# Patient Record
Sex: MALE | Race: WHITE | NOT HISPANIC OR LATINO | Employment: UNEMPLOYED | URBAN - METROPOLITAN AREA
[De-identification: names, ages, dates, MRNs, and addresses within clinical notes are randomized per-mention and may not be internally consistent; named-entity substitution may affect disease eponyms.]

---

## 2017-01-28 ENCOUNTER — HOSPITAL ENCOUNTER (EMERGENCY)
Facility: MEDICAL CENTER | Age: 22
End: 2017-01-28
Attending: EMERGENCY MEDICINE
Payer: MEDICAID

## 2017-01-28 VITALS
OXYGEN SATURATION: 96 % | DIASTOLIC BLOOD PRESSURE: 85 MMHG | HEIGHT: 70 IN | WEIGHT: 250.66 LBS | BODY MASS INDEX: 35.89 KG/M2 | HEART RATE: 92 BPM | SYSTOLIC BLOOD PRESSURE: 125 MMHG | TEMPERATURE: 97.7 F | RESPIRATION RATE: 16 BRPM

## 2017-01-28 DIAGNOSIS — S10.11XA ABRASION OF THROAT, INITIAL ENCOUNTER: ICD-10-CM

## 2017-01-28 PROCEDURE — 99283 EMERGENCY DEPT VISIT LOW MDM: CPT

## 2017-01-28 PROCEDURE — 700101 HCHG RX REV CODE 250: Performed by: EMERGENCY MEDICINE

## 2017-01-28 RX ORDER — BENZONATATE 100 MG/1
100 CAPSULE ORAL 3 TIMES DAILY PRN
Qty: 30 CAP | Refills: 0 | Status: SHIPPED | OUTPATIENT
Start: 2017-01-28 | End: 2017-04-02

## 2017-01-28 RX ADMIN — LIDOCAINE HYDROCHLORIDE 15 ML: 20 SOLUTION ORAL; TOPICAL at 17:40

## 2017-01-28 ASSESSMENT — PAIN SCALES - GENERAL: PAINLEVEL_OUTOF10: 4

## 2017-01-28 NOTE — ED AVS SNAPSHOT
Inmagic Access Code: [email or web address]  Expires: 2/27/2017  5:11 PM    Your email address is not on file at [email or web address].  Email Addresses are required for you to sign up for Inmagic, please contact [phone] to verify your personal information and to provide your email address prior to attempting to register for Inmagic.    [address] [place] [address]    Inmagic  A secure, online tool to manage your health information     [facility]’s Inmagic® is a secure, online tool that connects you to your personalized health information from the privacy of your home -- day or night - making it very easy for you to manage your healthcare. Once the activation process is completed, you can even access your medical information using the Inmagic bryan, which is available for free in the Apple Bryan store or Google Play store.     To learn more about Inmagic, visit [email or web address]    There are two levels of access available (as shown below):   My Chart Features  [facility] Primary Care Doctor [facility]  Specialists [facility]  Urgent  Care Non-[facility] Primary Care Doctor   Email your healthcare team securely and privately 24/7 X X X    Manage appointments: schedule your next appointment; view details of past/upcoming appointments X      Request prescription refills. X      View recent personal medical records, including lab and immunizations X X X X   View health record, including health history, allergies, medications X X X X   Read reports about your outpatient visits, procedures, consult and ER notes X X X X   See your discharge summary, which is a recap of your hospital and/or ER visit that includes your diagnosis, lab results, and care plan X X  X     How to register for Vibrowt:  Once your e-mail address has been verified, follow the following steps to sign up for Inmagic.     1. Go to  [email or web address]  2. Click on the Sign Up Now box, which takes you to the New Member Sign Up page. You will need to  provide the following information:  a. Enter your Zomato Access Code exactly as it appears at the top of this page. (You will not need to use this code after you’ve completed the sign-up process. If you do not sign up before the expiration date, you must request a new code.)   b. Enter your date of birth.   c. Enter your home email address.   d. Click Submit, and follow the next screen’s instructions.  3. Create a Zomato ID. This will be your Zomato login ID and cannot be changed, so think of one that is secure and easy to remember.  4. Create a Zomato password. You can change your password at any time.  5. Enter your Password Reset Question and Answer. This can be used at a later time if you forget your password.   6. Enter your e-mail address. This allows you to receive e-mail notifications when new information is available in Zomato.  7. Click Sign Up. You can now view your health information.    For assistance activating your Zomato account, call [phone]

## 2017-01-28 NOTE — ED AVS SNAPSHOT
1/28/2017          Fareed Muhammad  [address] [place] [address]    Dear Fareed:    [facility] wants to ensure your discharge home is safe and you or your loved ones have had all your questions answered regarding your care after you leave the hospital.    You may receive a telephone call within two days of your discharge.  This call is to make certain you understand your discharge instructions as well as ensure we provided you with the best care possible during your stay with us.     The call will only last approximately 3-5 minutes and will be done by a nurse.    Once again, we want to ensure your discharge home is safe and that you have a clear understanding of any next steps in your care.  If you have any questions or concerns, please do not hesitate to contact us, we are here for you.  Thank you for choosing [facility] for your healthcare needs.    Sincerely,    Shivam Ramos    [facility]

## 2017-01-28 NOTE — ED AVS SNAPSHOT
After Visit Summary                                                                                                                Fareed Muhammad   MRN: [id number]    Department:  [facility], Emergency Dept   Date of Visit:  1/28/2017            [facility], Emergency Dept    [address]    [place] [address]    Phone:  [phone]      You were seen by     Shivam Meyers M.D.      Your Diagnosis Was     Abrasion of throat, initial encounter     S10.11XA       Follow-up Information     1. Follow up with CLAUDIO De La Torre.    Specialty:  Family Medicine    Why:  As needed    Contact information    [address] [address] [phone]        Medication Information     Review all of your home medications and newly ordered medications with your primary doctor and/or pharmacist as soon as possible. Follow medication instructions as directed by your doctor and/or pharmacist.     Please keep your complete medication list with you and share with your physician. Update the information when medications are discontinued, doses are changed, or new medications (including over-the-counter products) are added; and carry medication information at all times in the event of emergency situations.               Medication List      START taking these medications        Instructions    benzonatate 100 MG Caps   Commonly known as:  TESSALON    Take 1 Cap by mouth 3 times a day as needed for Cough.   Dose:  100 mg                 Discharge Instructions       Choking, Adult  Choking occurs when a food or object gets stuck in the throat or trachea, blocking the airway. If the airway is partly blocked, coughing will usually cause the food or object to come out. If the airway is completely blocked, immediate action is needed to help it come out. A complete airway blockage is life threatening because it causes breathing to stop. Choking is a true medical emergency that  requires fast, appropriate action by anyone available.  SIGNS OF AIRWAY BLOCKAGE  There is a partial airway blockage if you or the person who is choking is:   · Able to breathe and speak.  · Coughing loudly.  · Making loud noises.  There is a complete airway blockage if you or the person who is choking is:   · Unable to breathe.  · Making soft or high-pitched sounds while breathing.  · Unable to cough or coughing weakly, ineffectively, or silently.  · Unable to cry, speak, or make sounds.  · Turning blue.  · Holding the neck with both arms. This is the universal sign of choking.  WHAT TO DO IF CHOKING OCCURS  If there is a partial airway blockage, allow coughing to clear the airway. Do not try to drink until the food or object comes out. If someone else has a partial airway blockage, do not interfere. Stay with him or her and watch for signs of complete airway blockage until the food or object comes out.   If there is a complete airway blockage or if there is a partial airway blockage and the food or object does not come out, perform abdominal thrusts (also referred to as the Heimlich maneuver). Abdominal thrusts are used to create an artificial cough to try to clear the airway. Performing abdominal thrusts is part of a series of steps that should be done to help someone who is choking. Abdominal thrusts are usually done by someone else, but if you are alone, you can perform abdominal thrusts on yourself. Follow the procedure below that best fits your situation.   IF SOMEONE ELSE IS CHOKING:  For a conscious adult:   1. Ask the person whether he or she is choking. If the person nods, continue to step 2.  2. Stand or kneel behind the person and lean him or her forward slightly.  3. Make a fist with 1 hand, put your arms around the person, and grasp your fist with your other hand. Place the thumb side of your fist in the person's abdomen, just below the ribs.  4. Press inward and upward with both hands.  5. Repeat  this maneuver until the object comes out and the person is able to breathe or until the person loses consciousness.  For an unconscious adult:  1. Shout for help. If someone responds, have him or her call local emergency services (971 in [place] If no one responds, call local emergency services yourself if possible.  2. Begin CPR, starting with compressions. Every time you open the airway to give rescue breaths, open the person's mouth. If you can see the food or object and it can be easily pulled out, remove it with your fingers.  3. After 5 cycles or 2 minutes of CPR, call local emergency services (781 in [place] if you or someone else did not already call.  For a conscious adult who is obese or in the later stages of pregnancy:  Abdominal thrusts may not be effective when helping people who are in the later stages of pregnancy or who are obese. In these instances, chest thrusts can be used.   1. Ask the person whether he or she is choking. If the person nods and has signs of complete airway blockage, continue to step 2.  2. Stand behind the person and wrap your arms around his or her chest (with your arms under the person's armpits).  3. Make a fist with 1 hand. Place the thumb side of your fist on the middle of the person's breastbone.  4. Grab your fist with your other hand and thrust backward. Continue this until the object comes out or until the person becomes unconscious.  For an unconscious adult who is obese or in the later stages of pregnancy:   1. Shout for help. If someone responds, have him or her call local emergency services (571 in [place] If no one responds, call local emergency services yourself if possible.  2. Begin CPR, starting with compressions. Every time you open the airway to give rescue breaths, open the person's mouth. If you can see the food or object and it can be easily pulled out, remove it with your fingers.  3. After 5 cycles or 2 minutes of CPR, call local emergency services (341 in  [place] if you or someone else did not already call.  Note that abdominal thrusts (below the rib cage) should be used for a pregnant woman if possible. This should be possible until the later stages of pregnancy when there is no longer enough room between the enlarging uterus and the rib cage to perform the maneuver. At that point, chest thrusts must be used as described.  IF YOU ARE CHOKIN. Call local emergency services (911 in [place] if near a landline. Do not worry about communicating what is happening. Do not hang up the phone. Someone may be sent to help you anyway.  2. Make a fist with 1 hand. Put the thumb side of the fist against your stomach, just above the belly button and well below the breastbone. If you are pregnant or obese, put your fist on your chest instead, just below the breastbone and just above your lowest ribs.  3. Hold your fist with your other hand and bend over a hard surface, such as a table or chair.  4. Forcefully push your fist in and up.  5. Continue to do this until the food or object comes out.  PREVENTION   To be prepared if choking occurs, learn how to correctly perform abdominal thrusts and give CPR by taking a certified first-aid training course.   SEEK IMMEDIATE MEDICAL CARE IF:  · You have a fever after choking stops.  · You have problems breathing after choking stops.  · You received the Heimlich maneuver.  MAKE SURE YOU:  · Understand these instructions.  · Watch your condition.  · Get help right away if you are not doing well or get worse.     This information is not intended to replace advice given to you by your health care provider. Make sure you discuss any questions you have with your health care provider.     Document Released: 2006 Document Revised: 2016 Document Reviewed: 2013  Elsevier Interactive Patient Education ©6 Elsevier Inc.            Patient Information     Patient Information    Following emergency treatment: all patient requiring  follow-up care must return either to a private physician or a clinic if your condition worsens before you are able to obtain further medical attention, please return to the emergency room.     Billing Information    At [facility], we work to make the billing process streamlined for our patients.  Our Representatives are here to answer any questions you may have regarding your hospital bill.  If you have insurance coverage and have supplied your insurance information to us, we will submit a claim to your insurer on your behalf.  Should you have any questions regarding your bill, we can be reached online or by phone as follows:  Online: You are able pay your bills online or live chat with our representatives about any billing questions you may have. We are here to help Monday - Friday from 8:00am to 7:30pm and 9:00am - 12:00pm on Saturdays.  Please visit [email or web address]  for more information.   Phone:  [phone] or [phone]    Please note that your emergency physician, surgeon, pathologist, radiologist, anesthesiologist, and other specialists are not employed by [facility] and will therefore bill separately for their services.  Please contact them directly for any questions concerning their bills at the numbers below:     Emergency Physician Services:  [phone]  [facility]:  [phone]  [facility]:  [phone]  [facility]:  [phone]    1. Your final bill may vary from the amount quoted upon discharge if all procedures are not complete at that time, or if your doctor has additional procedures of which we are not aware. You will receive an additional bill if you return to the Emergency Department at [facility] for suture removal regardless of the facility of which the sutures were placed.     2. Please arrange for settlement of this account at the emergency registration.    3. All self-pay accounts are due in  full at the time of treatment.  If you are unable to meet this obligation then payment is expected within 4-5 days.     4. If you have had radiology studies (CT, X-ray, Ultrasound, MRI), you have received a preliminary result during your emergency department visit. Please contact the radiology department [phone] to receive a copy of your final result. Please discuss the Final result with your primary physician or with the follow up physician provided.     Crisis Hotline:  [place] Crisis Hotline:  4-337-WHSGURO or 1-193.703.9959  [place] Crisis Hotline:    1-982.877.8369 or 332-370-3380         ED Discharge Follow Up Questions    1. In order to provide you with very good care, we would like to follow up with a phone call in the next few days.  May we have your permission to contact you?     YES /  NO    2. What is the best phone number to call you? (       )_____-__________    3. What is the best time to call you?      Morning  /  Afternoon  /  Evening                   Patient Signature:  ____________________________________________________________    Date:  ____________________________________________________________

## 2017-01-29 NOTE — DISCHARGE INSTRUCTIONS
The patient was informed of their blood pressure exceeding 120/80 and I have asked them to follow up with their primary care physician to discuss further monitoring and possible treatment.     Choking, Adult  Choking occurs when a food or object gets stuck in the throat or trachea, blocking the airway. If the airway is partly blocked, coughing will usually cause the food or object to come out. If the airway is completely blocked, immediate action is needed to help it come out. A complete airway blockage is life threatening because it causes breathing to stop. Choking is a true medical emergency that requires fast, appropriate action by anyone available.  SIGNS OF AIRWAY BLOCKAGE  There is a partial airway blockage if you or the person who is choking is:   · Able to breathe and speak.  · Coughing loudly.  · Making loud noises.  There is a complete airway blockage if you or the person who is choking is:   · Unable to breathe.  · Making soft or high-pitched sounds while breathing.  · Unable to cough or coughing weakly, ineffectively, or silently.  · Unable to cry, speak, or make sounds.  · Turning blue.  · Holding the neck with both arms. This is the universal sign of choking.  WHAT TO DO IF CHOKING OCCURS  If there is a partial airway blockage, allow coughing to clear the airway. Do not try to drink until the food or object comes out. If someone else has a partial airway blockage, do not interfere. Stay with him or her and watch for signs of complete airway blockage until the food or object comes out.   If there is a complete airway blockage or if there is a partial airway blockage and the food or object does not come out, perform abdominal thrusts (also referred to as the Heimlich maneuver). Abdominal thrusts are used to create an artificial cough to try to clear the airway. Performing abdominal thrusts is part of a series of steps that should be done to help someone who is choking. Abdominal thrusts are usually done  by someone else, but if you are alone, you can perform abdominal thrusts on yourself. Follow the procedure below that best fits your situation.   IF SOMEONE ELSE IS CHOKING:  For a conscious adult:   1. Ask the person whether he or she is choking. If the person nods, continue to step 2.  2. Stand or kneel behind the person and lean him or her forward slightly.  3. Make a fist with 1 hand, put your arms around the person, and grasp your fist with your other hand. Place the thumb side of your fist in the person's abdomen, just below the ribs.  4. Press inward and upward with both hands.  5. Repeat this maneuver until the object comes out and the person is able to breathe or until the person loses consciousness.  For an unconscious adult:  1. Shout for help. If someone responds, have him or her call local emergency services (911 in [place] If no one responds, call local emergency services yourself if possible.  2. Begin CPR, starting with compressions. Every time you open the airway to give rescue breaths, open the person's mouth. If you can see the food or object and it can be easily pulled out, remove it with your fingers.  3. After 5 cycles or 2 minutes of CPR, call local emergency services (911 in [place] if you or someone else did not already call.  For a conscious adult who is obese or in the later stages of pregnancy:  Abdominal thrusts may not be effective when helping people who are in the later stages of pregnancy or who are obese. In these instances, chest thrusts can be used.   1. Ask the person whether he or she is choking. If the person nods and has signs of complete airway blockage, continue to step 2.  2. Stand behind the person and wrap your arms around his or her chest (with your arms under the person's armpits).  3. Make a fist with 1 hand. Place the thumb side of your fist on the middle of the person's breastbone.  4. Grab your fist with your other hand and thrust backward. Continue this until the  object comes out or until the person becomes unconscious.  For an unconscious adult who is obese or in the later stages of pregnancy:   1. Shout for help. If someone responds, have him or her call [facility] emergency services (694 in [place] If no one responds, call local emergency services yourself if possible.  2. Begin CPR, starting with compressions. Every time you open the airway to give rescue breaths, open the person's mouth. If you can see the food or object and it can be easily pulled out, remove it with your fingers.  3. After 5 cycles or 2 minutes of CPR, call [facility] emergency services (020 in [place] if you or someone else did not already call.  Note that abdominal thrusts (below the rib cage) should be used for a pregnant woman if possible. This should be possible until the later stages of pregnancy when there is no longer enough room between the enlarging uterus and the rib cage to perform the maneuver. At that point, chest thrusts must be used as described.  IF YOU ARE CHOKIN. Call local emergency services (391 in [place] if near a landline. Do not worry about communicating what is happening. Do not hang up the phone. Someone may be sent to help you anyway.  2. Make a fist with 1 hand. Put the thumb side of the fist against your stomach, just above the belly button and well below the breastbone. If you are pregnant or obese, put your fist on your chest instead, just below the breastbone and just above your lowest ribs.  3. Hold your fist with your other hand and bend over a hard surface, such as a table or chair.  4. Forcefully push your fist in and up.  5. Continue to do this until the food or object comes out.  PREVENTION   To be prepared if choking occurs, learn how to correctly perform abdominal thrusts and give CPR by taking a certified first-aid training course.   SEEK IMMEDIATE MEDICAL CARE IF:  · You have a fever after choking stops.  · You have problems breathing after choking stops.  · You  received the Heimlich maneuver.  MAKE SURE YOU:  · Understand these instructions.  · Watch your condition.  · Get help right away if you are not doing well or get worse.     This information is not intended to replace advice given to you by your health care provider. Make sure you discuss any questions you have with your health care provider.     Document Released: 01/25/2006 Document Revised: 01/08/2016 Document Reviewed: 07/30/2013  ElseUpTo Interactive Patient Education ©2016 Elsevier Inc.

## 2017-01-29 NOTE — ED NOTES
".  Chief Complaint   Patient presents with   • Sore Throat     x1 day     C/o swelling and pain to throat x 1 day, reports \"white patches\"  "

## 2017-01-29 NOTE — ED PROVIDER NOTES
"ED Provider Note    Scribed for Shivam Meyers M.D. by Syeda Weinstein. 1/28/2017, 5:03 PM.    Primary care provider: CLAUDIO De La Torre  Means of arrival: Walk-In  History obtained from: Patient  History limited by: None    CHIEF COMPLAINT  Chief Complaint   Patient presents with   • Sore Throat     x1 day     HPI  Fareed Muhammad is a 21 y.o. male with asthma who presents to the Emergency Department with persistent throat pain without associated cough or dysphagia onset one day ago secondary to a potential retained tortilla chip. Today the patient was eating tortilla chips when he believes a chip may have gotten \"stuck\" in the back of his throat. Since that time he has noted persistent throat pain. He has not developed fever, chills, breath shortness, wheezing, cough, or sputum production.  The patient reports suffering from strep throat multiple times in the past and has come to the ED for concern of infection.  The patient last saw an ENT three years ago.  He is otherwise healthy, without known allergies.  The patient smokes cigarettes daily. He denies additional symptoms or further pertinent medical history.     REVIEW OF SYSTEMS  See HPI,  Negative for fevers, chills, breath shortness, wheezing, cough, sputum production      PAST MEDICAL HISTORY   has a past medical history of ASTHMA and Drug abuse in remission.    SURGICAL HISTORY  patient denies any surgical history    SOCIAL HISTORY  Social History   Substance Use Topics   • Smoking status: Current Every Day Smoker -- 0.25 packs/day for 3 years   • Smokeless tobacco: None      Comment: currently using e-cigarettes   • Alcohol Use: No      History   Drug Use   • Yes   • Special: Marijuana, Methamphetamines     FAMILY HISTORY  Family History   Problem Relation Age of Onset   • Cancer Paternal Aunt      pradip marie   • Hypertension Maternal Grandmother    • Diabetes Maternal Grandmother    • Cancer Maternal Grandmother    • Hypertension Maternal " "Grandfather    • Diabetes Maternal Grandfather      CURRENT MEDICATIONS  Reviewed.  See Encounter Summary.     ALLERGIES  No Known Allergies    PHYSICAL EXAM  VITAL SIGNS: /81 mmHg  Pulse 90  Temp(Src) 36.3 °C (97.4 °F)  Resp 16  Ht 1.778 m (5' 10\")  Wt 113.7 kg (250 lb 10.6 oz)  BMI 35.97 kg/m2  SpO2 96%  Constitutional: Awake, alert in no apparent distress.  HENT: Normocephalic, Bilateral external ears normal. Nose normal. Oropharynx within normal limits, no foreign body seen, there are mild cryptic tonsils. The patient is controlling his secretions.  Eyes: Conjunctiva normal, non-icteric, EOMI.    Thorax & Lungs: Easy unlabored respirations, no wheezes, no stridor  Cardiovascular:    Abdomen: Nondistended   :    Skin: Visualized skin is  Dry, No erythema, No rash.   Extremities:   No cyanosis, clubbing or edema  Neurologic: Alert, Grossly non-focal.   Psychiatric: Affect and Mood normal    COURSE & MEDICAL DECISION MAKING  Nursing notes and vital signs were reviewed. Pertinent Labs & Imaging studies reviewed. (See chart for details)    5:03 PM - Patient seen and examined at bedside. Patient presents with a throat abrasion. He was informed that his foreign body, as it was food, likely dissolved and he simply has persistent pain from a small abrasion.   I discussed his plans for discharge with a prescription for Tessalon. He was instructed to return to the ED if his symptoms worsen. Patient understands and agrees.     Patient's blood pressure was elevated, I believe it is likely secondary to medical condition. However I have advised home monitoring and if it continues to be 120/80 or higher, advised to followup with primary care physician for blood pressure management.     Decision Making:  This is a 21 y.o. year old male who presents with complaints of possibly retained foreign body in the throat. He does not have any respiratory distress, he is controlling his secretions, he denies any cough. Given " this history the foreign body would not have lodged in the vestibule of the larynx. The foreign body is a easily dissolvable potato chip, it should not be retained in the oropharynx or esophagus. He is controlling his secretions without difficulty. I do not feel that radiological studies would be helpful and I do not feel that he requires a rhinoscopy. Most likely he is experiencing pain from the abrasion of a hard potato chip. He was able to tolerate viscous lidocaine and noted improvement with this. He'll be discharged Tessalon pearls.  He is to return for any difficulty breathing, difficulty swallowing or any other concern.    DISPOSITION:  Patient will be discharged home in good condition.    Discharge Medications:  New Prescriptions    BENZONATATE (TESSALON) 100 MG CAP    Take 1 Cap by mouth 3 times a day as needed for Cough.     The patient was discharged home (see d/c instructions) and told to return immediately for any signs or symptoms listed, or any worsening at all.  The patient verbally agreed to the discharge precautions and follow-up plan which is documented in EPIC.    FINAL IMPRESSION  1. Abrasion of throat, initial encounter        Syeda WELLER (Scribe), am scribing for, and in the presence of, Shivam Meyers M.D..    Electronically signed by: Syeda Weinstein (Latiaibe), 1/28/2017    Shivam WELLER M.D. personally performed the services described in this documentation, as scribed by Syeda Weinstein in my presence, and it is both accurate and complete.    The note accurately reflects work and decisions made by me.  Shivam Meyers  1/28/2017  5:16 PM

## 2017-01-29 NOTE — ED NOTES
"Pt c/o sore throat since yesterday. States \"I swallowed a chip and I feel like it got stuck or scraped my throat, I have had strep throat before and want to catch it before I get infected.\" white coat noted to back of tongue, no patches noted to tonsils. Pt a/o x4, speaking in full sentences.   "

## 2017-01-29 NOTE — ED NOTES
MD at bedside prior to d/c. Pt given d/c instruction. One rx given. Pt ambulatory to lobby, gait steady. Pt took all belongings from room.

## 2017-03-26 ENCOUNTER — HOSPITAL ENCOUNTER (EMERGENCY)
Facility: MEDICAL CENTER | Age: 22
End: 2017-03-26
Payer: MEDICAID

## 2017-03-26 VITALS
HEART RATE: 81 BPM | SYSTOLIC BLOOD PRESSURE: 145 MMHG | WEIGHT: 247.8 LBS | DIASTOLIC BLOOD PRESSURE: 79 MMHG | TEMPERATURE: 96.6 F | RESPIRATION RATE: 16 BRPM | HEIGHT: 70 IN | OXYGEN SATURATION: 97 % | BODY MASS INDEX: 35.48 KG/M2

## 2017-03-26 PROCEDURE — 302449 STATCHG TRIAGE ONLY (STATISTIC)

## 2017-03-27 NOTE — ED NOTES
Chief Complaint   Patient presents with   • Ear Pain     left ear. x2 hours. Patient felt ear swelling after sitting for plasma donation. ear is red.   Pt ambulatory to triage with above complaint. Pt returned to lobby, educated on triage process, and to inform staff of any changes or concerns.

## 2017-03-27 NOTE — ED NOTES
Pt came to desk requesting AMA. Pt advised of risk. Pt acknowledges and understands risk. Py. Informed he was assigned to a room but needed to get home and would come back if symptoms worsened. Pt signed out AMA.

## 2017-04-02 ENCOUNTER — HOSPITAL ENCOUNTER (EMERGENCY)
Facility: MEDICAL CENTER | Age: 22
End: 2017-04-02
Attending: EMERGENCY MEDICINE
Payer: MEDICAID

## 2017-04-02 VITALS
BODY MASS INDEX: 34.36 KG/M2 | DIASTOLIC BLOOD PRESSURE: 94 MMHG | HEART RATE: 89 BPM | HEIGHT: 70 IN | WEIGHT: 240 LBS | SYSTOLIC BLOOD PRESSURE: 138 MMHG | TEMPERATURE: 98 F | RESPIRATION RATE: 16 BRPM

## 2017-04-02 DIAGNOSIS — L08.9 SKIN INFECTION: ICD-10-CM

## 2017-04-02 PROCEDURE — 99284 EMERGENCY DEPT VISIT MOD MDM: CPT

## 2017-04-02 RX ORDER — CEPHALEXIN 500 MG/1
500 CAPSULE ORAL 4 TIMES DAILY
Qty: 28 CAP | Refills: 0 | Status: SHIPPED | OUTPATIENT
Start: 2017-04-02 | End: 2018-04-28

## 2017-04-02 ASSESSMENT — LIFESTYLE VARIABLES
TOTAL SCORE: 0
HAVE PEOPLE ANNOYED YOU BY CRITICIZING YOUR DRINKING: NO
TOTAL SCORE: 0
DO YOU DRINK ALCOHOL: YES
EVER FELT BAD OR GUILTY ABOUT YOUR DRINKING: NO
CONSUMPTION TOTAL: INCOMPLETE
EVER HAD A DRINK FIRST THING IN THE MORNING TO STEADY YOUR NERVES TO GET RID OF A HANGOVER: NO
TOTAL SCORE: 0
HAVE YOU EVER FELT YOU SHOULD CUT DOWN ON YOUR DRINKING: NO

## 2017-04-02 ASSESSMENT — PAIN SCALES - GENERAL: PAINLEVEL_OUTOF10: 2

## 2017-04-02 NOTE — ED PROVIDER NOTES
ED Provider Note    CHIEF COMPLAINT  Chief Complaint   Patient presents with   • Legal 2000     Pt was released this am from intermediate, picked up for sleeping on the street, placed on legal after staff felt pt was rambling and unsafe to release on his own   • Hand Pain     rt, small abrasion on rt knuckle       HPI  Fareed Muhammad is a 21 y.o. male who presents for evaluation of a hand injury. The patient was just released from intermediate. He is homeless. He was evidently picked up for sleeping on the street. He was placed on a legal hold at the time of his release from the intermediate because he was disoriented. Upon arrival here he is oriented to person, place, and date. He states he did injure his right hand when they were putting handcuffs on him. He has no other physical complaints.    REVIEW OF SYSTEMS  See HPI for further details. All other systems are negative.     PAST MEDICAL HISTORY  Past Medical History   Diagnosis Date   • ASTHMA      Dx at age 5, does not use and inhaler, and has never had an attack   • Drug abuse in remission      meth, alcohol and tobacco since 14       FAMILY HISTORY  Family History   Problem Relation Age of Onset   • Cancer Paternal Aunt      pradip marie   • Hypertension Maternal Grandmother    • Diabetes Maternal Grandmother    • Cancer Maternal Grandmother    • Hypertension Maternal Grandfather    • Diabetes Maternal Grandfather        SOCIAL HISTORY  Social History     Social History   • Marital Status: Single     Spouse Name: N/A   • Number of Children: N/A   • Years of Education: N/A     Social History Main Topics   • Smoking status: Current Every Day Smoker -- 0.25 packs/day for 3 years   • Smokeless tobacco: None      Comment: currently using e-cigarettes   • Alcohol Use: No   • Drug Use: Yes     Special: Marijuana, Methamphetamines   • Sexual Activity:     Partners: Female     Birth Control/ Protection: Condom     Other Topics Concern   • None     Social History Narrative       SURGICAL  "HISTORY  History reviewed. No pertinent past surgical history.    CURRENT MEDICATIONS  Home Medications     Reviewed by Sobia Fuchs R.N. (Registered Nurse) on 04/02/17 at 1021  Med List Status: Complete    Medication Last Dose Status          Patient Juliano Taking any Medications                        ALLERGIES  No Known Allergies    PHYSICAL EXAM  VITAL SIGNS: /94 mmHg  Pulse 89  Temp(Src) 36.7 °C (98 °F)  Resp 16  Ht 1.778 m (5' 10\")  Wt 108.863 kg (240 lb)  BMI 34.44 kg/m2    Constitutional: Well developed, Well nourished, No acute distress, Non-toxic appearance.   HENT: Normocephalic, Atraumatic.   Eyes:  EOMI, Conjunctiva normal, No discharge.   Cardiovascular: Normal heart rate.   Thorax & Lungs: No respiratory distress.   Skin: Warm, Dry. Sunburn to his chest.  Musculoskeletal: Right hand shows no obvious deformity. He has an abrasion overlying the index MCP joint region. There appears to be a secondary cellulitis. He has another minor abrasion to the dorsal aspect of the middle finger overlying the PIP joint region. He has full range of motion of all digits.  Neurologic: Awake alert and oriented x 3. Cranial nerves II through XII are intact. Normal motor function, No focal deficits noted.       COURSE & MEDICAL DECISION MAKING  Pertinent Labs & Imaging studies reviewed. (See chart for details)  This 21-year-old here for evaluation of a hand injury as well as being on a legal 2000. The patient denies any suicidal or homicidal ideations. He is oriented to person, place, and date. I see no indication for a legal 2000 at this point. He's been evaluated by Rebekah of life [facility]. He does appear to have a cellulitis secondary to a hand abrasion and I will start him on Keflex. He is given a discharge instruction sheet on skin infections.    FINAL IMPRESSION  1. Abrasion to the right hand with secondary cellulitis  2.   3.         Electronically signed by: Elias Adamson, 4/2/2017 10:54 AM      "

## 2017-04-02 NOTE — ED AVS SNAPSHOT
Home Care Instructions                                                                                                                Fareed Muhammad   MRN: [id number]    Department:  [facility], Emergency Dept   Date of Visit:  4/2/2017            [facility], Emergency Dept    [address]    [place] [address]    Phone:  [phone]      You were seen by     Elias Adamson M.D.      Your Diagnosis Was     Skin infection     L08.9       Follow-up Information     1. Follow up with CLAUDIO De La Torre.    Specialty:  Family Medicine    Contact information    [address]  [address]  [place] [address] [phone]        Medication Information     Review all of your home medications and newly ordered medications with your primary doctor and/or pharmacist as soon as possible. Follow medication instructions as directed by your doctor and/or pharmacist.     Please keep your complete medication list with you and share with your physician. Update the information when medications are discontinued, doses are changed, or new medications (including over-the-counter products) are added; and carry medication information at all times in the event of emergency situations.               Medication List      START taking these medications        Instructions    Morning Afternoon Evening Bedtime    cephALEXin 500 MG Caps   Commonly known as:  KEFLEX        Take 1 Cap by mouth 4 times a day.   Dose:  500 mg                             Where to Get Your Medications      You can get these medications from any pharmacy     Bring a paper prescription for each of these medications    - cephALEXin 500 MG Caps              Discharge Instructions       Skin Infections  A skin infection usually develops as a result of disruption of the skin barrier.   CAUSES   A skin infection might occur following:  · Trauma or an injury to the skin such as a cut or insect sting.   · Inflammation (as in  eczema).   · Breaks in the skin between the toes (as in athlete's foot).   · Swelling (edema).   SYMPTOMS   The legs are the most common site affected. Usually there is:  · Redness.   · Swelling.   · Pain.   · There may be red streaks in the area of the infection.   TREATMENT   · Minor skin infections may be treated with topical antibiotics, but if the skin infection is severe, hospital care and intravenous (IV) antibiotic treatment may be needed.   · Most often skin infections can be treated with oral antibiotic medicine as well as proper rest and elevation of the affected area until the infection improves.   · If you are prescribed oral antibiotics, it is important to take them as directed and to take all the pills even if you feel better before you have finished all of the medicine.   · You may apply warm compresses to the area for 20-30 minutes 4 times daily.   You might need a tetanus shot now if:  · You have no idea when you had the last one.   · You have never had a tetanus shot before.   · Your wound had dirt in it.   If you need a tetanus shot and you decide not to get one, there is a rare chance of getting tetanus. Sickness from tetanus can be serious. If you get a tetanus shot, your arm may swell and become red and warm at the shot site. This is common and not a problem.  SEEK MEDICAL CARE IF:   The pain and swelling from your infection do not improve within 2 days.   SEEK IMMEDIATE MEDICAL CARE IF:   You develop a fever, chills, or other serious problems.   Document Released: 01/25/2006 Document Revised: 03/11/2013 Document Reviewed: 12/07/2009  [employer]® Patient Information ©2013 [employer].          Patient Information     Patient Information    Following emergency treatment: all patient requiring follow-up care must return either to a private physician or a clinic if your condition worsens before you are able to obtain further medical attention, please return to the emergency room.     Billing  Information    At [facility], we work to make the billing process streamlined for our patients.  Our Representatives are here to answer any questions you may have regarding your hospital bill.  If you have insurance coverage and have supplied your insurance information to us, we will submit a claim to your insurer on your behalf.  Should you have any questions regarding your bill, we can be reached online or by phone as follows:  Online: You are able pay your bills online or live chat with our representatives about any billing questions you may have. We are here to help Monday - Friday from 8:00am to 7:30pm and 9:00am - 12:00pm on Saturdays.  Please visit [email or web address]  for more information.   Phone:  [phone] or [phone]    Please note that your emergency physician, surgeon, pathologist, radiologist, anesthesiologist, and other specialists are not employed by [facility] and will therefore bill separately for their services.  Please contact them directly for any questions concerning their bills at the numbers below:     Emergency Physician Services:  [phone]  [facility]:  [phone]  [facility]:  [phone]  [facility]:  [phone]    1. Your final bill may vary from the amount quoted upon discharge if all procedures are not complete at that time, or if your doctor has additional procedures of which we are not aware. You will receive an additional bill if you return to the Emergency Department at [facility] for suture removal regardless of the facility of which the sutures were placed.     2. Please arrange for settlement of this account at the emergency registration.    3. All self-pay accounts are due in full at the time of treatment.  If you are unable to meet this obligation then payment is expected within 4-5 days.     4. If you have had radiology studies (CT, X-ray, Ultrasound, MRI), you have  received a preliminary result during your emergency department visit. Please contact the radiology department [phone] to receive a copy of your final result. Please discuss the Final result with your primary physician or with the follow up physician provided.     Crisis Hotline:  [place] Crisis Hotline:  5-350-ZLRWNZS or 1-563.786.8039  [place] Crisis Hotline:    1-986.396.4053 or 510-486-8215         ED Discharge Follow Up Questions    1. In order to provide you with very good care, we would like to follow up with a phone call in the next few days.  May we have your permission to contact you?     YES /  NO    2. What is the best phone number to call you? (       )_____-__________    3. What is the best time to call you?      Morning  /  Afternoon  /  Evening                   Patient Signature:  ____________________________________________________________    Date:  ____________________________________________________________

## 2017-04-02 NOTE — ED NOTES
Alert Team eval complete.  Pt states understanding of dc instructions and f/u care.   Financial Assist Rep @ BS.

## 2017-04-02 NOTE — CONSULTS
"[facility]   TRIAGE ASSESSMENT    Name: Fareed Muhammad  MRN: [id number]  : 1995  Age: 21 y.o.  Date of assessment: 2017  PCP: CLAUDIO De La Torre  Persons in attendance: Patient    CHIEF COMPLAINT/PRESENTING ISSUE (as stated by No Im not going to kill myself why would I life is good.\"  Was released from longterm but they placed him on legal until assess. By Eben.  Dr. Adamson exam. Him and removed from legal , put on script for infection in hand.          ):   Chief Complaint   Patient presents with   • Legal      Pt was released this am from longterm, picked up for sleeping on the street, placed on legal after staff felt pt was rambling and unsafe to release on his own   • Hand Pain     rt, small abrasion on rt knuckle        CURRENT LIVING SITUATION/SOCIAL SUPPORT: homeless recently, but lived with parents before that, and is calling mother after DC to come get him.  Has no children, 5 siblings, both parents alive.    BEHAVIORAL HEALTH TREATMENT HISTORY  Does patient/parent report a history of prior behavioral health treatment for patient?   once at [facility] 18 yr old, cant remember what problem was, poss. drugs.    SAFETY ASSESSMENT - SELF  Does patient acknowledge current or past symptoms of dangerousness to self? no  Does parent/significant other report patient has current or past symptoms of dangerousness to self? no  Does presenting problem suggest symptoms of dangerousness to self? No  \"Hell no why would I want to hurt myself.?\"      SAFETY ASSESSMENT - OTHERS  Does patient acknowledge current or past symptoms of aggressive behavior or risk to others? no  Does parent/significant other report patient has current or past symptoms of aggressive behavior or risk to others?  no  Does presenting problem suggest symptoms of dangerousness to others? No    Crisis Safety Plan completed and copy given to patient? N\A    ABUSE/NEGLECT SCREENING  Does patient report feeling “unsafe” in his/her " "home, or afraid of anyone?  no  Does patient report any history of physical, sexual, or emotional abuse?  no  Does parent or significant other report any of the above? no  Is there evidence of neglect by self?  no  Is there evidence of neglect by a caregiver? no  Does the patient/parent report any history of CPS/APS/police involvement related to suspected abuse/neglect or domestic violence? no  Based on the information provided during the current assessment, is a mandated report of suspected abuse/neglect being made?  No     SUBSTANCE USE SCREENING  Yes:  Qasim all substances used in the past 30 days:      Last Use Amount   []   Alcohol     []   Marijuana     []   Heroin     []   Prescription Opioids  (used without prescription, for    recreation, or in excess of prescribed amount)     []   Other Prescription  (used without prescription, for    recreation, or in excess of prescribed amount)     []   Cocaine      []   Methamphetamine     []   \"\" drugs (ectasy, MDMA)     []   Other substances        UDS results: pending   Breathalyzer results: 0.00      What consequences does the patient associate with any of the above substance use and or addictive behaviors? None    Risk factors for detox (check all that apply):  []  Seizures   []  Diaphoretic (sweating)   []  Tremors   []  Hallucinations   []  Increased blood pressure   []  Decreased blood pressure   []  Other   []  None      [] Patient education on risk factors for detoxification and instructed to return to ER as needed.      UDS results: pending   Breathalyzer results: 0.00    Risk factors for detox (check all that apply):  [] Seizures   [] Diaphoretic (sweating)   [] Tremors   [] Hallucinations   [] Increased blood pressure   [] Decreased blood pressure   [] Other    [] Patient education on risk factors for detoxification and instructed to return to ER as needed.  MENTAL STATUS   Participation: Active verbal participation  Grooming: Inappropriate to " situation  Orientation: Alert and Fully Oriented  Behavior: Agitated  Eye contact: Poor  Mood: Anxious  Affect: Constricted  Thought process: Circumstantial  Thought content: Preoccupation  Speech: Pressured and Rapid  Perception: Within normal limits  Memory:  Poor memory for chronology of events  Insight: Poor  Judgment:  Poor  Other:    Collateral information: police    Source:  [] Significant other present in person:   [] Significant other by telephone  [] [facility]   [] [facility] Nursing Staff  [] [facility]  [] Other:     [] Unable to complete full assessment due to:  [] Acute intoxication  [] Patient declined to participate/engage  [] Patient verbally unresponsive  [] Significant cognitive deficits  [] Significant perceptual distortions or behavioral disorganization  [] Other:      CLINICAL IMPRESSIONS:  Primary:  Substance abuse  Secondary:  Dep         IDENTIFIED NEEDS/PLAN:  [Trigger DISPOSITION list for any items marked]  His addiction is keeping him from remaining employed , so he will return to parents home or has instructions about [facility] if he decides wants recovery    []  Imminent safety risk - self [] Imminent safety risk - others   []  Acute substance withdrawl []  Psychosis/Impaired reality testing   []  Mood/anxiety [x]  Substance use/Addictive behavior   []  Maladaptive behaviro []  Parent/child conflict   []  Family/Couples conflict []  Biomedical   [x]  Housing [x]  Financial   []   Legal  Occupational/Educational   []  Domestic violence []  Other:     Disposition: removed from legal 20000 by Dr. Adamson, able to commit to no self or other harm,denies suicidal or homicidal.  bus pass and resource sheet with [facility] marked if he decides he wants to get clean at later date.     Does patient express agreement with the above plan? yes    Referral appointment(s) scheduled? N\A    Alert team only: 21 yr old cauc. Male with long hx addiction, just released intermediate and they felt he  needed ER assess. Before being back on st.   I have discussed findings and recommendations with Dr. Adamson who is in agreement with these recommendations.     Referral documentation sent to the following facilities:  [facility]   Was signed back up for Medicaid while visit to this facility.       Rebekah Jara R.N.     4/2/2017

## 2017-04-02 NOTE — ED AVS SNAPSHOT
4/2/2017          Fareed Muhammad  [address] [place] [address]    Dear Fareed:    [facility] wants to ensure your discharge home is safe and you or your loved ones have had all your questions answered regarding your care after you leave the hospital.    You may receive a telephone call within two days of your discharge.  This call is to make certain you understand your discharge instructions as well as ensure we provided you with the best care possible during your stay with us.     The call will only last approximately 3-5 minutes and will be done by a nurse.    Once again, we want to ensure your discharge home is safe and that you have a clear understanding of any next steps in your care.  If you have any questions or concerns, please do not hesitate to contact us, we are here for you.  Thank you for choosing [facility] for your healthcare needs.    Sincerely,    Shivam Ramos    [facility]

## 2017-04-02 NOTE — ED NOTES
Pt amb to triage.  Chief Complaint   Patient presents with   • Legal 2000     Pt was released this am from [facility], picked up for sleeping on the street, placed on legal after staff felt pt was rambling and unsafe to release on his own   • Hand Pain     rt, small abrasion on rt knuckle     Pt in triage room 2 w/ Alert Team.

## 2017-04-02 NOTE — ED AVS SNAPSHOT
Quovo Access Code: [email or web address]  Expires: 5/2/2017 10:57 AM    Your email address is not on file at [facility].  Email Addresses are required for you to sign up for Quovo, please contact [phone] to verify your personal information and to provide your email address prior to attempting to register for Quovo.    [address] [place] [address]    Quovo  A secure, online tool to manage your health information     [facility]’s Quovo® is a secure, online tool that connects you to your personalized health information from the privacy of your home -- day or night - making it very easy for you to manage your healthcare. Once the activation process is completed, you can even access your medical information using the Quovo bryan, which is available for free in the Apple Bryan store or Google Play store.     To learn more about Quovo, visit [email or web address]    There are two levels of access available (as shown below):   My Chart Features  [facility] Primary Care Doctor [facility]  Specialists [facility]  Urgent  Care Non-[facility] Primary Care Doctor   Email your healthcare team securely and privately 24/7 X X X    Manage appointments: schedule your next appointment; view details of past/upcoming appointments X      Request prescription refills. X      View recent personal medical records, including lab and immunizations X X X X   View health record, including health history, allergies, medications X X X X   Read reports about your outpatient visits, procedures, consult and ER notes X X X X   See your discharge summary, which is a recap of your hospital and/or ER visit that includes your diagnosis, lab results, and care plan X X  X     How to register for Quovo:  Once your e-mail address has been verified, follow the following steps to sign up for Quovo.     1. Go to  [email or web address]  2. Click on the Sign Up Now box, which takes you to the New Member Sign Up page. You will need to  provide the following information:  a. Enter your Webalo Access Code exactly as it appears at the top of this page. (You will not need to use this code after you’ve completed the sign-up process. If you do not sign up before the expiration date, you must request a new code.)   b. Enter your date of birth.   c. Enter your home email address.   d. Click Submit, and follow the next screen’s instructions.  3. Create a Webalo ID. This will be your Webalo login ID and cannot be changed, so think of one that is secure and easy to remember.  4. Create a Webalo password. You can change your password at any time.  5. Enter your Password Reset Question and Answer. This can be used at a later time if you forget your password.   6. Enter your e-mail address. This allows you to receive e-mail notifications when new information is available in Webalo.  7. Click Sign Up. You can now view your health information.    For assistance activating your Webalo account, call [phone]

## 2017-04-02 NOTE — DISCHARGE INSTRUCTIONS
Skin Infections  A skin infection usually develops as a result of disruption of the skin barrier.   CAUSES   A skin infection might occur following:  · Trauma or an injury to the skin such as a cut or insect sting.   · Inflammation (as in eczema).   · Breaks in the skin between the toes (as in athlete's foot).   · Swelling (edema).   SYMPTOMS   The legs are the most common site affected. Usually there is:  · Redness.   · Swelling.   · Pain.   · There may be red streaks in the area of the infection.   TREATMENT   · Minor skin infections may be treated with topical antibiotics, but if the skin infection is severe, hospital care and intravenous (IV) antibiotic treatment may be needed.   · Most often skin infections can be treated with oral antibiotic medicine as well as proper rest and elevation of the affected area until the infection improves.   · If you are prescribed oral antibiotics, it is important to take them as directed and to take all the pills even if you feel better before you have finished all of the medicine.   · You may apply warm compresses to the area for 20-30 minutes 4 times daily.   You might need a tetanus shot now if:  · You have no idea when you had the last one.   · You have never had a tetanus shot before.   · Your wound had dirt in it.   If you need a tetanus shot and you decide not to get one, there is a rare chance of getting tetanus. Sickness from tetanus can be serious. If you get a tetanus shot, your arm may swell and become red and warm at the shot site. This is common and not a problem.  SEEK MEDICAL CARE IF:   The pain and swelling from your infection do not improve within 2 days.   SEEK IMMEDIATE MEDICAL CARE IF:   You develop a fever, chills, or other serious problems.   Document Released: 01/25/2006 Document Revised: 03/11/2013 Document Reviewed: 12/07/2009  [employer]® Patient Information ©2013 [employer].

## 2017-05-31 ENCOUNTER — APPOINTMENT (OUTPATIENT)
Dept: INTERNAL MEDICINE | Facility: MEDICAL CENTER | Age: 22
End: 2017-05-31
Payer: MEDICAID

## 2017-08-07 ENCOUNTER — HOSPITAL ENCOUNTER (EMERGENCY)
Dept: HOSPITAL 8 - ED | Age: 22
Discharge: HOME | End: 2017-08-07
Payer: MEDICAID

## 2017-08-07 VITALS — BODY MASS INDEX: 40.84 KG/M2 | HEIGHT: 70 IN | WEIGHT: 285.28 LBS

## 2017-08-07 VITALS — SYSTOLIC BLOOD PRESSURE: 147 MMHG | DIASTOLIC BLOOD PRESSURE: 93 MMHG
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
## 2017-08-07 DIAGNOSIS — T63.301A: Primary | ICD-10-CM

## 2017-08-07 PROCEDURE — 99281 EMR DPT VST MAYX REQ PHY/QHP: CPT

## 2018-04-28 ENCOUNTER — HOSPITAL ENCOUNTER (EMERGENCY)
Facility: MEDICAL CENTER | Age: 23
End: 2018-04-29
Attending: EMERGENCY MEDICINE
Payer: MEDICAID

## 2018-04-28 DIAGNOSIS — R45.850 HOMICIDAL IDEATION: ICD-10-CM

## 2018-04-28 LAB
AMPHET UR QL SCN: NEGATIVE
BARBITURATES UR QL SCN: NEGATIVE
BENZODIAZ UR QL SCN: NEGATIVE
BZE UR QL SCN: NEGATIVE
CANNABINOIDS UR QL SCN: POSITIVE
METHADONE UR QL SCN: NEGATIVE
OPIATES UR QL SCN: NEGATIVE
OXYCODONE UR QL SCN: NEGATIVE
PCP UR QL SCN: NEGATIVE
POC BREATHALIZER: 0 PERCENT (ref 0–0.01)
PROPOXYPH UR QL SCN: NEGATIVE

## 2018-04-28 PROCEDURE — 99285 EMERGENCY DEPT VISIT HI MDM: CPT

## 2018-04-28 PROCEDURE — 302970 POC BREATHALIZER: Performed by: EMERGENCY MEDICINE

## 2018-04-28 PROCEDURE — 302970 POC BREATHALIZER

## 2018-04-28 PROCEDURE — 80307 DRUG TEST PRSMV CHEM ANLYZR: CPT

## 2018-04-28 RX ORDER — TRAZODONE HYDROCHLORIDE 100 MG/1
100 TABLET ORAL
Status: SHIPPED | COMMUNITY
End: 2022-03-03

## 2018-04-28 RX ORDER — MELOXICAM 7.5 MG/1
7.5 TABLET ORAL DAILY
Status: SHIPPED | COMMUNITY
End: 2022-03-03

## 2018-04-28 RX ORDER — QUETIAPINE FUMARATE 100 MG/1
100 TABLET, FILM COATED ORAL 2 TIMES DAILY
Status: SHIPPED | COMMUNITY
End: 2022-03-03

## 2018-04-28 RX ORDER — LORAZEPAM 1 MG/1
1 TABLET ORAL EVERY 6 HOURS PRN
Status: DISCONTINUED | OUTPATIENT
Start: 2018-04-28 | End: 2018-04-29 | Stop reason: HOSPADM

## 2018-04-28 RX ORDER — CARBAMAZEPINE 200 MG/1
200 TABLET ORAL 2 TIMES DAILY
Status: SHIPPED | COMMUNITY
End: 2022-03-03

## 2018-04-28 ASSESSMENT — LIFESTYLE VARIABLES
HAVE PEOPLE ANNOYED YOU BY CRITICIZING YOUR DRINKING: YES
EVER HAD A DRINK FIRST THING IN THE MORNING TO STEADY YOUR NERVES TO GET RID OF A HANGOVER: YES
AVERAGE NUMBER OF DAYS PER WEEK YOU HAVE A DRINK CONTAINING ALCOHOL: 5
TOTAL SCORE: 2
TOTAL SCORE: 2
HAVE YOU EVER FELT YOU SHOULD CUT DOWN ON YOUR DRINKING: NO
DO YOU DRINK ALCOHOL: YES
EVER FELT BAD OR GUILTY ABOUT YOUR DRINKING: NO
DOES PATIENT WANT TO STOP DRINKING: NO
HOW MANY TIMES IN THE PAST YEAR HAVE YOU HAD 5 OR MORE DRINKS IN A DAY: 150
CONSUMPTION TOTAL: POSITIVE
ON A TYPICAL DAY WHEN YOU DRINK ALCOHOL HOW MANY DRINKS DO YOU HAVE: 8
TOTAL SCORE: 2

## 2018-04-28 ASSESSMENT — PAIN SCALES - GENERAL
PAINLEVEL_OUTOF10: 1
PAINLEVEL_OUTOF10: 0

## 2018-04-28 NOTE — ED NOTES
Patient resting on bed, respirations even and unlabored.     Sitter in direct observation at all times.

## 2018-04-28 NOTE — ED NOTES
Patient resting on bed, respirations even and unlabored, NAD noted.    Sitter in direct observation of patient at all times.

## 2018-04-28 NOTE — ED NOTES
"Pt yelling at staff \"Hey did Freddy Wilson show up yet?  We were supposed to do a duet of the Itsy Bitsy Spider!  Let me know when he shows up!  It's a date!\"  "

## 2018-04-28 NOTE — ED NOTES
Patient resting on bed, watching TV, respirations even and unlabored.      Sitter in direct observation of patient at all times.

## 2018-04-28 NOTE — ED NOTES
Provider aware of patient HI. Room previously stripped of all dangerous items. Pt in hospital gown and no personal items. Legal hold in chart. No self harm discussed with pt, states will not harm self here.     Sitter has unobstructed view of patient at all times.
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
## 2018-04-28 NOTE — DISCHARGE PLANNING
Alert team note:  Patient assessed and approved by Delmar Monge , manager for transfer to inpatient psychiatric hospital.
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
## 2018-04-29 VITALS
BODY MASS INDEX: 38.65 KG/M2 | RESPIRATION RATE: 16 BRPM | WEIGHT: 270 LBS | TEMPERATURE: 100 F | HEIGHT: 70 IN | HEART RATE: 89 BPM | DIASTOLIC BLOOD PRESSURE: 84 MMHG | OXYGEN SATURATION: 97 % | SYSTOLIC BLOOD PRESSURE: 128 MMHG

## 2018-04-29 PROCEDURE — 700102 HCHG RX REV CODE 250 W/ 637 OVERRIDE(OP): Performed by: EMERGENCY MEDICINE

## 2018-04-29 PROCEDURE — A9270 NON-COVERED ITEM OR SERVICE: HCPCS | Performed by: EMERGENCY MEDICINE

## 2018-04-29 RX ORDER — CARBAMAZEPINE 200 MG/1
200 TABLET ORAL 2 TIMES DAILY
Status: DISCONTINUED | OUTPATIENT
Start: 2018-04-29 | End: 2018-04-29 | Stop reason: HOSPADM

## 2018-04-29 RX ORDER — TRAZODONE HYDROCHLORIDE 50 MG/1
100 TABLET ORAL
Status: DISCONTINUED | OUTPATIENT
Start: 2018-04-29 | End: 2018-04-29 | Stop reason: HOSPADM

## 2018-04-29 RX ORDER — QUETIAPINE FUMARATE 100 MG/1
100 TABLET, FILM COATED ORAL 2 TIMES DAILY
Status: DISCONTINUED | OUTPATIENT
Start: 2018-04-29 | End: 2018-04-29 | Stop reason: HOSPADM

## 2018-04-29 RX ADMIN — CARBAMAZEPINE 200 MG: 200 TABLET ORAL at 09:26

## 2018-04-29 RX ADMIN — QUETIAPINE FUMARATE 100 MG: 100 TABLET ORAL at 09:25
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
## 2018-04-29 NOTE — ED NOTES
Patient sleeping on bed, respirations even and unlabored.    Patient in direct observation at all times.

## 2018-04-29 NOTE — ED NOTES
Report and update given to RENAE Guerrero at [facility]. RENAE Guerrero states she will call back if accepted.

## 2018-04-29 NOTE — DISCHARGE PLANNING
Medical Social Work    Referral: Legal Hold    Intervention: Legal Hold Paperwork given to SW by [employer] RN Hanna    Legal Hold Initiated: Date: 4/28/2018 Time: 1110    Patient’s Insurance Listed on Face Sheet: [employer]    [facility]    This referral contains the following information:  1) Face sheet _X___  2) Page 1 and Page 2 of Legal Hold _X___  3) Alert Team Assessment/Psych Assessment __X__  4) Head to toe physical exam __X__  5) Urine Drug Screen __X__  6) Blood Alcohol __X__  7) Vital signs _X___  8) Pregnancy test when applicable _N/A__  9) Medications list __X__    Plan: Patient will transfer to mental health facility once acceptance is obtained

## 2018-04-29 NOTE — ED NOTES
Assumed care of pt  Pt resting quietly in NAD  Bed low and wheels locked  Sitter at bedside  Will continue to monitor

## 2018-04-29 NOTE — ED NOTES
Received report from RENAE Bullock to assume pt care. Pt sitting upright in bed. Pt denies any needs. Sitter present.

## 2018-04-29 NOTE — ED NOTES
Patient Mother Stephania called, patient consents for mother to receive update on patient status, Stephania updated on patient status,no questions at this time.

## 2018-04-29 NOTE — ED NOTES
"Patient's home medications have been reviewed by the pharmacy team. Per med rec team, the pt has not taken any medications since 04/25/18, because he \"doesn't want to take medications anymore\".    Past Medical History:   Diagnosis Date   • ASTHMA     Dx at age 5, does not use and inhaler, and has never had an attack   • Bipolar disorder (HCC)    • Drug abuse in remission     meth, alcohol and tobacco since 14       Patient's Medications   New Prescriptions    No medications on file   Previous Medications    CARBAMAZEPINE (TEGRETOL) 200 MG TAB    Take 200 mg by mouth 2 Times a Day.    MELOXICAM (MOBIC) 7.5 MG TAB    Take 7.5 mg by mouth every day.    QUETIAPINE (SEROQUEL) 100 MG TAB    Take 100 mg by mouth 2 times a day.    TRAZODONE (DESYREL) 100 MG TAB    Take 100 mg by mouth every bedtime.   Modified Medications    No medications on file   Discontinued Medications    CEPHALEXIN (KEFLEX) 500 MG CAP    Take 1 Cap by mouth 4 times a day.     A:  Medication non-compliance may be contributing to current complaints.     P:    Home medications have been discussed with the ER physician and reordered as appropriate.    Adriana Samayoa, PharmD, BCPS            "

## 2018-04-29 NOTE — DISCHARGE PLANNING
Medical Social Work    Pt being transferred to [facility] via [facility] at 1630. MTM arranged who provided auth number to [facility]

## 2018-04-29 NOTE — ED NOTES
Pt ambulating in room. Per SS RN pt has been accepted to [place] and they are just pending an intake time for transport to facility.

## 2018-04-29 NOTE — ED NOTES
"Patient anxious about going home, sitting on the ground yelling \"I have things to take care of at home and I need to go now\"; RN explained legal hold process to patient and POC - awaiting transfer to appropriate facility, patient requires reinforcement.  Patient calm at this time.  "

## 2018-04-29 NOTE — ED NOTES
Per SS pt has been accepted in at [place]. [facility] will come for pt at 1630. Pt updated on plan and states understanding. Pts father Jonny was notified per pt approval.

## 2018-04-29 NOTE — ED NOTES
Met with pt at bedside and dicussed current medications  And last doses taken.  Pt has not taken any medications since 04/25/18  Because he doesn't want to take medications anymore.

## 2020-09-20 ENCOUNTER — HOSPITAL ENCOUNTER (EMERGENCY)
Dept: HOSPITAL 8 - ED | Age: 25
LOS: 1 days | Discharge: PSYCHIATRIC OR SUBSTANCE USE FACILITY | End: 2020-09-21
Payer: MEDICAID

## 2020-09-20 VITALS — BODY MASS INDEX: 41.75 KG/M2 | WEIGHT: 315 LBS | HEIGHT: 73 IN

## 2020-09-20 DIAGNOSIS — F23: Primary | ICD-10-CM

## 2020-09-20 DIAGNOSIS — F22: ICD-10-CM

## 2020-09-20 LAB
ALBUMIN SERPL-MCNC: 4.1 G/DL (ref 3.4–5)
ANION GAP SERPL CALC-SCNC: 6 MMOL/L (ref 5–15)
BASOPHILS # BLD AUTO: 0.05 X10^3/UL (ref 0–0.1)
BASOPHILS NFR BLD AUTO: 0 % (ref 0–1)
CALCIUM SERPL-MCNC: 9 MG/DL (ref 8.5–10.1)
CHLORIDE SERPL-SCNC: 108 MMOL/L (ref 98–107)
CREAT SERPL-MCNC: 0.85 MG/DL (ref 0.7–1.3)
EOSINOPHIL # BLD AUTO: 0.02 X10^3/UL (ref 0–0.4)
EOSINOPHIL NFR BLD AUTO: 0 % (ref 1–7)
ERYTHROCYTE [DISTWIDTH] IN BLOOD BY AUTOMATED COUNT: 13.7 % (ref 9.4–14.8)
LYMPHOCYTES # BLD AUTO: 1.69 X10^3/UL (ref 1–3.4)
LYMPHOCYTES NFR BLD AUTO: 14 % (ref 22–44)
MCH RBC QN AUTO: 30.4 PG (ref 27.5–34.5)
MCHC RBC AUTO-ENTMCNC: 33.1 G/DL (ref 33.2–36.2)
MCV RBC AUTO: 91.8 FL (ref 81–97)
MD: NO
MONOCYTES # BLD AUTO: 0.87 X10^3/UL (ref 0.2–0.8)
MONOCYTES NFR BLD AUTO: 7 % (ref 2–9)
NEUTROPHILS # BLD AUTO: 9.51 X10^3/UL (ref 1.8–6.8)
NEUTROPHILS NFR BLD AUTO: 78 % (ref 42–75)
PLATELET # BLD AUTO: 252 X10^3/UL (ref 130–400)
PMV BLD AUTO: 8.3 FL (ref 7.4–10.4)
RBC # BLD AUTO: 4.84 X10^6/UL (ref 4.38–5.82)
VANCOMYCIN TROUGH SERPL-MCNC: < 1.7 MG/DL (ref 2.8–20)

## 2020-09-20 PROCEDURE — 99285 EMERGENCY DEPT VISIT HI MDM: CPT

## 2020-09-20 PROCEDURE — 80048 BASIC METABOLIC PNL TOTAL CA: CPT

## 2020-09-20 PROCEDURE — 82040 ASSAY OF SERUM ALBUMIN: CPT

## 2020-09-20 PROCEDURE — 96372 THER/PROPH/DIAG INJ SC/IM: CPT

## 2020-09-20 PROCEDURE — 80307 DRUG TEST PRSMV CHEM ANLYZR: CPT

## 2020-09-20 PROCEDURE — 85025 COMPLETE CBC W/AUTO DIFF WBC: CPT

## 2020-09-20 PROCEDURE — 36415 COLL VENOUS BLD VENIPUNCTURE: CPT

## 2020-09-20 NOTE — NUR
PT NOTED BY SITTER TO BE REPEATEDLY MESSING WITH THE GARAGE DOORS. PT EDUCATED 
ON WHY THEY ARE IN PLACE, AND REMINDED THAT HE IS NOT TO TAMPER WITH THE SAFETY 
EQUIPMENT. PT AGREEABLE. MONITOR TECH NOTIFIED FOR EXTRA EYES ON PATIENT. 
SITTER CONTINUES IN DIRECT LINE OF SIGHT.

## 2020-09-20 NOTE — NUR
ALL BELONGINGS REMOVED, BAGGED TAGGED AND PLACED IN PSYCH LOCKER IN APPROPRIATE 
BIN. L2K PROCESS EXPLAINED TO PT, PT VERBALIZES UNDERSTANDING. SITTER CONTINUES 
IN DIRECT LINE OF SIGHT.

## 2020-09-20 NOTE — NUR
PT PROVIDED URINE SAMPLE. COLLECTED AND SENT TO LAB. DENIES ANY FURTHER NEEDS 
OR CONCERNS. SITTER REMAINS IN DIRECT LINE OF SIGHT.

## 2020-09-21 VITALS — DIASTOLIC BLOOD PRESSURE: 86 MMHG | SYSTOLIC BLOOD PRESSURE: 136 MMHG

## 2020-09-21 NOTE — NUR
PT SATANDING AT DOOR AND PARINOID OF SITTER, PT SAID HE NEEDED FRESH AIR AND 
TRIED TO LEAVE TO 2 DIFFERENT EXITS WITH SITTER TRYING TO DIRRECT PT BACK TO 
ROOM. SECRURITY HAD TO TALK PT BACK TO ROOM AND BED. PROVIDER NOTIFIED. PT 
MEDICATED PER EMAR

## 2020-09-21 NOTE — NUR
TP: faxed packet to [facility], [facility], [facility], [place].

## 2020-09-21 NOTE — NUR
Gume from [facility]: accepted to [facility]. Accepting doctor is Dr. Davila.

## 2020-09-21 NOTE — NUR
PT SITTING ON EDGE OF BED, PT ROOM IS SI SECURE WITH SITTER AT PT DOOR. RN WILL 
CONTINUE TO MONITOR PT. PT DENIED ANY WANTS OR NEEDS AT THIS TIME. PT MEDICATED 
PER EMAR.

## 2020-09-21 NOTE — NUR
PT RESTING IN BED, PT ROOM IS SI SECURE WITH SITTER AT PT DOOR. RN WILL 
CONTINUE TO MONITOR PT. PT DENIED ANY WANTS OR NEEDS AT THIS TIME. PT MEDICATED 
PER EMAR.

## 2021-03-16 ENCOUNTER — HOSPITAL ENCOUNTER (EMERGENCY)
Dept: HOSPITAL 8 - ED | Age: 26
Discharge: HOME | End: 2021-03-16
Payer: MEDICAID

## 2021-03-16 VITALS — WEIGHT: 220.46 LBS | HEIGHT: 70 IN | BODY MASS INDEX: 31.56 KG/M2

## 2021-03-16 VITALS — DIASTOLIC BLOOD PRESSURE: 87 MMHG | SYSTOLIC BLOOD PRESSURE: 136 MMHG

## 2021-03-16 DIAGNOSIS — F15.151: Primary | ICD-10-CM

## 2021-03-16 DIAGNOSIS — F17.200: ICD-10-CM

## 2021-03-16 DIAGNOSIS — F12.10: ICD-10-CM

## 2021-03-16 DIAGNOSIS — R44.0: ICD-10-CM

## 2021-03-16 LAB
ALBUMIN SERPL-MCNC: 4 G/DL (ref 3.4–5)
ANION GAP SERPL CALC-SCNC: 9 MMOL/L (ref 5–15)
BASOPHILS # BLD AUTO: 0.1 X10^3/UL (ref 0–0.1)
BASOPHILS NFR BLD AUTO: 1 % (ref 0–1)
CALCIUM SERPL-MCNC: 8.8 MG/DL (ref 8.5–10.1)
CHLORIDE SERPL-SCNC: 107 MMOL/L (ref 98–107)
CREAT SERPL-MCNC: 0.8 MG/DL (ref 0.7–1.3)
EOSINOPHIL # BLD AUTO: 0 X10^3/UL (ref 0–0.4)
EOSINOPHIL NFR BLD AUTO: 0 % (ref 1–7)
ERYTHROCYTE [DISTWIDTH] IN BLOOD BY AUTOMATED COUNT: 13.7 % (ref 9.4–14.8)
LYMPHOCYTES # BLD AUTO: 2.6 X10^3/UL (ref 1–3.4)
LYMPHOCYTES NFR BLD AUTO: 19 % (ref 22–44)
MCH RBC QN AUTO: 30.4 PG (ref 27.5–34.5)
MCHC RBC AUTO-ENTMCNC: 34 G/DL (ref 33.2–36.2)
MD: NO
MONOCYTES # BLD AUTO: 1.3 X10^3/UL (ref 0.2–0.8)
MONOCYTES NFR BLD AUTO: 9 % (ref 2–9)
NEUTROPHILS # BLD AUTO: 9.9 X10^3/UL (ref 1.8–6.8)
NEUTROPHILS NFR BLD AUTO: 71 % (ref 42–75)
PLATELET # BLD AUTO: 283 X10^3/UL (ref 130–400)
PMV BLD AUTO: 8.6 FL (ref 7.4–10.4)
RBC # BLD AUTO: 5.55 X10^6/UL (ref 4.38–5.82)
VANCOMYCIN TROUGH SERPL-MCNC: < 1.7 MG/DL (ref 2.8–20)

## 2021-03-16 PROCEDURE — 80329 ANALGESICS NON-OPIOID 1 OR 2: CPT

## 2021-03-16 PROCEDURE — 80307 DRUG TEST PRSMV CHEM ANLYZR: CPT

## 2021-03-16 PROCEDURE — G0480 DRUG TEST DEF 1-7 CLASSES: HCPCS

## 2021-03-16 PROCEDURE — 36415 COLL VENOUS BLD VENIPUNCTURE: CPT

## 2021-03-16 PROCEDURE — 80299 QUANTITATIVE ASSAY DRUG: CPT

## 2021-03-16 PROCEDURE — 99283 EMERGENCY DEPT VISIT LOW MDM: CPT

## 2021-03-16 PROCEDURE — 85025 COMPLETE CBC W/AUTO DIFF WBC: CPT

## 2021-03-16 PROCEDURE — 80048 BASIC METABOLIC PNL TOTAL CA: CPT

## 2021-03-16 PROCEDURE — 82040 ASSAY OF SERUM ALBUMIN: CPT
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
## 2021-03-16 NOTE — NUR
PSYCH KANWAL MARTINEZ AND TIFFANY MARC AT BEDSIDE.  PT TO BE ADMITTED TO ACUTE MENTAL 
HEALTH FACILITY FOR SAFETY CONCERNS.  PT STATES THAT HE HAS BEEN ADMITTED TO 
[facility] 4-5 TIMES AND THAT THEY HAD HIM ON SEROQUEL AND ANOTHER 
PSYCH MED, BUT THAT HE TAKES NO MEDS AT THIS TIME BECAUSE AN UNKNOWN PHYSICIAN 
DIDN'T WANT HIM TO BE ON ANY MEDS.

## 2021-03-16 NOTE — NUR
PT RESTING ON GURNEY WITH NO COMPLAINTS.  PT REMAINS UNDER CONSTANT SUPERVISION 
BY SITTER AND REMAINS SAFE.

## 2021-05-11 NOTE — ED PROVIDER NOTES
"ED Provider Note Addendum    Scribed for Adrien Benavides DO by Dina Spring on 4/29/2018 at 12:36 PM.     This is an addendum to the note on Fareed J Jb.  For further details and full chart information, see patient's initial note.       12:36 PM- Patient evaluated by myself.  Patient is resting comfortably watching television this time with no complaints.   /86   Pulse 84   Temp 37.2 °C (98.9 °F) (Temporal)   Resp 14   Ht 1.778 m (5' 10\")   Wt 122.5 kg (270 lb)   SpO2 95%   BMI 38.74 kg/m²     Disposition:  Patient will be transferred to an appropriate psychiatric facility in stable condition for further psychiatric care and evaluation.  Patient will be placed under the care of my partner awaiting transfer.    FINAL IMPRESSION  1. Homicidal ideation         Dina WELLER (Scribe), am scribing for, and in the presence of, Adrien Benavides DO.    Electronically signed by: Dina Spring (Scribe), 4/29/2018    Adrien WELLER D* personally performed the services described in this documentation, as scribed by Dina Spring in my presence, and it is both accurate and complete.    The note accurately reflects work and decisions made by me.  Adrien Benavides  4/29/2018  12:52 PM      " Called patient to confirm her appointment for her SP w/  and left a voicemail asking the patient to call us back for additional instructions.

## 2021-05-31 ENCOUNTER — HOSPITAL ENCOUNTER (EMERGENCY)
Facility: MEDICAL CENTER | Age: 26
End: 2021-05-31
Attending: EMERGENCY MEDICINE
Payer: MEDICAID

## 2021-05-31 VITALS
SYSTOLIC BLOOD PRESSURE: 155 MMHG | HEIGHT: 70 IN | OXYGEN SATURATION: 97 % | RESPIRATION RATE: 20 BRPM | BODY MASS INDEX: 41.66 KG/M2 | HEART RATE: 101 BPM | WEIGHT: 291.01 LBS | TEMPERATURE: 96 F | DIASTOLIC BLOOD PRESSURE: 99 MMHG

## 2021-05-31 DIAGNOSIS — S01.01XA LACERATION OF SCALP, INITIAL ENCOUNTER: ICD-10-CM

## 2021-05-31 DIAGNOSIS — S09.90XA CLOSED HEAD INJURY, INITIAL ENCOUNTER: ICD-10-CM

## 2021-05-31 LAB — POC BREATHALIZER: 0 PERCENT (ref 0–0.01)

## 2021-05-31 PROCEDURE — 99284 EMERGENCY DEPT VISIT MOD MDM: CPT

## 2021-05-31 PROCEDURE — 302970 POC BREATHALIZER: Performed by: EMERGENCY MEDICINE

## 2021-05-31 RX ORDER — LIDOCAINE HYDROCHLORIDE 10 MG/ML
20 INJECTION, SOLUTION INFILTRATION; PERINEURAL ONCE
Status: DISCONTINUED | OUTPATIENT
Start: 2021-05-31 | End: 2021-05-31 | Stop reason: HOSPADM

## 2021-05-31 ASSESSMENT — LIFESTYLE VARIABLES: DO YOU DRINK ALCOHOL: YES
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
## 2021-05-31 NOTE — ED NOTES
Patient refusing assessment or other care. Patient breathalizer 0. Educated patient on risks of leaving AMA including possible death, patient verbalized understanding, ERP aware, AMA paperwork signed. Patient ambulated to ER lobby
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
## 2022-03-02 ENCOUNTER — HOSPITAL ENCOUNTER (EMERGENCY)
Facility: MEDICAL CENTER | Age: 27
End: 2022-03-02
Attending: EMERGENCY MEDICINE
Payer: MEDICAID

## 2022-03-02 ENCOUNTER — HOSPITAL ENCOUNTER (EMERGENCY)
Facility: MEDICAL CENTER | Age: 27
End: 2022-03-03
Attending: EMERGENCY MEDICINE
Payer: MEDICAID

## 2022-03-02 VITALS
SYSTOLIC BLOOD PRESSURE: 127 MMHG | BODY MASS INDEX: 40.09 KG/M2 | WEIGHT: 280 LBS | DIASTOLIC BLOOD PRESSURE: 69 MMHG | RESPIRATION RATE: 16 BRPM | HEIGHT: 70 IN | OXYGEN SATURATION: 97 % | TEMPERATURE: 97 F | HEART RATE: 71 BPM

## 2022-03-02 DIAGNOSIS — F10.929 ACUTE ALCOHOLIC INTOXICATION WITH COMPLICATION (HCC): ICD-10-CM

## 2022-03-02 DIAGNOSIS — F29 PSYCHOSIS, UNSPECIFIED PSYCHOSIS TYPE (HCC): ICD-10-CM

## 2022-03-02 DIAGNOSIS — G92.9 TOXIC ENCEPHALOPATHY: ICD-10-CM

## 2022-03-02 PROCEDURE — 99285 EMERGENCY DEPT VISIT HI MDM: CPT | Mod: 27

## 2022-03-02 PROCEDURE — 700102 HCHG RX REV CODE 250 W/ 637 OVERRIDE(OP): Performed by: EMERGENCY MEDICINE

## 2022-03-02 PROCEDURE — 700111 HCHG RX REV CODE 636 W/ 250 OVERRIDE (IP): Performed by: EMERGENCY MEDICINE

## 2022-03-02 PROCEDURE — 90791 PSYCH DIAGNOSTIC EVALUATION: CPT

## 2022-03-02 PROCEDURE — A9270 NON-COVERED ITEM OR SERVICE: HCPCS | Performed by: EMERGENCY MEDICINE

## 2022-03-02 PROCEDURE — 80307 DRUG TEST PRSMV CHEM ANLYZR: CPT

## 2022-03-02 PROCEDURE — 302970 POC BREATHALIZER: Performed by: EMERGENCY MEDICINE

## 2022-03-02 PROCEDURE — 96372 THER/PROPH/DIAG INJ SC/IM: CPT

## 2022-03-02 PROCEDURE — 99284 EMERGENCY DEPT VISIT MOD MDM: CPT

## 2022-03-02 PROCEDURE — 36415 COLL VENOUS BLD VENIPUNCTURE: CPT

## 2022-03-02 RX ORDER — LORAZEPAM 2 MG/1
2 TABLET ORAL ONCE
Status: COMPLETED | OUTPATIENT
Start: 2022-03-02 | End: 2022-03-02

## 2022-03-02 RX ORDER — LORAZEPAM 2 MG/ML
2 INJECTION INTRAMUSCULAR ONCE
Status: COMPLETED | OUTPATIENT
Start: 2022-03-02 | End: 2022-03-02

## 2022-03-02 RX ORDER — HALOPERIDOL 5 MG/ML
5 INJECTION INTRAMUSCULAR ONCE
Status: COMPLETED | OUTPATIENT
Start: 2022-03-02 | End: 2022-03-02

## 2022-03-02 RX ADMIN — HALOPERIDOL LACTATE 5 MG: 5 INJECTION, SOLUTION INTRAMUSCULAR at 11:54

## 2022-03-02 RX ADMIN — LORAZEPAM 2 MG: 2 INJECTION INTRAMUSCULAR; INTRAVENOUS at 14:04

## 2022-03-02 RX ADMIN — LORAZEPAM 2 MG: 2 TABLET ORAL at 11:35

## 2022-03-02 NOTE — ED NOTES
Patient refused discharge teaching. Patient A/Ox4 and ambulatory to the [facility] with a steady gait. Patient discharged home to self care.

## 2022-03-02 NOTE — DISCHARGE PLANNING
[facility] ED Behavioral Health Fax Referral      [facility] ED Behavioral Health Alert Team:  [phone]    Referral: Legal Hold    Intervention: Patient referral to [facility] inpatient  facillity    Legal Hold Initiated: Date: 3/2/22 Time: 1115    Patient’s Insurance Listed on Face Sheet: [place] [employer]    Referrals sent to: Stephan [facility]ARRIS, [facility]    Referrals faxed by Timothy Deleon    This referral contains the following information:  1) Face sheet __x__  2) Page 1 and Page 2 of Legal Hold _x___  3) Alert Team Assessment/Psych Assessment _x___  4) Head to toe physical exam __x__  5) Urine Drug Screen ____  6) Blood Alcohol __x__  7) Vital signs __x__  8) Pregnancy test when applicable _n/a__  9) Medications list _x___  10) Covid screening ____    Plan: Patient will transfer to mental health facility once acceptance is obtained

## 2022-03-02 NOTE — ED NOTES
Pt trying to elope multiple times in the last hour requiring security at bedside to help stop pt. Dr. Villanueva updated and 2mg of IM ativan ordered.
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
## 2022-03-02 NOTE — DISCHARGE PLANNING
Alert Team:    Updated /discharge planner on status of patient and completion of behavioral health consult.

## 2022-03-02 NOTE — ED NOTES
Pt has tried to leave x 2 additional times.  1:1 sitter placed on patient per Alert Team new recommendation.

## 2022-03-02 NOTE — ED PROVIDER NOTES
ED Provider Note    CHIEF COMPLAINT  Chief Complaint   Patient presents with   • Psych Eval     Pt found running around the streets naked, appears to be having conversations with himself       HPI  Fareed Muhammad is a 26 y.o. male who presents for psych evaluation.  The patient was walking around the streets naked having a conversation with himself when EMS was contacted.  He was just discharged from the ER this morning after admitting to alcohol and methamphetamine abuse.  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  He states that he did drink once he was discharged.  He is currently homeless.  He does have some flight of ideas but does answer questions appropriately.  He denies suicidal homicidal ideation.    REVIEW OF SYSTEMS  See HPI for further details. All other systems are negative.     PAST MEDICAL HISTORY  Past Medical History:   Diagnosis Date   • ASTHMA     Dx at age 5, does not use and inhaler, and has never had an attack   • Bipolar disorder (HCC)    • Drug abuse in remission     meth, alcohol and tobacco since 14       FAMILY HISTORY  The patient will not answer questions appropriately to get an accurate family history    SOCIAL HISTORY  Social History     Socioeconomic History   • Marital status: Single   Tobacco Use   • Smoking status: Current Every Day Smoker     Packs/day: 1.00     Years: 3.00     Pack years: 3.00   • Smokeless tobacco: Never Used   • Tobacco comment: currently using e-cigarettes   Substance and Sexual Activity   • Alcohol use: Yes     Comment: 1 pint several times a week   • Drug use: Yes     Types: Marijuana, Methamphetamines     Comment: pot, meth   • Sexual activity: Not Currently     Partners: Female     Birth control/protection: Condom       SURGICAL HISTORY  No past surgical history on file.    CURRENT MEDICATIONS  Home Medications    **Home medications have not yet been reviewed for this encounter**         ALLERGIES  No Known Allergies    PHYSICAL EXAM  VITAL SIGNS:  "/65   Pulse (!) 121   Temp 37.7 °C (99.9 °F) (Temporal)   Resp 18   Ht 1.778 m (5' 10\")   Wt 122 kg (270 lb)   SpO2 96%   BMI 38.74 kg/m²       Constitutional: Unkempt.   HENT: Normocephalic, Atraumatic, Bilateral external ears normal, Oropharynx moist, No oral exudates, Nose normal.   Eyes: PERRLA, EOMI, Conjunctiva normal, No discharge.   Neck: Normal range of motion, No tenderness, Supple, No stridor.   Lymphatic: No lymphadenopathy noted.   Cardiovascular: Tachycardic heart rate, Normal rhythm, No murmurs, No rubs, No gallops.   Thorax & Lungs: Normal breath sounds, No respiratory distress, No wheezing, No chest tenderness.   Abdomen: Bowel sounds normal, Soft, No tenderness, No masses, No pulsatile masses.   Skin: Warm, Dry, No erythema, No rash.   Back: No tenderness, No CVA tenderness.   Extremities: Intact distal pulses, No edema, No tenderness, No cyanosis, No clubbing.   Neurologic: Alert & oriented x 3, Normal motor function, Normal sensory function, No focal deficits noted.   Psychiatric: Flat of ideas, no suicidal no homicidal ideation    COURSE & MEDICAL DECISION MAKING  Pertinent Labs & Imaging studies reviewed. (See chart for details)  26-year-old male who presents emerge department and acutely psychotic state.  I did review his work-up this morning does not seem like the patient is totally cleared from a possible methamphetamine induced psychosis.  The patient continued to decompensate while in the department and on repeat exam he is responding to internal stimuli and clearly having hallucinations as well as some paranoia.  At this time he cannot care for himself.  I did speak with life skills and will place the patient on a legal hold.  The patient will be admitted to the emerge department for observation for further care and treatment.    The patient will be admitted to the emergency department for observation for further care for psychosis and drug abuse on 3/2/2022 at 11:26 " AM    FINAL IMPRESSION  1.  Acute psychosis  2.  Amphetamine abuse           Electronically signed by: Cosmo Villanueva M.D., 3/2/2022 11:13 AM

## 2022-03-02 NOTE — ED NOTES
Pt tried leaving x 2 additional times.  Pt into a gown and hospital provided clothing placed in belongings bag.  Security bedside.  ERP placing patient on legal 2000 at this time.

## 2022-03-02 NOTE — ED TRIAGE NOTES
"Fareed Muhammad  26 y.o. male  Chief Complaint   Patient presents with   • ETOH Intoxication     Patient was found without pants and intoxicated. Pt stated \"I was at snf, then a casino, now I'm here.\"      Partial triage complete do to patient cooperation.     Vitals:    03/02/22 0134   BP: 136/75   Pulse: (!) 105   Resp: 16   Temp: 35.8 °C (96.5 °F)   SpO2: 96%       Triage process explained to patient, apologized for wait time, and returned to lobby.  Pt informed to notify staff of any change in condition.     "

## 2022-03-02 NOTE — CONSULTS
[facility]   TRIAGE ASSESSMENT    Name: Fareed Muhammad  MRN: [id number]  : 1995  Age: 26 y.o.  Date of assessment: 3/2/2022  PCP: Roosevelt Alston M.D.  Persons in attendance: Patient  Patient Location: [facility]    CHIEF COMPLAINT/PRESENTING ISSUE (as stated by patient): Pt is a 27 y/o male BIB EMS after being found running around the streets naked. Pt was seen in ED this morning. States he was released from longterm this morning after being in there for 3 months for a trespassing charge. Pt states he used methamphetamine and ETOH after being released from longterm then ended up in ED this morning and was discharged to [facility]. Pt now back in ED. At time of behavioral health consult, pt denies SI/HI. Reports auditory hallucinations. Responding to internal stimuli. Denies visual hallucinations. Won't keep hospital gown on and keeps trying to elope while naked. Pt states he is homeless. States he was in longterm for 3 months and released this morning; for trespassing. Denies ETOH/substance use at this time, but admitted to ETOH/substance use this morning to ED staff and prior ED visit. 1:1 sitter recommended for elopement risk. Legal hold initiated. Findings discussed with ERP who agrees pt needs to transfer to an inpatient psychiatric facility for further evaluation and stabilization. [place] medicaid insurance plan. Inpatient referrals to be sent to [facility] and [facility]. Outpatient referral to be faxed to [facility].   Chief Complaint   Patient presents with   • Psych Eval     Pt found running around the streets naked, appears to be having conversations with himself        CURRENT LIVING SITUATION/SOCIAL SUPPORT/FINANCIAL RESOURCES: Pt states he is homeless. States he was in longterm for 3 months and released this morning; for trespassing.    BEHAVIORAL HEALTH/SUBSTANCE USE TREATMENT HISTORY  Does patient/parent report a history of prior behavioral health/substance  use treatment for patient?   Pt denies current outpatient psychiatric services. Reports hx of inpatient psychiatric hospitalizations.     SAFETY ASSESSMENT - SELF  Does patient acknowledge current or past symptoms of dangerousness to self or is previous history noted? no  Does parent/significant other report patient has current or past symptoms of dangerousness to self? N\A  Does presenting problem suggest symptoms of dangerousness to self? yes; pt is at risk of harm to self due to acute psychosis    SAFETY ASSESSMENT - OTHERS  Does patient acknowledge current or past symptoms of aggressive behavior or risk to others or is previous history noted? no  Does parent/significant other report patient has current or past symptoms of aggressive behavior or risk to others?  N\A  Does presenting problem suggest symptoms of dangerousness to others? No; Denies HI.    LEGAL HISTORY  Does patient acknowledge history of arrest/snf/penitentiary or is previous history noted? Yes; pt states he was in snf for three months for trespassing and released this morning.     Crisis Safety Plan completed and copy given to patient? N\A    ABUSE/NEGLECT SCREENING  Does patient report feeling “unsafe” in his/her home, or afraid of anyone?  no  Does patient report any history of physical, sexual, or emotional abuse?  no  Does parent or significant other report any of the above? N\A  Is there evidence of neglect by self?  yes  Is there evidence of neglect by a caregiver? N/A  Does the patient/parent report any history of CPS/APS/police involvement related to suspected abuse/neglect or domestic violence? no  Based on the information provided during the current assessment, is a mandated report of suspected abuse/neglect being made?  No    SUBSTANCE USE SCREENING  Yes:  *Pt denies substance use to this writer, but during prior ED visit this morning pt admits to methamphetamine and ETOH use since being released from snf earlier this morning.        UDS  results: pending  Breathalyzer results: 0.00          MENTAL STATUS   Participation: Limited verbal participation  Grooming: Disheveled and Inappropriate to weather  Orientation: Disoriented to: time and situation and Evidence of hallucinations present  Behavior: Unusual behaviors noted  Eye contact: Poor  Mood: Irritable  Affect: Constricted  Thought process: Flight of ideas  Thought content: Preoccupation  Speech: Pressured and Latency of response  Perception: Evidence of auditory hallucination and Derealization  Memory:  Poor memory for chronology of events  Insight: Poor  Judgment:  Poor  Other:    Collateral information:   Source:  [] Significant other present in person:   [] Significant other by telephone  [] [facility]   [x] [facility]  [x] [facility]  [x] Other: ERP    [] Unable to complete full assessment due to:  [] Acute intoxication  [] Patient declined to participate/engage  [] Patient verbally unresponsive  [] Significant cognitive deficits  [x] Significant perceptual distortions or behavioral disorganization  [] Other:      CLINICAL IMPRESSIONS:  Primary:  Psychosis  Secondary:  Substance use d/o       IDENTIFIED NEEDS/PLAN:  [Trigger DISPOSITION list for any items marked]    [x]  Imminent safety risk - self [] Imminent safety risk - others   []  Acute substance withdrawal [x]  Psychosis/Impaired reality testing   [x]  Mood/anxiety [x]  Substance use/Addictive behavior   [x]  Maladaptive behaviro []  Parent/child conflict   []  Family/Couples conflict []  Biomedical   [x]  Housing [x]  Financial   [x]   Legal  Occupational/Educational   []  Domestic violence []  Other:     Recommended Plan of Care:  Actively being addressed by [facility] and [facility] Emergency Department, Refer to inpatient psychiatric facilities and 1:1 Observation. Pt denies SI/HI. Reports auditory hallucinations. Responding to internal stimuli. Denies visual hallucinations. Won't keep hospital gown on  and keeps trying to elope while naked. 1:1 sitter recommended for elopement risk. Legal hold initiated. Findings discussed with ERP who agrees pt needs to transfer to an inpatient psychiatric facility for further evaluation and stabilization. [place] medicaid insurance plan. Inpatient referrals to be sent to [facility] and [facility]. Outpatient referral to be faxed to [facility].   *Telesitter may not be utilized for moderate or high risk patients    Has the Recommended Plan of Care/Level of Observation been reviewed with the patient's assigned nurse? yes    Does patient/parent or guardian express agreement with the above plan? No; pt wants to leave ED; 1:1 sitter for elopement risk      Referral appointment(s) scheduled? N\A    Alert team only:   I have discussed findings and recommendations with Dr. Villanueva who is in agreement with these recommendations.     Referral information sent to the following outpatient community providers : [facility]    Referral information sent to the following inpatient community providers : [facility], [facility]    If applicable : Referred  to  Alert Team for legal hold follow up at (time): 1115      Nurys Rodarte R.N.  3/2/2022

## 2022-03-03 VITALS
RESPIRATION RATE: 16 BRPM | BODY MASS INDEX: 38.65 KG/M2 | OXYGEN SATURATION: 97 % | SYSTOLIC BLOOD PRESSURE: 120 MMHG | HEART RATE: 88 BPM | HEIGHT: 70 IN | WEIGHT: 270 LBS | DIASTOLIC BLOOD PRESSURE: 76 MMHG | TEMPERATURE: 98.1 F

## 2022-03-03 LAB
ALBUMIN SERPL BCP-MCNC: 4.4 G/DL (ref 3.2–4.9)
ALBUMIN/GLOB SERPL: 1.5 G/DL
ALP SERPL-CCNC: 87 U/L (ref 30–99)
ALT SERPL-CCNC: 26 U/L (ref 2–50)
ANION GAP SERPL CALC-SCNC: 8 MMOL/L (ref 7–16)
AST SERPL-CCNC: 29 U/L (ref 12–45)
BASOPHILS # BLD AUTO: 0.4 % (ref 0–1.8)
BASOPHILS # BLD: 0.04 K/UL (ref 0–0.12)
BILIRUB SERPL-MCNC: 0.6 MG/DL (ref 0.1–1.5)
BUN SERPL-MCNC: 12 MG/DL (ref 8–22)
CALCIUM SERPL-MCNC: 9.7 MG/DL (ref 8.5–10.5)
CHLORIDE SERPL-SCNC: 105 MMOL/L (ref 96–112)
CO2 SERPL-SCNC: 28 MMOL/L (ref 20–33)
CREAT SERPL-MCNC: 0.73 MG/DL (ref 0.5–1.4)
EKG IMPRESSION: NORMAL
EOSINOPHIL # BLD AUTO: 0.04 K/UL (ref 0–0.51)
EOSINOPHIL NFR BLD: 0.4 % (ref 0–6.9)
ERYTHROCYTE [DISTWIDTH] IN BLOOD BY AUTOMATED COUNT: 41.9 FL (ref 35.9–50)
GLOBULIN SER CALC-MCNC: 3 G/DL (ref 1.9–3.5)
GLUCOSE SERPL-MCNC: 76 MG/DL (ref 65–99)
HCT VFR BLD AUTO: 45.3 % (ref 42–52)
HGB BLD-MCNC: 15.4 G/DL (ref 14–18)
IMM GRANULOCYTES # BLD AUTO: 0.04 K/UL (ref 0–0.11)
IMM GRANULOCYTES NFR BLD AUTO: 0.4 % (ref 0–0.9)
LYMPHOCYTES # BLD AUTO: 2.14 K/UL (ref 1–4.8)
LYMPHOCYTES NFR BLD: 23.7 % (ref 22–41)
MCH RBC QN AUTO: 30.4 PG (ref 27–33)
MCHC RBC AUTO-ENTMCNC: 34 G/DL (ref 33.7–35.3)
MCV RBC AUTO: 89.3 FL (ref 81.4–97.8)
MONOCYTES # BLD AUTO: 0.54 K/UL (ref 0–0.85)
MONOCYTES NFR BLD AUTO: 6 % (ref 0–13.4)
NEUTROPHILS # BLD AUTO: 6.22 K/UL (ref 1.82–7.42)
NEUTROPHILS NFR BLD: 69.1 % (ref 44–72)
NRBC # BLD AUTO: 0 K/UL
NRBC BLD-RTO: 0 /100 WBC
PLATELET # BLD AUTO: 254 K/UL (ref 164–446)
PMV BLD AUTO: 10.9 FL (ref 9–12.9)
POTASSIUM SERPL-SCNC: 4.1 MMOL/L (ref 3.6–5.5)
PROT SERPL-MCNC: 7.4 G/DL (ref 6–8.2)
RBC # BLD AUTO: 5.07 M/UL (ref 4.7–6.1)
SODIUM SERPL-SCNC: 141 MMOL/L (ref 135–145)
WBC # BLD AUTO: 9 K/UL (ref 4.8–10.8)

## 2022-03-03 PROCEDURE — 700102 HCHG RX REV CODE 250 W/ 637 OVERRIDE(OP): Performed by: STUDENT IN AN ORGANIZED HEALTH CARE EDUCATION/TRAINING PROGRAM

## 2022-03-03 PROCEDURE — 99285 EMERGENCY DEPT VISIT HI MDM: CPT | Mod: GC | Performed by: PSYCHIATRY & NEUROLOGY

## 2022-03-03 PROCEDURE — 80053 COMPREHEN METABOLIC PANEL: CPT

## 2022-03-03 PROCEDURE — 93005 ELECTROCARDIOGRAM TRACING: CPT | Performed by: STUDENT IN AN ORGANIZED HEALTH CARE EDUCATION/TRAINING PROGRAM

## 2022-03-03 PROCEDURE — 85025 COMPLETE CBC W/AUTO DIFF WBC: CPT

## 2022-03-03 PROCEDURE — A9270 NON-COVERED ITEM OR SERVICE: HCPCS | Performed by: STUDENT IN AN ORGANIZED HEALTH CARE EDUCATION/TRAINING PROGRAM

## 2022-03-03 RX ORDER — QUETIAPINE FUMARATE 25 MG/1
50 TABLET, FILM COATED ORAL 2 TIMES DAILY
Status: DISCONTINUED | OUTPATIENT
Start: 2022-03-03 | End: 2022-03-03 | Stop reason: HOSPADM

## 2022-03-03 RX ADMIN — QUETIAPINE 50 MG: 25 TABLET, FILM COATED ORAL at 10:15

## 2022-03-03 ASSESSMENT — ENCOUNTER SYMPTOMS
CARDIOVASCULAR NEGATIVE: 1
EYES NEGATIVE: 1
CONSTITUTIONAL NEGATIVE: 1
HEADACHES: 1
RESPIRATORY NEGATIVE: 1
GASTROINTESTINAL NEGATIVE: 1
MUSCULOSKELETAL NEGATIVE: 1

## 2022-03-03 NOTE — ED NOTES
Rounded on pt. Pt resting in bed, respirations even and unlabored, VSS, NAD at this time, 1:1 sitter in view of pt.

## 2022-03-03 NOTE — ED NOTES
PT awake, seated in chair for phlebotomy and EKG. Speaking without signs of distress. Direct observation continues with sitter at doorway.

## 2022-03-03 NOTE — DISCHARGE PLANNING
Alert Team Note:    Informed by Katrina Mathews that pt has been accepted to [facility]. Accepting is Dr. Sparks. Facility expects transport at 1500.   Informed Viola Duran.

## 2022-03-03 NOTE — ED NOTES
Provided pt with hospital gown and underwear. Pt dressed himself independently. Vital signs re-assessed, pt laying in bed, PURNIMA, 1:1 sitter in view of pt.

## 2022-03-03 NOTE — DISCHARGE PLANNING
Legal Hold Transfer     Referral: Legal hold transfer to Mental Health Facility     Intervention: Notified by ESTRELLA Aguirre that patient has been accepted to [facility].     Pt's accepting physcian is Dr. Sparks    Spoke to Rivera at [facility]     Transport arranged through [facility]     The pt will be picked up at 1500      Notified Bedside RN Nitesh, Alert Team RENAE Herndon and DANIELLE Meyer of the departure time as well as accepting facility.      Transfer packet created with original legal hold and placed on chart.      Plan: Pt will be transferring to [facility] today at 1500 via [facility].

## 2022-03-03 NOTE — ED NOTES
Pt noted to be seated on floor, regular respirations and without signs of distress. Direct observation continues with sitter at doorway.

## 2022-03-03 NOTE — ED NOTES
Report to Yolanda MACDONALD at [facility] at this time. Expect transport approx 1500.

## 2022-03-03 NOTE — PROGRESS NOTES
"ED Provider Progress Note  ED Observation Progress Note    Date of Service: 03/03/22    Interval History  Patient is in the emergency department on a legal hold because of psychosis. He does not have any medical complaints although is not a good historian. His vital signs have been stable. Reviewed psychiatry notes. Lab data was obtained as noted. EKG was interpreted normal as below. Patient's legal hold is extended. He has been started on Seroquel 50 mg twice daily. Plan will be to transfer for psychiatric care    Physical Exam  /81   Pulse 93   Temp 36.7 °C (98.1 °F) (Temporal)   Resp 15   Ht 1.778 m (5' 10\")   Wt 122 kg (270 lb)   SpO2 97%   BMI 38.74 kg/m² .    Constitutional: Awake and alert. Nontoxic  HENT:  Grossly normal  Eyes: Grossly normal  Cardiovascular: Normal heart rate   Thorax & Lungs: No respiratory distress  Psychiatric: Bizarre affect    Labs  Results for orders placed or performed during the hospital encounter of 03/02/22   URINE DRUG SCREEN (TRIAGE)   Result Value Ref Range    Amphetamines Urine Negative Negative    Barbiturates Negative Negative    Benzodiazepines Negative Negative    Cocaine Metabolite Negative Negative    Methadone Negative Negative    Opiates Negative Negative    Oxycodone Negative Negative    Phencyclidine -Pcp Negative Negative    Propoxyphene Negative Negative    Cannabinoid Metab Positive (A) Negative   CBC WITH DIFFERENTIAL   Result Value Ref Range    WBC 9.0 4.8 - 10.8 K/uL    RBC 5.07 4.70 - 6.10 M/uL    Hemoglobin 15.4 14.0 - 18.0 g/dL    Hematocrit 45.3 42.0 - 52.0 %    MCV 89.3 81.4 - 97.8 fL    MCH 30.4 27.0 - 33.0 pg    MCHC 34.0 33.7 - 35.3 g/dL    RDW 41.9 35.9 - 50.0 fL    Platelet Count 254 164 - 446 K/uL    MPV 10.9 9.0 - 12.9 fL    Neutrophils-Polys 69.10 44.00 - 72.00 %    Lymphocytes 23.70 22.00 - 41.00 %    Monocytes 6.00 0.00 - 13.40 %    Eosinophils 0.40 0.00 - 6.90 %    Basophils 0.40 0.00 - 1.80 %    Immature Granulocytes 0.40 0.00 - " 0.90 %    Nucleated RBC 0.00 /100 WBC    Neutrophils (Absolute) 6.22 1.82 - 7.42 K/uL    Lymphs (Absolute) 2.14 1.00 - 4.80 K/uL    Monos (Absolute) 0.54 0.00 - 0.85 K/uL    Eos (Absolute) 0.04 0.00 - 0.51 K/uL    Baso (Absolute) 0.04 0.00 - 0.12 K/uL    Immature Granulocytes (abs) 0.04 0.00 - 0.11 K/uL    NRBC (Absolute) 0.00 K/uL   POC BREATHALIZER   Result Value Ref Range    POC Breathalizer 0.00 0.00 - 0.01 Percent   EKG   Result Value Ref Range    Report       [facility] Emergency Dept.    Test Date:  2022  Pt Name:    MAURI HENSLEY                    Department: ER  MRN:        [id number]                      Room:       [facility]  Gender:     Male                         Technician: [id number]  :        1995                   Requested By:OSCAR CARD  Order #:    [id number]                    Reading MD: BRYAN ORDOÑEZ MD    Measurements  Intervals                                Axis  Rate:       68                           P:          27  NC:         147                          QRS:        19  QRSD:       89                           T:          21  QT:         391  QTc:        416    Interpretive Statements  Sinus rhythm  No previous ECG available for comparison  Electronically Signed On 3-3-2022 11:44:28 PST by BRYAN ORDOÑEZ MD         Problem List  1. Psychosis-continue with plan for psychiatric care      Electronically signed by: Bryan Ordoñez M.D., 3/3/2022 11:44 AM

## 2022-03-03 NOTE — DISCHARGE PLANNING
Received Verified Involuntary Court Ordered Petition from the court. Scanned copy of Petition into pt's chart, sent copy to [facility].

## 2022-03-03 NOTE — ED NOTES
Pt noted to be ambulatory in room. No signs of distress noted. Direct observation continued with sitter at doorway

## 2022-03-03 NOTE — CONSULTS
PSYCHIATRIC INTAKE EVALUATION(new)  Reason for admission: Acute psychosis  Reason for consult: Acute psychosis/legal hold evaluation  Requesting Provider: Cosmo Villanueva M.D.       Legal Hold Status on Admission:   On legal hold         Chart reviewed.         *HPI: Patient was seen in the emergency department.  Patient is an unreliable historian, unable to provide details of medical or psychiatric history.  He was sitting on the floor in a corner completely naked.  He states that he was brought here from [facility] for public indecency.  He denies any drug use and states that he is only been drinking beer.  Reports that his last drink was yesterday.  He denies history of alcohol withdrawal seizures.  When asked why patient was wandering naked and why he is naked now, patient is unable to provide a coherent answer.  Patient is unable to provide an accurate timeline of events.  He states that he has not used meth in 2 years, he does report that he smoked some dope at the homeless shelter but that was 1 year ago.  He thinks that he has been homeless for a year.  He reports that he has been diagnosed with schizophrenia meth induced type and bipolar disorder.  Patient says that he has not taken his medications in over a year because they gave him headaches.  He says the only medication that he is taken is quetiapine.  He does have family in the area, his mother and his father, unclear if he is in contact with them.  Patient reports that he has [facility], unable to provide dates or number of hospitalizations.  Unable to provide previous medication trials.  Patient denies SI, reports occasional HI however he does not provide a specific person or plan to harm anyone.  He reports that he continues to take his clothes off while in the hospital because the voices are telling him to do so.  He denies VH/paranoia, throughout interview he does appear to internally  "preoccupied.    Psychiatric ROS:  Depression: Denies  Deepali: Denies  Anxiety: Denies  Psychosis: Denies    Medical ROS (as pertinent):     Review of Systems   Constitutional: Negative.    HENT: Negative.    Eyes: Negative.    Respiratory: Negative.    Cardiovascular: Negative.    Gastrointestinal: Negative.    Genitourinary: Negative.    Musculoskeletal: Negative.    Skin: Negative.    Neurological: Positive for headaches.   Endo/Heme/Allergies: Negative.        *Psychiatric Examination:  Vitals: /81   Pulse 93   Temp 36.7 °C (98.1 °F) (Temporal)   Resp 15   Ht 1.778 m (5' 10\")   Wt 122 kg (270 lb)   SpO2 97%   General Appearance: 26-year-old  male, somewhat overweight, completely naked, feet have visible dirt on them, hair is disheveled poor grooming and hygiene, no acute distress, no physical signs of trauma  Abnormal Movements: None noted  Gait and Posture: Patient ambulates with a normal steady gait.  Posture is within normal limits.  Speech: Spontaneous.  Increased latency of response.  Regular rate and tone.  Volume is decreased.  Frequent slurring of words and poor articulation.  Thought processes: Somewhat tangential, but linear when redirected.  Somewhat illogical.  Somewhat disorganized at times.  Associations: No loose associations.  Abnormal or Psychotic Thoughts: Patient reports command auditory hallucinations telling him to take his clothes off,, denies VH/paranoia.  He appears internally preoccupied throughout interview.   orientation: Patient is alert and oriented to date, initially states that we are in prison, but then corrects himself to report that we are in [facility], oriented to reason for coming to the hospital \"public indecency, they found to be naked\"  Recent and Remote Memory: Impaired recent and remote memory, patient is unable to provide timelines or details of a psychiatric or medical history   Attention Span and Concentration: Patient has decreased attention " "span and concentration to conversation, he is able to spell the word world backwards  Language: Fluent in English  Fund of Knowledge: Age-appropriate fund of knowledge  Mood and Affect: \" I'm chillin\", mildly blunted, but friendly, somewhat  SI/HI: Denies SI/reports occasional vague HI without specific person or plan.    Past Medical History:   Past Medical History:   Diagnosis Date   • ASTHMA     Dx at age 5, does not use and inhaler, and has never had an attack   • Bipolar disorder (HCC)    • Drug abuse in remission     meth, alcohol and tobacco since 14        Past Psychiatric History:  Previous Diagnosis: Schizophrenia, meth induced psychosis, bipolar disorder  Current meds: Patient reports that he has not been taking medications for over a year  Previous med trials: Quetiapine 100 mg twice daily, carbamazepine 200 mg twice daily, trazodone 100 mg nightly, also reports that he has tried Depakote, and Dilaudid  Hospitalizations: Reports at least 5 prior hospitalizations between [facility] and [facility]  Suicide attempts/SIB: Reports history of suicide attempts does not provide number or type of attempt  Outpatient services: Reports that he has been receiving outpatient services through [facility] and [facility]  Access to guns: Not assessed  Abuse/trauma hx: Not assessed  Legal hx: Reports that he was recently in custodial for 3 months per chart review it appears patient was released around December    Family Hx: Patient denies family history of medical illness or psychiatric illness.  Denies family history of suicides.    Social Hx: Patient is homeless, reports that he has been homeless for the past year.  He initially states that his mother and father live in [place], later he reports that his father has passed away.  He reports that he did not graduate from high school.  He reports multiple arrests including a recent 3-month incarceration.  Reports past methamphetamine use, states that he has not smoked in " a year.  Reports that he drinks up to 4 tall cans of malt liquor a day for the past year or 2.  States that he smoked dope at the homeless shelter a year ago.  Reports that he uses cigarettes when he has access to them.       Current Medications: Patient denies taking the medications listed below for the past year  No current facility-administered medications for this encounter.     Current Outpatient Medications   Medication Sig Dispense Refill   • carBAMazepine (TEGRETOL) 200 MG Tab Take 200 mg by mouth 2 Times a Day.     • QUEtiapine (SEROQUEL) 100 MG Tab Take 100 mg by mouth 2 times a day.     • traZODone (DESYREL) 100 MG Tab Take 100 mg by mouth every bedtime.     • meloxicam (MOBIC) 7.5 MG Tab Take 7.5 mg by mouth every day.          Allergies:  Patient has no known allergies.     Labs personally reviewed:   Recent Results (from the past 72 hour(s))   POC BREATHALIZER    Collection Time: 03/02/22 11:17 AM   Result Value Ref Range    POC Breathalizer 0.00 0.00 - 0.01 Percent   URINE DRUG SCREEN (TRIAGE)    Collection Time: 03/02/22  1:00 PM   Result Value Ref Range    Amphetamines Urine Negative Negative    Barbiturates Negative Negative    Benzodiazepines Negative Negative    Cocaine Metabolite Negative Negative    Methadone Negative Negative    Opiates Negative Negative    Oxycodone Negative Negative    Phencyclidine -Pcp Negative Negative    Propoxyphene Negative Negative    Cannabinoid Metab Positive (A) Negative       EKG: No results found for this or any previous visit.,  1 has been ordered.  Brain Imaging: None available  EEG: None available     Assessment:  Patient is a 26-year-old  male with a historical diagnosis of schizophrenia abd bipolar disorder, who was brought in by police after being discharged from the hospital earlier that day.  Patient was found wandering the streets naked and brought in for a psychiatric evaluation.  UDS was positive for cannabinoids only.  POC breathalyzer was  negative.  Upon evaluation patient remained completely naked, appeared internally preoccupied, he reports that he has not been taking his medications for over a year.  He does report auditory hallucinations telling him to take off his clothing.  He denies all SI HI.  At this time patient remains psychotic and unable to care for self, legal hold has been extended.  Patient requires inpatient psychiatric stabilization.  Quetiapine 50 mg twice daily has been ordered as well as EKG, CBC, CMP.    Dx:  Unspecified psychotic spectrum disorder  Historical diagnosis of bipolar disorder    Medical:  None    Plan:   Legal hold: Extended   Psychotropic medications: Start quetiapine 50 mg twice daily   Please transfer pt to inpatient psychiatric hospital when medically cleared and bed is available   Labs ordered/reviewed: CBC, CMP ordered   EKG ordered   Old records ordered/reviewed/summarized   Psychiatry will follow up    Thank you for the consult.     Sitter: Yes  Phone: No  Visitors: No  Personal belongings: No    This note was created using voice recognition software (Dragon). The accuracy of the dictation is limited by the abilities of the software. I have reviewed the note prior to signing. However, error related to voice recognition software and /or scribes may still exist and should be interpreted within the appropriate context.

## 2022-03-03 NOTE — ED NOTES
Received report from Trinity MACDONALD. Assuming care of pt at this time. Pt sleeping in room, respirations even and unlabored, 1:1 sitter in view of pt.

## 2022-03-03 NOTE — ED NOTES
PT noted to be ambulating in room. Regular respirations without signs of distress. Direct observation continues with sitter at doorway

## 2022-03-03 NOTE — ED NOTES
[facility] Psych[facility] in room to see pt. Pt seated on floor, calm and speaking without signs of distress.

## 2022-03-03 NOTE — ED NOTES
Pt noted to be ambulating of room down hallway towards Charge RN desk. Pt redirected to room by this RN and sitter. Direct observation continues with sitter at doorway.

## 2022-03-03 NOTE — ED NOTES
Pt's vials re-assessed, WNL. Provided pt sandwich, crackers, juice and apple sauce. 1:1 sitter in view of pt.

## 2022-03-03 NOTE — ED NOTES
Med rec complete per pt at bedside.  Allergies reviewed and updated.  Confirmed patient's home pharmacy preference.    Pt states has not taken any medications in the past 30 days.

## 2022-03-03 NOTE — ED NOTES
"Pt walked out of room toward charge desk and stated \"I wanna leave, I'm done here.\" RN re-educated pt that he is still on a legal hold and would not be able to leave at this time, redirected pt back to room. Pt cooperated and returned to room with minimal re-direction, laid back down on bed, 1:1 sitter in direct view of pt.   "

## 2022-03-03 NOTE — ED NOTES
PT noted to be ambulating in room. Regular respirations and no signs of distress noted. Direct observation continue with sitter at doorway.

## 2022-03-03 NOTE — ED NOTES
Pt sleeping in bed, respirations even and unlabored, makes independent positional changes, 1:1 sitter in view of pt.

## 2022-03-17 ENCOUNTER — HOSPITAL ENCOUNTER (EMERGENCY)
Facility: MEDICAL CENTER | Age: 27
End: 2022-03-17
Attending: EMERGENCY MEDICINE
Payer: MEDICAID

## 2022-03-17 VITALS
WEIGHT: 270 LBS | TEMPERATURE: 97.2 F | SYSTOLIC BLOOD PRESSURE: 109 MMHG | OXYGEN SATURATION: 91 % | BODY MASS INDEX: 38.65 KG/M2 | HEART RATE: 110 BPM | HEIGHT: 70 IN | RESPIRATION RATE: 16 BRPM | DIASTOLIC BLOOD PRESSURE: 59 MMHG

## 2022-03-17 DIAGNOSIS — R11.2 NAUSEA AND VOMITING, INTRACTABILITY OF VOMITING NOT SPECIFIED, UNSPECIFIED VOMITING TYPE: ICD-10-CM

## 2022-03-17 DIAGNOSIS — F10.10 ALCOHOL ABUSE: ICD-10-CM

## 2022-03-17 LAB
ALBUMIN SERPL BCP-MCNC: 4.2 G/DL (ref 3.2–4.9)
ALBUMIN/GLOB SERPL: 1.4 G/DL
ALP SERPL-CCNC: 96 U/L (ref 30–99)
ALT SERPL-CCNC: 29 U/L (ref 2–50)
ANION GAP SERPL CALC-SCNC: 15 MMOL/L (ref 7–16)
AST SERPL-CCNC: 21 U/L (ref 12–45)
BASOPHILS # BLD AUTO: 0.4 % (ref 0–1.8)
BASOPHILS # BLD: 0.05 K/UL (ref 0–0.12)
BILIRUB SERPL-MCNC: <0.2 MG/DL (ref 0.1–1.5)
BUN SERPL-MCNC: 7 MG/DL (ref 8–22)
CALCIUM SERPL-MCNC: 9.1 MG/DL (ref 8.5–10.5)
CHLORIDE SERPL-SCNC: 107 MMOL/L (ref 96–112)
CO2 SERPL-SCNC: 23 MMOL/L (ref 20–33)
CREAT SERPL-MCNC: 0.52 MG/DL (ref 0.5–1.4)
EKG IMPRESSION: NORMAL
EOSINOPHIL # BLD AUTO: 0.04 K/UL (ref 0–0.51)
EOSINOPHIL NFR BLD: 0.3 % (ref 0–6.9)
ERYTHROCYTE [DISTWIDTH] IN BLOOD BY AUTOMATED COUNT: 42.3 FL (ref 35.9–50)
GFR SERPLBLD CREATININE-BSD FMLA CKD-EPI: 143 ML/MIN/1.73 M 2
GLOBULIN SER CALC-MCNC: 2.9 G/DL (ref 1.9–3.5)
GLUCOSE SERPL-MCNC: 83 MG/DL (ref 65–99)
HCT VFR BLD AUTO: 46.2 % (ref 42–52)
HGB BLD-MCNC: 15.9 G/DL (ref 14–18)
IMM GRANULOCYTES # BLD AUTO: 0.23 K/UL (ref 0–0.11)
IMM GRANULOCYTES NFR BLD AUTO: 1.6 % (ref 0–0.9)
LIPASE SERPL-CCNC: 17 U/L (ref 11–82)
LYMPHOCYTES # BLD AUTO: 2.2 K/UL (ref 1–4.8)
LYMPHOCYTES NFR BLD: 15.7 % (ref 22–41)
MCH RBC QN AUTO: 30.8 PG (ref 27–33)
MCHC RBC AUTO-ENTMCNC: 34.4 G/DL (ref 33.7–35.3)
MCV RBC AUTO: 89.4 FL (ref 81.4–97.8)
MONOCYTES # BLD AUTO: 1.24 K/UL (ref 0–0.85)
MONOCYTES NFR BLD AUTO: 8.9 % (ref 0–13.4)
NEUTROPHILS # BLD AUTO: 10.21 K/UL (ref 1.82–7.42)
NEUTROPHILS NFR BLD: 73.1 % (ref 44–72)
NRBC # BLD AUTO: 0 K/UL
NRBC BLD-RTO: 0 /100 WBC
PLATELET # BLD AUTO: 295 K/UL (ref 164–446)
PMV BLD AUTO: 10 FL (ref 9–12.9)
POTASSIUM SERPL-SCNC: 4.1 MMOL/L (ref 3.6–5.5)
PROT SERPL-MCNC: 7.1 G/DL (ref 6–8.2)
RBC # BLD AUTO: 5.17 M/UL (ref 4.7–6.1)
SODIUM SERPL-SCNC: 145 MMOL/L (ref 135–145)
WBC # BLD AUTO: 14 K/UL (ref 4.8–10.8)

## 2022-03-17 PROCEDURE — 83690 ASSAY OF LIPASE: CPT

## 2022-03-17 PROCEDURE — 93005 ELECTROCARDIOGRAM TRACING: CPT

## 2022-03-17 PROCEDURE — 93005 ELECTROCARDIOGRAM TRACING: CPT | Performed by: EMERGENCY MEDICINE

## 2022-03-17 PROCEDURE — 99284 EMERGENCY DEPT VISIT MOD MDM: CPT

## 2022-03-17 PROCEDURE — 85025 COMPLETE CBC W/AUTO DIFF WBC: CPT

## 2022-03-17 PROCEDURE — 36415 COLL VENOUS BLD VENIPUNCTURE: CPT

## 2022-03-17 PROCEDURE — 80053 COMPREHEN METABOLIC PANEL: CPT

## 2022-03-17 PROCEDURE — 700105 HCHG RX REV CODE 258: Performed by: EMERGENCY MEDICINE

## 2022-03-17 RX ORDER — SODIUM CHLORIDE, SODIUM LACTATE, POTASSIUM CHLORIDE, CALCIUM CHLORIDE 600; 310; 30; 20 MG/100ML; MG/100ML; MG/100ML; MG/100ML
1000 INJECTION, SOLUTION INTRAVENOUS ONCE
Status: COMPLETED | OUTPATIENT
Start: 2022-03-17 | End: 2022-03-17

## 2022-03-17 RX ORDER — ONDANSETRON 4 MG/1
4 TABLET, ORALLY DISINTEGRATING ORAL EVERY 6 HOURS PRN
Qty: 20 TABLET | Refills: 0 | Status: SHIPPED | OUTPATIENT
Start: 2022-03-17

## 2022-03-17 RX ORDER — ONDANSETRON 2 MG/ML
4 INJECTION INTRAMUSCULAR; INTRAVENOUS ONCE
Status: DISCONTINUED | OUTPATIENT
Start: 2022-03-17 | End: 2022-03-17 | Stop reason: HOSPADM

## 2022-03-17 RX ADMIN — SODIUM CHLORIDE, POTASSIUM CHLORIDE, SODIUM LACTATE AND CALCIUM CHLORIDE 1000 ML: 600; 310; 30; 20 INJECTION, SOLUTION INTRAVENOUS at 06:41

## 2022-03-17 ASSESSMENT — FIBROSIS 4 INDEX: FIB4 SCORE: 0.56

## 2022-03-17 NOTE — ED NOTES
Pt and family member at bedside verbalize understanding of discharge instructions and follow up. Pt able to ambulate out to ED lobby with all belongings.

## 2022-03-17 NOTE — ED TRIAGE NOTES
"Fareed Osorio Denver  26 y.o. male  Chief Complaint   Patient presents with   • N/V     No emesis for x1 day     Pt arrives to triage w/ above complaints. Pt denies N/V for at least one day. Pt states he \"has been staying at the shelter and the  came there and told me to come to the ED\"     No respiratory distress noted, pt tachycardic in triage, . Denies pain.   Denies current drug use.    Pt Aox4    EKG ordered and complete    Triage process explained to patient, apologized for wait time, and returned to [facility].  Pt informed to notify staff of any change in condition.     "

## 2022-03-17 NOTE — ED PROVIDER NOTES
ED Provider Note    ER PROVIDER NOTE        CHIEF COMPLAINT  Chief Complaint   Patient presents with   • N/V     No emesis for x1 day       HPI  Fareed Muhammad is a 26 y.o. male who presents to the emergency department complaining of nausea and vomiting.  Patient reports over the last day he has had 1-2 episodes of nonbloody emesis and some nausea.  He reports no diarrhea, he reports no abdominal pain.  No fevers or chills.  No chest pain or shortness of breath.  No headaches.  No recent illness, cough, congestion or similar.  No urinary symptoms.  He does report drinking heavily recently.    REVIEW OF SYSTEMS  Pertinent positives include vomiting. Pertinent negatives include no abdominal pain  . See HPI for details. All other systems reviewed and are negative.    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    SURGICAL HISTORY  patient denies any surgical history    FAMILY HISTORY  Family History   Problem Relation Age of Onset   • Cancer Paternal Aunt         pradip marie   • Hypertension Maternal Grandmother    • Diabetes Maternal Grandmother    • Cancer Maternal Grandmother    • Hypertension Maternal Grandfather    • Diabetes Maternal Grandfather        SOCIAL HISTORY  Social History     Socioeconomic History   • Marital status: Single   Tobacco Use   • Smoking status: Current Every Day Smoker     Packs/day: 1.00     Years: 3.00     Pack years: 3.00   • Smokeless tobacco: Never Used   • Tobacco comment: currently using e-cigarettes   Substance and Sexual Activity   • Alcohol use: Yes     Comment: 1 pint several times a week   • Drug use: Yes     Types: Marijuana, Methamphetamines     Comment: pot, meth   • Sexual activity: Not Currently     Partners: Female     Birth control/protection: Condom      Social History     Substance and Sexual Activity   Drug Use Yes   • Types: Marijuana, Methamphetamines    Comment: pot, meth       CURRENT  "MEDICATIONS  Home Medications     Reviewed by Barb Espitia R.N. (Registered Nurse) on 03/17/22 at 0551  Med List Status: Partial   Medication Last Dose Status        Patient Juliano Taking any Medications                       ALLERGIES  No Known Allergies    PHYSICAL EXAM  VITAL SIGNS: /78   Pulse (!) 111   Temp 37.1 °C (98.7 °F) (Temporal)   Resp 18   Ht 1.778 m (5' 10\")   Wt 122 kg (270 lb)   SpO2 91%   BMI 38.74 kg/m²   Pulse ox interpretation: I interpret this pulse ox as normal.    Constitutional: Alert in no apparent distress.  HENT: No signs of trauma, Bilateral external ears normal, Nose normal.   Eyes: Pupils are equal and reactive, Conjunctiva normal, Non-icteric.   Neck: Normal range of motion, No tenderness, Supple, No stridor.   Lymphatic: No lymphadenopathy noted.   Cardiovascular: Tachycardic, no murmurs.   Thorax & Lungs: Normal breath sounds, No respiratory distress, No wheezing, No chest tenderness.   Abdomen: Bowel sounds normal, Soft, No tenderness, No masses, No pulsatile masses. No peritoneal signs.  Skin: Warm, Dry, No erythema, No rash.   Back: No bony tenderness, No CVA tenderness.   Extremities: Intact distal pulses, No edema, No tenderness, No cyanosis, Negative Jacqueline's sign.  Musculoskeletal: Good range of motion in all major joints. No tenderness to palpation or major deformities noted.   Neurologic: Alert , Normal motor function, Normal sensory function, No focal deficits noted.   Psychiatric: Affect normal, Judgment normal, Mood normal.     DIAGNOSTIC STUDIES / PROCEDURES    Results for orders placed or performed during the hospital encounter of 03/17/22   CBC WITH DIFFERENTIAL   Result Value Ref Range    WBC 14.0 (H) 4.8 - 10.8 K/uL    RBC 5.17 4.70 - 6.10 M/uL    Hemoglobin 15.9 14.0 - 18.0 g/dL    Hematocrit 46.2 42.0 - 52.0 %    MCV 89.4 81.4 - 97.8 fL    MCH 30.8 27.0 - 33.0 pg    MCHC 34.4 33.7 - 35.3 g/dL    RDW 42.3 35.9 - 50.0 fL    Platelet Count 295 164 - " 446 K/uL    MPV 10.0 9.0 - 12.9 fL    Neutrophils-Polys 73.10 (H) 44.00 - 72.00 %    Lymphocytes 15.70 (L) 22.00 - 41.00 %    Monocytes 8.90 0.00 - 13.40 %    Eosinophils 0.30 0.00 - 6.90 %    Basophils 0.40 0.00 - 1.80 %    Immature Granulocytes 1.60 (H) 0.00 - 0.90 %    Nucleated RBC 0.00 /100 WBC    Neutrophils (Absolute) 10.21 (H) 1.82 - 7.42 K/uL    Lymphs (Absolute) 2.20 1.00 - 4.80 K/uL    Monos (Absolute) 1.24 (H) 0.00 - 0.85 K/uL    Eos (Absolute) 0.04 0.00 - 0.51 K/uL    Baso (Absolute) 0.05 0.00 - 0.12 K/uL    Immature Granulocytes (abs) 0.23 (H) 0.00 - 0.11 K/uL    NRBC (Absolute) 0.00 K/uL   COMP METABOLIC PANEL   Result Value Ref Range    Sodium 145 135 - 145 mmol/L    Potassium 4.1 3.6 - 5.5 mmol/L    Chloride 107 96 - 112 mmol/L    Co2 23 20 - 33 mmol/L    Anion Gap 15.0 7.0 - 16.0    Glucose 83 65 - 99 mg/dL    Bun 7 (L) 8 - 22 mg/dL    Creatinine 0.52 0.50 - 1.40 mg/dL    Calcium 9.1 8.5 - 10.5 mg/dL    AST(SGOT) 21 12 - 45 U/L    ALT(SGPT) 29 2 - 50 U/L    Alkaline Phosphatase 96 30 - 99 U/L    Total Bilirubin <0.2 0.1 - 1.5 mg/dL    Albumin 4.2 3.2 - 4.9 g/dL    Total Protein 7.1 6.0 - 8.2 g/dL    Globulin 2.9 1.9 - 3.5 g/dL    A-G Ratio 1.4 g/dL   LIPASE   Result Value Ref Range    Lipase 17 11 - 82 U/L   ESTIMATED GFR   Result Value Ref Range    GFR (CKD-EPI) 143 >60 mL/min/1.73 m 2   EKG   Result Value Ref Range    Report       [facility] Emergency Dept.    Test Date:  2022  Pt Name:    MAURI [facility]: ER  MRN:        [id number]                      Room:  Gender:     Male                         Technician: [id number]  :        1995                   Requested By:ER TRIAGE PROTOCOL  Order #:    [id number]                    Reading MD: NARESH PEARCE MD    Measurements  Intervals                                Axis  Rate:       106                          P:          42  NC:         156                          QRS:        47  QRSD:        80                           T:          19  QT:         324  QTc:        431    Interpretive Statements  SINUS TACHYCARDIA  Compared to ECG 03/03/2022 11:10:36  Sinus rhythm no longer present  Electronically Signed On 3- 7:23:37 PDT by NARESH PEARCE MD           RADIOLOGY  No orders to display     The radiologist's interpretation of all radiological studies have been reviewed and images independently viewed by me.    COURSE & MEDICAL DECISION MAKING  Nursing notes, VS, PMSFHx reviewed in chart.    6:15 AM Patient seen and examined at bedside. Patient will be treated with IV fluid, Zofran. Ordered for CBC, CMP, lipase to evaluate his symptoms.     7:29 AM  Patient reevaluated, states he is feeling improved, tolerating water well, will plan for discharge    HYDRATION: Based on the patient's presentation of Acute Vomiting the patient was given IV fluids. IV Hydration was used because oral hydration was not as rapid as required. Upon recheck following hydration, the patient was improved.    Decision Making:  This is a 26 y.o. male presenting with some nausea and vomiting over the last day.  Likely secondary to his alcohol use and some gastritis, is overall quite well-appearing and tolerating orals well at time of discharge.  No findings of significant alcohol withdrawal on exam.  Lipase is normal and he has no abdominal pain or other findings suggestive of pancreatitis at this time.  There is no electrolyte or metabolic derangement.  He has no abdominal pain or tenderness suggestive of surgical intra-abdominal pathology such as appendicitis, perforation or obstruction.  He was given a prescription for Zofran, counseled on alcohol cessation    The patient will return for new or worsening symptoms and is stable at the time of discharge.    The patient is referred to a primary physician for blood pressure management, diabetic screening, and for all other preventative health concerns.      DISPOSITION:  Patient  will be discharged home in stable condition.    FOLLOW UP:  Critical access hospital ([facility]) - Primary Care  [address]  [facility] [address]  [phone]          OUTPATIENT MEDICATIONS:  New Prescriptions    ONDANSETRON (ZOFRAN ODT) 4 MG TABLET DISPERSIBLE    Take 1 Tablet by mouth every 6 hours as needed for Nausea.         FINAL IMPRESSION  1. Nausea and vomiting, intractability of vomiting not specified, unspecified vomiting type    2. Alcohol abuse          The note accurately reflects work and decisions made by me.  Nato Bentley M.D.  3/17/2022  7:30 AM

## 2022-03-17 NOTE — ED NOTES
"Pt admits to drinking \"a fifth of hard alcohol tonight with a few beers\".  Pt reports his emesis looking like alcohol.   "

## 2023-05-24 ENCOUNTER — APPOINTMENT (OUTPATIENT)
Dept: RADIOLOGY | Facility: MEDICAL CENTER | Age: 28
End: 2023-05-24
Attending: EMERGENCY MEDICINE
Payer: MEDICAID

## 2023-05-24 ENCOUNTER — HOSPITAL ENCOUNTER (EMERGENCY)
Facility: MEDICAL CENTER | Age: 28
End: 2023-05-24
Attending: EMERGENCY MEDICINE
Payer: MEDICAID

## 2023-05-24 VITALS
TEMPERATURE: 98.6 F | HEIGHT: 70 IN | HEART RATE: 85 BPM | WEIGHT: 210.54 LBS | DIASTOLIC BLOOD PRESSURE: 80 MMHG | OXYGEN SATURATION: 97 % | RESPIRATION RATE: 18 BRPM | SYSTOLIC BLOOD PRESSURE: 151 MMHG | BODY MASS INDEX: 30.14 KG/M2

## 2023-05-24 DIAGNOSIS — L01.00 IMPETIGO: ICD-10-CM

## 2023-05-24 DIAGNOSIS — R11.2 NAUSEA AND VOMITING, UNSPECIFIED VOMITING TYPE: ICD-10-CM

## 2023-05-24 DIAGNOSIS — L55.9 SUNBURN: ICD-10-CM

## 2023-05-24 DIAGNOSIS — D72.829 LEUKOCYTOSIS, UNSPECIFIED TYPE: ICD-10-CM

## 2023-05-24 LAB
ALBUMIN SERPL BCP-MCNC: 4.6 G/DL (ref 3.2–4.9)
ALBUMIN/GLOB SERPL: 1.2 G/DL
ALP SERPL-CCNC: 81 U/L (ref 30–99)
ALT SERPL-CCNC: 31 U/L (ref 2–50)
ANION GAP SERPL CALC-SCNC: 12 MMOL/L (ref 7–16)
APPEARANCE UR: CLEAR
AST SERPL-CCNC: 23 U/L (ref 12–45)
BACTERIA #/AREA URNS HPF: ABNORMAL /HPF
BASOPHILS # BLD AUTO: 0.3 % (ref 0–1.8)
BASOPHILS # BLD: 0.07 K/UL (ref 0–0.12)
BILIRUB SERPL-MCNC: 0.8 MG/DL (ref 0.1–1.5)
BILIRUB UR QL STRIP.AUTO: NEGATIVE
BUN SERPL-MCNC: 20 MG/DL (ref 8–22)
CALCIUM ALBUM COR SERPL-MCNC: 8.7 MG/DL (ref 8.5–10.5)
CALCIUM SERPL-MCNC: 9.2 MG/DL (ref 8.5–10.5)
CHLORIDE SERPL-SCNC: 101 MMOL/L (ref 96–112)
CO2 SERPL-SCNC: 26 MMOL/L (ref 20–33)
COLOR UR: YELLOW
CREAT SERPL-MCNC: 0.79 MG/DL (ref 0.5–1.4)
EOSINOPHIL # BLD AUTO: 0.01 K/UL (ref 0–0.51)
EOSINOPHIL NFR BLD: 0 % (ref 0–6.9)
EPI CELLS #/AREA URNS HPF: ABNORMAL /HPF
ERYTHROCYTE [DISTWIDTH] IN BLOOD BY AUTOMATED COUNT: 41.3 FL (ref 35.9–50)
ETHANOL BLD-MCNC: <10.1 MG/DL
GFR SERPLBLD CREATININE-BSD FMLA CKD-EPI: 125 ML/MIN/1.73 M 2
GLOBULIN SER CALC-MCNC: 4 G/DL (ref 1.9–3.5)
GLUCOSE SERPL-MCNC: 100 MG/DL (ref 65–99)
GLUCOSE UR STRIP.AUTO-MCNC: NEGATIVE MG/DL
HCT VFR BLD AUTO: 49.8 % (ref 42–52)
HGB BLD-MCNC: 16.9 G/DL (ref 14–18)
HYALINE CASTS #/AREA URNS LPF: ABNORMAL /LPF
IMM GRANULOCYTES # BLD AUTO: 0.27 K/UL (ref 0–0.11)
IMM GRANULOCYTES NFR BLD AUTO: 1.3 % (ref 0–0.9)
KETONES UR STRIP.AUTO-MCNC: ABNORMAL MG/DL
LEUKOCYTE ESTERASE UR QL STRIP.AUTO: ABNORMAL
LIPASE SERPL-CCNC: 20 U/L (ref 11–82)
LYMPHOCYTES # BLD AUTO: 2 K/UL (ref 1–4.8)
LYMPHOCYTES NFR BLD: 9.8 % (ref 22–41)
MCH RBC QN AUTO: 30.5 PG (ref 27–33)
MCHC RBC AUTO-ENTMCNC: 33.9 G/DL (ref 32.3–36.5)
MCV RBC AUTO: 89.9 FL (ref 81.4–97.8)
MICRO URNS: ABNORMAL
MONOCYTES # BLD AUTO: 1.35 K/UL (ref 0–0.85)
MONOCYTES NFR BLD AUTO: 6.6 % (ref 0–13.4)
NEUTROPHILS # BLD AUTO: 16.61 K/UL (ref 1.82–7.42)
NEUTROPHILS NFR BLD: 82 % (ref 44–72)
NITRITE UR QL STRIP.AUTO: NEGATIVE
NRBC # BLD AUTO: 0 K/UL
NRBC BLD-RTO: 0 /100 WBC (ref 0–0.2)
PH UR STRIP.AUTO: 6 [PH] (ref 5–8)
PLATELET # BLD AUTO: 424 K/UL (ref 164–446)
PMV BLD AUTO: 9.7 FL (ref 9–12.9)
POTASSIUM SERPL-SCNC: 4.1 MMOL/L (ref 3.6–5.5)
PROT SERPL-MCNC: 8.6 G/DL (ref 6–8.2)
PROT UR QL STRIP: NEGATIVE MG/DL
RBC # BLD AUTO: 5.54 M/UL (ref 4.7–6.1)
RBC # URNS HPF: ABNORMAL /HPF
RBC UR QL AUTO: NEGATIVE
SODIUM SERPL-SCNC: 139 MMOL/L (ref 135–145)
SP GR UR STRIP.AUTO: 1.01
UROBILINOGEN UR STRIP.AUTO-MCNC: 0.2 MG/DL
WBC # BLD AUTO: 20.3 K/UL (ref 4.8–10.8)
WBC #/AREA URNS HPF: ABNORMAL /HPF

## 2023-05-24 PROCEDURE — 700102 HCHG RX REV CODE 250 W/ 637 OVERRIDE(OP): Mod: UD | Performed by: EMERGENCY MEDICINE

## 2023-05-24 PROCEDURE — 85025 COMPLETE CBC W/AUTO DIFF WBC: CPT

## 2023-05-24 PROCEDURE — 82077 ASSAY SPEC XCP UR&BREATH IA: CPT

## 2023-05-24 PROCEDURE — 99285 EMERGENCY DEPT VISIT HI MDM: CPT

## 2023-05-24 PROCEDURE — 700111 HCHG RX REV CODE 636 W/ 250 OVERRIDE (IP): Mod: UD | Performed by: EMERGENCY MEDICINE

## 2023-05-24 PROCEDURE — 36415 COLL VENOUS BLD VENIPUNCTURE: CPT

## 2023-05-24 PROCEDURE — 81001 URINALYSIS AUTO W/SCOPE: CPT

## 2023-05-24 PROCEDURE — A9270 NON-COVERED ITEM OR SERVICE: HCPCS | Mod: UD | Performed by: EMERGENCY MEDICINE

## 2023-05-24 PROCEDURE — 80053 COMPREHEN METABOLIC PANEL: CPT

## 2023-05-24 PROCEDURE — 700105 HCHG RX REV CODE 258: Performed by: EMERGENCY MEDICINE

## 2023-05-24 PROCEDURE — 71045 X-RAY EXAM CHEST 1 VIEW: CPT

## 2023-05-24 PROCEDURE — 83690 ASSAY OF LIPASE: CPT

## 2023-05-24 RX ORDER — ONDANSETRON 4 MG/1
4 TABLET, ORALLY DISINTEGRATING ORAL EVERY 8 HOURS PRN
Qty: 10 TABLET | Refills: 0 | Status: SHIPPED | OUTPATIENT
Start: 2023-05-24

## 2023-05-24 RX ORDER — SODIUM CHLORIDE, SODIUM LACTATE, POTASSIUM CHLORIDE, AND CALCIUM CHLORIDE .6; .31; .03; .02 G/100ML; G/100ML; G/100ML; G/100ML
30 INJECTION, SOLUTION INTRAVENOUS ONCE
Status: COMPLETED | OUTPATIENT
Start: 2023-05-24 | End: 2023-05-24

## 2023-05-24 RX ORDER — DOXYCYCLINE 100 MG/1
100 CAPSULE ORAL 2 TIMES DAILY
Qty: 20 CAPSULE | Refills: 0 | Status: ACTIVE | OUTPATIENT
Start: 2023-05-24 | End: 2023-06-03

## 2023-05-24 RX ORDER — OMEPRAZOLE 20 MG/1
20 CAPSULE, DELAYED RELEASE ORAL DAILY
Qty: 30 CAPSULE | Refills: 0 | Status: SHIPPED | OUTPATIENT
Start: 2023-05-24

## 2023-05-24 RX ORDER — DOXYCYCLINE 100 MG/1
100 TABLET ORAL ONCE
Status: COMPLETED | OUTPATIENT
Start: 2023-05-24 | End: 2023-05-24

## 2023-05-24 RX ORDER — ONDANSETRON 4 MG/1
4 TABLET, ORALLY DISINTEGRATING ORAL ONCE
Status: COMPLETED | OUTPATIENT
Start: 2023-05-24 | End: 2023-05-24

## 2023-05-24 RX ADMIN — SODIUM CHLORIDE, POTASSIUM CHLORIDE, SODIUM LACTATE AND CALCIUM CHLORIDE 2190 ML: 600; 310; 30; 20 INJECTION, SOLUTION INTRAVENOUS at 18:57

## 2023-05-24 RX ADMIN — ONDANSETRON 4 MG: 4 TABLET, ORALLY DISINTEGRATING ORAL at 17:44

## 2023-05-24 RX ADMIN — DOXYCYCLINE 100 MG: 100 TABLET ORAL at 17:44

## 2023-05-24 ASSESSMENT — FIBROSIS 4 INDEX: FIB4 SCORE: 0.34

## 2023-05-24 NOTE — ED TRIAGE NOTES
"Chief Complaint   Patient presents with    N/V     \"I'm very ill. I tried everything I could. I've been barfing\"    Psych Eval     Pt rubbing hand  on his face. Pt not making any sense in triage. Pt is disheveled.     Denies use of alcohol and drugs.    BP (!) 125/90   Pulse (!) 117   Temp 36.2 °C (97.2 °F) (Temporal)   Resp 17   Ht 1.778 m (5' 10\")   Wt 95.5 kg (210 lb 8.6 oz)   SpO2 97%   BMI 30.21 kg/m²     "

## 2023-05-24 NOTE — ED NOTES
Pt continues to get out of bed and try to walk around. Redirected him twice, and informed him that he could not walk around. He is easily redirectable. Laying in bed currently.
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
## 2023-05-25 NOTE — ED NOTES
Bedside report from RENAE Longoria. Pt resting in [facility] comfortably, IVF continued

## 2023-05-25 NOTE — DISCHARGE PLANNING
"Alert Team:    Pt is a 26 y/o male presenting to ED reporting nausea and vomiting. He states, \"I'm very ill. I tried everything I could. I've been barfing.\" Pt was rubbing hand  on his face during triage and not making sense; disheveled. Upon attempting to meet with this writer he states, \"Get out. You're crazy. Leave me alone.\" Then he proceeded to demand food be brought to him. Notified ERP. Then pt apologized and this writer met with him a second time in which he denies SI/HI and is able to contract for safety. Pt does not meet criteria for a legal hold at this time. He states he wants to leave and asked for his IV to be taken out. Offered assistance getting to the [facility], but pt states he has been kicked out of their facility. Declined bus pass as well. Outpatient psychiatric resources provided with discharge paperwork. Updated ERP.     Pt requesting taxi voucher to shelter after all. Taxi voucher provided.   "

## 2023-05-25 NOTE — ED NOTES
Pt discharged to lobby with steady gait, new shirt provided and bus pass given to pt. GCS 15. IV discontinued and gauze placed, pt in possession of belongings. Pt provided discharge education and information pertaining to medications and follow up appointments. Pt received copy of discharge instructions and verbalized understanding.     Vitals:    05/24/23 2005   BP: (!) 151/80   Pulse: 85   Resp: 18   Temp: 37 °C (98.6 °F)   SpO2: 97%

## 2023-05-25 NOTE — DISCHARGE INSTRUCTIONS
Return the emergency department if you have severe vomiting, blood in vomit, blood in stool, abdominal pain, fever or increasing redness to the skin.

## 2023-05-31 ENCOUNTER — APPOINTMENT (OUTPATIENT)
Dept: RADIOLOGY | Facility: MEDICAL CENTER | Age: 28
DRG: 957 | End: 2023-05-31
Attending: EMERGENCY MEDICINE
Payer: MEDICAID

## 2023-05-31 ENCOUNTER — APPOINTMENT (OUTPATIENT)
Dept: RADIOLOGY | Facility: MEDICAL CENTER | Age: 28
DRG: 957 | End: 2023-05-31
Attending: SURGERY
Payer: MEDICAID

## 2023-05-31 ENCOUNTER — ANESTHESIA (OUTPATIENT)
Dept: SURGERY | Facility: MEDICAL CENTER | Age: 28
DRG: 957 | End: 2023-05-31
Payer: MEDICAID

## 2023-05-31 ENCOUNTER — ANESTHESIA EVENT (OUTPATIENT)
Dept: SURGERY | Facility: MEDICAL CENTER | Age: 28
DRG: 957 | End: 2023-05-31
Payer: MEDICAID

## 2023-05-31 ENCOUNTER — APPOINTMENT (OUTPATIENT)
Dept: RADIOLOGY | Facility: MEDICAL CENTER | Age: 28
DRG: 957 | End: 2023-05-31
Payer: MEDICAID

## 2023-05-31 ENCOUNTER — HOSPITAL ENCOUNTER (INPATIENT)
Facility: MEDICAL CENTER | Age: 28
LOS: 30 days | DRG: 957 | End: 2023-06-30
Attending: EMERGENCY MEDICINE | Admitting: SURGERY
Payer: MEDICAID

## 2023-05-31 DIAGNOSIS — R13.12 DYSPHAGIA, OROPHARYNGEAL: ICD-10-CM

## 2023-05-31 DIAGNOSIS — S27.321A CONTUSION OF LEFT LUNG, INITIAL ENCOUNTER: ICD-10-CM

## 2023-05-31 DIAGNOSIS — S22.42XA CLOSED FRACTURE OF MULTIPLE RIBS OF LEFT SIDE, INITIAL ENCOUNTER: ICD-10-CM

## 2023-05-31 DIAGNOSIS — S27.2XXA TRAUMATIC HEMOPNEUMOTHORAX, INITIAL ENCOUNTER: ICD-10-CM

## 2023-05-31 DIAGNOSIS — S42.402B OPEN FRACTURE DISLOCATION OF LEFT ELBOW JOINT, INITIAL ENCOUNTER: ICD-10-CM

## 2023-05-31 DIAGNOSIS — S37.062A: ICD-10-CM

## 2023-05-31 DIAGNOSIS — S36.113A LIVER LACERATION, INITIAL ENCOUNTER: ICD-10-CM

## 2023-05-31 DIAGNOSIS — F99 PSYCHIATRIC DIAGNOSIS: ICD-10-CM

## 2023-05-31 DIAGNOSIS — S36.039A SPLEEN LACERATION, INITIAL ENCOUNTER: ICD-10-CM

## 2023-05-31 DIAGNOSIS — S13.120A: ICD-10-CM

## 2023-05-31 PROBLEM — S02.91XB OPEN SKULL FRACTURE (HCC): Status: ACTIVE | Noted: 2023-05-31

## 2023-05-31 PROBLEM — S51.002A: Status: ACTIVE | Noted: 2023-05-31

## 2023-05-31 PROBLEM — S37.039A: Status: ACTIVE | Noted: 2023-05-31

## 2023-05-31 PROBLEM — Z53.09 CONTRAINDICATION TO DEEP VEIN THROMBOSIS (DVT) PROPHYLAXIS: Status: ACTIVE | Noted: 2023-05-31

## 2023-05-31 PROBLEM — S53.105A DISLOCATION OF LEFT ELBOW: Status: ACTIVE | Noted: 2023-05-31

## 2023-05-31 PROBLEM — T07.XXXA ABRASION, MULTIPLE SITES: Status: ACTIVE | Noted: 2023-05-31

## 2023-05-31 PROBLEM — S27.0XXA CLOSED TRAUMATIC FRACTURE OF RIBS WITH PNEUMOTHORAX, INITIAL ENCOUNTER: Status: ACTIVE | Noted: 2023-05-31

## 2023-05-31 PROBLEM — S02.119B: Status: ACTIVE | Noted: 2023-05-31

## 2023-05-31 PROBLEM — S81.811A LACERATION OF RIGHT LOWER EXTREMITY: Status: ACTIVE | Noted: 2023-05-31

## 2023-05-31 PROBLEM — T14.90XA TRAUMA: Status: ACTIVE | Noted: 2023-05-31

## 2023-05-31 PROBLEM — S22.49XA CLOSED TRAUMATIC FRACTURE OF RIBS WITH PNEUMOTHORAX, INITIAL ENCOUNTER: Status: ACTIVE | Noted: 2023-05-31

## 2023-05-31 PROBLEM — S01.01XA SCALP LACERATION, INITIAL ENCOUNTER: Status: ACTIVE | Noted: 2023-05-31

## 2023-05-31 LAB
ABO + RH BLD: NORMAL
ABO GROUP BLD: NORMAL
ALBUMIN SERPL BCP-MCNC: 3.4 G/DL (ref 3.2–4.9)
ALBUMIN/GLOB SERPL: 1.2 G/DL
ALP SERPL-CCNC: 72 U/L (ref 30–99)
ALT SERPL-CCNC: 151 U/L (ref 2–50)
ANION GAP SERPL CALC-SCNC: 15 MMOL/L (ref 7–16)
APTT PPP: 27.6 SEC (ref 24.7–36)
AST SERPL-CCNC: 212 U/L (ref 12–45)
BARCODED ABORH UBTYP: 5100
BARCODED ABORH UBTYP: 600
BARCODED ABORH UBTYP: 600
BARCODED ABORH UBTYP: 6200
BARCODED ABORH UBTYP: 6200
BARCODED PRD CODE UBPRD: NORMAL
BARCODED UNIT NUM UBUNT: NORMAL
BASE EXCESS BLDA CALC-SCNC: -3 MMOL/L (ref -4–3)
BASOPHILS # BLD AUTO: 0.1 % (ref 0–1.8)
BASOPHILS # BLD: 0.03 K/UL (ref 0–0.12)
BILIRUB SERPL-MCNC: 0.3 MG/DL (ref 0.1–1.5)
BLD GP AB SCN SERPL QL: NORMAL
BODY TEMPERATURE: ABNORMAL DEGREES
BUN SERPL-MCNC: 11 MG/DL (ref 8–22)
CALCIUM ALBUM COR SERPL-MCNC: 8.7 MG/DL (ref 8.5–10.5)
CALCIUM SERPL-MCNC: 8.2 MG/DL (ref 8.5–10.5)
CFT BLD TEG: 3.7 MIN (ref 4.6–9.1)
CFT P HPASE BLD TEG: 3.8 MIN (ref 4.3–8.3)
CHLORIDE SERPL-SCNC: 107 MMOL/L (ref 96–112)
CLOT ANGLE BLD TEG: 77 DEGREES (ref 63–78)
CLOT LYSIS 30M P MA LENFR BLD TEG: 0 % (ref 0–2.6)
CO2 BLDA-SCNC: 24 MMOL/L (ref 20–33)
CO2 SERPL-SCNC: 20 MMOL/L (ref 20–33)
COMPONENT F 8504F: NORMAL
COMPONENT P 8504P: NORMAL
COMPONENT R 8504R: NORMAL
CREAT SERPL-MCNC: 0.9 MG/DL (ref 0.5–1.4)
CT.EXTRINSIC BLD ROTEM: 0.8 MIN (ref 0.8–2.1)
DELSYS IDSYS: ABNORMAL
END TIDAL CARBON DIOXIDE IECO2: 39 MMHG
EOSINOPHIL # BLD AUTO: 0.08 K/UL (ref 0–0.51)
EOSINOPHIL NFR BLD: 0.4 % (ref 0–6.9)
ERYTHROCYTE [DISTWIDTH] IN BLOOD BY AUTOMATED COUNT: 45 FL (ref 35.9–50)
ERYTHROCYTE [DISTWIDTH] IN BLOOD BY AUTOMATED COUNT: 45.4 FL (ref 35.9–50)
ETHANOL BLD-MCNC: 59.9 MG/DL
GFR SERPLBLD CREATININE-BSD FMLA CKD-EPI: 120 ML/MIN/1.73 M 2
GLOBULIN SER CALC-MCNC: 2.8 G/DL (ref 1.9–3.5)
GLUCOSE SERPL-MCNC: 143 MG/DL (ref 65–99)
HCO3 BLDA-SCNC: 22.9 MMOL/L (ref 17–25)
HCT VFR BLD AUTO: 30.2 % (ref 42–52)
HCT VFR BLD AUTO: 37.2 % (ref 42–52)
HGB BLD-MCNC: 12.2 G/DL (ref 14–18)
HGB BLD-MCNC: 9.9 G/DL (ref 14–18)
HOROWITZ INDEX BLDA+IHG-RTO: 138 MM[HG]
IMM GRANULOCYTES # BLD AUTO: 0.23 K/UL (ref 0–0.11)
IMM GRANULOCYTES NFR BLD AUTO: 1.1 % (ref 0–0.9)
INR PPP: 1.26 (ref 0.87–1.13)
LYMPHOCYTES # BLD AUTO: 1.16 K/UL (ref 1–4.8)
LYMPHOCYTES NFR BLD: 5.7 % (ref 22–41)
MCF BLD TEG: 66 MM (ref 52–69)
MCF.PLATELET INHIB BLD ROTEM: 29.4 MM (ref 15–32)
MCH RBC QN AUTO: 30.8 PG (ref 27–33)
MCH RBC QN AUTO: 31 PG (ref 27–33)
MCHC RBC AUTO-ENTMCNC: 32.8 G/DL (ref 32.3–36.5)
MCHC RBC AUTO-ENTMCNC: 32.8 G/DL (ref 32.3–36.5)
MCV RBC AUTO: 94.1 FL (ref 81.4–97.8)
MCV RBC AUTO: 94.4 FL (ref 81.4–97.8)
MODE IMODE: ABNORMAL
MONOCYTES # BLD AUTO: 1.34 K/UL (ref 0–0.85)
MONOCYTES NFR BLD AUTO: 6.6 % (ref 0–13.4)
NEUTROPHILS # BLD AUTO: 17.55 K/UL (ref 1.82–7.42)
NEUTROPHILS NFR BLD: 86.1 % (ref 44–72)
NRBC # BLD AUTO: 0 K/UL
NRBC BLD-RTO: 0 /100 WBC (ref 0–0.2)
O2/TOTAL GAS SETTING VFR VENT: 60 %
PA AA BLD-ACNC: 8.8 % (ref 0–11)
PA ADP BLD-ACNC: 98.4 % (ref 0–17)
PCO2 BLDA: 43.2 MMHG (ref 26–37)
PCO2 TEMP ADJ BLDA: 44.7 MMHG (ref 26–37)
PERCENT MINUTE VOLUME IPMV: 130
PH BLDA: 7.33 [PH] (ref 7.4–7.5)
PH TEMP ADJ BLDA: 7.32 [PH] (ref 7.4–7.5)
PLATELET # BLD AUTO: 259 K/UL (ref 164–446)
PLATELET # BLD AUTO: 334 K/UL (ref 164–446)
PMV BLD AUTO: 10 FL (ref 9–12.9)
PMV BLD AUTO: 9.9 FL (ref 9–12.9)
PO2 BLDA: 83 MMHG (ref 64–87)
PO2 TEMP ADJ BLDA: 87 MMHG (ref 64–87)
POTASSIUM SERPL-SCNC: 3.8 MMOL/L (ref 3.6–5.5)
PRODUCT TYPE UPROD: NORMAL
PROT SERPL-MCNC: 6.2 G/DL (ref 6–8.2)
PROTHROMBIN TIME: 15.6 SEC (ref 12–14.6)
RBC # BLD AUTO: 3.21 M/UL (ref 4.7–6.1)
RBC # BLD AUTO: 3.94 M/UL (ref 4.7–6.1)
RH BLD: NORMAL
SAO2 % BLDA: 95 % (ref 93–99)
SODIUM SERPL-SCNC: 142 MMOL/L (ref 135–145)
SPECIMEN DRAWN FROM PATIENT: ABNORMAL
TEG ALGORITHM TGALG: ABNORMAL
UNIT STATUS USTAT: NORMAL
WBC # BLD AUTO: 19.6 K/UL (ref 4.8–10.8)
WBC # BLD AUTO: 20.4 K/UL (ref 4.8–10.8)

## 2023-05-31 PROCEDURE — 160029 HCHG SURGERY MINUTES - 1ST 30 MINS LEVEL 4: Performed by: SURGERY

## 2023-05-31 PROCEDURE — 0PSJ35Z REPOSITION LEFT RADIUS WITH EXTERNAL FIXATION DEVICE, PERCUTANEOUS APPROACH: ICD-10-PCS | Performed by: EMERGENCY MEDICINE

## 2023-05-31 PROCEDURE — 88305 TISSUE EXAM BY PATHOLOGIST: CPT

## 2023-05-31 PROCEDURE — 700105 HCHG RX REV CODE 258: Performed by: ANESTHESIOLOGY

## 2023-05-31 PROCEDURE — 80053 COMPREHEN METABOLIC PANEL: CPT

## 2023-05-31 PROCEDURE — 99222 1ST HOSP IP/OBS MODERATE 55: CPT | Mod: 57 | Performed by: ORTHOPAEDIC SURGERY

## 2023-05-31 PROCEDURE — 700111 HCHG RX REV CODE 636 W/ 250 OVERRIDE (IP)

## 2023-05-31 PROCEDURE — 86901 BLOOD TYPING SEROLOGIC RH(D): CPT

## 2023-05-31 PROCEDURE — 160041 HCHG SURGERY MINUTES - EA ADDL 1 MIN LEVEL 4: Performed by: SURGERY

## 2023-05-31 PROCEDURE — 0HQKXZZ REPAIR RIGHT LOWER LEG SKIN, EXTERNAL APPROACH: ICD-10-PCS | Performed by: EMERGENCY MEDICINE

## 2023-05-31 PROCEDURE — 0PSL35Z REPOSITION LEFT ULNA WITH EXTERNAL FIXATION DEVICE, PERCUTANEOUS APPROACH: ICD-10-PCS | Performed by: EMERGENCY MEDICINE

## 2023-05-31 PROCEDURE — 90471 IMMUNIZATION ADMIN: CPT

## 2023-05-31 PROCEDURE — 37799 UNLISTED PX VASCULAR SURGERY: CPT

## 2023-05-31 PROCEDURE — 85610 PROTHROMBIN TIME: CPT

## 2023-05-31 PROCEDURE — 00790 ANES IPER UPR ABD NOS: CPT | Performed by: ANESTHESIOLOGY

## 2023-05-31 PROCEDURE — 82077 ASSAY SPEC XCP UR&BREATH IA: CPT

## 2023-05-31 PROCEDURE — 76705 ECHO EXAM OF ABDOMEN: CPT

## 2023-05-31 PROCEDURE — 73070 X-RAY EXAM OF ELBOW: CPT | Mod: LT

## 2023-05-31 PROCEDURE — 86900 BLOOD TYPING SEROLOGIC ABO: CPT

## 2023-05-31 PROCEDURE — 700111 HCHG RX REV CODE 636 W/ 250 OVERRIDE (IP): Performed by: ANESTHESIOLOGY

## 2023-05-31 PROCEDURE — 85347 COAGULATION TIME ACTIVATED: CPT

## 2023-05-31 PROCEDURE — 86850 RBC ANTIBODY SCREEN: CPT

## 2023-05-31 PROCEDURE — 700111 HCHG RX REV CODE 636 W/ 250 OVERRIDE (IP): Performed by: EMERGENCY MEDICINE

## 2023-05-31 PROCEDURE — 0HQ0XZZ REPAIR SCALP SKIN, EXTERNAL APPROACH: ICD-10-PCS | Performed by: EMERGENCY MEDICINE

## 2023-05-31 PROCEDURE — 24600 TX CLSD ELBOW DISLC W/O ANES: CPT

## 2023-05-31 PROCEDURE — 73590 X-RAY EXAM OF LOWER LEG: CPT | Mod: LT

## 2023-05-31 PROCEDURE — 85576 BLOOD PLATELET AGGREGATION: CPT | Mod: 91

## 2023-05-31 PROCEDURE — 94002 VENT MGMT INPAT INIT DAY: CPT

## 2023-05-31 PROCEDURE — 94799 UNLISTED PULMONARY SVC/PX: CPT

## 2023-05-31 PROCEDURE — 770022 HCHG ROOM/CARE - ICU (200)

## 2023-05-31 PROCEDURE — 304217 HCHG IRRIGATION SYSTEM

## 2023-05-31 PROCEDURE — 72125 CT NECK SPINE W/O DYE: CPT

## 2023-05-31 PROCEDURE — 71045 X-RAY EXAM CHEST 1 VIEW: CPT

## 2023-05-31 PROCEDURE — 700105 HCHG RX REV CODE 258: Performed by: EMERGENCY MEDICINE

## 2023-05-31 PROCEDURE — 0W3P0ZZ CONTROL BLEEDING IN GASTROINTESTINAL TRACT, OPEN APPROACH: ICD-10-PCS | Performed by: SURGERY

## 2023-05-31 PROCEDURE — 72131 CT LUMBAR SPINE W/O DYE: CPT

## 2023-05-31 PROCEDURE — 72170 X-RAY EXAM OF PELVIS: CPT

## 2023-05-31 PROCEDURE — 96375 TX/PRO/DX INJ NEW DRUG ADDON: CPT

## 2023-05-31 PROCEDURE — 160009 HCHG ANES TIME/MIN: Performed by: SURGERY

## 2023-05-31 PROCEDURE — 36415 COLL VENOUS BLD VENIPUNCTURE: CPT

## 2023-05-31 PROCEDURE — 85384 FIBRINOGEN ACTIVITY: CPT

## 2023-05-31 PROCEDURE — 82803 BLOOD GASES ANY COMBINATION: CPT

## 2023-05-31 PROCEDURE — 99223 1ST HOSP IP/OBS HIGH 75: CPT | Mod: AI,57 | Performed by: SURGERY

## 2023-05-31 PROCEDURE — 160048 HCHG OR STATISTICAL LEVEL 1-5: Performed by: SURGERY

## 2023-05-31 PROCEDURE — 700101 HCHG RX REV CODE 250: Performed by: EMERGENCY MEDICINE

## 2023-05-31 PROCEDURE — 700117 HCHG RX CONTRAST REV CODE 255: Performed by: EMERGENCY MEDICINE

## 2023-05-31 PROCEDURE — 700105 HCHG RX REV CODE 258

## 2023-05-31 PROCEDURE — 99291 CRITICAL CARE FIRST HOUR: CPT

## 2023-05-31 PROCEDURE — G0390 TRAUMA RESPONS W/HOSP CRITI: HCPCS

## 2023-05-31 PROCEDURE — 70450 CT HEAD/BRAIN W/O DYE: CPT

## 2023-05-31 PROCEDURE — 72128 CT CHEST SPINE W/O DYE: CPT

## 2023-05-31 PROCEDURE — 85027 COMPLETE CBC AUTOMATED: CPT

## 2023-05-31 PROCEDURE — 38100 REMOVAL OF SPLEEN TOTAL: CPT | Performed by: SURGERY

## 2023-05-31 PROCEDURE — 73590 X-RAY EXAM OF LOWER LEG: CPT | Mod: RT

## 2023-05-31 PROCEDURE — 700102 HCHG RX REV CODE 250 W/ 637 OVERRIDE(OP)

## 2023-05-31 PROCEDURE — 85730 THROMBOPLASTIN TIME PARTIAL: CPT

## 2023-05-31 PROCEDURE — 90715 TDAP VACCINE 7 YRS/> IM: CPT

## 2023-05-31 PROCEDURE — 71260 CT THORAX DX C+: CPT

## 2023-05-31 PROCEDURE — 304999 HCHG REPAIR-SIMPLE/INTERMED LEVEL 1

## 2023-05-31 PROCEDURE — 07TP0ZZ RESECTION OF SPLEEN, OPEN APPROACH: ICD-10-PCS | Performed by: SURGERY

## 2023-05-31 PROCEDURE — A9270 NON-COVERED ITEM OR SERVICE: HCPCS

## 2023-05-31 PROCEDURE — 73610 X-RAY EXAM OF ANKLE: CPT | Mod: LT

## 2023-05-31 PROCEDURE — 85025 COMPLETE CBC W/AUTO DIFF WBC: CPT

## 2023-05-31 PROCEDURE — 3E0234Z INTRODUCTION OF SERUM, TOXOID AND VACCINE INTO MUSCLE, PERCUTANEOUS APPROACH: ICD-10-PCS | Performed by: EMERGENCY MEDICINE

## 2023-05-31 PROCEDURE — 36620 INSERTION CATHETER ARTERY: CPT | Performed by: ANESTHESIOLOGY

## 2023-05-31 PROCEDURE — 305308 HCHG STAPLER,SKIN,DISP.

## 2023-05-31 PROCEDURE — 96374 THER/PROPH/DIAG INJ IV PUSH: CPT

## 2023-05-31 PROCEDURE — 700111 HCHG RX REV CODE 636 W/ 250 OVERRIDE (IP): Performed by: SURGERY

## 2023-05-31 PROCEDURE — 73610 X-RAY EXAM OF ANKLE: CPT | Mod: RT

## 2023-05-31 PROCEDURE — 700101 HCHG RX REV CODE 250: Performed by: ANESTHESIOLOGY

## 2023-05-31 PROCEDURE — 5A1945Z RESPIRATORY VENTILATION, 24-96 CONSECUTIVE HOURS: ICD-10-PCS | Performed by: SURGERY

## 2023-05-31 RX ORDER — ONDANSETRON 2 MG/ML
4 INJECTION INTRAMUSCULAR; INTRAVENOUS EVERY 4 HOURS PRN
Status: DISCONTINUED | OUTPATIENT
Start: 2023-05-31 | End: 2023-06-06

## 2023-05-31 RX ORDER — POLYETHYLENE GLYCOL 3350 17 G/17G
1 POWDER, FOR SOLUTION ORAL 2 TIMES DAILY
Status: DISCONTINUED | OUTPATIENT
Start: 2023-05-31 | End: 2023-06-02

## 2023-05-31 RX ORDER — LIDOCAINE HYDROCHLORIDE AND EPINEPHRINE 10; 10 MG/ML; UG/ML
10 INJECTION, SOLUTION INFILTRATION; PERINEURAL ONCE
Status: DISCONTINUED | OUTPATIENT
Start: 2023-05-31 | End: 2023-05-31

## 2023-05-31 RX ORDER — ACETAMINOPHEN 500 MG
1000 TABLET ORAL EVERY 6 HOURS
Status: DISCONTINUED | OUTPATIENT
Start: 2023-06-01 | End: 2023-06-05

## 2023-05-31 RX ORDER — HYDROMORPHONE HYDROCHLORIDE 2 MG/ML
INJECTION, SOLUTION INTRAMUSCULAR; INTRAVENOUS; SUBCUTANEOUS PRN
Status: DISCONTINUED | OUTPATIENT
Start: 2023-05-31 | End: 2023-05-31 | Stop reason: SURG

## 2023-05-31 RX ORDER — BISACODYL 10 MG
10 SUPPOSITORY, RECTAL RECTAL
Status: DISCONTINUED | OUTPATIENT
Start: 2023-05-31 | End: 2023-06-30 | Stop reason: HOSPADM

## 2023-05-31 RX ORDER — BACITRACIN ZINC 500 [USP'U]/G
OINTMENT TOPICAL 2 TIMES DAILY
Status: COMPLETED | OUTPATIENT
Start: 2023-05-31 | End: 2023-06-05

## 2023-05-31 RX ORDER — ACETAMINOPHEN 500 MG
1000 TABLET ORAL EVERY 6 HOURS PRN
Status: DISCONTINUED | OUTPATIENT
Start: 2023-06-06 | End: 2023-06-05

## 2023-05-31 RX ORDER — MIDAZOLAM HYDROCHLORIDE 1 MG/ML
INJECTION INTRAMUSCULAR; INTRAVENOUS PRN
Status: DISCONTINUED | OUTPATIENT
Start: 2023-05-31 | End: 2023-05-31 | Stop reason: SURG

## 2023-05-31 RX ORDER — HYDROMORPHONE HYDROCHLORIDE 1 MG/ML
0.5 INJECTION, SOLUTION INTRAMUSCULAR; INTRAVENOUS; SUBCUTANEOUS
Status: DISCONTINUED | OUTPATIENT
Start: 2023-05-31 | End: 2023-06-05

## 2023-05-31 RX ORDER — CEFAZOLIN 2 G/1
INJECTION, POWDER, FOR SOLUTION INTRAMUSCULAR; INTRAVENOUS
Status: COMPLETED | OUTPATIENT
Start: 2023-05-31 | End: 2023-05-31

## 2023-05-31 RX ORDER — CEFAZOLIN SODIUM 1 G/3ML
INJECTION, POWDER, FOR SOLUTION INTRAMUSCULAR; INTRAVENOUS PRN
Status: DISCONTINUED | OUTPATIENT
Start: 2023-05-31 | End: 2023-05-31 | Stop reason: SURG

## 2023-05-31 RX ORDER — PHENYLEPHRINE HYDROCHLORIDE 10 MG/ML
INJECTION, SOLUTION INTRAMUSCULAR; INTRAVENOUS; SUBCUTANEOUS PRN
Status: DISCONTINUED | OUTPATIENT
Start: 2023-05-31 | End: 2023-05-31 | Stop reason: SURG

## 2023-05-31 RX ORDER — KETAMINE HYDROCHLORIDE 50 MG/ML
100 INJECTION, SOLUTION INTRAMUSCULAR; INTRAVENOUS ONCE
Status: COMPLETED | OUTPATIENT
Start: 2023-05-31 | End: 2023-05-31

## 2023-05-31 RX ORDER — SODIUM CHLORIDE 9 MG/ML
INJECTION, SOLUTION INTRAVENOUS CONTINUOUS
Status: DISCONTINUED | OUTPATIENT
Start: 2023-05-31 | End: 2023-06-05

## 2023-05-31 RX ORDER — HYDROMORPHONE HYDROCHLORIDE 1 MG/ML
1 INJECTION, SOLUTION INTRAMUSCULAR; INTRAVENOUS; SUBCUTANEOUS
Status: DISCONTINUED | OUTPATIENT
Start: 2023-05-31 | End: 2023-06-05

## 2023-05-31 RX ORDER — TRANEXAMIC ACID 100 MG/ML
INJECTION, SOLUTION INTRAVENOUS PRN
Status: DISCONTINUED | OUTPATIENT
Start: 2023-05-31 | End: 2023-05-31 | Stop reason: SURG

## 2023-05-31 RX ORDER — FAMOTIDINE 20 MG/1
20 TABLET, FILM COATED ORAL 2 TIMES DAILY
Status: DISCONTINUED | OUTPATIENT
Start: 2023-06-01 | End: 2023-06-05

## 2023-05-31 RX ORDER — SODIUM CHLORIDE 9 MG/ML
INJECTION, SOLUTION INTRAVENOUS
Status: COMPLETED | OUTPATIENT
Start: 2023-05-31 | End: 2023-05-31

## 2023-05-31 RX ORDER — DOCUSATE SODIUM 100 MG/1
100 CAPSULE, LIQUID FILLED ORAL 2 TIMES DAILY
Status: DISCONTINUED | OUTPATIENT
Start: 2023-05-31 | End: 2023-06-02

## 2023-05-31 RX ORDER — SODIUM CHLORIDE, SODIUM LACTATE, POTASSIUM CHLORIDE, CALCIUM CHLORIDE 600; 310; 30; 20 MG/100ML; MG/100ML; MG/100ML; MG/100ML
INJECTION, SOLUTION INTRAVENOUS
Status: DISCONTINUED | OUTPATIENT
Start: 2023-05-31 | End: 2023-05-31 | Stop reason: SURG

## 2023-05-31 RX ORDER — ENEMA 19; 7 G/133ML; G/133ML
1 ENEMA RECTAL
Status: DISCONTINUED | OUTPATIENT
Start: 2023-05-31 | End: 2023-06-30 | Stop reason: HOSPADM

## 2023-05-31 RX ORDER — ONDANSETRON 4 MG/1
4 TABLET, ORALLY DISINTEGRATING ORAL EVERY 4 HOURS PRN
Status: DISCONTINUED | OUTPATIENT
Start: 2023-05-31 | End: 2023-06-02

## 2023-05-31 RX ORDER — AMOXICILLIN 250 MG
1 CAPSULE ORAL NIGHTLY
Status: DISCONTINUED | OUTPATIENT
Start: 2023-05-31 | End: 2023-06-02

## 2023-05-31 RX ORDER — AMOXICILLIN 250 MG
1 CAPSULE ORAL
Status: DISCONTINUED | OUTPATIENT
Start: 2023-05-31 | End: 2023-06-02

## 2023-05-31 RX ADMIN — Medication 100 MG: at 21:05

## 2023-05-31 RX ADMIN — BACITRACIN ZINC: 500 OINTMENT TOPICAL at 23:10

## 2023-05-31 RX ADMIN — HYDROMORPHONE HYDROCHLORIDE 1 MG: 1 INJECTION, SOLUTION INTRAMUSCULAR; INTRAVENOUS; SUBCUTANEOUS at 22:33

## 2023-05-31 RX ADMIN — TRANEXAMIC ACID 1000 MG: 100 INJECTION, SOLUTION INTRAVENOUS at 21:05

## 2023-05-31 RX ADMIN — CLOSTRIDIUM TETANI TOXOID ANTIGEN (FORMALDEHYDE INACTIVATED), CORYNEBACTERIUM DIPHTHERIAE TOXOID ANTIGEN (FORMALDEHYDE INACTIVATED), BORDETELLA PERTUSSIS TOXOID ANTIGEN (GLUTARALDEHYDE INACTIVATED), BORDETELLA PERTUSSIS FILAMENTOUS HEMAGGLUTININ ANTIGEN (FORMALDEHYDE INACTIVATED), BORDETELLA PERTUSSIS PERTACTIN ANTIGEN, AND BORDETELLA PERTUSSIS FIMBRIAE 2/3 ANTIGEN 0.5 ML: 5; 2; 2.5; 5; 3; 5 INJECTION, SUSPENSION INTRAMUSCULAR at 20:12

## 2023-05-31 RX ADMIN — PROPOFOL 5 MCG/KG/MIN: 10 INJECTION, EMULSION INTRAVENOUS at 22:24

## 2023-05-31 RX ADMIN — SODIUM CHLORIDE 1 L: 9 INJECTION, SOLUTION INTRAVENOUS at 19:30

## 2023-05-31 RX ADMIN — MIDAZOLAM 4 MG: 1 INJECTION, SOLUTION INTRAMUSCULAR; INTRAVENOUS at 21:39

## 2023-05-31 RX ADMIN — KETAMINE HYDROCHLORIDE 100 MG: 100 INJECTION INTRAMUSCULAR; INTRAVENOUS at 20:16

## 2023-05-31 RX ADMIN — PROPOFOL 150 MG: 10 INJECTION, EMULSION INTRAVENOUS at 21:05

## 2023-05-31 RX ADMIN — CEFAZOLIN 2 G: 1 INJECTION, POWDER, FOR SOLUTION INTRAMUSCULAR; INTRAVENOUS at 21:06

## 2023-05-31 RX ADMIN — PHENYLEPHRINE HYDROCHLORIDE 100 MCG: 10 INJECTION INTRAVENOUS at 21:05

## 2023-05-31 RX ADMIN — SODIUM CHLORIDE, POTASSIUM CHLORIDE, SODIUM LACTATE AND CALCIUM CHLORIDE: 600; 310; 30; 20 INJECTION, SOLUTION INTRAVENOUS at 23:00

## 2023-05-31 RX ADMIN — SODIUM CHLORIDE: 9 INJECTION, SOLUTION INTRAVENOUS at 22:26

## 2023-05-31 RX ADMIN — HYDROMORPHONE HYDROCHLORIDE 2 MG: 2 INJECTION INTRAMUSCULAR; INTRAVENOUS; SUBCUTANEOUS at 21:39

## 2023-05-31 RX ADMIN — FENTANYL CITRATE 50 MCG: 50 INJECTION, SOLUTION INTRAMUSCULAR; INTRAVENOUS at 19:31

## 2023-05-31 RX ADMIN — CEFAZOLIN 2 G: 2 INJECTION, POWDER, FOR SOLUTION INTRAMUSCULAR; INTRAVENOUS at 19:29

## 2023-05-31 RX ADMIN — ROCURONIUM BROMIDE 50 MG: 10 INJECTION, SOLUTION INTRAVENOUS at 21:05

## 2023-05-31 RX ADMIN — ROCURONIUM BROMIDE 50 MG: 10 INJECTION, SOLUTION INTRAVENOUS at 21:39

## 2023-05-31 RX ADMIN — IOHEXOL 100 ML: 350 INJECTION, SOLUTION INTRAVENOUS at 19:54

## 2023-05-31 ASSESSMENT — PAIN DESCRIPTION - PAIN TYPE: TYPE: ACUTE PAIN

## 2023-06-01 ENCOUNTER — ANESTHESIA EVENT (OUTPATIENT)
Dept: SURGERY | Facility: MEDICAL CENTER | Age: 28
DRG: 957 | End: 2023-06-01
Payer: MEDICAID

## 2023-06-01 ENCOUNTER — APPOINTMENT (OUTPATIENT)
Dept: RADIOLOGY | Facility: MEDICAL CENTER | Age: 28
DRG: 957 | End: 2023-06-01
Attending: NURSE PRACTITIONER
Payer: MEDICAID

## 2023-06-01 ENCOUNTER — ANESTHESIA (OUTPATIENT)
Dept: SURGERY | Facility: MEDICAL CENTER | Age: 28
DRG: 957 | End: 2023-06-01
Payer: MEDICAID

## 2023-06-01 ENCOUNTER — APPOINTMENT (OUTPATIENT)
Dept: RADIOLOGY | Facility: MEDICAL CENTER | Age: 28
DRG: 957 | End: 2023-06-01
Attending: ORTHOPAEDIC SURGERY
Payer: MEDICAID

## 2023-06-01 ENCOUNTER — APPOINTMENT (OUTPATIENT)
Dept: RADIOLOGY | Facility: MEDICAL CENTER | Age: 28
DRG: 957 | End: 2023-06-01
Payer: MEDICAID

## 2023-06-01 ENCOUNTER — APPOINTMENT (OUTPATIENT)
Dept: RADIOLOGY | Facility: MEDICAL CENTER | Age: 28
DRG: 957 | End: 2023-06-01
Attending: SURGERY
Payer: MEDICAID

## 2023-06-01 PROBLEM — S27.321A LEFT PULMONARY CONTUSION: Status: ACTIVE | Noted: 2023-06-01

## 2023-06-01 PROBLEM — J96.90 RESPIRATORY FAILURE AFTER TRAUMA (HCC): Status: ACTIVE | Noted: 2023-06-01

## 2023-06-01 PROBLEM — S42.402B: Status: ACTIVE | Noted: 2023-05-31

## 2023-06-01 PROBLEM — F19.90 ILLICIT DRUG USE: Status: ACTIVE | Noted: 2023-06-01

## 2023-06-01 LAB
ALBUMIN SERPL BCP-MCNC: 2.6 G/DL (ref 3.2–4.9)
ALBUMIN/GLOB SERPL: 1.2 G/DL
ALP SERPL-CCNC: 51 U/L (ref 30–99)
ALT SERPL-CCNC: 113 U/L (ref 2–50)
AMPHET UR QL SCN: POSITIVE
ANION GAP SERPL CALC-SCNC: 8 MMOL/L (ref 7–16)
AST SERPL-CCNC: 107 U/L (ref 12–45)
BARBITURATES UR QL SCN: NEGATIVE
BASOPHILS # BLD AUTO: 0 % (ref 0–1.8)
BASOPHILS # BLD: 0 K/UL (ref 0–0.12)
BENZODIAZ UR QL SCN: POSITIVE
BILIRUB SERPL-MCNC: 0.3 MG/DL (ref 0.1–1.5)
BUN SERPL-MCNC: 12 MG/DL (ref 8–22)
BZE UR QL SCN: NEGATIVE
CALCIUM ALBUM COR SERPL-MCNC: 8.4 MG/DL (ref 8.5–10.5)
CALCIUM SERPL-MCNC: 7.3 MG/DL (ref 8.5–10.5)
CANNABINOIDS UR QL SCN: POSITIVE
CHLORIDE SERPL-SCNC: 110 MMOL/L (ref 96–112)
CO2 SERPL-SCNC: 23 MMOL/L (ref 20–33)
CREAT SERPL-MCNC: 1.1 MG/DL (ref 0.5–1.4)
EOSINOPHIL # BLD AUTO: 0 K/UL (ref 0–0.51)
EOSINOPHIL NFR BLD: 0 % (ref 0–6.9)
ERYTHROCYTE [DISTWIDTH] IN BLOOD BY AUTOMATED COUNT: 47.4 FL (ref 35.9–50)
FENTANYL UR QL: POSITIVE
GFR SERPLBLD CREATININE-BSD FMLA CKD-EPI: 95 ML/MIN/1.73 M 2
GLOBULIN SER CALC-MCNC: 2.1 G/DL (ref 1.9–3.5)
GLUCOSE SERPL-MCNC: 159 MG/DL (ref 65–99)
HCT VFR BLD AUTO: 29.9 % (ref 42–52)
HGB BLD-MCNC: 8.7 G/DL (ref 14–18)
HGB BLD-MCNC: 8.7 G/DL (ref 14–18)
HGB BLD-MCNC: 9.2 G/DL (ref 14–18)
HGB BLD-MCNC: 9.6 G/DL (ref 14–18)
LACTATE SERPL-SCNC: 1 MMOL/L (ref 0.5–2)
LYMPHOCYTES # BLD AUTO: 0.64 K/UL (ref 1–4.8)
LYMPHOCYTES NFR BLD: 3 % (ref 22–41)
MAGNESIUM SERPL-MCNC: 2 MG/DL (ref 1.5–2.5)
MANUAL DIFF BLD: NORMAL
MCH RBC QN AUTO: 30.7 PG (ref 27–33)
MCHC RBC AUTO-ENTMCNC: 32.1 G/DL (ref 32.3–36.5)
MCV RBC AUTO: 95.5 FL (ref 81.4–97.8)
METAMYELOCYTES NFR BLD MANUAL: 2 %
METHADONE UR QL SCN: NEGATIVE
MICROCYTES BLD QL SMEAR: ABNORMAL
MONOCYTES # BLD AUTO: 0.86 K/UL (ref 0–0.85)
MONOCYTES NFR BLD AUTO: 4 % (ref 0–13.4)
MORPHOLOGY BLD-IMP: NORMAL
NEUTROPHILS # BLD AUTO: 19.47 K/UL (ref 1.82–7.42)
NEUTROPHILS NFR BLD: 86 % (ref 44–72)
NEUTS BAND NFR BLD MANUAL: 5 % (ref 0–10)
NRBC # BLD AUTO: 0 K/UL
NRBC BLD-RTO: 0 /100 WBC (ref 0–0.2)
OPIATES UR QL SCN: NEGATIVE
OXYCODONE UR QL SCN: NEGATIVE
PATHOLOGY CONSULT NOTE: NORMAL
PCP UR QL SCN: NEGATIVE
PHOSPHATE SERPL-MCNC: 3.6 MG/DL (ref 2.5–4.5)
PLATELET # BLD AUTO: 266 K/UL (ref 164–446)
PLATELET BLD QL SMEAR: NORMAL
PMV BLD AUTO: 10.6 FL (ref 9–12.9)
POTASSIUM SERPL-SCNC: 4.2 MMOL/L (ref 3.6–5.5)
PROPOXYPH UR QL SCN: NEGATIVE
PROT SERPL-MCNC: 4.7 G/DL (ref 6–8.2)
RBC # BLD AUTO: 3.13 M/UL (ref 4.7–6.1)
RBC BLD AUTO: PRESENT
SODIUM SERPL-SCNC: 141 MMOL/L (ref 135–145)
WBC # BLD AUTO: 21.4 K/UL (ref 4.8–10.8)

## 2023-06-01 PROCEDURE — 502240 HCHG MISC OR SUPPLY RC 0272: Performed by: ORTHOPAEDIC SURGERY

## 2023-06-01 PROCEDURE — 160048 HCHG OR STATISTICAL LEVEL 1-5: Performed by: ORTHOPAEDIC SURGERY

## 2023-06-01 PROCEDURE — 83735 ASSAY OF MAGNESIUM: CPT

## 2023-06-01 PROCEDURE — 36620 INSERTION CATHETER ARTERY: CPT | Performed by: ANESTHESIOLOGY

## 2023-06-01 PROCEDURE — 72141 MRI NECK SPINE W/O DYE: CPT

## 2023-06-01 PROCEDURE — 84100 ASSAY OF PHOSPHORUS: CPT

## 2023-06-01 PROCEDURE — 11012 DEB SKIN BONE AT FX SITE: CPT | Performed by: ORTHOPAEDIC SURGERY

## 2023-06-01 PROCEDURE — 70547 MR ANGIOGRAPHY NECK W/O DYE: CPT

## 2023-06-01 PROCEDURE — 24615 OPTX AQT/CHRNC ELBOW DISLC: CPT | Performed by: ORTHOPAEDIC SURGERY

## 2023-06-01 PROCEDURE — 85007 BL SMEAR W/DIFF WBC COUNT: CPT

## 2023-06-01 PROCEDURE — 700105 HCHG RX REV CODE 258

## 2023-06-01 PROCEDURE — 94799 UNLISTED PULMONARY SVC/PX: CPT

## 2023-06-01 PROCEDURE — 770022 HCHG ROOM/CARE - ICU (200)

## 2023-06-01 PROCEDURE — 700111 HCHG RX REV CODE 636 W/ 250 OVERRIDE (IP): Performed by: SURGERY

## 2023-06-01 PROCEDURE — 73200 CT UPPER EXTREMITY W/O DYE: CPT | Mod: LT

## 2023-06-01 PROCEDURE — 80053 COMPREHEN METABOLIC PANEL: CPT

## 2023-06-01 PROCEDURE — C1713 ANCHOR/SCREW BN/BN,TIS/BN: HCPCS | Performed by: ORTHOPAEDIC SURGERY

## 2023-06-01 PROCEDURE — 71045 X-RAY EXAM CHEST 1 VIEW: CPT

## 2023-06-01 PROCEDURE — 700111 HCHG RX REV CODE 636 W/ 250 OVERRIDE (IP): Performed by: ANESTHESIOLOGY

## 2023-06-01 PROCEDURE — 24579 OPTX HUMRL CNDYLR FRACTURE: CPT | Mod: LT | Performed by: ORTHOPAEDIC SURGERY

## 2023-06-01 PROCEDURE — 85025 COMPLETE CBC W/AUTO DIFF WBC: CPT

## 2023-06-01 PROCEDURE — 99291 CRITICAL CARE FIRST HOUR: CPT | Performed by: SURGERY

## 2023-06-01 PROCEDURE — 80307 DRUG TEST PRSMV CHEM ANLYZR: CPT

## 2023-06-01 PROCEDURE — 73070 X-RAY EXAM OF ELBOW: CPT | Mod: LT

## 2023-06-01 PROCEDURE — 160029 HCHG SURGERY MINUTES - 1ST 30 MINS LEVEL 4: Performed by: ORTHOPAEDIC SURGERY

## 2023-06-01 PROCEDURE — 83605 ASSAY OF LACTIC ACID: CPT

## 2023-06-01 PROCEDURE — 700101 HCHG RX REV CODE 250: Performed by: ANESTHESIOLOGY

## 2023-06-01 PROCEDURE — 700111 HCHG RX REV CODE 636 W/ 250 OVERRIDE (IP)

## 2023-06-01 PROCEDURE — 160009 HCHG ANES TIME/MIN: Performed by: ORTHOPAEDIC SURGERY

## 2023-06-01 PROCEDURE — 502000 HCHG MISC OR IMPLANTS RC 0278: Performed by: ORTHOPAEDIC SURGERY

## 2023-06-01 PROCEDURE — 94003 VENT MGMT INPAT SUBQ DAY: CPT

## 2023-06-01 PROCEDURE — 85018 HEMOGLOBIN: CPT

## 2023-06-01 PROCEDURE — 01740 ANES OPN/ARTHRS PX ELBW NOS: CPT | Performed by: ANESTHESIOLOGY

## 2023-06-01 PROCEDURE — 160041 HCHG SURGERY MINUTES - EA ADDL 1 MIN LEVEL 4: Performed by: ORTHOPAEDIC SURGERY

## 2023-06-01 PROCEDURE — 0PSG04Z REPOSITION LEFT HUMERAL SHAFT WITH INTERNAL FIXATION DEVICE, OPEN APPROACH: ICD-10-PCS | Performed by: ORTHOPAEDIC SURGERY

## 2023-06-01 DEVICE — IMPLANTABLE DEVICE: Type: IMPLANTABLE DEVICE | Site: ELBOW | Status: FUNCTIONAL

## 2023-06-01 RX ORDER — OXYCODONE HCL 5 MG/5 ML
10 SOLUTION, ORAL ORAL
Status: CANCELLED | OUTPATIENT
Start: 2023-06-01

## 2023-06-01 RX ORDER — DIPHENHYDRAMINE HYDROCHLORIDE 50 MG/ML
12.5 INJECTION INTRAMUSCULAR; INTRAVENOUS
Status: CANCELLED | OUTPATIENT
Start: 2023-06-01

## 2023-06-01 RX ORDER — CEFAZOLIN SODIUM 1 G/3ML
INJECTION, POWDER, FOR SOLUTION INTRAMUSCULAR; INTRAVENOUS PRN
Status: DISCONTINUED | OUTPATIENT
Start: 2023-06-01 | End: 2023-06-01 | Stop reason: SURG

## 2023-06-01 RX ORDER — ROCURONIUM BROMIDE 10 MG/ML
INJECTION, SOLUTION INTRAVENOUS PRN
Status: DISCONTINUED | OUTPATIENT
Start: 2023-06-01 | End: 2023-06-01 | Stop reason: SURG

## 2023-06-01 RX ORDER — HYDROMORPHONE HYDROCHLORIDE 1 MG/ML
0.1 INJECTION, SOLUTION INTRAMUSCULAR; INTRAVENOUS; SUBCUTANEOUS
Status: CANCELLED | OUTPATIENT
Start: 2023-06-01

## 2023-06-01 RX ORDER — ONDANSETRON 2 MG/ML
4 INJECTION INTRAMUSCULAR; INTRAVENOUS
Status: CANCELLED | OUTPATIENT
Start: 2023-06-01

## 2023-06-01 RX ORDER — HYDROMORPHONE HYDROCHLORIDE 1 MG/ML
0.2 INJECTION, SOLUTION INTRAMUSCULAR; INTRAVENOUS; SUBCUTANEOUS
Status: CANCELLED | OUTPATIENT
Start: 2023-06-01

## 2023-06-01 RX ORDER — SODIUM CHLORIDE, SODIUM LACTATE, POTASSIUM CHLORIDE, CALCIUM CHLORIDE 600; 310; 30; 20 MG/100ML; MG/100ML; MG/100ML; MG/100ML
INJECTION, SOLUTION INTRAVENOUS CONTINUOUS
Status: CANCELLED | OUTPATIENT
Start: 2023-06-01

## 2023-06-01 RX ORDER — OXYCODONE HCL 5 MG/5 ML
5 SOLUTION, ORAL ORAL
Status: CANCELLED | OUTPATIENT
Start: 2023-06-01

## 2023-06-01 RX ORDER — HYDROMORPHONE HYDROCHLORIDE 1 MG/ML
0.4 INJECTION, SOLUTION INTRAMUSCULAR; INTRAVENOUS; SUBCUTANEOUS
Status: CANCELLED | OUTPATIENT
Start: 2023-06-01

## 2023-06-01 RX ORDER — HYDROMORPHONE HYDROCHLORIDE 2 MG/ML
INJECTION, SOLUTION INTRAMUSCULAR; INTRAVENOUS; SUBCUTANEOUS PRN
Status: DISCONTINUED | OUTPATIENT
Start: 2023-06-01 | End: 2023-06-01 | Stop reason: SURG

## 2023-06-01 RX ADMIN — HYDROMORPHONE HYDROCHLORIDE 1 MG: 1 INJECTION, SOLUTION INTRAMUSCULAR; INTRAVENOUS; SUBCUTANEOUS at 08:25

## 2023-06-01 RX ADMIN — HYDROMORPHONE HYDROCHLORIDE 1 MG: 1 INJECTION, SOLUTION INTRAMUSCULAR; INTRAVENOUS; SUBCUTANEOUS at 22:35

## 2023-06-01 RX ADMIN — PROPOFOL 70 MCG/KG/MIN: 10 INJECTION, EMULSION INTRAVENOUS at 08:24

## 2023-06-01 RX ADMIN — CEFAZOLIN 2 G: 2 INJECTION, POWDER, FOR SOLUTION INTRAMUSCULAR; INTRAVENOUS at 05:19

## 2023-06-01 RX ADMIN — PROPOFOL 70 MCG/KG/MIN: 10 INJECTION, EMULSION INTRAVENOUS at 11:15

## 2023-06-01 RX ADMIN — CEFAZOLIN 2 G: 1 INJECTION, POWDER, FOR SOLUTION INTRAMUSCULAR; INTRAVENOUS at 16:37

## 2023-06-01 RX ADMIN — ROCURONIUM BROMIDE 50 MG: 50 INJECTION, SOLUTION INTRAVENOUS at 16:33

## 2023-06-01 RX ADMIN — HYDROMORPHONE HYDROCHLORIDE 1 MG: 1 INJECTION, SOLUTION INTRAMUSCULAR; INTRAVENOUS; SUBCUTANEOUS at 11:30

## 2023-06-01 RX ADMIN — CEFAZOLIN 2 G: 2 INJECTION, POWDER, FOR SOLUTION INTRAMUSCULAR; INTRAVENOUS at 14:02

## 2023-06-01 RX ADMIN — PROPOFOL 50 MCG/KG/MIN: 10 INJECTION, EMULSION INTRAVENOUS at 17:58

## 2023-06-01 RX ADMIN — ROCURONIUM BROMIDE 40 MG: 50 INJECTION, SOLUTION INTRAVENOUS at 17:16

## 2023-06-01 RX ADMIN — FAMOTIDINE 20 MG: 10 INJECTION, SOLUTION INTRAVENOUS at 17:56

## 2023-06-01 RX ADMIN — BACITRACIN ZINC: 500 OINTMENT TOPICAL at 17:55

## 2023-06-01 RX ADMIN — CEFAZOLIN 2 G: 2 INJECTION, POWDER, FOR SOLUTION INTRAMUSCULAR; INTRAVENOUS at 21:28

## 2023-06-01 RX ADMIN — BACITRACIN ZINC: 500 OINTMENT TOPICAL at 05:23

## 2023-06-01 RX ADMIN — PROPOFOL 65 MCG/KG/MIN: 10 INJECTION, EMULSION INTRAVENOUS at 01:46

## 2023-06-01 RX ADMIN — Medication 1 APPLICATOR: at 17:56

## 2023-06-01 RX ADMIN — FAMOTIDINE 20 MG: 10 INJECTION, SOLUTION INTRAVENOUS at 05:16

## 2023-06-01 RX ADMIN — PROPOFOL 55 MCG/KG/MIN: 10 INJECTION, EMULSION INTRAVENOUS at 04:54

## 2023-06-01 RX ADMIN — PROPOFOL 70 MCG/KG/MIN: 10 INJECTION, EMULSION INTRAVENOUS at 13:59

## 2023-06-01 RX ADMIN — Medication 1 APPLICATOR: at 05:16

## 2023-06-01 RX ADMIN — ROCURONIUM BROMIDE 10 MG: 50 INJECTION, SOLUTION INTRAVENOUS at 16:53

## 2023-06-01 RX ADMIN — HYDROMORPHONE HYDROCHLORIDE 2 MG: 2 INJECTION INTRAMUSCULAR; INTRAVENOUS; SUBCUTANEOUS at 16:53

## 2023-06-01 RX ADMIN — PROPOFOL 60 MCG/KG/MIN: 10 INJECTION, EMULSION INTRAVENOUS at 22:42

## 2023-06-01 ASSESSMENT — PAIN DESCRIPTION - PAIN TYPE
TYPE: ACUTE PAIN

## 2023-06-01 ASSESSMENT — FIBROSIS 4 INDEX: FIB4 SCORE: 8.19

## 2023-06-01 ASSESSMENT — PAIN SCALES - GENERAL: PAIN_LEVEL: 0

## 2023-06-01 NOTE — ASSESSMENT & PLAN NOTE
Complete dislocation with comminuted olecranon fracture, comminuted fracture of the lateral humeral epicondyle.  Reduced and splinted in ED.  6/1 ORIF.  Weight bearing status - Nonweightbearing LUE.  6/22 Cast and staple removal with subsequent replacement of long arm cast  Follow up 3 weeks from cast placement (7/6)  Josef Bills MD. Orthopedic Surgeon. [facility].
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
## 2023-06-01 NOTE — ANESTHESIA POSTPROCEDURE EVALUATION
Patient: Faviola Seventy-[age]    Procedure Summary     Date: 05/31/23 Room / Location: [facility] [facility] 01 / SURGERY [facility]    Anesthesia Start: 2042 Anesthesia Stop: 2300    Procedure: SPLENECTOMY (Abdomen) Diagnosis: (grade 5 spleen)    Surgeons: Doris Bhakta M.D. Responsible Provider: Qasim Bella M.D.    Anesthesia Type: general ASA Status: 3 - Emergent          Final Anesthesia Type: general  Last vitals  BP   BP: 104/56    Temp   36.8 °C (98.3 °F)    Pulse   126   Resp   27    SpO2   100 %      Anesthesia Post Evaluation    Patient location during evaluation: PACU  Patient participation: complete - patient participated  Level of consciousness: awake and alert    Airway patency: patent  Anesthetic complications: no  Cardiovascular status: hemodynamically stable  Respiratory status: acceptable  Hydration status: euvolemic    PONV: none          No notable events documented.
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
## 2023-06-01 NOTE — ANESTHESIA PREPROCEDURE EVALUATION
Case: [id number] Date/Time: 06/01/23 1554    Procedure: ORIF, ELBOW    Location: [facility] OR 16 / SURGERY [facility]    Surgeons: Josef Bills M.D.          Relevant Problems      (positive) Liver laceration, initial encounter   (positive) Major laceration of kidney, left, initial encounter      Other   (positive) Spleen laceration, initial encounter       Physical Exam    Airway   Patient is intubated/trached     Cardiovascular - normal exam  Rhythm: regular  Rate: normal  (-) murmur     Dental - normal exam           Pulmonary - normal exam  (+) decreased breath sounds     Abdominal    Neurological - normal exam                 Anesthesia Plan    ASA 3   ASA physical status 3 criteria: alcohol and/or substance dependence or abuse    Plan - general       Airway plan will be ETT                    Informed Consent:
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
## 2023-06-01 NOTE — ASSESSMENT & PLAN NOTE
Trace left pneumothorax.  Chest tube placement not required at time of admission.  6/1 Chest x-ray without pneumothorax.  Supplemental oxygen to maintain SaO2 greater than 95%.  Aggressive pulmonary hygiene and serial chest radiography.
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
## 2023-06-01 NOTE — PROGRESS NOTES
No family available and patient intubated so 2 physician consent obtained as fracture dislocation requires urgent surgical management
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
## 2023-06-01 NOTE — THERAPY
Occupational Therapy Contact Note    OT consult received. Pt not ready for OT evaluation yet, pending OR. Will attempt tomorrow as appropriate.    Amaris Guillen, OTR/L

## 2023-06-01 NOTE — CARE PLAN
The patient is Watcher - Medium risk of patient condition declining or worsening    Shift Goals  Clinical Goals: Q6hr Hgb, hemodynamic stability.  Patient Goals: REKHA  Family Goals: REKHA    Progress made toward(s) clinical / shift goals:  MRI and CT today prior to surgery with orthopedics.  Patient remains on mechanical ventilation with good neurological responses, plan to go back to OR for fixation of elbow with ortho surgeon today.  Pain well controlled with analgesics.      Patient is not progressing towards the following goals:      Problem: Pain - Standard  Goal: Alleviation of pain or a reduction in pain to the patient’s comfort goal  Outcome: Progressing     Problem: Knowledge Deficit - Standard  Goal: Patient and family/care givers will demonstrate understanding of plan of care, disease process/condition, diagnostic tests and medications  Outcome: Progressing     Problem: Safety - Medical Restraint  Goal: Free from restraint(s) (Restraint for Interference with Medical Device)  Outcome: Not Progressing

## 2023-06-01 NOTE — PROGRESS NOTES
Left elbow fracture dislocation  Closed reduction after CT  Operative fixation in AM  NPO after MN

## 2023-06-01 NOTE — ASSESSMENT & PLAN NOTE
Grade 5 left renal injury with adjacent hematoma, concern for traumatic AV fistula.  6/5 Repeat CTA abdomen with no evidence for left renal arteriovenous fistula.  Serial hemograms and abdominal exams stable.

## 2023-06-01 NOTE — ANESTHESIA PROCEDURE NOTES
Airway    Date/Time: 5/31/2023 9:07 PM    Performed by: Qasim Bella M.D.  Authorized by: Qasim Bella M.D.    Location:  OR  Urgency:  Elective  Indications for Airway Management:  Anesthesia      Spontaneous Ventilation: absent    Sedation Level:  Deep  Preoxygenated: Yes    Patient Position:  Sniffing  Final Airway Type:  Endotracheal airway  Final Endotracheal Airway:  ETT  Cuffed: Yes    Technique Used for Successful ETT Placement:  Video laryngoscopy    Insertion Site:  Oral  Blade Type:  Khanh  Laryngoscope Blade/Videolaryngoscope Blade Size:  4  ETT Size (mm):  8.0  Measured from:  Teeth  ETT to Teeth (cm):  22  Placement Verified by: auscultation and capnometry    Cormack-Lehane Classification:  Grade I - full view of glottis  Number of Attempts at Approach:  1   RSI with cricoid and in line stablization with glidescope

## 2023-06-01 NOTE — ASSESSMENT & PLAN NOTE
Posterior left 4th through 10th rib fractures.   Aggressive pulmonary hygiene and multimodal pain management.

## 2023-06-01 NOTE — ANESTHESIA PROCEDURE NOTES
Arterial Line    Performed by: Qasim Bella M.D.  Authorized by: Qasim Bella M.D.    Start Time:  5/31/2023 8:55 PM  End Time:  5/31/2023 8:56 PM  Localization: surface landmarks    Patient Location:  OR  Indication: continuous blood pressure monitoring        Catheter Size:  20 G  Seldinger Technique?: Yes    Site:  Radial artery  Line Secured:  Antimicrobial disc, tape and transparent dressing  Events: patient tolerated procedure well with no complications

## 2023-06-01 NOTE — ED NOTES
Pt medicated per MAR  Initiation of set up for conscious sedation   RT contacted   MD at bedside  Tech at bedside

## 2023-06-01 NOTE — ED NOTES
Time out for conscious sedation-2015  100 mg of Ketamine given-2016  Pt's arm reduced-2018  Splint placed-2022  Pt's right ankle is stapled   Laceration to the back of pt's head stapled, pt rolled with c-spine precautions, collar remains on  Surgeon at bedside  Per surgeon, pt needs surgery  Xray obtained- 2045  Pt placed on Zoll-2047  Pt to OR-2050

## 2023-06-01 NOTE — THERAPY
Physical Therapy Contact Note    Patient Name: Faviola Seventy-Six  Age:  89 y.o., Sex:  adult  Medical Record #: [id number]  Today's Date: 6/1/2023    PT consult received and chart reviewed. Pt is pending OR today per ortho notes to address elbow fx. Will hold and follow post-op as appropriate.    Shital Carrillo, PT, DPT

## 2023-06-01 NOTE — ASSESSMENT & PLAN NOTE
VTE Prophylaxis Contraindicated: VTE prophylaxis initially contraindicated secondary to elevated bleeding risk.  6/2 Trauma screening bilateral lower extremity venous duplex negative for above knee DVT.   Multiple blood transfusions required. Holding enoxaparin.  6/6 Enoxaparin initiated.

## 2023-06-01 NOTE — CARE PLAN
Problem: Ventilation  Goal: Ability to achieve and maintain unassisted ventilation or tolerate decreased levels of ventilator support  Description: Target End Date:  4 days     Document on Vent flowsheet    1.  Support and monitor invasive and noninvasive mechanical ventilation  2.  Monitor ventilator weaning response  3.  Perform ventilator associated pneumonia prevention interventions  4.  Manage ventilation therapy by monitoring diagnostic test results  Outcome: Not Met   Vent day 2   +8, 40% FIO2

## 2023-06-01 NOTE — ANESTHESIA PREPROCEDURE EVALUATION
Case: [id number] Date/Time: 05/31/23 2100    Procedure: SPLENECTOMY    Location: [facility] OR 01 / SURGERY [facility]    Surgeons: Doris Bhakta M.D.          Relevant Problems      (positive) Major laceration of kidney, left, initial encounter      Other   (positive) Spleen laceration, initial encounter       Physical Exam    Airway   Mallampati: II  TM distance: >3 FB  Neck ROM: full       Cardiovascular - normal exam  Rhythm: regular  Rate: normal  (-) murmur     Dental - normal exam           Pulmonary - normal exam  Breath sounds clear to auscultation     Abdominal    Neurological - normal exam                 Anesthesia Plan    ASA 3- EMERGENT   ASA physical status 3 criteria: other (comment)ASA physical status emergent criteria: acute hemorrhage    Plan - general               Induction: intravenous    Postoperative Plan: Postoperative administration of opioids is intended.    Pertinent diagnostic labs and testing reviewed    Informed Consent:    Anesthetic plan and risks discussed with patient.    Use of blood products discussed with: patient whom consented to blood products.

## 2023-06-01 NOTE — ASSESSMENT & PLAN NOTE
History of polysubstance abuse with heroine, methamphetamines, and etoh.  Urine drug screen positive for amphetamine and cannabis.   6/5 SBIRT completed.

## 2023-06-01 NOTE — ASSESSMENT & PLAN NOTE
Continue full mechanical ventilatory support.  6/4 Liberated from ventilator  Respiratory protocol.

## 2023-06-01 NOTE — ASSESSMENT & PLAN NOTE
Wound care and closure with staples in ED.   Xray without acute traumatic bony injury.  6/9 Staples removed.

## 2023-06-01 NOTE — CARE PLAN
The patient is Watcher - Medium risk of patient condition declining or worsening    Shift Goals  Clinical Goals: Q6hr Hgb, hemodynamic stability.  Patient Goals: REKHA  Family Goals: REKHA    Progress made toward(s) clinical / shift goals:      Problem: Pain - Standard  Goal: Alleviation of pain or a reduction in pain to the patient’s comfort goal  Outcome: Progressing     Problem: Skin Integrity  Goal: Skin integrity is maintained or improved  Outcome: Progressing     Problem: Fall Risk  Goal: Patient will remain free from falls  Outcome: Progressing     Problem: Safety - Medical Restraint  Goal: Remains free of injury from restraints (Restraint for Interference with Medical Device)  Outcome: Progressing       Patient is not progressing towards the following goals: Unable to update the patient on the Plan of Care at this time as he is intubated and sedated. The patient is still requiring restraints at this time due to risk of removal of ETT.     Problem: Knowledge Deficit - Standard  Goal: Patient and family/care givers will demonstrate understanding of plan of care, disease process/condition, diagnostic tests and medications  Outcome: Not Progressing     Problem: Safety - Medical Restraint  Goal: Free from restraint(s) (Restraint for Interference with Medical Device)  Outcome: Not Progressing

## 2023-06-01 NOTE — ANESTHESIA TIME REPORT
Anesthesia Start and Stop Event Times     Date Time Event    5/31/2023 2042 Ready for Procedure     2042 Anesthesia Start     2219 Anesthesia Stop        Responsible Staff  05/31/23    Name Role Begin End    Qasim Bella M.D. Anesth 2042 2219        Overtime Reason:  overtime    Comments:

## 2023-06-01 NOTE — ASSESSMENT & PLAN NOTE
Grade 5 spleen injury.  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  6/5 Pneumococcal conjugate vaccine (PCV20), Meningococcal polysaccharide/diphtheria toxoid conjugate vaccine series (Menactra®, Menveo®, MedQuadfi), Meningococcal serogroup B vaccine series (Bexsero®, Trumenba®), Haemophilus influenzae type B (Hib) and Diphtheria and tetanus toxoids and acellular pertussis vaccine (DTap <8yo, Tdap>=8yo).  Post splenectomy sepsis education prior to discharge.

## 2023-06-01 NOTE — PROGRESS NOTES
"      Mental status adequate for full examination?: No, intubated and sedated    Spine cleared (radiologically and/or clinically): No    REVIEW OF SYSTEMS:  Review of Systems   Unable to perform ROS: Intubated       PHYSICAL EXAMINATION:  Blood Pressure 122/74   Pulse 99   Temperature 36.8 °C (98.3 °F) (Temporal)   Respiration 20   Height 1.803 m (5' 11\")   Weight 98 kg (216 lb 0.8 oz)   Oxygen Saturation 98%   Body Mass Index 30.13 kg/m²   Physical Exam  Vitals and nursing note reviewed.   Constitutional:       Interventions: Faviola Bhatia is sedated, intubated and restrained. Cervical collar in place.      Comments: Intubated and sedated   HENT:      Head:      Comments: Posterior scalp laceration approximated with staples     Mouth/Throat:      Mouth: Mucous membranes are dry.      Comments: Oral gastric tube in place to suction  Eyes:      Conjunctiva/sclera: Conjunctivae normal.   Neck:      Comments: Cervical collar in place  Cardiovascular:      Rate and Rhythm: Normal rate and regular rhythm.      Pulses: Normal pulses.   Pulmonary:      Effort: Pulmonary effort is normal. Faviola Bhatia is intubated.      Breath sounds: No rhonchi.      Comments: Mechanically ventilated  Abdominal:      General: There is no distension.      Tenderness: There is no guarding.      Comments: Midline incision with surgical dressing in place   Genitourinary:     Comments: Edwards in place with yellow urine  Musculoskeletal:      Comments: Moves all extremities spontaneously    Skin:     General: Skin is warm.      Comments: Stapled laceration to right lower leg  Scattered abrasions   Neurological:      Comments: Sedated, moves all extremities.  Intermittent command following.         LABORATORY VALUES:  Recent Labs     05/31/23  1924 05/31/23  2309 06/01/23  0520   WBC 19.6* 20.4* 21.4*   RBC 3.94* 3.21* 3.13*   HEMOGLOBIN 12.2* 9.9* 9.6*   HEMATOCRIT 37.2* 30.2* 29.9*   MCV 94.4 94.1 95.5   MCH 31.0 30.8 30.7 "   MCHC 32.8 32.8 32.1*   RDW 45.4 45.0 47.4   PLATELETCT 334 259 266   MPV 9.9 10.0 10.6     Recent Labs     05/31/23 1924 06/01/23 0520   SODIUM 142 141   POTASSIUM 3.8 4.2   CHLORIDE 107 110   CO2 20 23   GLUCOSE 143* 159*   BUN 11 12   CREATININE 0.90 1.10   CALCIUM 8.2* 7.3*     Recent Labs     05/31/23 1924 06/01/23 0520   ASTSGOT 212* 107*   ALTSGPT 151* 113*   TBILIRUBIN 0.3 0.3   ALKPHOSPHAT 72 51   GLOBULIN 2.8 2.1   INR 1.26*  --      Recent Labs     05/31/23 1924   APTT 27.6   INR 1.26*       IMAGING:  DX-CHEST-PORTABLE (1 VIEW)   Final Result         1.  Left pulmonary infiltrates      DX-TIBIA AND FIBULA RIGHT   Final Result         1.  No acute traumatic bony injury.      DX-ANKLE 3+ VIEWS RIGHT   Final Result         1.  No acute traumatic bony injury.         DX-TIBIA AND FIBULA LEFT   Final Result         1.  No acute traumatic bony injury.      DX-ANKLE 3+ VIEWS LEFT   Final Result         1.  No acute traumatic bony injury.         DX-ELBOW-LIMITED 2- LEFT   Final Result      1.  Interval reduction of the left elbow dislocation, in improved alignment.   2.  Comminuted fracture of the lateral humeral epicondyle.   3.  Additional fractures may be present, though suboptimally evaluated for given splint and overlapping fracture fragments.      CT-LSPINE W/O PLUS RECONS   Final Result         1.  No acute traumatic bony injury of the lumbar spine.   2.  Left renal injury, see dedicated CT of the abdomen for further characterization.   3.  Thoracic injuries, see dedicated CT thorax for further characterization.      CT-TSPINE W/O PLUS RECONS   Final Result         1.  No acute traumatic bony injury of the thoracic spine.   2.  Posterior left fourth through 10th rib fractures   3.  Trace left pneumothorax      CT-CSPINE WITHOUT PLUS RECONS   Final Result         1.  2.7 mm right lateral shift of the lateral masses in relation of the dens, appearance favoring rotatory subluxation.   2.  Otherwise no  acute traumatic bony injury of the cervical spine is appreciated      These findings were discussed with the patient's clinician, Domenico Ryder, on 5/31/2023 8:18 PM.      CT-CHEST,ABDOMEN,PELVIS WITH   Final Result      1.  Small left pneumothorax.   2.  Segmental rib fractures of the left fourth through 10th ribs.   3.  Subjacent left lower lobe contusion.   4.  Grade 5 splenic injury. Questionable foci of active extravasation at the inferior aspect may represent shattered pieces of the spleen.   5.  Grade 5 left renal injury with adjacent hematoma. Contrast in the left renal vein raises concern for traumatic AV fistula. Questionable focus of active extravasation.   6.  Partially visualized left elbow dislocation with soft tissue gas.      Dr. Chase discussed these findings with Dr. Bhakta at 8:22 PM by telephone on 5/31/2023      CT-HEAD W/O   Final Result      1.  No definite intracranial hemorrhage.   2.  Comminuted and depressed left parietal occipital bone fractures with subjacent pneumocephalus.   3.  Overlying scalp laceration.   4.  Questionable left lateral pterygoid fracture.   5.  Scattered paranasal sinus disease.      Based solely on CT findings, the brain injury guideline category is mBIG 3.      EDH   IVH   Displaced skull fx   SDH > 8mm   IPH > 8mm or multiple   SAH bi-hemispheric or > 3mm      The original BIG retrospective analysis found radiographic progression in 0% of BIG 1 patients and 2.6% BIG 2.      Findings conveyed to Dr. Bhakta at 8:05 PM via Voalte messaging on 5/31/2023.      US-ABDOMEN F.A.S.T. LTD (FOR ED USE ONLY)   Final Result      No free fluid seen in all 4 quadrants.      Negative FAST scan.            DX-ELBOW-LIMITED 2- LEFT   Final Result      1.  Limited single view left elbow demonstrating complete dislocation with comminuted olecranon fracture. Other fractures not excluded by this single view.      DX-PELVIS-1 OR 2 VIEWS   Final Result      No acute fracture or dislocation.       DX-CHEST-LIMITED (1 VIEW)   Final Result      1.  No acute cardiac or pulmonary abnormalities are identified.   2.  Possible left lateral rib fractures.      MR-CERVICAL SPINE-W/O    (Results Pending)   DX-PORTABLE FLUORO > 1 HOUR    (Results Pending)   DX-ELBOW-LIMITED 2- LEFT    (Results Pending)   CT-ELBOW W/O PLUS RECONS LEFT    (Results Pending)       All current laboratory studies/radiology exams reviewed: Yes    Medications reconciliation has been reviewed: Yes    Completed Consultations:  None    Pending Consultations:  Dr. Rodriguez, neurosurgery    Newly Identified Diagnoses and Injuries:  None noted at time of exam    RAP Score Total: 8      CAGE Results: not completed Blood Alcohol>0.08: no       Discussed patient condition with RN, Patient, and trauma surgery, Dr. Man.

## 2023-06-01 NOTE — ASSESSMENT & PLAN NOTE
Comminuted and depressed left parietal occipital bone fractures with subjacent pneumocephalus. Overlying scalp laceration.  Ancef and Tetanus in ED.  Wound care and closure with staples per ED.  Non-operative management.   6/9 Staples removed.  Nadir Rodriguez MD. Neurosurgeon. [facility].
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
## 2023-06-01 NOTE — ED NOTES
Bedside report from Tonja Boyd  Pt to the room from CT  Pt placed on wall oxygen and all monitoring systems in the room  Pt is A&Ox1, orientated to self   Pt is in a c-collar  Fall precautions in place
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
## 2023-06-01 NOTE — ASSESSMENT & PLAN NOTE
Supplemental oxygen to maintain SaO2 greater than 95%.  Aggressive pulmonary hygiene and serial chest radiography.

## 2023-06-01 NOTE — ASSESSMENT & PLAN NOTE
2.7 mm right lateral shift of the lateral masses concerning for rotatory subluxation. No gross neuro deficit.  MRI C-spine without cord abnormality, possible ligamentous sprain at the craniocervical junction.  MRA neck without vessel injury.  Non-operative management.  Cervical immobilization. x 12 weeks, may remove collar for showering.  Follow up in 6 weeks for upright AP/lateral/flexion and extension x-rays (7/11).  Nadir Rodriguez MD. Neurosurgeon. [facility].

## 2023-06-01 NOTE — OR NURSING
Patient rolled through pre-op without stopping in a room. Patient identification verified with circulating RN. No family to consent the patient. 2 MD consent obtained. See Dr. Bills's note. WHO checklist completed.
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
## 2023-06-01 NOTE — PROGRESS NOTES
Unable to address [facility] at this time. Patient unable to participate in interview (intubated). No pharmacy on file and no emergency contact information.

## 2023-06-02 ENCOUNTER — APPOINTMENT (OUTPATIENT)
Dept: RADIOLOGY | Facility: MEDICAL CENTER | Age: 28
DRG: 957 | End: 2023-06-02
Payer: MEDICAID

## 2023-06-02 ENCOUNTER — APPOINTMENT (OUTPATIENT)
Dept: RADIOLOGY | Facility: MEDICAL CENTER | Age: 28
DRG: 957 | End: 2023-06-02
Attending: SURGERY
Payer: MEDICAID

## 2023-06-02 LAB
ALBUMIN SERPL BCP-MCNC: 2.3 G/DL (ref 3.2–4.9)
ALBUMIN/GLOB SERPL: 0.9 G/DL
ALP SERPL-CCNC: 55 U/L (ref 30–99)
ALT SERPL-CCNC: 52 U/L (ref 2–50)
ANION GAP SERPL CALC-SCNC: 8 MMOL/L (ref 7–16)
AST SERPL-CCNC: 43 U/L (ref 12–45)
BASOPHILS # BLD AUTO: 0.3 % (ref 0–1.8)
BASOPHILS # BLD: 0.06 K/UL (ref 0–0.12)
BILIRUB SERPL-MCNC: 0.2 MG/DL (ref 0.1–1.5)
BUN SERPL-MCNC: 12 MG/DL (ref 8–22)
CALCIUM ALBUM COR SERPL-MCNC: 8.9 MG/DL (ref 8.5–10.5)
CALCIUM SERPL-MCNC: 7.5 MG/DL (ref 8.5–10.5)
CHLORIDE SERPL-SCNC: 113 MMOL/L (ref 96–112)
CO2 SERPL-SCNC: 23 MMOL/L (ref 20–33)
CREAT SERPL-MCNC: 1.05 MG/DL (ref 0.5–1.4)
EOSINOPHIL # BLD AUTO: 0 K/UL (ref 0–0.51)
EOSINOPHIL NFR BLD: 0 % (ref 0–6.9)
ERYTHROCYTE [DISTWIDTH] IN BLOOD BY AUTOMATED COUNT: 48.2 FL (ref 35.9–50)
GFR SERPLBLD CREATININE-BSD FMLA CKD-EPI: 100 ML/MIN/1.73 M 2
GLOBULIN SER CALC-MCNC: 2.6 G/DL (ref 1.9–3.5)
GLUCOSE SERPL-MCNC: 124 MG/DL (ref 65–99)
HCT VFR BLD AUTO: 25.2 % (ref 42–52)
HGB BLD-MCNC: 7.9 G/DL (ref 14–18)
IMM GRANULOCYTES # BLD AUTO: 0.19 K/UL (ref 0–0.11)
IMM GRANULOCYTES NFR BLD AUTO: 0.9 % (ref 0–0.9)
LYMPHOCYTES # BLD AUTO: 1.8 K/UL (ref 1–4.8)
LYMPHOCYTES NFR BLD: 8.6 % (ref 22–41)
MCH RBC QN AUTO: 30.4 PG (ref 27–33)
MCHC RBC AUTO-ENTMCNC: 31.3 G/DL (ref 32.3–36.5)
MCV RBC AUTO: 96.9 FL (ref 81.4–97.8)
MONOCYTES # BLD AUTO: 2.34 K/UL (ref 0–0.85)
MONOCYTES NFR BLD AUTO: 11.2 % (ref 0–13.4)
NEUTROPHILS # BLD AUTO: 16.54 K/UL (ref 1.82–7.42)
NEUTROPHILS NFR BLD: 79 % (ref 44–72)
NRBC # BLD AUTO: 0 K/UL
NRBC BLD-RTO: 0 /100 WBC (ref 0–0.2)
PLATELET # BLD AUTO: 247 K/UL (ref 164–446)
PMV BLD AUTO: 11.2 FL (ref 9–12.9)
POTASSIUM SERPL-SCNC: 4.1 MMOL/L (ref 3.6–5.5)
PROT SERPL-MCNC: 4.9 G/DL (ref 6–8.2)
RBC # BLD AUTO: 2.6 M/UL (ref 4.7–6.1)
SODIUM SERPL-SCNC: 144 MMOL/L (ref 135–145)
WBC # BLD AUTO: 20.9 K/UL (ref 4.8–10.8)

## 2023-06-02 PROCEDURE — A9270 NON-COVERED ITEM OR SERVICE: HCPCS | Performed by: SURGERY

## 2023-06-02 PROCEDURE — 99291 CRITICAL CARE FIRST HOUR: CPT | Performed by: SURGERY

## 2023-06-02 PROCEDURE — 94003 VENT MGMT INPAT SUBQ DAY: CPT

## 2023-06-02 PROCEDURE — 700105 HCHG RX REV CODE 258

## 2023-06-02 PROCEDURE — 700102 HCHG RX REV CODE 250 W/ 637 OVERRIDE(OP): Performed by: SURGERY

## 2023-06-02 PROCEDURE — 700111 HCHG RX REV CODE 636 W/ 250 OVERRIDE (IP): Performed by: SURGERY

## 2023-06-02 PROCEDURE — 700105 HCHG RX REV CODE 258: Performed by: SURGERY

## 2023-06-02 PROCEDURE — 71045 X-RAY EXAM CHEST 1 VIEW: CPT

## 2023-06-02 PROCEDURE — 770022 HCHG ROOM/CARE - ICU (200)

## 2023-06-02 PROCEDURE — 85025 COMPLETE CBC W/AUTO DIFF WBC: CPT

## 2023-06-02 PROCEDURE — 700111 HCHG RX REV CODE 636 W/ 250 OVERRIDE (IP)

## 2023-06-02 PROCEDURE — 80053 COMPREHEN METABOLIC PANEL: CPT

## 2023-06-02 PROCEDURE — A9270 NON-COVERED ITEM OR SERVICE: HCPCS

## 2023-06-02 PROCEDURE — 700102 HCHG RX REV CODE 250 W/ 637 OVERRIDE(OP)

## 2023-06-02 PROCEDURE — 93970 EXTREMITY STUDY: CPT

## 2023-06-02 PROCEDURE — 94799 UNLISTED PULMONARY SVC/PX: CPT

## 2023-06-02 RX ORDER — QUETIAPINE FUMARATE 100 MG/1
100 TABLET, FILM COATED ORAL EVERY 8 HOURS
Status: DISCONTINUED | OUTPATIENT
Start: 2023-06-02 | End: 2023-06-05

## 2023-06-02 RX ORDER — AMOXICILLIN 250 MG
1 CAPSULE ORAL
Status: DISCONTINUED | OUTPATIENT
Start: 2023-06-02 | End: 2023-06-05

## 2023-06-02 RX ORDER — OXYCODONE HYDROCHLORIDE 10 MG/1
10 TABLET ORAL
Status: DISCONTINUED | OUTPATIENT
Start: 2023-06-02 | End: 2023-06-05

## 2023-06-02 RX ORDER — DOCUSATE SODIUM 50 MG/5ML
100 LIQUID ORAL 2 TIMES DAILY
Status: DISCONTINUED | OUTPATIENT
Start: 2023-06-02 | End: 2023-06-05

## 2023-06-02 RX ORDER — POLYETHYLENE GLYCOL 3350 17 G/17G
1 POWDER, FOR SOLUTION ORAL 2 TIMES DAILY
Status: DISCONTINUED | OUTPATIENT
Start: 2023-06-02 | End: 2023-06-05

## 2023-06-02 RX ORDER — OXYCODONE HYDROCHLORIDE 5 MG/1
5 TABLET ORAL
Status: DISCONTINUED | OUTPATIENT
Start: 2023-06-02 | End: 2023-06-05

## 2023-06-02 RX ORDER — ONDANSETRON 4 MG/1
4 TABLET, ORALLY DISINTEGRATING ORAL EVERY 4 HOURS PRN
Status: DISCONTINUED | OUTPATIENT
Start: 2023-06-02 | End: 2023-06-05

## 2023-06-02 RX ORDER — AMOXICILLIN 250 MG
1 CAPSULE ORAL NIGHTLY
Status: DISCONTINUED | OUTPATIENT
Start: 2023-06-02 | End: 2023-06-05

## 2023-06-02 RX ADMIN — ACETAMINOPHEN 1000 MG: 500 TABLET, FILM COATED ORAL at 11:42

## 2023-06-02 RX ADMIN — ACETAMINOPHEN 1000 MG: 500 TABLET, FILM COATED ORAL at 01:36

## 2023-06-02 RX ADMIN — POLYETHYLENE GLYCOL 3350 1 PACKET: 17 POWDER, FOR SOLUTION ORAL at 17:11

## 2023-06-02 RX ADMIN — PROPOFOL 70 MCG/KG/MIN: 10 INJECTION, EMULSION INTRAVENOUS at 07:54

## 2023-06-02 RX ADMIN — ACETAMINOPHEN 1000 MG: 500 TABLET, FILM COATED ORAL at 05:24

## 2023-06-02 RX ADMIN — PROPOFOL 70 MCG/KG/MIN: 10 INJECTION, EMULSION INTRAVENOUS at 11:26

## 2023-06-02 RX ADMIN — OXYCODONE HYDROCHLORIDE 10 MG: 10 TABLET ORAL at 11:43

## 2023-06-02 RX ADMIN — QUETIAPINE FUMARATE 100 MG: 100 TABLET ORAL at 21:06

## 2023-06-02 RX ADMIN — PROPOFOL 70 MCG/KG/MIN: 10 INJECTION, EMULSION INTRAVENOUS at 16:41

## 2023-06-02 RX ADMIN — ACETAMINOPHEN 1000 MG: 500 TABLET, FILM COATED ORAL at 23:31

## 2023-06-02 RX ADMIN — BACITRACIN ZINC: 500 OINTMENT TOPICAL at 17:11

## 2023-06-02 RX ADMIN — PROPOFOL 70 MCG/KG/MIN: 10 INJECTION, EMULSION INTRAVENOUS at 04:45

## 2023-06-02 RX ADMIN — SODIUM CHLORIDE: 9 INJECTION, SOLUTION INTRAVENOUS at 16:14

## 2023-06-02 RX ADMIN — Medication 1 APPLICATOR: at 05:24

## 2023-06-02 RX ADMIN — PROPOFOL 66.67 MCG/KG/MIN: 10 INJECTION, EMULSION INTRAVENOUS at 14:02

## 2023-06-02 RX ADMIN — QUETIAPINE FUMARATE 100 MG: 100 TABLET ORAL at 13:42

## 2023-06-02 RX ADMIN — HYDROMORPHONE HYDROCHLORIDE 1 MG: 1 INJECTION, SOLUTION INTRAMUSCULAR; INTRAVENOUS; SUBCUTANEOUS at 23:30

## 2023-06-02 RX ADMIN — PROPOFOL 60 MCG/KG/MIN: 10 INJECTION, EMULSION INTRAVENOUS at 01:37

## 2023-06-02 RX ADMIN — CEFAZOLIN 2 G: 2 INJECTION, POWDER, FOR SOLUTION INTRAMUSCULAR; INTRAVENOUS at 13:50

## 2023-06-02 RX ADMIN — BACITRACIN ZINC: 500 OINTMENT TOPICAL at 05:25

## 2023-06-02 RX ADMIN — PROPOFOL 70 MCG/KG/MIN: 10 INJECTION, EMULSION INTRAVENOUS at 22:39

## 2023-06-02 RX ADMIN — DOCUSATE SODIUM 50 MG AND SENNOSIDES 8.6 MG 1 TABLET: 8.6; 5 TABLET, FILM COATED ORAL at 21:06

## 2023-06-02 RX ADMIN — POLYETHYLENE GLYCOL 3350 1 PACKET: 17 POWDER, FOR SOLUTION ORAL at 05:24

## 2023-06-02 RX ADMIN — FAMOTIDINE 20 MG: 20 TABLET, FILM COATED ORAL at 17:10

## 2023-06-02 RX ADMIN — PROPOFOL 70 MCG/KG/MIN: 10 INJECTION, EMULSION INTRAVENOUS at 19:47

## 2023-06-02 RX ADMIN — Medication 1 APPLICATOR: at 17:11

## 2023-06-02 RX ADMIN — HYDROMORPHONE HYDROCHLORIDE 1 MG: 1 INJECTION, SOLUTION INTRAMUSCULAR; INTRAVENOUS; SUBCUTANEOUS at 01:46

## 2023-06-02 RX ADMIN — CEFAZOLIN 2 G: 2 INJECTION, POWDER, FOR SOLUTION INTRAMUSCULAR; INTRAVENOUS at 05:27

## 2023-06-02 RX ADMIN — DOCUSATE SODIUM 100 MG: 50 LIQUID ORAL at 17:10

## 2023-06-02 RX ADMIN — SODIUM CHLORIDE: 9 INJECTION, SOLUTION INTRAVENOUS at 04:44

## 2023-06-02 RX ADMIN — FAMOTIDINE 20 MG: 20 TABLET, FILM COATED ORAL at 05:24

## 2023-06-02 RX ADMIN — ACETAMINOPHEN 1000 MG: 500 TABLET, FILM COATED ORAL at 17:10

## 2023-06-02 ASSESSMENT — PAIN DESCRIPTION - PAIN TYPE
TYPE: ACUTE PAIN

## 2023-06-02 ASSESSMENT — FIBROSIS 4 INDEX: FIB4 SCORE: 4.65
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
## 2023-06-02 NOTE — ANESTHESIA POSTPROCEDURE EVALUATION
Patient: Faviola [age]    Procedure Summary     Date: 06/01/23 Room / Location: [place] [id number] / SURGERY [facility]    Anesthesia Start: 1627 Anesthesia Stop: 1744    Procedure: ORIF, ELBOW (Left: Elbow) Diagnosis: (Left elbow fracture dislocation)    Surgeons: Josef Bills M.D. Responsible Provider: Roosevelt Avendano M.D.    Anesthesia Type: general ASA Status: 3          Final Anesthesia Type: general  Last vitals  BP   BP: 122/67, Arterial BP: 155/70    Temp   36.8 °C (98.3 °F)    Pulse   105   Resp   19    SpO2   96 %      Anesthesia Post Evaluation    Patient location during evaluation: ICU  Patient participation: complete - patient cannot participate  Level of consciousness: obtunded/minimal responses  Pain score: 0    Airway patency: patent  Anesthetic complications: no  Cardiovascular status: hemodynamically stable  Respiratory status: ETT, intubated, ventilator and acceptable  Hydration status: balanced    PONV: none          There were no known notable events for this encounter.

## 2023-06-02 NOTE — THERAPY
Occupational Therapy Contact Note    OT eval attempted. Per RN, they are going to continue him on sedation due to agitation. Will attempt as appropriate and able.    Amaris Guillen, OTR/L

## 2023-06-02 NOTE — ANESTHESIA TIME REPORT
Anesthesia Start and Stop Event Times     Date Time Event    6/1/2023 1601 Ready for Procedure     1627 Anesthesia Start     1744 Anesthesia Stop        Responsible Staff  06/01/23    Name Role Begin End    Song Robbins M.D. Anesth 1627 1711    Roosevelt Avendano M.D. Anesth 1711 1748        Overtime Reason:  no overtime (within assigned shift)    Comments:
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
## 2023-06-02 NOTE — CARE PLAN
Problem: Ventilation  Goal: Ability to achieve and maintain unassisted ventilation or tolerate decreased levels of ventilator support  Description: Target End Date:  4 days     Document on Vent flowsheet    1.  Support and monitor invasive and noninvasive mechanical ventilation  2.  Monitor ventilator weaning response  3.  Perform ventilator associated pneumonia prevention interventions  4.  Manage ventilation therapy by monitoring diagnostic test results  Outcome: Not Met   Vd 3 100/10/30

## 2023-06-02 NOTE — DIETARY
"Nutrition Support Assessment:  Day 2 of admit.  Fareed Muhammad is a 89 y.o. adult with admitting DX of Trauma.     Current problem list:  Trauma  Respiratory failure after trauma  Liver laceration  Rotatory subluxation of atlantoaxial joint  Major laceration of kidney, left  Spleen laceration  Left pulmonary contusion  Laceration of right lower extremity  Closed fracture of multiple ribs of left side  Traumatic hemopneumothorax  Open skull fracture  Open fracture dislocation of left elbow joint  Illicit drug use  Abrasion, multiple sites     Assessment:  Estimated Nutritional Needs based on:   Height: 180.3 cm (5' 11\")  Weight: 98.5 kg (217 lb 2.5 oz)  Weight to Use in Calculations: 98.5 kg (217 lb 2.5 oz)  Ideal Body Weight: 78 kg (172 lb)  Percent Ideal Body Weight: 126.3  Body mass index is 30.29 kg/m²., BMI classification: Class 1 Obesity    Calculation/Equation using estimated age 28: PSU (VE 8.2, Tmax 38.2 C) = 2324 kcal, REE x 1.2 = 2380 kcal  Total Calories / day: 2300 - 2400  (Calories / k.3 - 24.3)  Total Grams Protein / day: 118 - 156  (Grams Protein / k.2 - 1.6 actual weight, 1.5 - 2 IBW)     Evaluation:   Pt intubated. Pt discussed in IDT rounds. Start tube feed and advance per protocol per Dr Lion.  OG placed to stomach per abdominal x-ray.  Medications include Propofol at 70 mcg/kg/min providing 831 kcal in 24 hours, Ancef, Pepcid, Bowel protocol  IV NS at 83 ml/hour   Labs 6/2 include Na 144, K+ 4.1, Glu 124 (H), BUN 12, Creat 1.05, Alb 2.3 (L)  LBM PTA  High protein, specialized formula Peptamen Intense VHP appropriate to meet protein needs in pt sedated on high rate of propofol.     Malnutrition Risk: ASPEN criteria not noted, but unable to fully assess.     Recommendations/Plan:  Start Peptamen Intense VHP and advance to goal rate of 65 ml/hour while on propofol to provide 1560 kcal, 143 gm protein, and 1310 ml free water in 24 hours.  Once off propofol, change tube feed to Impact Peptide " 1.5 goal rate of 65 ml/hour to provide 2340 kcal, 146 gm protein, and 1201 ml free water in 24 hours.  Fluids per MD.  Monitor weight.    RD following
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
## 2023-06-02 NOTE — CARE PLAN
Problem: Ventilation  Goal: Ability to achieve and maintain unassisted ventilation or tolerate decreased levels of ventilator support  Description: Target End Date:  4 days     Document on Vent flowsheet    1.  Support and monitor invasive and noninvasive mechanical ventilation  2.  Monitor ventilator weaning response  3.  Perform ventilator associated pneumonia prevention interventions  4.  Manage ventilation therapy by monitoring diagnostic test results  Outcome: Not Met     Ventilator Daily Summary    Vent Day # 2    ETT: 8.0 @ 26    Ventilator settings: , +10, 30%    Plan: Continue current ventilator settings and wean mechanical ventilation as tolerated per physician orders.

## 2023-06-02 NOTE — THERAPY
Physical Therapy Contact Note    Patient Name: Fareed Muhammad  Age:  89 y.o., Sex:  adult  Medical Record #: [id number]  Today's Date: 6/2/2023    Attempted to see for PT evaluation. Per RN, pt very agitated with any weaning of sedation and is not appropriate for therapy today. Will follow up as able and appropriate.    Shital Carrillo, PT, DPT

## 2023-06-02 NOTE — CARE PLAN
The patient is Watcher - Medium risk of patient condition declining or worsening    Shift Goals  Clinical Goals: Hemodynamic stability, reduce agitation  Patient Goals: REKHA  Family Goals: REKHA    Progress made toward(s) clinical / shift goals:      Problem: Pain - Standard  Goal: Alleviation of pain or a reduction in pain to the patient’s comfort goal  Outcome: Progressing     Problem: Skin Integrity  Goal: Skin integrity is maintained or improved  Outcome: Progressing     Problem: Fall Risk  Goal: Patient will remain free from falls  Outcome: Progressing     Problem: Safety - Medical Restraint  Goal: Remains free of injury from restraints (Restraint for Interference with Medical Device)  Outcome: Progressing       Patient is not progressing towards the following goals: Unable to update the patient on the Plan of Care at this time as he is intubated and sedated. The patient is still requiring restraints due to risk of removal of ETT.     Problem: Knowledge Deficit - Standard  Goal: Patient and family/care givers will demonstrate understanding of plan of care, disease process/condition, diagnostic tests and medications  Outcome: Not Progressing     Problem: Safety - Medical Restraint  Goal: Free from restraint(s) (Restraint for Interference with Medical Device)  Outcome: Not Progressing

## 2023-06-02 NOTE — DISCHARGE PLANNING
DEVIN reviewed chart and spoke with bedside RN. No DEBK identified or contacted yet. DEVIN reviewed previous chart which is linked for merge. SW found contact for pt's mother, Stephania. SW spoke with Stephania via phone and provided brief update to Stephania and pt's step-father. They haven't seen Fareed in about 3 weeks, after his release from [facility]. SW provided emotional support, as Stephania was quite distraught over pt condition. SW updated bedside RN.     Stephania PINON, [phone], should be contacted for all medical decision making.

## 2023-06-03 ENCOUNTER — APPOINTMENT (OUTPATIENT)
Dept: RADIOLOGY | Facility: MEDICAL CENTER | Age: 28
DRG: 957 | End: 2023-06-03
Payer: MEDICAID

## 2023-06-03 PROBLEM — D62 ACUTE BLOOD LOSS ANEMIA: Status: ACTIVE | Noted: 2023-06-03

## 2023-06-03 LAB
ALBUMIN SERPL BCP-MCNC: 2 G/DL (ref 3.2–4.9)
ALBUMIN/GLOB SERPL: 0.7 G/DL
ALP SERPL-CCNC: 56 U/L (ref 30–99)
ALT SERPL-CCNC: 27 U/L (ref 2–50)
ANION GAP SERPL CALC-SCNC: 4 MMOL/L (ref 7–16)
AST SERPL-CCNC: 33 U/L (ref 12–45)
BARCODED ABORH UBTYP: 6200
BARCODED PRD CODE UBPRD: NORMAL
BARCODED UNIT NUM UBUNT: NORMAL
BASOPHILS # BLD AUTO: 0.2 % (ref 0–1.8)
BASOPHILS # BLD: 0.05 K/UL (ref 0–0.12)
BILIRUB SERPL-MCNC: 0.2 MG/DL (ref 0.1–1.5)
BUN SERPL-MCNC: 13 MG/DL (ref 8–22)
CALCIUM ALBUM COR SERPL-MCNC: 9.1 MG/DL (ref 8.5–10.5)
CALCIUM SERPL-MCNC: 7.5 MG/DL (ref 8.5–10.5)
CHLORIDE SERPL-SCNC: 114 MMOL/L (ref 96–112)
CO2 SERPL-SCNC: 25 MMOL/L (ref 20–33)
COMPONENT R 8504R: NORMAL
CREAT SERPL-MCNC: 0.85 MG/DL (ref 0.5–1.4)
CRP SERPL HS-MCNC: 27.35 MG/DL (ref 0–0.75)
EKG IMPRESSION: NORMAL
EOSINOPHIL # BLD AUTO: 0 K/UL (ref 0–0.51)
EOSINOPHIL NFR BLD: 0 % (ref 0–6.9)
ERYTHROCYTE [DISTWIDTH] IN BLOOD BY AUTOMATED COUNT: 47.4 FL (ref 35.9–50)
GFR SERPLBLD CREATININE-BSD FMLA CKD-EPI: 122 ML/MIN/1.73 M 2
GLOBULIN SER CALC-MCNC: 2.8 G/DL (ref 1.9–3.5)
GLUCOSE SERPL-MCNC: 115 MG/DL (ref 65–99)
HCT VFR BLD AUTO: 21 % (ref 42–52)
HGB BLD-MCNC: 6.7 G/DL (ref 14–18)
IMM GRANULOCYTES # BLD AUTO: 0.24 K/UL (ref 0–0.11)
IMM GRANULOCYTES NFR BLD AUTO: 1.1 % (ref 0–0.9)
LYMPHOCYTES # BLD AUTO: 1.78 K/UL (ref 1–4.8)
LYMPHOCYTES NFR BLD: 8.3 % (ref 22–41)
MCH RBC QN AUTO: 30.5 PG (ref 27–33)
MCHC RBC AUTO-ENTMCNC: 31.9 G/DL (ref 32.3–36.5)
MCV RBC AUTO: 95.5 FL (ref 81.4–97.8)
MONOCYTES # BLD AUTO: 1.9 K/UL (ref 0–0.85)
MONOCYTES NFR BLD AUTO: 8.8 % (ref 0–13.4)
NEUTROPHILS # BLD AUTO: 17.58 K/UL (ref 1.82–7.42)
NEUTROPHILS NFR BLD: 81.6 % (ref 44–72)
NRBC # BLD AUTO: 0.02 K/UL
NRBC BLD-RTO: 0.1 /100 WBC (ref 0–0.2)
PLATELET # BLD AUTO: 291 K/UL (ref 164–446)
PMV BLD AUTO: 10.7 FL (ref 9–12.9)
POTASSIUM SERPL-SCNC: 4.1 MMOL/L (ref 3.6–5.5)
PREALB SERPL-MCNC: 5.5 MG/DL (ref 18–38)
PRODUCT TYPE UPROD: NORMAL
PROT SERPL-MCNC: 4.8 G/DL (ref 6–8.2)
RBC # BLD AUTO: 2.2 M/UL (ref 4.7–6.1)
SODIUM SERPL-SCNC: 143 MMOL/L (ref 135–145)
TRIGL SERPL-MCNC: 123 MG/DL (ref 0–149)
UNIT STATUS USTAT: NORMAL
WBC # BLD AUTO: 21.6 K/UL (ref 4.8–10.8)

## 2023-06-03 PROCEDURE — 85025 COMPLETE CBC W/AUTO DIFF WBC: CPT

## 2023-06-03 PROCEDURE — A9270 NON-COVERED ITEM OR SERVICE: HCPCS

## 2023-06-03 PROCEDURE — 71045 X-RAY EXAM CHEST 1 VIEW: CPT

## 2023-06-03 PROCEDURE — 30233N1 TRANSFUSION OF NONAUTOLOGOUS RED BLOOD CELLS INTO PERIPHERAL VEIN, PERCUTANEOUS APPROACH: ICD-10-PCS | Performed by: SURGERY

## 2023-06-03 PROCEDURE — 80053 COMPREHEN METABOLIC PANEL: CPT

## 2023-06-03 PROCEDURE — 700111 HCHG RX REV CODE 636 W/ 250 OVERRIDE (IP)

## 2023-06-03 PROCEDURE — 86140 C-REACTIVE PROTEIN: CPT

## 2023-06-03 PROCEDURE — 700105 HCHG RX REV CODE 258

## 2023-06-03 PROCEDURE — 94003 VENT MGMT INPAT SUBQ DAY: CPT

## 2023-06-03 PROCEDURE — 84478 ASSAY OF TRIGLYCERIDES: CPT

## 2023-06-03 PROCEDURE — 700102 HCHG RX REV CODE 250 W/ 637 OVERRIDE(OP)

## 2023-06-03 PROCEDURE — 99291 CRITICAL CARE FIRST HOUR: CPT | Performed by: SURGERY

## 2023-06-03 PROCEDURE — 86923 COMPATIBILITY TEST ELECTRIC: CPT

## 2023-06-03 PROCEDURE — 770022 HCHG ROOM/CARE - ICU (200)

## 2023-06-03 PROCEDURE — 84134 ASSAY OF PREALBUMIN: CPT

## 2023-06-03 PROCEDURE — 700105 HCHG RX REV CODE 258: Performed by: SURGERY

## 2023-06-03 PROCEDURE — A9270 NON-COVERED ITEM OR SERVICE: HCPCS | Performed by: SURGERY

## 2023-06-03 PROCEDURE — 700111 HCHG RX REV CODE 636 W/ 250 OVERRIDE (IP): Performed by: SURGERY

## 2023-06-03 PROCEDURE — 93005 ELECTROCARDIOGRAM TRACING: CPT | Performed by: SURGERY

## 2023-06-03 PROCEDURE — P9016 RBC LEUKOCYTES REDUCED: HCPCS

## 2023-06-03 PROCEDURE — 94799 UNLISTED PULMONARY SVC/PX: CPT

## 2023-06-03 PROCEDURE — 93010 ELECTROCARDIOGRAM REPORT: CPT | Performed by: INTERNAL MEDICINE

## 2023-06-03 PROCEDURE — 700102 HCHG RX REV CODE 250 W/ 637 OVERRIDE(OP): Performed by: SURGERY

## 2023-06-03 PROCEDURE — 36430 TRANSFUSION BLD/BLD COMPNT: CPT

## 2023-06-03 RX ORDER — SODIUM CHLORIDE, SODIUM LACTATE, POTASSIUM CHLORIDE, AND CALCIUM CHLORIDE .6; .31; .03; .02 G/100ML; G/100ML; G/100ML; G/100ML
1000 INJECTION, SOLUTION INTRAVENOUS ONCE
Status: COMPLETED | OUTPATIENT
Start: 2023-06-03 | End: 2023-06-03

## 2023-06-03 RX ADMIN — PROPOFOL 70 MCG/KG/MIN: 10 INJECTION, EMULSION INTRAVENOUS at 04:27

## 2023-06-03 RX ADMIN — BACITRACIN ZINC: 500 OINTMENT TOPICAL at 06:00

## 2023-06-03 RX ADMIN — SODIUM CHLORIDE, POTASSIUM CHLORIDE, SODIUM LACTATE AND CALCIUM CHLORIDE 1000 ML: 600; 310; 30; 20 INJECTION, SOLUTION INTRAVENOUS at 12:56

## 2023-06-03 RX ADMIN — HYDROMORPHONE HYDROCHLORIDE 1 MG: 1 INJECTION, SOLUTION INTRAMUSCULAR; INTRAVENOUS; SUBCUTANEOUS at 23:05

## 2023-06-03 RX ADMIN — DOCUSATE SODIUM 100 MG: 50 LIQUID ORAL at 05:31

## 2023-06-03 RX ADMIN — QUETIAPINE FUMARATE 100 MG: 100 TABLET ORAL at 14:40

## 2023-06-03 RX ADMIN — FAMOTIDINE 20 MG: 20 TABLET, FILM COATED ORAL at 17:16

## 2023-06-03 RX ADMIN — PROPOFOL 70 MCG/KG/MIN: 10 INJECTION, EMULSION INTRAVENOUS at 18:34

## 2023-06-03 RX ADMIN — QUETIAPINE FUMARATE 100 MG: 100 TABLET ORAL at 21:27

## 2023-06-03 RX ADMIN — QUETIAPINE FUMARATE 100 MG: 100 TABLET ORAL at 05:32

## 2023-06-03 RX ADMIN — HYDROMORPHONE HYDROCHLORIDE 1 MG: 1 INJECTION, SOLUTION INTRAMUSCULAR; INTRAVENOUS; SUBCUTANEOUS at 19:05

## 2023-06-03 RX ADMIN — PROPOFOL 70 MCG/KG/MIN: 10 INJECTION, EMULSION INTRAVENOUS at 10:04

## 2023-06-03 RX ADMIN — ACETAMINOPHEN 1000 MG: 500 TABLET, FILM COATED ORAL at 17:16

## 2023-06-03 RX ADMIN — ACETAMINOPHEN 1000 MG: 500 TABLET, FILM COATED ORAL at 05:32

## 2023-06-03 RX ADMIN — FAMOTIDINE 20 MG: 20 TABLET, FILM COATED ORAL at 05:32

## 2023-06-03 RX ADMIN — PROPOFOL 70 MCG/KG/MIN: 10 INJECTION, EMULSION INTRAVENOUS at 07:07

## 2023-06-03 RX ADMIN — PROPOFOL 70 MCG/KG/MIN: 10 INJECTION, EMULSION INTRAVENOUS at 12:57

## 2023-06-03 RX ADMIN — PROPOFOL 70 MCG/KG/MIN: 10 INJECTION, EMULSION INTRAVENOUS at 01:35

## 2023-06-03 RX ADMIN — PROPOFOL 70 MCG/KG/MIN: 10 INJECTION, EMULSION INTRAVENOUS at 16:10

## 2023-06-03 RX ADMIN — ACETAMINOPHEN 1000 MG: 500 TABLET, FILM COATED ORAL at 23:42

## 2023-06-03 RX ADMIN — DOCUSATE SODIUM 50 MG AND SENNOSIDES 8.6 MG 1 TABLET: 8.6; 5 TABLET, FILM COATED ORAL at 21:27

## 2023-06-03 RX ADMIN — POLYETHYLENE GLYCOL 3350 1 PACKET: 17 POWDER, FOR SOLUTION ORAL at 17:15

## 2023-06-03 RX ADMIN — Medication 1 APPLICATOR: at 06:00

## 2023-06-03 RX ADMIN — POLYETHYLENE GLYCOL 3350 1 PACKET: 17 POWDER, FOR SOLUTION ORAL at 05:32

## 2023-06-03 RX ADMIN — SODIUM CHLORIDE: 9 INJECTION, SOLUTION INTRAVENOUS at 03:47

## 2023-06-03 RX ADMIN — Medication 1 APPLICATOR: at 17:17

## 2023-06-03 RX ADMIN — DOCUSATE SODIUM 100 MG: 50 LIQUID ORAL at 17:16

## 2023-06-03 RX ADMIN — PROPOFOL 70 MCG/KG/MIN: 10 INJECTION, EMULSION INTRAVENOUS at 21:33

## 2023-06-03 RX ADMIN — HYDROMORPHONE HYDROCHLORIDE 1 MG: 1 INJECTION, SOLUTION INTRAMUSCULAR; INTRAVENOUS; SUBCUTANEOUS at 03:52

## 2023-06-03 RX ADMIN — ACETAMINOPHEN 1000 MG: 500 TABLET, FILM COATED ORAL at 12:14

## 2023-06-03 RX ADMIN — BACITRACIN ZINC: 500 OINTMENT TOPICAL at 17:18

## 2023-06-03 RX ADMIN — SODIUM CHLORIDE: 9 INJECTION, SOLUTION INTRAVENOUS at 16:52

## 2023-06-03 RX ADMIN — HYDROMORPHONE HYDROCHLORIDE 1 MG: 1 INJECTION, SOLUTION INTRAMUSCULAR; INTRAVENOUS; SUBCUTANEOUS at 12:24

## 2023-06-03 ASSESSMENT — PAIN DESCRIPTION - PAIN TYPE
TYPE: ACUTE PAIN

## 2023-06-03 ASSESSMENT — FIBROSIS 4 INDEX: FIB4 SCORE: 0.57

## 2023-06-03 NOTE — CARE PLAN
Problem: Ventilation  Goal: Ability to achieve and maintain unassisted ventilation or tolerate decreased levels of ventilator support  Description: Target End Date:  4 days     Document on Vent flowsheet    1.  Support and monitor invasive and noninvasive mechanical ventilation  2.  Monitor ventilator weaning response  3.  Perform ventilator associated pneumonia prevention interventions  4.  Manage ventilation therapy by monitoring diagnostic test results  Outcome: Not Met     Ventilator Daily Summary    Vent Day # 3    ETT: 8.0 @ 26    Ventilator settings: , +10, 30%    Plan: Continue current ventilator settings and wean mechanical ventilation as tolerated per physician orders.

## 2023-06-03 NOTE — CARE PLAN
Problem: Pain - Standard  Goal: Alleviation of pain or a reduction in pain to the patient’s comfort goal  6/3/2023 1529 by Randall Stevens RSerinaN.  Outcome: Progressing  6/3/2023 0710 by NADIYA MoffettN.  Outcome: Progressing     Problem: Safety - Medical Restraint  Goal: Remains free of injury from restraints (Restraint for Interference with Medical Device)  6/3/2023 1529 by NADIYA MoffettN.  Outcome: Progressing  6/3/2023 0710 by NADIYA MoffettN.  Outcome: Progressing   The patient is Watcher - Medium risk of patient condition declining or worsening    Shift Goals  Clinical Goals: Hemodynamic stability, control agitation  Patient Goals: REKHA  Family Goals: REKHA (No family present)    Progress made toward(s) clinical / shift goals:  Met      Patient is not progressing towards the following goals:

## 2023-06-03 NOTE — CARE PLAN
The patient is Watcher - Medium risk of patient condition declining or worsening    Shift Goals  Clinical Goals: Hemodynamic stability, pain control, RASS at goal  Patient Goals: unable to assess  Family Goals: No family at bedside    Progress made toward(s) clinical / shift goals:   Problem: Pain - Standard  Goal: Alleviation of pain or a reduction in pain to the patient’s comfort goal  Outcome: Progressing     Problem: Safety - Medical Restraint  Goal: Remains free of injury from restraints (Restraint for Interference with Medical Device)  Outcome: Progressing       Patient is not progressing towards the following goals:      Problem: Knowledge Deficit - Standard  Goal: Patient and family/care givers will demonstrate understanding of plan of care, disease process/condition, diagnostic tests and medications  Outcome: Not Progressing     Problem: Safety - Medical Restraint  Goal: Free from restraint(s) (Restraint for Interference with Medical Device)  Outcome: Not Progressing

## 2023-06-03 NOTE — PROGRESS NOTES
"     Orthopedic PA Progress Note    Interval changes:  Patient remains intubated in ICU.  LUE cast is CDI  NWB LUE  No additional ortho procedures pending    ROS - Unable to obtain due to status.     /61   Pulse (!) 104   Temp 37.4 °C (99.3 °F) (Bladder)   Resp (!) 21   Ht 1.803 m (5' 10.98\")   Wt 98.5 kg (217 lb 2.5 oz)   SpO2 98%     Patient seen and examined  Intubated  RRR  LUE: Cast CDI. Cap refill <2s.     Recent Labs     06/01/23  0520 06/01/23  1245 06/01/23  2305 06/02/23  0430 06/03/23  0515   WBC 21.4*  --   --  20.9* 21.6*   RBC 3.13*  --   --  2.60* 2.20*   HEMOGLOBIN 9.6*   < > 8.7* 7.9* 6.7*   HEMATOCRIT 29.9*  --   --  25.2* 21.0*   MCV 95.5  --   --  96.9 95.5   MCH 30.7  --   --  30.4 30.5   MCHC 32.1*  --   --  31.3* 31.9*   RDW 47.4  --   --  48.2 47.4   PLATELETCT 266  --   --  247 291   MPV 10.6  --   --  11.2 10.7    < > = values in this interval not displayed.         Active Hospital Problems    Diagnosis     Respiratory failure after trauma (HCC) [J96.90]      Priority: High    Spleen laceration, initial encounter [S36.039A]      Priority: High    Major laceration of kidney, left, initial encounter [S37.062A]      Priority: High    Liver laceration, initial encounter [S36.113A]      Priority: High    Left pulmonary contusion [S27.321A]      Priority: Medium    Open fracture dislocation of left elbow joint [S42.402B]      Priority: Medium    Open skull fracture (HCC) [S02.91XB]      Priority: Medium    Traumatic hemopneumothorax, initial encounter [S27.2XXA]      Priority: Medium    Closed fracture of multiple ribs of left side [S22.42XA]      Priority: Medium    Rotatory subluxation of atlantoaxial joint [S13.120A]      Priority: Medium    Contraindication to deep vein thrombosis (DVT) prophylaxis [Z53.09]      Priority: Medium    Laceration of right lower extremity [S81.811A]      Priority: Medium    Illicit drug use [F19.90]      Priority: Low    Trauma [T14.90XA]      " Priority: Low    Abrasion, multiple sites [T07.XXXA]      Priority: Low       Assessment/Plan:  Patient remains intubated in ICU.  LUE cast is CDI  NWB LUE  No additional ortho procedures pending    POD#2 S/p  1.  Irrigation and debridement open fracture   2.  Open reduction internal fixation left distal humerus lateral condyle fracture   3.  Open treatment acute elbow dislocation  Wt bearing status - NWB LUE  Wound care/Drains - Dressings to be changed every other day by nursing. Or PRN for saturation starting POD#2  Future Procedures - None planned   Lovenox: defer to trauma team  Sutures/Staples out- 14-21 days post operatively. Removal will completed by ortho ADAM's unless transferred.  PT/OT-initiated  Antibiotics:  Perioperative completed  DVT Prophylaxis- TEDS/SCDs/Foot pumps  Edwards-not needed per ortho  Case Coordination for Discharge Planning - Disposition per therapy recs.

## 2023-06-03 NOTE — CARE PLAN
Problem: Ventilation  Goal: Ability to achieve and maintain unassisted ventilation or tolerate decreased levels of ventilator support  Description: Target End Date:  4 days     Document on Vent flowsheet    1.  Support and monitor invasive and noninvasive mechanical ventilation  2.  Monitor ventilator weaning response  3.  Perform ventilator associated pneumonia prevention interventions  4.  Manage ventilation therapy by monitoring diagnostic test results  Outcome: Progressing     Ventilator Daily Summary    Vent Day # 4     ETT: 8.0 @ 26    Ventilator settings: /10/30%    Weaning trials: SBT x2    Respiratory Procedures: none    Plan: Continue current ventilator settings and wean mechanical ventilation as tolerated per physician orders.

## 2023-06-03 NOTE — CARE PLAN
The patient is Stable - Low risk of patient condition declining or worsening    Shift Goals  Clinical Goals: Hemodynamic stability, pain control, RASS at goal  Patient Goals: unable to assess  Family Goals: No family at bedside    Progress made toward(s) clinical / shift goals:  Met    Patient is not progressing towards the following goals:
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
## 2023-06-04 ENCOUNTER — APPOINTMENT (OUTPATIENT)
Dept: RADIOLOGY | Facility: MEDICAL CENTER | Age: 28
DRG: 957 | End: 2023-06-04
Attending: SURGERY
Payer: MEDICAID

## 2023-06-04 ENCOUNTER — APPOINTMENT (OUTPATIENT)
Dept: RADIOLOGY | Facility: MEDICAL CENTER | Age: 28
DRG: 957 | End: 2023-06-04
Payer: MEDICAID

## 2023-06-04 LAB
ABO GROUP BLD: NORMAL
ALBUMIN SERPL BCP-MCNC: 2.1 G/DL (ref 3.2–4.9)
ALBUMIN/GLOB SERPL: 0.7 G/DL
ALP SERPL-CCNC: 59 U/L (ref 30–99)
ALT SERPL-CCNC: 17 U/L (ref 2–50)
ANION GAP SERPL CALC-SCNC: 10 MMOL/L (ref 7–16)
AST SERPL-CCNC: 27 U/L (ref 12–45)
BARCODED ABORH UBTYP: 9500
BARCODED PRD CODE UBPRD: NORMAL
BARCODED UNIT NUM UBUNT: NORMAL
BASOPHILS # BLD AUTO: 0.3 % (ref 0–1.8)
BASOPHILS # BLD: 0.06 K/UL (ref 0–0.12)
BILIRUB SERPL-MCNC: 0.2 MG/DL (ref 0.1–1.5)
BLD GP AB SCN SERPL QL: NORMAL
BUN SERPL-MCNC: 17 MG/DL (ref 8–22)
CALCIUM ALBUM COR SERPL-MCNC: 9.2 MG/DL (ref 8.5–10.5)
CALCIUM SERPL-MCNC: 7.7 MG/DL (ref 8.5–10.5)
CHLORIDE SERPL-SCNC: 112 MMOL/L (ref 96–112)
CO2 SERPL-SCNC: 22 MMOL/L (ref 20–33)
COMPONENT R 8504R: NORMAL
CREAT SERPL-MCNC: 0.77 MG/DL (ref 0.5–1.4)
EOSINOPHIL # BLD AUTO: 0 K/UL (ref 0–0.51)
EOSINOPHIL NFR BLD: 0 % (ref 0–6.9)
ERYTHROCYTE [DISTWIDTH] IN BLOOD BY AUTOMATED COUNT: 52.9 FL (ref 35.9–50)
GFR SERPLBLD CREATININE-BSD FMLA CKD-EPI: 126 ML/MIN/1.73 M 2
GLOBULIN SER CALC-MCNC: 3 G/DL (ref 1.9–3.5)
GLUCOSE SERPL-MCNC: 114 MG/DL (ref 65–99)
HCT VFR BLD AUTO: 21.4 % (ref 42–52)
HGB BLD-MCNC: 6.7 G/DL (ref 14–18)
IMM GRANULOCYTES # BLD AUTO: 0.44 K/UL (ref 0–0.11)
IMM GRANULOCYTES NFR BLD AUTO: 2 % (ref 0–0.9)
LYMPHOCYTES # BLD AUTO: 1.68 K/UL (ref 1–4.8)
LYMPHOCYTES NFR BLD: 7.5 % (ref 22–41)
MCH RBC QN AUTO: 30 PG (ref 27–33)
MCHC RBC AUTO-ENTMCNC: 31.3 G/DL (ref 32.3–36.5)
MCV RBC AUTO: 96 FL (ref 81.4–97.8)
MONOCYTES # BLD AUTO: 2.18 K/UL (ref 0–0.85)
MONOCYTES NFR BLD AUTO: 9.7 % (ref 0–13.4)
NEUTROPHILS # BLD AUTO: 18.17 K/UL (ref 1.82–7.42)
NEUTROPHILS NFR BLD: 80.5 % (ref 44–72)
NRBC # BLD AUTO: 0.16 K/UL
NRBC BLD-RTO: 0.7 /100 WBC (ref 0–0.2)
PLATELET # BLD AUTO: 367 K/UL (ref 164–446)
PMV BLD AUTO: 10.1 FL (ref 9–12.9)
POTASSIUM SERPL-SCNC: 4.3 MMOL/L (ref 3.6–5.5)
PRODUCT TYPE UPROD: NORMAL
PROT SERPL-MCNC: 5.1 G/DL (ref 6–8.2)
RBC # BLD AUTO: 2.23 M/UL (ref 4.7–6.1)
RH BLD: NORMAL
SODIUM SERPL-SCNC: 144 MMOL/L (ref 135–145)
UNIT STATUS USTAT: NORMAL
WBC # BLD AUTO: 22.5 K/UL (ref 4.8–10.8)

## 2023-06-04 PROCEDURE — 700102 HCHG RX REV CODE 250 W/ 637 OVERRIDE(OP): Performed by: SURGERY

## 2023-06-04 PROCEDURE — 700105 HCHG RX REV CODE 258

## 2023-06-04 PROCEDURE — A9270 NON-COVERED ITEM OR SERVICE: HCPCS

## 2023-06-04 PROCEDURE — 86923 COMPATIBILITY TEST ELECTRIC: CPT

## 2023-06-04 PROCEDURE — 700111 HCHG RX REV CODE 636 W/ 250 OVERRIDE (IP)

## 2023-06-04 PROCEDURE — 86901 BLOOD TYPING SEROLOGIC RH(D): CPT

## 2023-06-04 PROCEDURE — 700111 HCHG RX REV CODE 636 W/ 250 OVERRIDE (IP): Performed by: SURGERY

## 2023-06-04 PROCEDURE — 700101 HCHG RX REV CODE 250

## 2023-06-04 PROCEDURE — 94003 VENT MGMT INPAT SUBQ DAY: CPT

## 2023-06-04 PROCEDURE — 71045 X-RAY EXAM CHEST 1 VIEW: CPT

## 2023-06-04 PROCEDURE — 85025 COMPLETE CBC W/AUTO DIFF WBC: CPT

## 2023-06-04 PROCEDURE — A9270 NON-COVERED ITEM OR SERVICE: HCPCS | Performed by: SURGERY

## 2023-06-04 PROCEDURE — 86900 BLOOD TYPING SEROLOGIC ABO: CPT

## 2023-06-04 PROCEDURE — 99223 1ST HOSP IP/OBS HIGH 75: CPT | Performed by: SURGERY

## 2023-06-04 PROCEDURE — 36430 TRANSFUSION BLD/BLD COMPNT: CPT

## 2023-06-04 PROCEDURE — 770022 HCHG ROOM/CARE - ICU (200)

## 2023-06-04 PROCEDURE — 86850 RBC ANTIBODY SCREEN: CPT

## 2023-06-04 PROCEDURE — 94669 MECHANICAL CHEST WALL OSCILL: CPT

## 2023-06-04 PROCEDURE — 94799 UNLISTED PULMONARY SVC/PX: CPT

## 2023-06-04 PROCEDURE — P9016 RBC LEUKOCYTES REDUCED: HCPCS

## 2023-06-04 PROCEDURE — 80053 COMPREHEN METABOLIC PANEL: CPT

## 2023-06-04 PROCEDURE — 94640 AIRWAY INHALATION TREATMENT: CPT

## 2023-06-04 PROCEDURE — 700102 HCHG RX REV CODE 250 W/ 637 OVERRIDE(OP)

## 2023-06-04 RX ADMIN — QUETIAPINE FUMARATE 100 MG: 100 TABLET ORAL at 05:39

## 2023-06-04 RX ADMIN — HYDROMORPHONE HYDROCHLORIDE 1 MG: 1 INJECTION, SOLUTION INTRAMUSCULAR; INTRAVENOUS; SUBCUTANEOUS at 08:03

## 2023-06-04 RX ADMIN — PROPOFOL 70 MCG/KG/MIN: 10 INJECTION, EMULSION INTRAVENOUS at 00:28

## 2023-06-04 RX ADMIN — HYDROMORPHONE HYDROCHLORIDE 1 MG: 1 INJECTION, SOLUTION INTRAMUSCULAR; INTRAVENOUS; SUBCUTANEOUS at 03:17

## 2023-06-04 RX ADMIN — MAGNESIUM HYDROXIDE 30 ML: 400 SUSPENSION ORAL at 05:38

## 2023-06-04 RX ADMIN — ALBUTEROL SULFATE 2.5 MG: 2.5 SOLUTION RESPIRATORY (INHALATION) at 10:40

## 2023-06-04 RX ADMIN — HYDROMORPHONE HYDROCHLORIDE 0.5 MG: 1 INJECTION, SOLUTION INTRAMUSCULAR; INTRAVENOUS; SUBCUTANEOUS at 22:01

## 2023-06-04 RX ADMIN — BACITRACIN ZINC: 500 OINTMENT TOPICAL at 18:09

## 2023-06-04 RX ADMIN — FAMOTIDINE 20 MG: 20 TABLET, FILM COATED ORAL at 05:39

## 2023-06-04 RX ADMIN — POLYETHYLENE GLYCOL 3350 1 PACKET: 17 POWDER, FOR SOLUTION ORAL at 05:39

## 2023-06-04 RX ADMIN — Medication 1 APPLICATOR: at 05:39

## 2023-06-04 RX ADMIN — ACETAMINOPHEN 1000 MG: 500 TABLET, FILM COATED ORAL at 05:39

## 2023-06-04 RX ADMIN — HYDROMORPHONE HYDROCHLORIDE 0.5 MG: 1 INJECTION, SOLUTION INTRAMUSCULAR; INTRAVENOUS; SUBCUTANEOUS at 19:31

## 2023-06-04 RX ADMIN — SODIUM CHLORIDE: 9 INJECTION, SOLUTION INTRAVENOUS at 17:31

## 2023-06-04 RX ADMIN — SODIUM CHLORIDE: 9 INJECTION, SOLUTION INTRAVENOUS at 04:59

## 2023-06-04 RX ADMIN — HYDROMORPHONE HYDROCHLORIDE 1 MG: 1 INJECTION, SOLUTION INTRAMUSCULAR; INTRAVENOUS; SUBCUTANEOUS at 10:08

## 2023-06-04 RX ADMIN — HYDROMORPHONE HYDROCHLORIDE 1 MG: 1 INJECTION, SOLUTION INTRAMUSCULAR; INTRAVENOUS; SUBCUTANEOUS at 15:16

## 2023-06-04 RX ADMIN — Medication 2.5 MG: at 10:40

## 2023-06-04 RX ADMIN — BACITRACIN ZINC: 500 OINTMENT TOPICAL at 05:40

## 2023-06-04 RX ADMIN — FAMOTIDINE 20 MG: 10 INJECTION, SOLUTION INTRAVENOUS at 18:09

## 2023-06-04 RX ADMIN — PROPOFOL 70 MCG/KG/MIN: 10 INJECTION, EMULSION INTRAVENOUS at 06:10

## 2023-06-04 RX ADMIN — DOCUSATE SODIUM 100 MG: 50 LIQUID ORAL at 05:38

## 2023-06-04 RX ADMIN — PROPOFOL 70 MCG/KG/MIN: 10 INJECTION, EMULSION INTRAVENOUS at 03:10

## 2023-06-04 RX ADMIN — Medication 1 APPLICATOR: at 17:32

## 2023-06-04 ASSESSMENT — PAIN DESCRIPTION - PAIN TYPE
TYPE: ACUTE PAIN

## 2023-06-04 ASSESSMENT — FIBROSIS 4 INDEX: FIB4 SCORE: 0.57

## 2023-06-04 ASSESSMENT — COPD QUESTIONNAIRES
HAVE YOU SMOKED AT LEAST 100 CIGARETTES IN YOUR ENTIRE LIFE: YES
DURING THE PAST 4 WEEKS HOW MUCH DID YOU FEEL SHORT OF BREATH: NONE/LITTLE OF THE TIME
DO YOU EVER COUGH UP ANY MUCUS OR PHLEGM?: NO/ONLY WITH OCCASIONAL COLDS OR INFECTIONS
COPD SCREENING SCORE: 2
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
## 2023-06-04 NOTE — CARE PLAN
Problem: Ventilation  Goal: Ability to achieve and maintain unassisted ventilation or tolerate decreased levels of ventilator support  Description: Target End Date:  4 days     Document on Vent flowsheet    1.  Support and monitor invasive and noninvasive mechanical ventilation  2.  Monitor ventilator weaning response  3.  Perform ventilator associated pneumonia prevention interventions  4.  Manage ventilation therapy by monitoring diagnostic test results  Outcome: Not Met   8 @ 26 vd 5 100/10/30

## 2023-06-04 NOTE — RESPIRATORY CARE
Extubation    Cuff leak noted: Small  Stridor present: No       RCP Complete? Yes  Events/Summary/Plan: Pt extubated to 4L NC, tolerated well.

## 2023-06-04 NOTE — ASSESSMENT & PLAN NOTE
Multiple sources of blood loss.  6/3 Transfused 1 uPRBC.  6/4 Transfused 1 uPRBC.  6/5 Iron studies low and replacement initiated.  Lab studies PRN.  Transfuse 1 unit PRBC's for hemoglobin less than 7.

## 2023-06-04 NOTE — PROGRESS NOTES
"     Orthopedic PA Progress Note    Interval changes:  Patient remains in ICU but is extubated.  LUE cast is CDI  NWB LUE  No additional ortho procedures pending  Cleared per ortho for any discharge planning    ROS - Unable to obtain due to status.     /72   Pulse (!) 103   Temp 38 °C (100.4 °F) (Bladder)   Resp 19   Ht 1.803 m (5' 10.98\")   Wt 107 kg (235 lb 0.2 oz)   SpO2 93%     Patient seen and examined  Intubated  RRR  LUE: Cast CDI. Flexes and extends all fingers. Cap refill <2s.     Recent Labs     06/02/23  0430 06/03/23  0515 06/04/23  0445   WBC 20.9* 21.6* 22.5*   RBC 2.60* 2.20* 2.23*   HEMOGLOBIN 7.9* 6.7* 6.7*   HEMATOCRIT 25.2* 21.0* 21.4*   MCV 96.9 95.5 96.0   MCH 30.4 30.5 30.0   MCHC 31.3* 31.9* 31.3*   RDW 48.2 47.4 52.9*   PLATELETCT 247 291 367   MPV 11.2 10.7 10.1         Active Hospital Problems    Diagnosis     Acute blood loss anemia [D62]      Priority: High    Respiratory failure after trauma (HCC) [J96.90]      Priority: High    Spleen laceration, initial encounter [S36.039A]      Priority: High    Major laceration of kidney, left, initial encounter [S37.062A]      Priority: High    Liver laceration, initial encounter [S36.113A]      Priority: High    Left pulmonary contusion [S27.321A]      Priority: Medium    Open fracture dislocation of left elbow joint [S42.402B]      Priority: Medium    Open skull fracture (HCC) [S02.91XB]      Priority: Medium    Traumatic hemopneumothorax, initial encounter [S27.2XXA]      Priority: Medium    Closed fracture of multiple ribs of left side [S22.42XA]      Priority: Medium    Rotatory subluxation of atlantoaxial joint [S13.120A]      Priority: Medium    Contraindication to deep vein thrombosis (DVT) prophylaxis [Z53.09]      Priority: Medium    Laceration of right lower extremity [S81.811A]      Priority: Medium    Illicit drug use [F19.90]      Priority: Low    Trauma [T14.90XA]      Priority: Low    Abrasion, multiple sites [T07.XXXA]  "     Priority: Low       Assessment/Plan:  Patient remains in ICU but is extubated.  LUE cast is CDI  NWB LUE  No additional ortho procedures pending  Cleared per ortho for any discharge planning    POD#3 S/p  1.  Irrigation and debridement open fracture   2.  Open reduction internal fixation left distal humerus lateral condyle fracture   3.  Open treatment acute elbow dislocation  Wt bearing status - NWB LUE  Wound care/Drains - Dressings to be left in place  Future Procedures - None planned   Lovenox: defer to trauma team  Sutures/Staples out- 14-21 days post operatively. Removal will completed by ortho ADAM's unless transferred.  PT/OT-initiated  Antibiotics:  Perioperative completed  DVT Prophylaxis- TEDS/SCDs/Foot pumps  Edwards-not needed per ortho  Case Coordination for Discharge Planning - Disposition per therapy recs.

## 2023-06-04 NOTE — CARE PLAN
Problem: Pain - Standard  Goal: Alleviation of pain or a reduction in pain to the patient’s comfort goal  Outcome: Progressing     Problem: Safety - Medical Restraint  Goal: Remains free of injury from restraints (Restraint for Interference with Medical Device)  Outcome: Progressing  Goal: Free from restraint(s) (Restraint for Interference with Medical Device)  Outcome: Progressing   The patient is Watcher - Medium risk of patient condition declining or worsening    Shift Goals  Clinical Goals: Extubate  Patient Goals: REKHA  Family Goals: REKHA    Progress made toward(s) clinical / shift goals:  Met      Patient is not progressing towards the following goals:

## 2023-06-04 NOTE — CARE PLAN
The patient is Watcher - Medium risk of patient condition declining or worsening    Shift Goals  Clinical Goals: Stable hgb and neuro exams, control agitation  Patient Goals: unable to assess  Family Goals: no family at bedside    Progress made toward(s) clinical / shift goals:   Problem: Pain - Standard  Goal: Alleviation of pain or a reduction in pain to the patient’s comfort goal  Outcome: Progressing     Problem: Skin Integrity  Goal: Skin integrity is maintained or improved  Outcome: Progressing     Problem: Fall Risk  Goal: Patient will remain free from falls  Outcome: Progressing     Problem: Safety - Medical Restraint  Goal: Remains free of injury from restraints (Restraint for Interference with Medical Device)  Outcome: Progressing       Patient is not progressing towards the following goals:      Problem: Knowledge Deficit - Standard  Goal: Patient and family/care givers will demonstrate understanding of plan of care, disease process/condition, diagnostic tests and medications  Outcome: Not Progressing     Problem: Safety - Medical Restraint  Goal: Free from restraint(s) (Restraint for Interference with Medical Device)  Outcome: Not Progressing

## 2023-06-05 ENCOUNTER — APPOINTMENT (OUTPATIENT)
Dept: RADIOLOGY | Facility: MEDICAL CENTER | Age: 28
DRG: 957 | End: 2023-06-05
Payer: MEDICAID

## 2023-06-05 ENCOUNTER — APPOINTMENT (OUTPATIENT)
Dept: RADIOLOGY | Facility: MEDICAL CENTER | Age: 28
DRG: 957 | End: 2023-06-05
Attending: NURSE PRACTITIONER
Payer: MEDICAID

## 2023-06-05 PROBLEM — R13.12 DYSPHAGIA, OROPHARYNGEAL: Status: ACTIVE | Noted: 2023-06-05

## 2023-06-05 LAB
ALBUMIN SERPL BCP-MCNC: 2.1 G/DL (ref 3.2–4.9)
ALBUMIN SERPL BCP-MCNC: 2.1 G/DL (ref 3.2–4.9)
ALBUMIN/GLOB SERPL: 0.6 G/DL
ALBUMIN/GLOB SERPL: 0.7 G/DL
ALP SERPL-CCNC: 67 U/L (ref 30–99)
ALP SERPL-CCNC: 67 U/L (ref 30–99)
ALT SERPL-CCNC: 27 U/L (ref 2–50)
ALT SERPL-CCNC: 31 U/L (ref 2–50)
ANION GAP SERPL CALC-SCNC: 10 MMOL/L (ref 7–16)
ANION GAP SERPL CALC-SCNC: 7 MMOL/L (ref 7–16)
ANISOCYTOSIS BLD QL SMEAR: ABNORMAL
AST SERPL-CCNC: 36 U/L (ref 12–45)
AST SERPL-CCNC: 40 U/L (ref 12–45)
BASOPHILS # BLD AUTO: 0 % (ref 0–1.8)
BASOPHILS # BLD: 0 K/UL (ref 0–0.12)
BILIRUB SERPL-MCNC: 0.4 MG/DL (ref 0.1–1.5)
BILIRUB SERPL-MCNC: 0.4 MG/DL (ref 0.1–1.5)
BUN SERPL-MCNC: 16 MG/DL (ref 8–22)
BUN SERPL-MCNC: 17 MG/DL (ref 8–22)
CALCIUM ALBUM COR SERPL-MCNC: 9.6 MG/DL (ref 8.5–10.5)
CALCIUM ALBUM COR SERPL-MCNC: 9.6 MG/DL (ref 8.5–10.5)
CALCIUM SERPL-MCNC: 8.1 MG/DL (ref 8.5–10.5)
CALCIUM SERPL-MCNC: 8.1 MG/DL (ref 8.5–10.5)
CHLORIDE SERPL-SCNC: 112 MMOL/L (ref 96–112)
CHLORIDE SERPL-SCNC: 113 MMOL/L (ref 96–112)
CO2 SERPL-SCNC: 23 MMOL/L (ref 20–33)
CO2 SERPL-SCNC: 24 MMOL/L (ref 20–33)
CREAT SERPL-MCNC: 0.61 MG/DL (ref 0.5–1.4)
CREAT SERPL-MCNC: 0.65 MG/DL (ref 0.5–1.4)
EOSINOPHIL # BLD AUTO: 0 K/UL (ref 0–0.51)
EOSINOPHIL NFR BLD: 0 % (ref 0–6.9)
ERYTHROCYTE [DISTWIDTH] IN BLOOD BY AUTOMATED COUNT: 50.8 FL (ref 35.9–50)
GFR SERPLBLD CREATININE-BSD FMLA CKD-EPI: 133 ML/MIN/1.73 M 2
GFR SERPLBLD CREATININE-BSD FMLA CKD-EPI: 135 ML/MIN/1.73 M 2
GLOBULIN SER CALC-MCNC: 3.1 G/DL (ref 1.9–3.5)
GLOBULIN SER CALC-MCNC: 3.3 G/DL (ref 1.9–3.5)
GLUCOSE SERPL-MCNC: 94 MG/DL (ref 65–99)
GLUCOSE SERPL-MCNC: 97 MG/DL (ref 65–99)
HCT VFR BLD AUTO: 24.3 % (ref 42–52)
HGB BLD-MCNC: 7.6 G/DL (ref 14–18)
HGB RETIC QN AUTO: 23.1 PG/CELL (ref 29–35)
HYPOCHROMIA BLD QL SMEAR: ABNORMAL
IMM RETICS NFR: 19.5 % (ref 2.6–16.1)
IRON SATN MFR SERPL: 10 % (ref 15–55)
IRON SERPL-MCNC: 12 UG/DL (ref 50–180)
LYMPHOCYTES # BLD AUTO: 1.65 K/UL (ref 1–4.8)
LYMPHOCYTES NFR BLD: 8 % (ref 22–41)
MAGNESIUM SERPL-MCNC: 2.5 MG/DL (ref 1.5–2.5)
MANUAL DIFF BLD: NORMAL
MCH RBC QN AUTO: 29.5 PG (ref 27–33)
MCHC RBC AUTO-ENTMCNC: 31.3 G/DL (ref 32.3–36.5)
MCV RBC AUTO: 94.2 FL (ref 81.4–97.8)
MICROCYTES BLD QL SMEAR: ABNORMAL
MONOCYTES # BLD AUTO: 2.06 K/UL (ref 0–0.85)
MONOCYTES NFR BLD AUTO: 10 % (ref 0–13.4)
MORPHOLOGY BLD-IMP: NORMAL
NEUTROPHILS # BLD AUTO: 16.89 K/UL (ref 1.82–7.42)
NEUTROPHILS NFR BLD: 82 % (ref 44–72)
NRBC # BLD AUTO: 0.15 K/UL
NRBC BLD-RTO: 0.7 /100 WBC (ref 0–0.2)
PHOSPHATE SERPL-MCNC: 3.2 MG/DL (ref 2.5–4.5)
PLATELET # BLD AUTO: 463 K/UL (ref 164–446)
PLATELET BLD QL SMEAR: NORMAL
PMV BLD AUTO: 9.5 FL (ref 9–12.9)
POTASSIUM SERPL-SCNC: 4.6 MMOL/L (ref 3.6–5.5)
POTASSIUM SERPL-SCNC: 4.8 MMOL/L (ref 3.6–5.5)
PROT SERPL-MCNC: 5.2 G/DL (ref 6–8.2)
PROT SERPL-MCNC: 5.4 G/DL (ref 6–8.2)
RBC # BLD AUTO: 2.58 M/UL (ref 4.7–6.1)
RBC BLD AUTO: PRESENT
RETICS # AUTO: 0.08 M/UL (ref 0.04–0.12)
RETICS/RBC NFR: 3.1 % (ref 0.8–2.6)
SODIUM SERPL-SCNC: 144 MMOL/L (ref 135–145)
SODIUM SERPL-SCNC: 145 MMOL/L (ref 135–145)
TIBC SERPL-MCNC: 118 UG/DL (ref 250–450)
UIBC SERPL-MCNC: 106 UG/DL (ref 110–370)
WBC # BLD AUTO: 20.6 K/UL (ref 4.8–10.8)

## 2023-06-05 PROCEDURE — 700111 HCHG RX REV CODE 636 W/ 250 OVERRIDE (IP)

## 2023-06-05 PROCEDURE — 99233 SBSQ HOSP IP/OBS HIGH 50: CPT | Performed by: NURSE PRACTITIONER

## 2023-06-05 PROCEDURE — 90471 IMMUNIZATION ADMIN: CPT

## 2023-06-05 PROCEDURE — 85025 COMPLETE CBC W/AUTO DIFF WBC: CPT

## 2023-06-05 PROCEDURE — 97167 OT EVAL HIGH COMPLEX 60 MIN: CPT

## 2023-06-05 PROCEDURE — 97163 PT EVAL HIGH COMPLEX 45 MIN: CPT

## 2023-06-05 PROCEDURE — 36415 COLL VENOUS BLD VENIPUNCTURE: CPT

## 2023-06-05 PROCEDURE — 94669 MECHANICAL CHEST WALL OSCILL: CPT

## 2023-06-05 PROCEDURE — 90648 HIB PRP-T VACCINE 4 DOSE IM: CPT | Performed by: NURSE PRACTITIONER

## 2023-06-05 PROCEDURE — 700117 HCHG RX CONTRAST REV CODE 255: Performed by: NURSE PRACTITIONER

## 2023-06-05 PROCEDURE — 3E0234Z INTRODUCTION OF SERUM, TOXOID AND VACCINE INTO MUSCLE, PERCUTANEOUS APPROACH: ICD-10-PCS | Performed by: SURGERY

## 2023-06-05 PROCEDURE — 74175 CTA ABDOMEN W/CONTRAST: CPT

## 2023-06-05 PROCEDURE — A9270 NON-COVERED ITEM OR SERVICE: HCPCS | Performed by: NURSE PRACTITIONER

## 2023-06-05 PROCEDURE — 83540 ASSAY OF IRON: CPT

## 2023-06-05 PROCEDURE — 90621 MENB-FHBP VACC 2/3 DOSE IM: CPT | Performed by: NURSE PRACTITIONER

## 2023-06-05 PROCEDURE — 700111 HCHG RX REV CODE 636 W/ 250 OVERRIDE (IP): Performed by: NURSE PRACTITIONER

## 2023-06-05 PROCEDURE — 90619 MENACWY-TT VACCINE IM: CPT | Performed by: NURSE PRACTITIONER

## 2023-06-05 PROCEDURE — 84100 ASSAY OF PHOSPHORUS: CPT

## 2023-06-05 PROCEDURE — 80053 COMPREHEN METABOLIC PANEL: CPT | Mod: 91

## 2023-06-05 PROCEDURE — 85046 RETICYTE/HGB CONCENTRATE: CPT

## 2023-06-05 PROCEDURE — 83550 IRON BINDING TEST: CPT

## 2023-06-05 PROCEDURE — 92610 EVALUATE SWALLOWING FUNCTION: CPT

## 2023-06-05 PROCEDURE — 85007 BL SMEAR W/DIFF WBC COUNT: CPT

## 2023-06-05 PROCEDURE — 700102 HCHG RX REV CODE 250 W/ 637 OVERRIDE(OP): Performed by: NURSE PRACTITIONER

## 2023-06-05 PROCEDURE — 700105 HCHG RX REV CODE 258: Performed by: SURGERY

## 2023-06-05 PROCEDURE — 83735 ASSAY OF MAGNESIUM: CPT

## 2023-06-05 PROCEDURE — 700105 HCHG RX REV CODE 258

## 2023-06-05 PROCEDURE — 71045 X-RAY EXAM CHEST 1 VIEW: CPT

## 2023-06-05 PROCEDURE — 90677 PCV20 VACCINE IM: CPT | Performed by: NURSE PRACTITIONER

## 2023-06-05 PROCEDURE — 700111 HCHG RX REV CODE 636 W/ 250 OVERRIDE (IP): Performed by: SURGERY

## 2023-06-05 PROCEDURE — 770001 HCHG ROOM/CARE - MED/SURG/GYN PRIV*

## 2023-06-05 RX ORDER — HYDROMORPHONE HYDROCHLORIDE 1 MG/ML
0.5 INJECTION, SOLUTION INTRAMUSCULAR; INTRAVENOUS; SUBCUTANEOUS
Status: DISCONTINUED | OUTPATIENT
Start: 2023-06-05 | End: 2023-06-09

## 2023-06-05 RX ORDER — POLYETHYLENE GLYCOL 3350 17 G/17G
1 POWDER, FOR SOLUTION ORAL 2 TIMES DAILY
Status: DISCONTINUED | OUTPATIENT
Start: 2023-06-05 | End: 2023-06-12

## 2023-06-05 RX ORDER — DOCUSATE SODIUM 50 MG/5ML
100 LIQUID ORAL 2 TIMES DAILY
Status: DISCONTINUED | OUTPATIENT
Start: 2023-06-05 | End: 2023-06-12

## 2023-06-05 RX ORDER — AMOXICILLIN 250 MG
1 CAPSULE ORAL NIGHTLY
Status: DISCONTINUED | OUTPATIENT
Start: 2023-06-05 | End: 2023-06-30 | Stop reason: HOSPADM

## 2023-06-05 RX ORDER — ONDANSETRON 4 MG/1
4 TABLET, ORALLY DISINTEGRATING ORAL EVERY 4 HOURS PRN
Status: DISCONTINUED | OUTPATIENT
Start: 2023-06-05 | End: 2023-06-30 | Stop reason: HOSPADM

## 2023-06-05 RX ORDER — QUETIAPINE FUMARATE 25 MG/1
50 TABLET, FILM COATED ORAL EVERY 8 HOURS
Status: DISCONTINUED | OUTPATIENT
Start: 2023-06-05 | End: 2023-06-09

## 2023-06-05 RX ORDER — AMOXICILLIN AND CLAVULANATE POTASSIUM 875; 125 MG/1; MG/1
1 TABLET, FILM COATED ORAL 2 TIMES DAILY PRN
Qty: 4 TABLET | Refills: 0 | Status: ACTIVE | OUTPATIENT
Start: 2023-06-05 | End: 2023-07-07

## 2023-06-05 RX ORDER — AMOXICILLIN 250 MG
1 CAPSULE ORAL
Status: DISCONTINUED | OUTPATIENT
Start: 2023-06-05 | End: 2023-06-30 | Stop reason: HOSPADM

## 2023-06-05 RX ORDER — ACETAMINOPHEN 500 MG
1000 TABLET ORAL EVERY 6 HOURS
Status: COMPLETED | OUTPATIENT
Start: 2023-06-05 | End: 2023-06-05

## 2023-06-05 RX ORDER — OXYCODONE HYDROCHLORIDE 5 MG/1
5 TABLET ORAL
Status: DISCONTINUED | OUTPATIENT
Start: 2023-06-05 | End: 2023-06-11

## 2023-06-05 RX ORDER — OXYCODONE HYDROCHLORIDE 10 MG/1
10 TABLET ORAL
Status: DISCONTINUED | OUTPATIENT
Start: 2023-06-05 | End: 2023-06-11

## 2023-06-05 RX ORDER — ACETAMINOPHEN 500 MG
1000 TABLET ORAL EVERY 6 HOURS PRN
Status: DISCONTINUED | OUTPATIENT
Start: 2023-06-06 | End: 2023-06-30 | Stop reason: HOSPADM

## 2023-06-05 RX ADMIN — ACETAMINOPHEN 1000 MG: 500 TABLET, FILM COATED ORAL at 13:23

## 2023-06-05 RX ADMIN — IOHEXOL 100 ML: 350 INJECTION, SOLUTION INTRAVENOUS at 19:19

## 2023-06-05 RX ADMIN — SENNOSIDES AND DOCUSATE SODIUM 1 TABLET: 50; 8.6 TABLET ORAL at 21:09

## 2023-06-05 RX ADMIN — PNEUMOCOCCAL 20-VALENT CONJUGATE VACCINE 0.5 ML
2.2; 2.2; 2.2; 2.2; 2.2; 2.2; 2.2; 2.2; 2.2; 2.2; 2.2; 2.2; 2.2; 2.2; 2.2; 2.2; 4.4; 2.2; 2.2; 2.2 INJECTION, SUSPENSION INTRAMUSCULAR at 11:36

## 2023-06-05 RX ADMIN — OXYCODONE HYDROCHLORIDE 10 MG: 10 TABLET ORAL at 21:11

## 2023-06-05 RX ADMIN — SODIUM CHLORIDE 250 MG: 9 INJECTION, SOLUTION INTRAVENOUS at 17:27

## 2023-06-05 RX ADMIN — SODIUM CHLORIDE: 9 INJECTION, SOLUTION INTRAVENOUS at 05:53

## 2023-06-05 RX ADMIN — MENINGOCOCCAL GROUP B VACCINE 0.5 ML: 60; 60 INJECTION, SUSPENSION INTRAMUSCULAR at 11:31

## 2023-06-05 RX ADMIN — NEISSERIA MENINGITIDIS GROUP A CAPSULAR POLYSACCHARIDE TETANUS TOXOID CONJUGATE ANTIGEN, NEISSERIA MENINGITIDIS GROUP C CAPSULAR POLYSACCHARIDE TETANUS TOXOID CONJUGATE ANTIGEN, NEISSERIA MENINGITIDIS GROUP Y CAPSULAR POLYSACCHARIDE TETANUS TOXOID CONJUGATE ANTIGEN, AND NEISSERIA MENINGITIDIS GROUP W-135 CAPSULAR POLYSACCHARIDE TETANUS TOXOID CONJUGATE ANTIGEN 0.5 ML: 10; 10; 10; 10 INJECTION, SOLUTION INTRAMUSCULAR at 11:39

## 2023-06-05 RX ADMIN — QUETIAPINE FUMARATE 50 MG: 25 TABLET ORAL at 21:09

## 2023-06-05 RX ADMIN — Medication 1 APPLICATOR: at 18:00

## 2023-06-05 RX ADMIN — Medication 1 APPLICATOR: at 05:40

## 2023-06-05 RX ADMIN — QUETIAPINE FUMARATE 50 MG: 25 TABLET ORAL at 13:23

## 2023-06-05 RX ADMIN — DOCUSATE SODIUM 100 MG: 50 LIQUID ORAL at 17:22

## 2023-06-05 RX ADMIN — BACITRACIN ZINC: 500 OINTMENT TOPICAL at 05:37

## 2023-06-05 RX ADMIN — HYDROMORPHONE HYDROCHLORIDE 0.5 MG: 1 INJECTION, SOLUTION INTRAMUSCULAR; INTRAVENOUS; SUBCUTANEOUS at 02:38

## 2023-06-05 RX ADMIN — HAEMOPHILUS INFLUENZAE TYPE B STRAIN 1482 CAPSULAR POLYSACCHARIDE TETANUS TOXOID CONJUGATE ANTIGEN 0.5 ML: KIT at 11:43

## 2023-06-05 RX ADMIN — ACETAMINOPHEN 1000 MG: 500 TABLET, FILM COATED ORAL at 17:22

## 2023-06-05 RX ADMIN — FAMOTIDINE 20 MG: 10 INJECTION, SOLUTION INTRAVENOUS at 05:12

## 2023-06-05 ASSESSMENT — COGNITIVE AND FUNCTIONAL STATUS - GENERAL
MOVING FROM LYING ON BACK TO SITTING ON SIDE OF FLAT BED: A LITTLE
SUGGESTED CMS G CODE MODIFIER DAILY ACTIVITY: CL
SUGGESTED CMS G CODE MODIFIER MOBILITY: CM
TOILETING: A LITTLE
EATING MEALS: A LITTLE
TOILETING: A LITTLE
MOBILITY SCORE: 7
MOBILITY SCORE: 16
CLIMB 3 TO 5 STEPS WITH RAILING: A LOT
CLIMB 3 TO 5 STEPS WITH RAILING: A LOT
STANDING UP FROM CHAIR USING ARMS: A LITTLE
DAILY ACTIVITIY SCORE: 13
PERSONAL GROOMING: A LOT
CLIMB 3 TO 5 STEPS WITH RAILING: TOTAL
MOVING TO AND FROM BED TO CHAIR: UNABLE
WALKING IN HOSPITAL ROOM: A LOT
MOVING FROM LYING ON BACK TO SITTING ON SIDE OF FLAT BED: UNABLE
STANDING UP FROM CHAIR USING ARMS: TOTAL
HELP NEEDED FOR BATHING: A LITTLE
SUGGESTED CMS G CODE MODIFIER MOBILITY: CL
STANDING UP FROM CHAIR USING ARMS: A LOT
SUGGESTED CMS G CODE MODIFIER DAILY ACTIVITY: CJ
DRESSING REGULAR LOWER BODY CLOTHING: A LITTLE
HELP NEEDED FOR BATHING: A LITTLE
PERSONAL GROOMING: A LITTLE
WALKING IN HOSPITAL ROOM: TOTAL
DRESSING REGULAR UPPER BODY CLOTHING: A LITTLE
MOBILITY SCORE: 13
DRESSING REGULAR UPPER BODY CLOTHING: A LOT
DAILY ACTIVITIY SCORE: 20
SUGGESTED CMS G CODE MODIFIER MOBILITY: CK
DRESSING REGULAR LOWER BODY CLOTHING: A LOT
MOVING FROM LYING ON BACK TO SITTING ON SIDE OF FLAT BED: A LOT
SUGGESTED CMS G CODE MODIFIER DAILY ACTIVITY: CK
TURNING FROM BACK TO SIDE WHILE IN FLAT BAD: A LOT
TURNING FROM BACK TO SIDE WHILE IN FLAT BAD: A LITTLE
HELP NEEDED FOR BATHING: A LOT
TOILETING: A LOT
DAILY ACTIVITIY SCORE: 16
WALKING IN HOSPITAL ROOM: A LOT
TURNING FROM BACK TO SIDE WHILE IN FLAT BAD: A LITTLE
DRESSING REGULAR UPPER BODY CLOTHING: A LOT
MOVING TO AND FROM BED TO CHAIR: A LITTLE
DRESSING REGULAR LOWER BODY CLOTHING: A LOT
MOVING TO AND FROM BED TO CHAIR: A LOT
EATING MEALS: A LITTLE

## 2023-06-05 ASSESSMENT — ENCOUNTER SYMPTOMS
DOUBLE VISION: 0
TREMORS: 0
SENSORY CHANGE: 0
VOMITING: 0
ABDOMINAL PAIN: 1
MYALGIAS: 1
NAUSEA: 0
FEVER: 0
HEADACHES: 0
NECK PAIN: 1
BACK PAIN: 0
CHILLS: 0
TINGLING: 0
FOCAL WEAKNESS: 0

## 2023-06-05 ASSESSMENT — LIFESTYLE VARIABLES
TOTAL SCORE: 0
ALCOHOL_USE: NO
TOTAL SCORE: 0
CONSUMPTION TOTAL: NEGATIVE
TOTAL SCORE: 0
HOW MANY TIMES IN THE PAST YEAR HAVE YOU HAD 5 OR MORE DRINKS IN A DAY: 0
AVERAGE NUMBER OF DAYS PER WEEK YOU HAVE A DRINK CONTAINING ALCOHOL: 0
HAVE YOU EVER FELT YOU SHOULD CUT DOWN ON YOUR DRINKING: NO
EVER FELT BAD OR GUILTY ABOUT YOUR DRINKING: NO
HAVE PEOPLE ANNOYED YOU BY CRITICIZING YOUR DRINKING: NO
DOES PATIENT WANT TO STOP DRINKING: NO
ON A TYPICAL DAY WHEN YOU DRINK ALCOHOL HOW MANY DRINKS DO YOU HAVE: 0
EVER HAD A DRINK FIRST THING IN THE MORNING TO STEADY YOUR NERVES TO GET RID OF A HANGOVER: NO

## 2023-06-05 ASSESSMENT — PAIN DESCRIPTION - PAIN TYPE
TYPE: ACUTE PAIN
TYPE: ACUTE PAIN;SURGICAL PAIN
TYPE: ACUTE PAIN;SURGICAL PAIN
TYPE: ACUTE PAIN

## 2023-06-05 ASSESSMENT — GAIT ASSESSMENTS
DISTANCE (FEET): 3
GAIT LEVEL OF ASSIST: MODERATE ASSIST
ASSISTIVE DEVICE: HAND HELD ASSIST

## 2023-06-05 ASSESSMENT — PATIENT HEALTH QUESTIONNAIRE - PHQ9
1. LITTLE INTEREST OR PLEASURE IN DOING THINGS: NOT AT ALL
2. FEELING DOWN, DEPRESSED, IRRITABLE, OR HOPELESS: NOT AT ALL
SUM OF ALL RESPONSES TO PHQ9 QUESTIONS 1 AND 2: 0

## 2023-06-05 ASSESSMENT — ACTIVITIES OF DAILY LIVING (ADL): TOILETING: INDEPENDENT

## 2023-06-05 ASSESSMENT — FIBROSIS 4 INDEX: FIB4 SCORE: 0.49
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
## 2023-06-05 NOTE — CARE PLAN
The patient is Stable - Low risk of patient condition declining or worsening    Shift Goals  Clinical Goals: maintain adequate oxygenation, stable VS, pain management  Patient Goals: get out of bed  Family Goals: no family at bedside    Progress made toward(s) clinical / shift goals:    Problem: Knowledge Deficit - Standard  Goal: Patient and family/care givers will demonstrate understanding of plan of care, disease process/condition, diagnostic tests and medications  Outcome: Progressing     Problem: Pain - Standard  Goal: Alleviation of pain or a reduction in pain to the patient’s comfort goal  Outcome: Progressing     Problem: Skin Integrity  Goal: Skin integrity is maintained or improved  Outcome: Progressing     Problem: Fall Risk  Goal: Patient will remain free from falls  Outcome: Progressing

## 2023-06-05 NOTE — PROGRESS NOTES
Pt failed nursing bedside swallow eval with 15 ml sterile water. Pt swallows but then clears throat and begins coughing. Order is in place for speech eval tomorrow
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
## 2023-06-05 NOTE — DIETARY
Nutrition Services: Update   Pt extubated 6/4 and tube feed was discontinued. Pt passed swallow evaluation for Regular diet today. RD to monitor per department policy.

## 2023-06-05 NOTE — DISCHARGE PLANNING
Received Choice form @: [id number]  Agency/Facility Name: [facility]   Referral sent per Choice form @: 1989
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
## 2023-06-05 NOTE — PROGRESS NOTES
"     Orthopedic PA Progress Note    Interval changes:  Patient remains in ICU.  Able to stand with therapies today  LUE cast is CDI  NWB LUE  No additional ortho procedures pending  Cleared per ortho for any discharge planning    ROS - Unable to obtain due to status.     /72   Pulse 96   Temp 38 °C (100.4 °F) (Bladder)   Resp 17   Ht 1.803 m (5' 10.98\")   Wt 108 kg (238 lb 12.1 oz)   SpO2 99%     Patient seen and examined  Intubated  RRR  LUE: Cast CDI. Flexes and extends all fingers. Cap refill <2s.     Recent Labs     06/03/23  0515 06/04/23  0445 06/05/23  0650   WBC 21.6* 22.5* 20.6*   RBC 2.20* 2.23* 2.58*   HEMOGLOBIN 6.7* 6.7* 7.6*   HEMATOCRIT 21.0* 21.4* 24.3*   MCV 95.5 96.0 94.2   MCH 30.5 30.0 29.5   MCHC 31.9* 31.3* 31.3*   RDW 47.4 52.9* 50.8*   PLATELETCT 291 367 463*   MPV 10.7 10.1 9.5         Active Hospital Problems    Diagnosis     Acute blood loss anemia [D62]      Priority: High    Respiratory failure after trauma (HCC) [J96.90]      Priority: High    Spleen laceration, initial encounter [S36.039A]      Priority: High    Major laceration of kidney, left, initial encounter [S37.062A]      Priority: High    Liver laceration, initial encounter [S36.113A]      Priority: High    Left pulmonary contusion [S27.321A]      Priority: Medium    Open fracture dislocation of left elbow joint [S42.402B]      Priority: Medium    Open skull fracture (HCC) [S02.91XB]      Priority: Medium    Traumatic hemopneumothorax, initial encounter [S27.2XXA]      Priority: Medium    Closed fracture of multiple ribs of left side [S22.42XA]      Priority: Medium    Rotatory subluxation of atlantoaxial joint [S13.120A]      Priority: Medium    Contraindication to deep vein thrombosis (DVT) prophylaxis [Z53.09]      Priority: Medium    Laceration of right lower extremity [S81.811A]      Priority: Medium    Illicit drug use [F19.90]      Priority: Low    Trauma [T14.90XA]      Priority: Low    Abrasion, multiple " sites [T07.XXXA]      Priority: Low       Assessment/Plan:  Patient remains in ICU.  Able to stand with therapies today  LUE cast is CDI  NWB LUE  No additional ortho procedures pending  Cleared per ortho for any discharge planning    POD#4 S/p  1.  Irrigation and debridement open fracture   2.  Open reduction internal fixation left distal humerus lateral condyle fracture   3.  Open treatment acute elbow dislocation  Wt bearing status - NWB LUE  Wound care/Drains - Dressings to be left in place  Future Procedures - None planned   Lovenox: defer to trauma team  Sutures/Staples out- 14-21 days post operatively. Removal will completed by ortho ADAM's unless transferred.  PT/OT-initiated  Antibiotics:  Perioperative completed  DVT Prophylaxis- TEDS/SCDs/Foot pumps  Edwards-not needed per ortho  Case Coordination for Discharge Planning - Disposition per therapy recs.

## 2023-06-05 NOTE — DISCHARGE PLANNING
"Care Transition Team Assessment    LSW attempted to meet with pt at bedside to complete assessment. Pt unable to provide appropriate responses to assessment questions.    LSW made phone call to pt's mom who directed this LSW to call pt's sister, Mary. LSW spoke with Mary to obtain the information below. Pt had been in senior living for about a year and was just released in May. Pt has been homeless for the past 3-4 years and does not like to stay at shelters. Pt has an extensive psych and drug history. Mary reports the pt has been in and out of psychiatric hospitals since he was 16 or 17 years old. Most recently, pt spent about two months at [facility] while he was in custody at the senior living. Pt has been diagnosed with bipolar disorder and meth induced schizophrenia. Pt has a history of meth, heroin, and etoh abuse in addition to \"takes any pills he can get his hands on\".    Pt is independent with ADLs, IADLs, and does not use any DME at baseline. Pt is unemployed and his only source of income is from [employer]. Pt does not drive and will walk anywhere he needs to go. Mary reports that pt has a history of aggression and they (her and her mom) do not feel safe to have pt stay with them. Mary stated that she believes the pt's preference would be to return being homeless on the streets regardless. Mary reports they have other brothers and sisters, however does not know if they would be comfortable with pt staying with them if needed. Pt does not have an advance directive, however his mom is his NOK.    LSW spoke with Mary about currently PT/OT recommendations. Mary agreeable with sending blanket SNF referrals due to MH, SA, and insurance barriers. Choice form faxed to [facility].    Information Source  Orientation Level: Oriented to person, Oriented to place, Oriented to situation, Disoriented to time  Information Given By: Relative  Informant's Name: Mary Jb  Who is responsible for making " decisions for patient? : Patient    Readmission Evaluation  Is this a readmission?: No    Elopement Risk  Legal Hold: No  Ambulatory or Self Mobile in Wheelchair: No-Not an Elopement Risk  Elopement Risk: Not at Risk for Elopement    Interdisciplinary Discharge Planning  Lives with - Patient's Self Care Capacity: Alone and Unable to Care For Self  Patient or legal guardian wants to designate a caregiver: No  Support Systems: None  Housing / Facility: Homeless  Prior Services: Unable To Determine At This Time  Durable Medical Equipment: Not Applicable    Discharge Preparedness  What is your plan after discharge?: Skilled nursing facility  Prior Functional Level: Ambulatory, Independent with Activities of Daily Living, Independent with Medication Management  Difficulity with ADLs: None  Difficulity with IADLs: None    Functional Assesment  Prior Functional Level: Ambulatory, Independent with Activities of Daily Living, Independent with Medication Management    Finances  Financial Barriers to Discharge: Yes  Average Monthly Income: 0 $  Source of Income: None  Prescription Coverage: Yes    Vision / Hearing Impairment  Vision Impairment : No  Hearing Impairment : No    Advance Directive  Advance Directive?: None    Domestic Abuse  Have you ever been the victim of abuse or violence?: No  Physical Abuse or Sexual Abuse: No  Verbal Abuse or Emotional Abuse: No  Possible Abuse/Neglect Reported to:: Not Applicable    Psychological Assessment  History of Substance Abuse: Alcohol, Heroin, Methamphetamine  History of Psychiatric Problems: Yes  Non-compliant with Treatment: Yes  Newly Diagnosed Illness: No    Discharge Risks or Barriers  Discharge risks or barriers?: No PCP, Substance abuse, Mental health, Homeless / couch surfing  Patient risk factors: Homeless, Mental health, Substance abuse, Noncompliance    Anticipated Discharge Information  Discharge Disposition: D/T to SNF with Medicare cert in anticipation of skilled care  (03)
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
## 2023-06-05 NOTE — CARE PLAN
Problem: Hyperinflation  Goal: Prevent or improve atelectasis  Description: Target End Date:  3 to 4 days    1. Instruct incentive spirometry usage  2.  Perform hyperinflation therapy as indicated  Outcome: Not Met       Respiratory Update    Treatment modality: PEP  Frequency: QID    Pt on 6L oxymask, will continue to titrate as tolerated.    IS value 750    Pt tolerating current treatments well with no adverse reactions.

## 2023-06-05 NOTE — ASSESSMENT & PLAN NOTE
Initially NPO due to intubated.  6/5 Swallow evaluation completed, start regular / thin liquid diet.  Speech therapy following.

## 2023-06-05 NOTE — CARE PLAN
The patient is Watcher - Medium risk of patient condition declining or worsening    Shift Goals  Clinical Goals: maintain adequate oxygenation, stable VS, pain management  Patient Goals: get out of bed  Family Goals: no family at bedside    Progress made toward(s) clinical / shift goals:    Problem: Pain - Standard  Goal: Alleviation of pain or a reduction in pain to the patient’s comfort goal  Outcome: Progressing     Problem: Safety - Medical Restraint  Goal: Remains free of injury from restraints (Restraint for Interference with Medical Device)  Outcome: Progressing     Problem: Safety - Medical Restraint  Goal: Free from restraint(s) (Restraint for Interference with Medical Device)  Outcome: Progressing       Patient is not progressing towards the following goals: N/A

## 2023-06-06 ENCOUNTER — APPOINTMENT (OUTPATIENT)
Dept: RADIOLOGY | Facility: MEDICAL CENTER | Age: 28
DRG: 957 | End: 2023-06-06
Payer: MEDICAID

## 2023-06-06 PROBLEM — Z78.9 NO CONTRAINDICATION TO DEEP VEIN THROMBOSIS (DVT) PROPHYLAXIS: Status: ACTIVE | Noted: 2023-05-31

## 2023-06-06 PROBLEM — F99 PSYCHIATRIC DIAGNOSIS: Status: ACTIVE | Noted: 2023-06-06

## 2023-06-06 PROBLEM — J96.90 RESPIRATORY FAILURE AFTER TRAUMA (HCC): Status: RESOLVED | Noted: 2023-06-01 | Resolved: 2023-06-06

## 2023-06-06 LAB
ALBUMIN SERPL BCP-MCNC: 2.3 G/DL (ref 3.2–4.9)
ALBUMIN/GLOB SERPL: 0.7 G/DL
ALP SERPL-CCNC: 90 U/L (ref 30–99)
ALT SERPL-CCNC: 48 U/L (ref 2–50)
ANION GAP SERPL CALC-SCNC: 6 MMOL/L (ref 7–16)
AST SERPL-CCNC: 46 U/L (ref 12–45)
BASOPHILS # BLD AUTO: 0 % (ref 0–1.8)
BASOPHILS # BLD: 0 K/UL (ref 0–0.12)
BILIRUB SERPL-MCNC: 0.4 MG/DL (ref 0.1–1.5)
BUN SERPL-MCNC: 16 MG/DL (ref 8–22)
CALCIUM ALBUM COR SERPL-MCNC: 9.5 MG/DL (ref 8.5–10.5)
CALCIUM SERPL-MCNC: 8.1 MG/DL (ref 8.5–10.5)
CHLORIDE SERPL-SCNC: 110 MMOL/L (ref 96–112)
CO2 SERPL-SCNC: 29 MMOL/L (ref 20–33)
CREAT SERPL-MCNC: 0.81 MG/DL (ref 0.5–1.4)
EOSINOPHIL # BLD AUTO: 0 K/UL (ref 0–0.51)
EOSINOPHIL NFR BLD: 0 % (ref 0–6.9)
ERYTHROCYTE [DISTWIDTH] IN BLOOD BY AUTOMATED COUNT: 47.9 FL (ref 35.9–50)
GFR SERPLBLD CREATININE-BSD FMLA CKD-EPI: 124 ML/MIN/1.73 M 2
GLOBULIN SER CALC-MCNC: 3.2 G/DL (ref 1.9–3.5)
GLUCOSE SERPL-MCNC: 108 MG/DL (ref 65–99)
HCT VFR BLD AUTO: 26.1 % (ref 42–52)
HGB BLD-MCNC: 8.2 G/DL (ref 14–18)
HYPOCHROMIA BLD QL SMEAR: ABNORMAL
LYMPHOCYTES # BLD AUTO: 3.25 K/UL (ref 1–4.8)
LYMPHOCYTES NFR BLD: 17 % (ref 22–41)
MACROCYTES BLD QL SMEAR: ABNORMAL
MANUAL DIFF BLD: NORMAL
MCH RBC QN AUTO: 29.5 PG (ref 27–33)
MCHC RBC AUTO-ENTMCNC: 31.4 G/DL (ref 32.3–36.5)
MCV RBC AUTO: 93.9 FL (ref 81.4–97.8)
MICROCYTES BLD QL SMEAR: ABNORMAL
MONOCYTES # BLD AUTO: 1.91 K/UL (ref 0–0.85)
MONOCYTES NFR BLD AUTO: 10 % (ref 0–13.4)
MORPHOLOGY BLD-IMP: NORMAL
NEUTROPHILS # BLD AUTO: 13.94 K/UL (ref 1.82–7.42)
NEUTROPHILS NFR BLD: 73 % (ref 44–72)
NRBC # BLD AUTO: 0.21 K/UL
NRBC BLD-RTO: 1.1 /100 WBC (ref 0–0.2)
PLATELET # BLD AUTO: 590 K/UL (ref 164–446)
PLATELET BLD QL SMEAR: NORMAL
PMV BLD AUTO: 9.5 FL (ref 9–12.9)
POLYCHROMASIA BLD QL SMEAR: NORMAL
POTASSIUM SERPL-SCNC: 4.3 MMOL/L (ref 3.6–5.5)
PROT SERPL-MCNC: 5.5 G/DL (ref 6–8.2)
RBC # BLD AUTO: 2.78 M/UL (ref 4.7–6.1)
RBC BLD AUTO: PRESENT
ROULEAUX BLD QL SMEAR: NORMAL
SODIUM SERPL-SCNC: 145 MMOL/L (ref 135–145)
WBC # BLD AUTO: 19.1 K/UL (ref 4.8–10.8)

## 2023-06-06 PROCEDURE — 770001 HCHG ROOM/CARE - MED/SURG/GYN PRIV*

## 2023-06-06 PROCEDURE — 700111 HCHG RX REV CODE 636 W/ 250 OVERRIDE (IP): Performed by: NURSE PRACTITIONER

## 2023-06-06 PROCEDURE — 99254 IP/OBS CNSLTJ NEW/EST MOD 60: CPT | Performed by: PHYSICAL MEDICINE & REHABILITATION

## 2023-06-06 PROCEDURE — 94669 MECHANICAL CHEST WALL OSCILL: CPT

## 2023-06-06 PROCEDURE — 700102 HCHG RX REV CODE 250 W/ 637 OVERRIDE(OP): Performed by: NURSE PRACTITIONER

## 2023-06-06 PROCEDURE — 36415 COLL VENOUS BLD VENIPUNCTURE: CPT

## 2023-06-06 PROCEDURE — 71045 X-RAY EXAM CHEST 1 VIEW: CPT

## 2023-06-06 PROCEDURE — 700111 HCHG RX REV CODE 636 W/ 250 OVERRIDE (IP): Performed by: SURGERY

## 2023-06-06 PROCEDURE — 80053 COMPREHEN METABOLIC PANEL: CPT

## 2023-06-06 PROCEDURE — 85025 COMPLETE CBC W/AUTO DIFF WBC: CPT

## 2023-06-06 PROCEDURE — A9270 NON-COVERED ITEM OR SERVICE: HCPCS | Performed by: NURSE PRACTITIONER

## 2023-06-06 PROCEDURE — 85007 BL SMEAR W/DIFF WBC COUNT: CPT

## 2023-06-06 PROCEDURE — 99254 IP/OBS CNSLTJ NEW/EST MOD 60: CPT | Mod: GC | Performed by: PSYCHIATRY & NEUROLOGY

## 2023-06-06 PROCEDURE — 700105 HCHG RX REV CODE 258: Performed by: SURGERY

## 2023-06-06 PROCEDURE — 99233 SBSQ HOSP IP/OBS HIGH 50: CPT | Performed by: NURSE PRACTITIONER

## 2023-06-06 RX ORDER — ENOXAPARIN SODIUM 100 MG/ML
30 INJECTION SUBCUTANEOUS EVERY 12 HOURS
Status: DISCONTINUED | OUTPATIENT
Start: 2023-06-06 | End: 2023-06-09

## 2023-06-06 RX ADMIN — SODIUM CHLORIDE 250 MG: 9 INJECTION, SOLUTION INTRAVENOUS at 18:08

## 2023-06-06 RX ADMIN — DOCUSATE SODIUM 100 MG: 50 LIQUID ORAL at 18:00

## 2023-06-06 RX ADMIN — SODIUM CHLORIDE 250 MG: 9 INJECTION, SOLUTION INTRAVENOUS at 05:54

## 2023-06-06 RX ADMIN — POLYETHYLENE GLYCOL 3350 1 PACKET: 17 POWDER, FOR SOLUTION ORAL at 18:02

## 2023-06-06 RX ADMIN — QUETIAPINE FUMARATE 50 MG: 25 TABLET ORAL at 15:15

## 2023-06-06 RX ADMIN — OXYCODONE HYDROCHLORIDE 10 MG: 10 TABLET ORAL at 06:18

## 2023-06-06 RX ADMIN — OXYCODONE HYDROCHLORIDE 10 MG: 10 TABLET ORAL at 21:16

## 2023-06-06 RX ADMIN — ENOXAPARIN SODIUM 30 MG: 100 INJECTION SUBCUTANEOUS at 18:04

## 2023-06-06 RX ADMIN — OXYCODONE HYDROCHLORIDE 5 MG: 5 TABLET ORAL at 18:03

## 2023-06-06 RX ADMIN — SENNOSIDES AND DOCUSATE SODIUM 1 TABLET: 50; 8.6 TABLET ORAL at 21:16

## 2023-06-06 RX ADMIN — QUETIAPINE FUMARATE 50 MG: 25 TABLET ORAL at 21:16

## 2023-06-06 RX ADMIN — DOCUSATE SODIUM 100 MG: 50 LIQUID ORAL at 05:54

## 2023-06-06 RX ADMIN — Medication 1 APPLICATOR: at 18:10

## 2023-06-06 RX ADMIN — QUETIAPINE FUMARATE 50 MG: 25 TABLET ORAL at 05:54

## 2023-06-06 ASSESSMENT — PAIN DESCRIPTION - PAIN TYPE
TYPE: ACUTE PAIN;SURGICAL PAIN

## 2023-06-06 ASSESSMENT — ENCOUNTER SYMPTOMS
ABDOMINAL PAIN: 1
CONSTIPATION: 0
FOCAL WEAKNESS: 0
NECK PAIN: 1
DECREASED NEED FOR SLEEP: 0
SPEECH CHANGE: 0
HEADACHES: 1
BLURRED VISION: 0
DOUBLE VISION: 0
PARANOIA: 1
BACK PAIN: 0
DIZZINESS: 0
DIFFICULTY FALLING ASLEEP: 0
FEVER: 0
NAUSEA: 0
CHILLS: 0
SHORTNESS OF BREATH: 0
TINGLING: 0
MYALGIAS: 1
VOMITING: 0
SENSORY CHANGE: 0
HEADACHES: 0
DELUSIONS: 1
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
## 2023-06-06 NOTE — PROGRESS NOTES
Received report from previous shift RN, pt arrived to T434 @ 1930, pt belongings at bedside, family at bedside. C collar in place. Pt transported via hospital bed.  Assessment complete.  A&O x 4, intermittently requires reorienting and refocusing during conversation, per family member pt has history of psych related issues. Patient educated on how to use in room call light. Pt pleasant at this time. Pt understands how to use call light.  Patient ambulates with x2 assist w/ FWW, NWB to LUE, c-collar in place per order, HOB > 30 per order.. Bed alarm on.   Patient has 7/10 pain. Pain managed with prescribed medications.  Denies N&V. Currently on regular diet, requires 1:1 supervision when eating. Tolerating sips of water and some food at this time.  MLI to abdomen, stapled closed, JEROME.  LUE in cast, pt reporting no numbness and tingling to extremity and reports no decrease in sensation to finger tips.  + void, + flatus, + BM 6/5, small BM upon arriving to unit, pt cleaned up.  Patient denies SOB.  SCD's on.    Review plan of care with patient. Call light and personal belongings with in reach. Hourly rounding in place. All needs met at this time.

## 2023-06-06 NOTE — PROGRESS NOTES
Received report from previous shift RN     C collar in place.   Assessment complete.  A&O x 4, often requires reorienting and refocusing during conversation, Patient educated on how to use in room call light.  Pt understands how to use call light. This RN spoke to sister on the phone. Per sister patient has a psychiatric history and can be difficult to communicate with.   Per report patient ambulates with x2 assist w/ FWW, NWB to LUE, c-collar in place per order, HOB > 30 per order.. Bed alarm on.   Patient has 4/10 pain. Decline pain medication. Patient repositioned. Full linen change  Denies N&V. Currently on regular diet, requires 1:1 supervision when eating. Tolerating diet. No n/v.  MLI to abdomen, stapled closed, JEROME.  LUE in cast, pt reporting no numbness and tingling to extremity and reports no decrease in sensation to finger tips.  + void, + flatus, -BM  Patient denies SOB.  SCD's on.    Review plan of care with patient. Call light and personal belongings with in reach. Hourly rounding in place. All needs met at this time.
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
## 2023-06-06 NOTE — CARE PLAN
Problem: Hyperinflation  Goal: Prevent or improve atelectasis  Description: Target End Date:  3 to 4 days    1. Instruct incentive spirometry usage  2.  Perform hyperinflation therapy as indicated  Outcome: Progressing     PEP QID  IS 1250 ml

## 2023-06-06 NOTE — PROGRESS NOTES
4 Eyes Skin Assessment Completed by RENAE Buchanan and RENAE Nam.    Head WDL  Ears WDL  Nose WDL  Mouth WDL  Neck C-collar in place.  Breast/Chest Redness and Blanching  Shoulder Blades Redness and Blanching, rashing to bilateral shoulders  Spine WDL  (R) Arm/Elbow/Hand Redness and Blanching, scattered abrasions, rashing  (L) Arm/Elbow/Hand Redness and Blanching  Abdomen MLI, JEROME  Groin WDL  Scrotum/Coccyx/Buttocks swelling to scrotum, redness and blanching to coccyx  (R) Leg laceration closed with staples  (L) Leg WDL  (R) Heel/Foot/Toe Redness and Blanching, discoloration.  (L) Heel/Foot/Toe Redness and Blanching, discoloration          Devices In Places Blood Pressure Cuff, Pulse Ox, SCD's, Nasal Cannula, and Cervical Collar, hard cast      Interventions In Place NC W/Ear Foams, Heel Mepilex, Sacral Mepilex, TAP System, Pillows, Q2 Turns, Barrier Cream, and Pressure Redistribution Mattress    Possible Skin Injury No    Pictures Uploaded Into Epic N/A  Wound Consult Placed N/A  RN Wound Prevention Protocol Ordered No

## 2023-06-06 NOTE — ASSESSMENT & PLAN NOTE
History of bipolar disorder and meth induced schizophrenia.  Has been in and out of mental health facilities for the past 10 years.  Recent 2 months stay at [facility] while incarcerated with the skilled nursing.  6/6 Psychiatric consult. Seroquel and PRN haldol per recommendations.  6/11 Depakote initiated per psychiatry recommendations.  6/14 Risperdal initiated per psychiatry recommendations.  6/16 Legal hold extended.  6/19 Legal hold discontinued.

## 2023-06-06 NOTE — CARE PLAN
The patient is Stable - Low risk of patient condition declining or worsening    Shift Goals  Clinical Goals: safety, skin integrity, rest, pain control  Patient Goals: sleep  Family Goals: No family present    Progress made toward(s) clinical / shift goals:    Problem: Knowledge Deficit - Standard  Goal: Patient and family/care givers will demonstrate understanding of plan of care, disease process/condition, diagnostic tests and medications  Outcome: Progressing     Problem: Pain - Standard  Goal: Alleviation of pain or a reduction in pain to the patient’s comfort goal  Outcome: Progressing
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
## 2023-06-06 NOTE — CARE PLAN
The patient is Stable - Low risk of patient condition declining or worsening    Shift Goals  Clinical Goals: Safety, skin integrity, pain control  Patient Goals: Rest  Family Goals: No family present    Progress made toward(s) clinical / shift goals:    Problem: Knowledge Deficit - Standard  Goal: Patient and family/care givers will demonstrate understanding of plan of care, disease process/condition, diagnostic tests and medications  Description: Target End Date:  1-3 days or as soon as patient condition allows    Document in Patient Education    1.  Patient and family/caregiver oriented to unit, equipment, visitation policy and means for communicating concern  2.  Complete/review Learning Assessment  3.  Assess knowledge level of disease process/condition, treatment plan, diagnostic tests and medications  4.  Explain disease process/condition, treatment plan, diagnostic tests and medications  Outcome: Progressing     Problem: Pain - Standard  Goal: Alleviation of pain or a reduction in pain to the patient’s comfort goal  Description: Target End Date:  Prior to discharge or change in level of care    Document on Vitals flowsheet    1.  Document pain using the appropriate pain scale per order or unit policy  2.  Educate and implement non-pharmacologic comfort measures (i.e. relaxation, distraction, massage, cold/heat therapy, etc.)  3.  Pain management medications as ordered  4.  Reassess pain after pain med administration per policy  5.  If opiods administered assess patient's response to pain medication is appropriate per POSS sedation scale  6.  Follow pain management plan developed in collaboration with patient and interdisciplinary team (including palliative care or pain specialists if applicable)  Outcome: Progressing     Problem: Skin Integrity  Goal: Skin integrity is maintained or improved  Description: Target End Date:  Prior to discharge or change in level of care    Document interventions on Skin  Risk/Jay flowsheet groups and corresponding LDA    1.  Assess and monitor skin integrity, appearance and/or temperature  2.  Assess risk factors for impaired skin integrity and/or pressures ulcers  3.  Implement precautions to protect skin integrity in collaboration with interdisciplinary team  4.  Implement pressure ulcer prevention protocol if at risk for skin breakdown  5.  Confirm wound care consult if at risk for skin breakdown  6.  Ensure patient use of pressure relieving devices  (Low air loss bed, waffle overlay, heel protectors, ROHO cushion, etc)  Outcome: Progressing     Problem: Fall Risk  Goal: Patient will remain free from falls  Description: Target End Date:  Prior to discharge or change in level of care    Document interventions on the Mary Jane Adamson Fall Risk Assessment    1.  Assess for fall risk factors  2.  Implement fall precautions  Outcome: Progressing
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
## 2023-06-07 ENCOUNTER — APPOINTMENT (OUTPATIENT)
Dept: RADIOLOGY | Facility: MEDICAL CENTER | Age: 28
DRG: 957 | End: 2023-06-07
Payer: MEDICAID

## 2023-06-07 LAB
ALBUMIN SERPL BCP-MCNC: 2.5 G/DL (ref 3.2–4.9)
ALBUMIN/GLOB SERPL: 0.8 G/DL
ALP SERPL-CCNC: 106 U/L (ref 30–99)
ALT SERPL-CCNC: 58 U/L (ref 2–50)
ANION GAP SERPL CALC-SCNC: 7 MMOL/L (ref 7–16)
AST SERPL-CCNC: 43 U/L (ref 12–45)
BASOPHILS # BLD AUTO: 0 % (ref 0–1.8)
BASOPHILS # BLD: 0 K/UL (ref 0–0.12)
BILIRUB SERPL-MCNC: 0.4 MG/DL (ref 0.1–1.5)
BUN SERPL-MCNC: 12 MG/DL (ref 8–22)
CALCIUM ALBUM COR SERPL-MCNC: 9.5 MG/DL (ref 8.5–10.5)
CALCIUM SERPL-MCNC: 8.3 MG/DL (ref 8.5–10.5)
CHLORIDE SERPL-SCNC: 97 MMOL/L (ref 96–112)
CO2 SERPL-SCNC: 29 MMOL/L (ref 20–33)
CREAT SERPL-MCNC: 0.67 MG/DL (ref 0.5–1.4)
EOSINOPHIL # BLD AUTO: 0 K/UL (ref 0–0.51)
EOSINOPHIL NFR BLD: 0 % (ref 0–6.9)
ERYTHROCYTE [DISTWIDTH] IN BLOOD BY AUTOMATED COUNT: 42.7 FL (ref 35.9–50)
GFR SERPLBLD CREATININE-BSD FMLA CKD-EPI: 132 ML/MIN/1.73 M 2
GLOBULIN SER CALC-MCNC: 3.2 G/DL (ref 1.9–3.5)
GLUCOSE SERPL-MCNC: 103 MG/DL (ref 65–99)
HCT VFR BLD AUTO: 27.1 % (ref 42–52)
HGB BLD-MCNC: 8.9 G/DL (ref 14–18)
HYPOCHROMIA BLD QL SMEAR: ABNORMAL
LYMPHOCYTES # BLD AUTO: 2.65 K/UL (ref 1–4.8)
LYMPHOCYTES NFR BLD: 14 % (ref 22–41)
MANUAL DIFF BLD: NORMAL
MCH RBC QN AUTO: 30.1 PG (ref 27–33)
MCHC RBC AUTO-ENTMCNC: 32.8 G/DL (ref 32.3–36.5)
MCV RBC AUTO: 91.6 FL (ref 81.4–97.8)
METAMYELOCYTES NFR BLD MANUAL: 1 %
MICROCYTES BLD QL SMEAR: ABNORMAL
MONOCYTES # BLD AUTO: 2.83 K/UL (ref 0–0.85)
MONOCYTES NFR BLD AUTO: 15 % (ref 0–13.4)
MORPHOLOGY BLD-IMP: NORMAL
MYELOCYTES NFR BLD MANUAL: 3 %
NEUTROPHILS # BLD AUTO: 12.66 K/UL (ref 1.82–7.42)
NEUTROPHILS NFR BLD: 67 % (ref 44–72)
NRBC # BLD AUTO: 0.38 K/UL
NRBC BLD-RTO: 2 /100 WBC (ref 0–0.2)
PLATELET # BLD AUTO: 710 K/UL (ref 164–446)
PLATELET BLD QL SMEAR: NORMAL
PMV BLD AUTO: 9.4 FL (ref 9–12.9)
POLYCHROMASIA BLD QL SMEAR: NORMAL
POTASSIUM SERPL-SCNC: 4.4 MMOL/L (ref 3.6–5.5)
PROT SERPL-MCNC: 5.7 G/DL (ref 6–8.2)
RBC # BLD AUTO: 2.96 M/UL (ref 4.7–6.1)
RBC BLD AUTO: PRESENT
SODIUM SERPL-SCNC: 133 MMOL/L (ref 135–145)
WBC # BLD AUTO: 18.9 K/UL (ref 4.8–10.8)

## 2023-06-07 PROCEDURE — 700111 HCHG RX REV CODE 636 W/ 250 OVERRIDE (IP): Performed by: NURSE PRACTITIONER

## 2023-06-07 PROCEDURE — 700105 HCHG RX REV CODE 258: Performed by: SURGERY

## 2023-06-07 PROCEDURE — 36415 COLL VENOUS BLD VENIPUNCTURE: CPT

## 2023-06-07 PROCEDURE — 71045 X-RAY EXAM CHEST 1 VIEW: CPT

## 2023-06-07 PROCEDURE — 700102 HCHG RX REV CODE 250 W/ 637 OVERRIDE(OP): Performed by: NURSE PRACTITIONER

## 2023-06-07 PROCEDURE — A9270 NON-COVERED ITEM OR SERVICE: HCPCS | Performed by: NURSE PRACTITIONER

## 2023-06-07 PROCEDURE — 770001 HCHG ROOM/CARE - MED/SURG/GYN PRIV*

## 2023-06-07 PROCEDURE — 99231 SBSQ HOSP IP/OBS SF/LOW 25: CPT | Performed by: PSYCHIATRY & NEUROLOGY

## 2023-06-07 PROCEDURE — 99024 POSTOP FOLLOW-UP VISIT: CPT | Performed by: NURSE PRACTITIONER

## 2023-06-07 PROCEDURE — 85007 BL SMEAR W/DIFF WBC COUNT: CPT

## 2023-06-07 PROCEDURE — 94669 MECHANICAL CHEST WALL OSCILL: CPT

## 2023-06-07 PROCEDURE — 85025 COMPLETE CBC W/AUTO DIFF WBC: CPT

## 2023-06-07 PROCEDURE — 700111 HCHG RX REV CODE 636 W/ 250 OVERRIDE (IP): Performed by: SURGERY

## 2023-06-07 PROCEDURE — 80053 COMPREHEN METABOLIC PANEL: CPT

## 2023-06-07 RX ORDER — HALOPERIDOL 5 MG/ML
5 INJECTION INTRAMUSCULAR EVERY 6 HOURS PRN
Status: DISCONTINUED | OUTPATIENT
Start: 2023-06-07 | End: 2023-06-08

## 2023-06-07 RX ADMIN — HYDROMORPHONE HYDROCHLORIDE 0.5 MG: 1 INJECTION, SOLUTION INTRAMUSCULAR; INTRAVENOUS; SUBCUTANEOUS at 05:48

## 2023-06-07 RX ADMIN — QUETIAPINE FUMARATE 50 MG: 25 TABLET ORAL at 20:48

## 2023-06-07 RX ADMIN — OXYCODONE HYDROCHLORIDE 10 MG: 10 TABLET ORAL at 15:04

## 2023-06-07 RX ADMIN — OXYCODONE HYDROCHLORIDE 10 MG: 10 TABLET ORAL at 08:40

## 2023-06-07 RX ADMIN — QUETIAPINE FUMARATE 50 MG: 25 TABLET ORAL at 15:00

## 2023-06-07 RX ADMIN — POLYETHYLENE GLYCOL 3350 1 PACKET: 17 POWDER, FOR SOLUTION ORAL at 05:48

## 2023-06-07 RX ADMIN — SENNOSIDES AND DOCUSATE SODIUM 1 TABLET: 50; 8.6 TABLET ORAL at 20:48

## 2023-06-07 RX ADMIN — DOCUSATE SODIUM 100 MG: 50 LIQUID ORAL at 05:48

## 2023-06-07 RX ADMIN — SODIUM CHLORIDE 250 MG: 9 INJECTION, SOLUTION INTRAVENOUS at 05:51

## 2023-06-07 RX ADMIN — ENOXAPARIN SODIUM 30 MG: 100 INJECTION SUBCUTANEOUS at 20:48

## 2023-06-07 RX ADMIN — ENOXAPARIN SODIUM 30 MG: 100 INJECTION SUBCUTANEOUS at 05:48

## 2023-06-07 RX ADMIN — OXYCODONE HYDROCHLORIDE 10 MG: 10 TABLET ORAL at 03:27

## 2023-06-07 RX ADMIN — QUETIAPINE FUMARATE 50 MG: 25 TABLET ORAL at 05:48

## 2023-06-07 ASSESSMENT — ENCOUNTER SYMPTOMS
MYALGIAS: 1
ABDOMINAL PAIN: 1
HALLUCINATIONS: 0
HEADACHES: 1
SHORTNESS OF BREATH: 0

## 2023-06-07 ASSESSMENT — PAIN DESCRIPTION - PAIN TYPE
TYPE: ACUTE PAIN
TYPE: ACUTE PAIN;SURGICAL PAIN
TYPE: ACUTE PAIN

## 2023-06-07 NOTE — PROGRESS NOTES
Per psychiatry resident Christine Walker D.O. legal hold put into effect for HI. No family, visitors or belongings available to patient. Patient not allowed to make phone calls.   Per psychiatry security sitter not needed. Security to round every 30 min. 1-1 sitter to be in room

## 2023-06-07 NOTE — PROGRESS NOTES
Received report from previous shift RN  Assessment complete.  A&O x 4. Patient calls appropriately. Legal hold in place for HI.  Patient ambulates with max assist, Q2 turns in place, TAPS in place. Bed alarm on. 1:1 sitter in place, no security sitter needed per Psych. Belongings removed from inside room.   Patient has 7/10 pain. Pain managed with prescribed medications.  Denies N&V. Tolerating regular diet, requires 1:1 feed.  MLI, stapled closed JEROME.  L arm in cast, NWB.  + void, + flatus, + BM.  Patient denies SOB.  SCD's refused.    Review plan of care with patient. Call light and personal belongings with in reach. Hourly rounding in place. All needs met at this time.

## 2023-06-07 NOTE — PROGRESS NOTES
"      Orthopaedic Progress Note    Interval changes:  Patient doing well   LUE cast CDI  Cleared for DC to facility vs shelter by ortho pending trauma clearance    ROS - Patient denies any new issues.  Pain well controlled.    BP (!) 130/90   Pulse 100   Temp 37.2 °C (99 °F) (Temporal)   Resp 20   Ht 1.803 m (5' 10.98\")   Wt 108 kg (238 lb 12.1 oz)   SpO2 95%     Patient seen and examined  No acute distress  Breathing non labored  RRR  LUE in long arm cast CDI, DNVI, moves all fingers, cap refill <2 sec.     Recent Labs     06/05/23  0650 06/06/23  0128 06/07/23  0702   WBC 20.6* 19.1* 18.9*   RBC 2.58* 2.78* 2.96*   HEMOGLOBIN 7.6* 8.2* 8.9*   HEMATOCRIT 24.3* 26.1* 27.1*   MCV 94.2 93.9 91.6   MCH 29.5 29.5 30.1   MCHC 31.3* 31.4* 32.8   RDW 50.8* 47.9 42.7   PLATELETCT 463* 590* 710*   MPV 9.5 9.5 9.4       Active Hospital Problems    Diagnosis     Psychiatric diagnosis [F99]      Priority: High    Dysphagia, oropharyngeal [R13.12]      Priority: Medium    Acute blood loss anemia [D62]      Priority: Medium    Left pulmonary contusion [S27.321A]      Priority: Medium    Illicit drug use [F19.90]      Priority: Medium    Open fracture dislocation of left elbow joint [S42.402B]      Priority: Medium    Open skull fracture (HCC) [S02.91XB]      Priority: Medium    Spleen laceration, initial encounter [S36.039A]      Priority: Medium    Major laceration of kidney, left, initial encounter [S37.062A]      Priority: Medium    Closed fracture of multiple ribs of left side [S22.42XA]      Priority: Medium    Rotatory subluxation of atlantoaxial joint [S13.120A]      Priority: Medium    No contraindication to deep vein thrombosis (DVT) prophylaxis [Z78.9]      Priority: Medium    Liver laceration, initial encounter [S36.113A]      Priority: Medium    Trauma [T14.90XA]      Priority: Low    Abrasion, multiple sites [T07.XXXA]      Priority: Low    Traumatic hemopneumothorax, initial encounter [S27.2XXA]      Priority: " Low    Laceration of right lower extremity [S81.946H]      Priority: Low       Assessment/Plan:  Patient doing well   LUE cast CDI  Cleared for DC to facility vs shelter by ortho pending trauma clearance  POD#6 S/P:  1.  Irrigation and debridement open fracture   2.  Open reduction internal fixation left distal humerus lateral condyle fracture   3.  Open treatment acute elbow dislocation  Wt bearing status - NWB LUE  Wound care/Drains - Dressings to be changed every other day by nursing. Or PRN for saturation starting POD#2  Future Procedures - none planned   Lovenox: Start 6/6, Duration-until ambulatory > 150'  Sutures/Staples out- 14-21 days post operatively. Removal will completed by ortho mid levels only.  PT/OT-initiated  Antibiotics: Perioperative completed  DVT Prophylaxis- TEDS/SCDs/Foot pumps  Edwards-not needed per ortho  Case Coordination for Discharge Planning - Disposition per therapy recs.

## 2023-06-07 NOTE — PROGRESS NOTES
Pt belongings placed in U legal hold cabinet, curtain removed from room, call light removed, all movable furniture and excess bedding removed.

## 2023-06-07 NOTE — CARE PLAN
The patient is Stable - Low risk of patient condition declining or worsening    Shift Goals  Clinical Goals: Safety, skin integrity  Patient Goals: Rest  Family Goals: No family present    Progress made toward(s) clinical / shift goals:    Problem: Knowledge Deficit - Standard  Goal: Patient and family/care givers will demonstrate understanding of plan of care, disease process/condition, diagnostic tests and medications  Description: Target End Date:  1-3 days or as soon as patient condition allows    Document in Patient Education    1.  Patient and family/caregiver oriented to unit, equipment, visitation policy and means for communicating concern  2.  Complete/review Learning Assessment  3.  Assess knowledge level of disease process/condition, treatment plan, diagnostic tests and medications  4.  Explain disease process/condition, treatment plan, diagnostic tests and medications  Outcome: Progressing     Problem: Pain - Standard  Goal: Alleviation of pain or a reduction in pain to the patient’s comfort goal  Description: Target End Date:  Prior to discharge or change in level of care    Document on Vitals flowsheet    1.  Document pain using the appropriate pain scale per order or unit policy  2.  Educate and implement non-pharmacologic comfort measures (i.e. relaxation, distraction, massage, cold/heat therapy, etc.)  3.  Pain management medications as ordered  4.  Reassess pain after pain med administration per policy  5.  If opiods administered assess patient's response to pain medication is appropriate per POSS sedation scale  6.  Follow pain management plan developed in collaboration with patient and interdisciplinary team (including palliative care or pain specialists if applicable)  Outcome: Progressing     Problem: Skin Integrity  Goal: Skin integrity is maintained or improved  Description: Target End Date:  Prior to discharge or change in level of care    Document interventions on Skin Risk/Jay flowsheet  groups and corresponding LDA    1.  Assess and monitor skin integrity, appearance and/or temperature  2.  Assess risk factors for impaired skin integrity and/or pressures ulcers  3.  Implement precautions to protect skin integrity in collaboration with interdisciplinary team  4.  Implement pressure ulcer prevention protocol if at risk for skin breakdown  5.  Confirm wound care consult if at risk for skin breakdown  6.  Ensure patient use of pressure relieving devices  (Low air loss bed, waffle overlay, heel protectors, ROHO cushion, etc)  Outcome: Progressing     Problem: Fall Risk  Goal: Patient will remain free from falls  Description: Target End Date:  Prior to discharge or change in level of care    Document interventions on the [facility] Fall Risk Assessment    1.  Assess for fall risk factors  2.  Implement fall precautions  Outcome: Progressing     Problem: Safety - Medical Restraint  Goal: Free from restraint(s) (Restraint for Interference with Medical Device)  Description: INTERVENTIONS:  1.  ONCE/SHIFT or MINIMUM Q12H: Assess and document the continuing need for restraints  2.  Q24H: Continued use of restraint requires LIP to perform face to face examination and written order  3.  Identify and implement measures to help patient regain control  4.  Educate patient/family on discontinuation criteria   5.  Assess patient's understanding and retention of education provided  6.  Assess readiness for release & initiate progressive release per protocol  7.  Identify and document criteria for restraints  Outcome: Progressing
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
## 2023-06-07 NOTE — DISCHARGE PLANNING
Referral: Legal Hold    Intervention: Discussed this case during IDT Rounds. Per Bernadette Hagan RN, Legal Hold was initiated yesterday, psych team aware.    LSW notified Viola with the ALERT Team and faxed the LH to [phone].    Plan: ALERT Team to follow up on the LH.

## 2023-06-07 NOTE — CARE PLAN
Problem: Hyperinflation  Goal: Prevent or improve atelectasis  Description: Target End Date:  3 to 4 days    1. Instruct incentive spirometry usage  2.  Perform hyperinflation therapy as indicated  6/6/2023 1731 by Roma Cochran, RRT  Outcome: Progressing    BID PEP therapy x 24 hrs PT able to do good effort but was lethargic due to pain meds
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
## 2023-06-07 NOTE — CARE PLAN
Problem: Hyperinflation  Goal: Prevent or improve atelectasis  Description: Target End Date:  3 to 4 days    1. Instruct incentive spirometry usage  2.  Perform hyperinflation therapy as indicated  Outcome: Progressing   PEP Q4hr

## 2023-06-07 NOTE — FLOWSHEET NOTE
06/07/23 0936   Hyperinflation Protocol   Hyperinflation Protocol Indications Chest Trauma (Blunt, Penetrative, Crushing, or Surgical);Atelectasis Documented by Chest X-Ray   I.S. Greater than 40% of Predicted IS instruct, PEP QID x 48 hours   Hyperinflation Protocol Goals/Outcome Improvement in Repeat CXR     Based on the patients CXR and effort, Patient should continue with PEP QID.

## 2023-06-07 NOTE — PROGRESS NOTES
Bedside report received from NOC RN, assessment completed.    A&O x  3, pt requires frequent reminders, highly impulsive. Continues to make statements about harming or killing staff.   Mobility: Moderate assist, with no lower extremity restrictions. Non weight bearing on LUE.   Fall Risk Assessment: high fall risk, bed alarm on, door notifications on  Pain Assessment / Reassessment completed, medication provided per MAR.   Skin: Q2Ts in place, multiple abrasions and bruises throughout, staples to RLE and back of head, LUE cast. C-Collar in place.   Diet: regular with 1x1 supervision  LDA:   IV Access: 18g LFA, CDI/ flushed/ SL/ Infusing     GI/: + void, + flatus, 6/5 LBM  DVT Prophylaxis: contraindicated, SCD in place  Jay Score: 16 see two RN skin note.        Reviewed plan of care with patient, bed in lowest position and locked, pt resting comfortably now, call light within reach, all needs met at this time. Interventions will be executed per plan of care

## 2023-06-07 NOTE — PROGRESS NOTES
4 Eyes Skin Assessment Completed by RENAE Buchanan and RENAE Self.     Head laceration closed with deirdre to head  Ears WDL  Nose WDL  Mouth WDL  Neck C-collar in place.  Breast/Chest Redness and Blanching  Shoulder Blades Redness and Blanching, rashing to bilateral shoulders  Spine WDL  (R) Arm/Elbow/Hand Redness and Blanching, scattered abrasions, rashing  (L) Arm/Elbow/Hand Redness and Blanching  Abdomen MLI, JEROME  Groin WDL  Scrotum/Coccyx/Buttocks swelling to scrotum, redness and blanching to coccyx  (R) Leg laceration closed with staples  (L) Leg WDL  (R) Heel/Foot/Toe Redness and Blanching, discoloration.  (L) Heel/Foot/Toe Redness and Blanching, discoloration              Devices In Places Blood Pressure Cuff, Pulse Ox, SCD's, Nasal Cannula, and Cervical Collar, hard cast        Interventions In Place NC W/Ear Foams, Heel Mepilex, Sacral Mepilex, TAP System, Pillows, Q2 Turns, Barrier Cream, and ICU low air loss     Possible Skin Injury No     Pictures Uploaded Into Epic N/A  Wound Consult Placed N/A  RN Wound Prevention Protocol Ordered No

## 2023-06-08 ENCOUNTER — APPOINTMENT (OUTPATIENT)
Dept: RADIOLOGY | Facility: MEDICAL CENTER | Age: 28
DRG: 957 | End: 2023-06-08
Payer: MEDICAID

## 2023-06-08 LAB
ALBUMIN SERPL BCP-MCNC: 2.7 G/DL (ref 3.2–4.9)
ALBUMIN/GLOB SERPL: 0.8 G/DL
ALP SERPL-CCNC: 148 U/L (ref 30–99)
ALT SERPL-CCNC: 85 U/L (ref 2–50)
ANION GAP SERPL CALC-SCNC: 11 MMOL/L (ref 7–16)
ANISOCYTOSIS BLD QL SMEAR: ABNORMAL
AST SERPL-CCNC: 55 U/L (ref 12–45)
BASOPHILS # BLD AUTO: 0 % (ref 0–1.8)
BASOPHILS # BLD: 0 K/UL (ref 0–0.12)
BILIRUB SERPL-MCNC: 0.6 MG/DL (ref 0.1–1.5)
BUN SERPL-MCNC: 11 MG/DL (ref 8–22)
CALCIUM ALBUM COR SERPL-MCNC: 9.5 MG/DL (ref 8.5–10.5)
CALCIUM SERPL-MCNC: 8.5 MG/DL (ref 8.5–10.5)
CHLORIDE SERPL-SCNC: 96 MMOL/L (ref 96–112)
CO2 SERPL-SCNC: 26 MMOL/L (ref 20–33)
CREAT SERPL-MCNC: 0.72 MG/DL (ref 0.5–1.4)
EOSINOPHIL # BLD AUTO: 0 K/UL (ref 0–0.51)
EOSINOPHIL NFR BLD: 0 % (ref 0–6.9)
ERYTHROCYTE [DISTWIDTH] IN BLOOD BY AUTOMATED COUNT: 42.4 FL (ref 35.9–50)
GFR SERPLBLD CREATININE-BSD FMLA CKD-EPI: 129 ML/MIN/1.73 M 2
GLOBULIN SER CALC-MCNC: 3.5 G/DL (ref 1.9–3.5)
GLUCOSE SERPL-MCNC: 105 MG/DL (ref 65–99)
HCT VFR BLD AUTO: 30.1 % (ref 42–52)
HGB BLD-MCNC: 9.6 G/DL (ref 14–18)
LYMPHOCYTES # BLD AUTO: 3.99 K/UL (ref 1–4.8)
LYMPHOCYTES NFR BLD: 19 % (ref 22–41)
MANUAL DIFF BLD: NORMAL
MCH RBC QN AUTO: 29.4 PG (ref 27–33)
MCHC RBC AUTO-ENTMCNC: 31.9 G/DL (ref 32.3–36.5)
MCV RBC AUTO: 92.3 FL (ref 81.4–97.8)
METAMYELOCYTES NFR BLD MANUAL: 2 %
MICROCYTES BLD QL SMEAR: ABNORMAL
MONOCYTES # BLD AUTO: 2.73 K/UL (ref 0–0.85)
MONOCYTES NFR BLD AUTO: 13 % (ref 0–13.4)
MORPHOLOGY BLD-IMP: NORMAL
MYELOCYTES NFR BLD MANUAL: 5 %
NEUTROPHILS # BLD AUTO: 12.81 K/UL (ref 1.82–7.42)
NEUTROPHILS NFR BLD: 61 % (ref 44–72)
NRBC # BLD AUTO: 0.07 K/UL
NRBC BLD-RTO: 0.3 /100 WBC (ref 0–0.2)
PLATELET # BLD AUTO: 894 K/UL (ref 164–446)
PLATELET BLD QL SMEAR: NORMAL
PMV BLD AUTO: 9.7 FL (ref 9–12.9)
POTASSIUM SERPL-SCNC: 4.4 MMOL/L (ref 3.6–5.5)
PROT SERPL-MCNC: 6.2 G/DL (ref 6–8.2)
RBC # BLD AUTO: 3.26 M/UL (ref 4.7–6.1)
RBC BLD AUTO: PRESENT
SODIUM SERPL-SCNC: 133 MMOL/L (ref 135–145)
WBC # BLD AUTO: 21 K/UL (ref 4.8–10.8)

## 2023-06-08 PROCEDURE — 770001 HCHG ROOM/CARE - MED/SURG/GYN PRIV*

## 2023-06-08 PROCEDURE — 85025 COMPLETE CBC W/AUTO DIFF WBC: CPT

## 2023-06-08 PROCEDURE — 700111 HCHG RX REV CODE 636 W/ 250 OVERRIDE (IP)

## 2023-06-08 PROCEDURE — 700111 HCHG RX REV CODE 636 W/ 250 OVERRIDE (IP): Performed by: NURSE PRACTITIONER

## 2023-06-08 PROCEDURE — 92526 ORAL FUNCTION THERAPY: CPT

## 2023-06-08 PROCEDURE — 80053 COMPREHEN METABOLIC PANEL: CPT

## 2023-06-08 PROCEDURE — 85007 BL SMEAR W/DIFF WBC COUNT: CPT

## 2023-06-08 PROCEDURE — 99024 POSTOP FOLLOW-UP VISIT: CPT | Performed by: NURSE PRACTITIONER

## 2023-06-08 PROCEDURE — A9270 NON-COVERED ITEM OR SERVICE: HCPCS | Performed by: NURSE PRACTITIONER

## 2023-06-08 PROCEDURE — 99233 SBSQ HOSP IP/OBS HIGH 50: CPT | Mod: GC | Performed by: PSYCHIATRY & NEUROLOGY

## 2023-06-08 PROCEDURE — 94669 MECHANICAL CHEST WALL OSCILL: CPT

## 2023-06-08 PROCEDURE — 700102 HCHG RX REV CODE 250 W/ 637 OVERRIDE(OP): Performed by: NURSE PRACTITIONER

## 2023-06-08 PROCEDURE — 700111 HCHG RX REV CODE 636 W/ 250 OVERRIDE (IP): Performed by: SURGERY

## 2023-06-08 PROCEDURE — 97530 THERAPEUTIC ACTIVITIES: CPT

## 2023-06-08 PROCEDURE — 36415 COLL VENOUS BLD VENIPUNCTURE: CPT

## 2023-06-08 PROCEDURE — 71045 X-RAY EXAM CHEST 1 VIEW: CPT

## 2023-06-08 RX ORDER — FUROSEMIDE 10 MG/ML
20 INJECTION INTRAMUSCULAR; INTRAVENOUS ONCE
Status: COMPLETED | OUTPATIENT
Start: 2023-06-08 | End: 2023-06-08

## 2023-06-08 RX ORDER — HALOPERIDOL 5 MG/ML
5 INJECTION INTRAMUSCULAR EVERY 4 HOURS PRN
Status: DISCONTINUED | OUTPATIENT
Start: 2023-06-08 | End: 2023-06-09

## 2023-06-08 RX ORDER — HALOPERIDOL 5 MG/ML
1 INJECTION INTRAMUSCULAR EVERY 4 HOURS PRN
Status: DISCONTINUED | OUTPATIENT
Start: 2023-06-08 | End: 2023-06-09

## 2023-06-08 RX ADMIN — MAGNESIUM HYDROXIDE 30 ML: 400 SUSPENSION ORAL at 04:10

## 2023-06-08 RX ADMIN — DOCUSATE SODIUM 100 MG: 50 LIQUID ORAL at 04:08

## 2023-06-08 RX ADMIN — QUETIAPINE FUMARATE 50 MG: 25 TABLET ORAL at 04:08

## 2023-06-08 RX ADMIN — OXYCODONE HYDROCHLORIDE 10 MG: 10 TABLET ORAL at 04:08

## 2023-06-08 RX ADMIN — QUETIAPINE FUMARATE 50 MG: 25 TABLET ORAL at 20:07

## 2023-06-08 RX ADMIN — POLYETHYLENE GLYCOL 3350 1 PACKET: 17 POWDER, FOR SOLUTION ORAL at 04:11

## 2023-06-08 RX ADMIN — ENOXAPARIN SODIUM 30 MG: 100 INJECTION SUBCUTANEOUS at 17:53

## 2023-06-08 RX ADMIN — OXYCODONE HYDROCHLORIDE 10 MG: 10 TABLET ORAL at 14:50

## 2023-06-08 RX ADMIN — ENOXAPARIN SODIUM 30 MG: 100 INJECTION SUBCUTANEOUS at 04:24

## 2023-06-08 RX ADMIN — HALOPERIDOL LACTATE 5 MG: 5 INJECTION, SOLUTION INTRAMUSCULAR at 14:44

## 2023-06-08 RX ADMIN — HALOPERIDOL LACTATE 1 MG: 5 INJECTION, SOLUTION INTRAMUSCULAR at 21:54

## 2023-06-08 RX ADMIN — QUETIAPINE FUMARATE 50 MG: 25 TABLET ORAL at 14:49

## 2023-06-08 RX ADMIN — OXYCODONE HYDROCHLORIDE 10 MG: 10 TABLET ORAL at 01:10

## 2023-06-08 RX ADMIN — FUROSEMIDE 20 MG: 10 INJECTION, SOLUTION INTRAVENOUS at 09:50

## 2023-06-08 RX ADMIN — HALOPERIDOL LACTATE 5 MG: 5 INJECTION, SOLUTION INTRAMUSCULAR at 00:49

## 2023-06-08 RX ADMIN — OXYCODONE HYDROCHLORIDE 10 MG: 10 TABLET ORAL at 09:50

## 2023-06-08 RX ADMIN — OXYCODONE HYDROCHLORIDE 10 MG: 10 TABLET ORAL at 21:38

## 2023-06-08 ASSESSMENT — ENCOUNTER SYMPTOMS
ABDOMINAL PAIN: 1
HALLUCINATIONS: 0
HEADACHES: 0
NEUROLOGICAL NEGATIVE: 1
PSYCHIATRIC NEGATIVE: 1
BACK PAIN: 1
RESPIRATORY NEGATIVE: 1
CHILLS: 0
ROS GI COMMENTS: BM 6/5
DEPRESSION: 0
COUGH: 0
MYALGIAS: 1
FEVER: 0

## 2023-06-08 ASSESSMENT — COGNITIVE AND FUNCTIONAL STATUS - GENERAL
MOVING TO AND FROM BED TO CHAIR: A LITTLE
CLIMB 3 TO 5 STEPS WITH RAILING: A LOT
STANDING UP FROM CHAIR USING ARMS: A LITTLE
MOBILITY SCORE: 17
WALKING IN HOSPITAL ROOM: A LITTLE
MOVING FROM LYING ON BACK TO SITTING ON SIDE OF FLAT BED: A LITTLE
TURNING FROM BACK TO SIDE WHILE IN FLAT BAD: A LITTLE
SUGGESTED CMS G CODE MODIFIER MOBILITY: CK

## 2023-06-08 ASSESSMENT — PAIN DESCRIPTION - PAIN TYPE
TYPE: ACUTE PAIN
TYPE: ACUTE PAIN;SURGICAL PAIN
TYPE: ACUTE PAIN;SURGICAL PAIN

## 2023-06-08 ASSESSMENT — GAIT ASSESSMENTS
DISTANCE (FEET): 15
DEVIATION: BRADYKINETIC;INCREASED BASE OF SUPPORT
GAIT LEVEL OF ASSIST: MINIMAL ASSIST

## 2023-06-08 NOTE — PROGRESS NOTES
Bedside report received.  Assessment complete.    Patient on legal hold for homicidal ideation, sitter at bedside.    A&O x 3. Patient continues to make statements about harming others.    Patient mobilized with x1-x2 assist, NWB to LUE. Bed alarm on.    Patient has no pain at this time. No pharmacological interventions provided at this time.    Denies N&V. Tolerating regular diet; 1:1 supervision.    MLI to abdomen, staples, JEROME, no drainage noted. LUE cast. RLE lacerations with staples, JEROME. Skull lacerations to back of the head, staples, JEROME.    + void, + flatus, - BM, last BM 6/5.    Patient denies SOB.    SCD's on.    Review plan with of care with patient. Call light and personal belongings within reach. Hourly rounding in place. All needs met at this time.

## 2023-06-08 NOTE — PROGRESS NOTES
0040: Patient getting more agitated. Started spitting, removing C-collar, and being verbally aggressive. Patient non-compliant to redirection and de-escalation. Patient given Haldol at 0049 per MAR.   0110: patient given oxycodone 10 mg per MAR after patient removed C-collar again  0150: patient calming down

## 2023-06-08 NOTE — CARE PLAN
The patient is Stable - Low risk of patient condition declining or worsening    Shift Goals  Clinical Goals: safety, pain control, rest, skin integrity  Patient Goals: rest  Family Goals: REKHA    Progress made toward(s) clinical / shift goals:  patient remains free from falls and hospital acquired injuries. Pain controlled with pharmacologic means and distractions.    Patient is not progressing towards the following goals:      Problem: Knowledge Deficit - Standard  Goal: Patient and family/care givers will demonstrate understanding of plan of care, disease process/condition, diagnostic tests and medications  Outcome: Not Progressing     Goal not progressing due to patient continuing to experience psychosis and hallucinations.

## 2023-06-08 NOTE — PROGRESS NOTES
4 Eyes Skin Assessment Completed by RENAE Garcia and RENAE Jimenez.    Head Scab, Bruising, Swelling, Redness, and Incision with staples  Ears WDL  Nose WDL  Mouth WDL  Neck Bruising C-Collar  Breast/Chest Redness and Bruising  Shoulder Blades Redness, rash  Spine WDL  (R) Arm/Elbow/Hand Redness, Blanching, and Rash  (L) Arm/Elbow/Hand Redness, Blanching, and Rash  Abdomen Incision  Groin WDL  Scrotum/Coccyx/Buttocks Scrotal swelling, redness and blanching to coccyx.   (R) Leg Incision, with staples  (L) Leg Redness  (R) Heel/Foot/Toe Redness, Blanching, and Discoloration  (L) Heel/Foot/Toe Redness, Blanching, and Discoloration          Devices In Places Blood Pressure Cuff, Pulse Ox, and SCD's and C-Collar       Interventions In Place Gray Ear Foams, NC W/Ear Foams, Q2 Turns, and Low Air Loss Mattress    Possible Skin Injury No    Pictures Uploaded Into Epic N/A  Wound Consult Placed N/A  RN Wound Prevention Protocol Ordered Low tamia interventions in place

## 2023-06-08 NOTE — THERAPY
Speech Language Pathology   Daily Treatment     Patient Name: Fareed Muhammad  AGE:  27 y.o., SEX:  male  Medical Record #: [id number]  Date of Service: 6/8/2023      Precautions:  Precautions: Fall Risk, Non Weight Bearing Left Upper Extremity, Cervical Collar  , Spinal / Back Precautions , Swallow Precautions         Subjective  Patient received asleep, needing verbal cues to rouse. Patient agreeable to participate in session, though needing encouragement and frequent redirection.       Assessment  Patient seen for dysphagia management on this date. Patient was agreeable to PO snack of apple juice, soft cheese and percy crackers. Patient was impulsive with PO, needing frequent verbal cues to take small bites/sips and slow rate of intake. Appropriate oral bolus acceptance/containment/transit. Mastication was intact. Swallow initiation appeared timely. No coughing or throat clearing appreciated. No overt s/sx of aspiration noted.      Clinical Impressions  Patient presents with a functional swallow, though impulsive with oral intake. No s/sx of aspiration appreciated. Diet modification is not indicated though pt will need supervision during meals due to impulsivity.       Recommendations  Treatment Completed: Dysphagia Treatment       Dysphagia Treatment  Diet Consistency: Regular solids and thin liquids  Instrumentation: None indicated at this time  Medication: As tolerated  Supervision: Distant supervision during meals, Monitor due to impulsive behavior  Positioning: Fully upright and midline during oral intake  Risk Management : Small bites/sips, Slow rate of intake, Reduce environmental distractions  Oral Care: BID             SLP Treatment Plan  Treatment Plan: Dysphagia Treatment  SLP Frequency: 3x Per Week  Estimated Duration: Until Therapy Goals Met      Anticipated Discharge Needs  Discharge Recommendations: Anticipate that the patient will have no further speech therapy needs after discharge from the  "hospital  Therapy Recommendations Upon DC: Not Indicated      Patient / Family Goals  Patient / Family Goal #1: \"More water.\"  Goal #1 Outcome: Progressing as expected  Short Term Goals  Short Term Goal # 1: Patient will consume meals of regular solids and thin liquids with no s/sx of aspiration given supervision for meals.  Goal Outcome # 1: Progressing as expected      Rocío Mclaughlin MS,CCC-SLP    "

## 2023-06-08 NOTE — THERAPY
"Physical Therapy   Daily Treatment     Patient Name: Fareed Muhammad  Age:  27 y.o., Sex:  male  Medical Record #: [id number]  Today's Date: 6/8/2023     Precautions  Precautions: Fall Risk;Non Weight Bearing Left Upper Extremity;Cervical Collar  ;Spinal / Back Precautions   Comments: L elbow is cast    Assessment    Patient received in bed and appropriate for PT session. PT provided cues for log roll/bed mobility but pt did not follow cues. Pt performed bed mobility with supervision and STS with SBA. Pt required min A during ambulation to the bathroom. Pt primarily limited by cognition and safety awareness. Continue to recommend post acute PT. Will follow for acute care PT.     Plan    Treatment Plan Status: Modify Current Treatment Plan  Type of Treatment: Bed Mobility, Gait Training, Neuro Re-Education / Balance, Stair Training, Therapeutic Activities, Therapeutic Exercise  Treatment Frequency: 4 Times per Week  Treatment Duration: Until Therapy Goals Met    DC Equipment Recommendations: Unable to determine at this time  Discharge Recommendations: Recommend post-acute placement for additional physical therapy services prior to discharge home      Subjective    \"Look I am walking, wow\".      Objective       06/08/23 0903   Precautions   Precautions Fall Risk;Non Weight Bearing Left Upper Extremity;Cervical Collar  ;Spinal / Back Precautions    Comments L elbow is cast   Pain 0 - 10 Group   Therapist Pain Assessment Post Activity Pain Same as Prior to Activity;Nurse Notified  (pain not rated)   Cognition    Level of Consciousness Alert   Ability To Follow Commands 3 Step   Safety Awareness Impaired;Impulsive   Attention Impaired   Sequencing Impaired   Comments cooperative, potential for escalation of behaviors, does not follow cues well, has a sitter for homicidal ideations   Strength Lower Body   Lower Body Strength  WDL   Comments no knee buckling during ambulation   Lower Body Muscle Tone   Lower Body Muscle Tone "  WDL   Balance   Sitting Balance (Static) Fair   Sitting Balance (Dynamic) Fair   Standing Balance (Static) Fair -   Standing Balance (Dynamic) Fair -   Weight Shift Sitting Fair   Weight Shift Standing Fair   Skilled Intervention Verbal Cuing;Sequencing   Comments CGA to min A, no AD   Bed Mobility    Supine to Sit Supervised   Scooting Supervised   Rolling Refused   Skilled Intervention Verbal Cuing;Sequencing   Comments pt did not follow cues for log roll/bed mobility   Gait Analysis   Gait Level Of Assist Minimal Assist   Assistive Device None   Distance (Feet) 15   # of Times Distance was Traveled 1   Deviation Bradykinetic;Increased Base Of Support   Skilled Intervention Verbal Cuing   Comments pt moves at own pace   Functional Mobility   Sit to Stand Standby Assist   Tub / Shower Transfers Minimal Assist   Mobility bed > walk to bathroom   Skilled Intervention Verbal Cuing   How much difficulty does the patient currently have...   Turning over in bed (including adjusting bedclothes, sheets and blankets)? 3   Sitting down on and standing up from a chair with arms (e.g., wheelchair, bedside commode, etc.) 3   Moving from lying on back to sitting on the side of the bed? 3   How much help from another person does the patient currently need...   Moving to and from a bed to a chair (including a wheelchair)? 3   Need to walk in a hospital room? 3   Climbing 3-5 steps with a railing? 2   6 clicks Mobility Score 17   Short Term Goals    Short Term Goal # 1 bed mobility at SPV level by tx 6   Goal Outcome # 1 Goal met   Short Term Goal # 2 pt will transfer bed to chair with LRAD at CGA level by tx 6   Goal Outcome # 2 Goal not met   Short Term Goal # 3 Pt will ambulate for 50 ft with LRAD with CGA by tx 6   Goal Outcome # 3 Goal not met   Education Group   Education Provided Role of Physical Therapist   Role of Physical Therapist Patient Response Patient;Acceptance;Explanation;Verbal Demonstration   Physical Therapy  Treatment Plan   Physical Therapy Treatment Plan Modify Current Treatment Plan   Treatment Plan  Bed Mobility;Gait Training;Neuro Re-Education / Balance;Stair Training;Therapeutic Activities;Therapeutic Exercise   Treatment Frequency 4 Times per Week   Duration Until Therapy Goals Met   Anticipated Discharge Equipment and Recommendations   DC Equipment Recommendations Unable to determine at this time   Discharge Recommendations Recommend post-acute placement for additional physical therapy services prior to discharge home   Interdisciplinary Plan of Care Collaboration   IDT Collaboration with  Nursing   Patient Position at End of Therapy Seated  (seated on shower bench with nursing)   Collaboration Comments RN updated   Session Information   Date / Session Number  6/8- 2 (2/4, 6/11)

## 2023-06-08 NOTE — CARE PLAN
The patient is Stable - Low risk of patient condition declining or worsening    Problem: Knowledge Deficit - Standard  Goal: Patient and family/care givers will demonstrate understanding of plan of care, disease process/condition, diagnostic tests and medications  Outcome: Progressing     Problem: Pain - Standard  Goal: Alleviation of pain or a reduction in pain to the patient’s comfort goal  Outcome: Progressing     Shift Goals  Clinical Goals: safety, pain control, Legal hold, rest  Patient Goals: rest  Family Goals: REKHA    Progress made toward(s) clinical / shift goals:  Patient's pain managed per MAR. Patient able to rest during this shift. Sitter at bedside for active legal hold.    Patient is not progressing towards the following goals:

## 2023-06-08 NOTE — PROGRESS NOTES
"      Orthopaedic Progress Note    Interval changes:  Patient doing well   LUE cast CDI  Cleared for DC to facility vs shelter by ortho pending trauma clearance    ROS - Patient denies any new issues.  Pain well controlled.    /81   Pulse 97   Temp 36.5 °C (97.7 °F) (Temporal)   Resp 17   Ht 1.803 m (5' 10.98\")   Wt 108 kg (238 lb 12.1 oz)   SpO2 99%     Patient seen and examined  No acute distress  Breathing non labored  RRR  LUE in long arm cast CDI, DNVI, moves all fingers, cap refill <2 sec.     Recent Labs     06/06/23  0128 06/07/23  0702 06/08/23  0946   WBC 19.1* 18.9* 21.0*   RBC 2.78* 2.96* 3.26*   HEMOGLOBIN 8.2* 8.9* 9.6*   HEMATOCRIT 26.1* 27.1* 30.1*   MCV 93.9 91.6 92.3   MCH 29.5 30.1 29.4   MCHC 31.4* 32.8 31.9*   RDW 47.9 42.7 42.4   PLATELETCT 590* 710* 894*   MPV 9.5 9.4 9.7       Active Hospital Problems    Diagnosis     Psychiatric diagnosis [F99]      Priority: High    Dysphagia, oropharyngeal [R13.12]      Priority: Medium    Acute blood loss anemia [D62]      Priority: Medium    Left pulmonary contusion [S27.321A]      Priority: Medium    Illicit drug use [F19.90]      Priority: Medium    Open fracture dislocation of left elbow joint [S42.402B]      Priority: Medium    Open skull fracture (HCC) [S02.91XB]      Priority: Medium    Spleen laceration, initial encounter [S36.039A]      Priority: Medium    Major laceration of kidney, left, initial encounter [S37.062A]      Priority: Medium    Closed fracture of multiple ribs of left side [S22.42XA]      Priority: Medium    Rotatory subluxation of atlantoaxial joint [S13.120A]      Priority: Medium    No contraindication to deep vein thrombosis (DVT) prophylaxis [Z78.9]      Priority: Medium    Liver laceration, initial encounter [S36.113A]      Priority: Medium    Trauma [T14.90XA]      Priority: Low    Abrasion, multiple sites [T07.XXXA]      Priority: Low    Traumatic hemopneumothorax, initial encounter [S27.2XXA]      Priority: Low "    Laceration of right lower extremity [S86.199L]      Priority: Low       Assessment/Plan:  Patient doing well   LUE cast CDI  Cleared for DC to facility vs shelter by ortho pending trauma clearance  POD#7 S/P:  1.  Irrigation and debridement open fracture   2.  Open reduction internal fixation left distal humerus lateral condyle fracture   3.  Open treatment acute elbow dislocation  Wt bearing status - NWB LUE  Wound care/Drains - cast left in place  Future Procedures - none planned   Lovenox: Start 6/6, Duration-until ambulatory > 150'  Sutures/Staples out- 14-21 days post operatively with cast exchange  PT/OT-initiated  Antibiotics: Perioperative completed  DVT Prophylaxis- TEDS/SCDs/Foot pumps  Edwards-not needed per ortho  Case Coordination for Discharge Planning - Disposition per therapy recs.

## 2023-06-08 NOTE — PROGRESS NOTES
4 Eyes Skin Assessment Completed by RENAE Shields and RENAE Mcneal.    Head Scab, Bruising, Swelling, Redness, and Incision with staples  Ears WDL  Nose WDL  Mouth WDL  Neck Bruising, C-collar in place  Breast/Chest Redness and Bruising  Shoulder Blades Redness  Spine WDL  (R) Arm/Elbow/Hand Redness, Blanching, and Rash  (L) Arm/Elbow/Hand unable to assess, cast in place  Abdomen Incision with staples, JEROME  Groin WDL  Scrotum/Coccyx/Buttocks Redness and Blanching to coccyx; scrotal swelling  (R) Leg Incision with staples  (L) Leg Redness  (R) Heel/Foot/Toe Redness and Blanching  (L) Heel/Foot/Toe Redness and Blanching          Devices In Places Blood Pressure Cuff, Pulse Ox, and SCD's, C-collar      Interventions In Place Gray Ear Foams, NC W/Ear Foams, Q2 Turns, and Low Air Loss Mattress    Possible Skin Injury No    Pictures Uploaded Into Epic N/A  Wound Consult Placed N/A  RN Wound Prevention Protocol Ordered Yes; low Jay interventions in place
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
## 2023-06-09 ENCOUNTER — APPOINTMENT (OUTPATIENT)
Dept: RADIOLOGY | Facility: MEDICAL CENTER | Age: 28
DRG: 957 | End: 2023-06-09
Payer: MEDICAID

## 2023-06-09 LAB
ALBUMIN SERPL BCP-MCNC: 3.1 G/DL (ref 3.2–4.9)
ALBUMIN/GLOB SERPL: 0.9 G/DL
ALP SERPL-CCNC: 186 U/L (ref 30–99)
ALT SERPL-CCNC: 110 U/L (ref 2–50)
ANION GAP SERPL CALC-SCNC: 9 MMOL/L (ref 7–16)
ANISOCYTOSIS BLD QL SMEAR: ABNORMAL
AST SERPL-CCNC: 62 U/L (ref 12–45)
BASOPHILS # BLD AUTO: 0 % (ref 0–1.8)
BASOPHILS # BLD: 0 K/UL (ref 0–0.12)
BILIRUB SERPL-MCNC: 0.5 MG/DL (ref 0.1–1.5)
BUN SERPL-MCNC: 12 MG/DL (ref 8–22)
CALCIUM ALBUM COR SERPL-MCNC: 9.3 MG/DL (ref 8.5–10.5)
CALCIUM SERPL-MCNC: 8.6 MG/DL (ref 8.5–10.5)
CHLORIDE SERPL-SCNC: 94 MMOL/L (ref 96–112)
CO2 SERPL-SCNC: 27 MMOL/L (ref 20–33)
CREAT SERPL-MCNC: 0.84 MG/DL (ref 0.5–1.4)
EOSINOPHIL # BLD AUTO: 0 K/UL (ref 0–0.51)
EOSINOPHIL NFR BLD: 0 % (ref 0–6.9)
ERYTHROCYTE [DISTWIDTH] IN BLOOD BY AUTOMATED COUNT: 42.8 FL (ref 35.9–50)
GFR SERPLBLD CREATININE-BSD FMLA CKD-EPI: 123 ML/MIN/1.73 M 2
GLOBULIN SER CALC-MCNC: 3.3 G/DL (ref 1.9–3.5)
GLUCOSE SERPL-MCNC: 109 MG/DL (ref 65–99)
HCT VFR BLD AUTO: 29.3 % (ref 42–52)
HGB BLD-MCNC: 9.5 G/DL (ref 14–18)
LYMPHOCYTES # BLD AUTO: 3 K/UL (ref 1–4.8)
LYMPHOCYTES NFR BLD: 13 % (ref 22–41)
MANUAL DIFF BLD: NORMAL
MCH RBC QN AUTO: 29.4 PG (ref 27–33)
MCHC RBC AUTO-ENTMCNC: 32.4 G/DL (ref 32.3–36.5)
MCV RBC AUTO: 90.7 FL (ref 81.4–97.8)
METAMYELOCYTES NFR BLD MANUAL: 1 %
MONOCYTES # BLD AUTO: 0.92 K/UL (ref 0–0.85)
MONOCYTES NFR BLD AUTO: 4 % (ref 0–13.4)
MORPHOLOGY BLD-IMP: NORMAL
MYELOCYTES NFR BLD MANUAL: 1 %
NEUTROPHILS # BLD AUTO: 18.71 K/UL (ref 1.82–7.42)
NEUTROPHILS NFR BLD: 81 % (ref 44–72)
NRBC # BLD AUTO: 0.03 K/UL
NRBC BLD-RTO: 0.1 /100 WBC (ref 0–0.2)
PLATELET # BLD AUTO: 899 K/UL (ref 164–446)
PLATELET BLD QL SMEAR: NORMAL
PMV BLD AUTO: 8.7 FL (ref 9–12.9)
POTASSIUM SERPL-SCNC: 4.8 MMOL/L (ref 3.6–5.5)
PROT SERPL-MCNC: 6.4 G/DL (ref 6–8.2)
RBC # BLD AUTO: 3.23 M/UL (ref 4.7–6.1)
RBC BLD AUTO: PRESENT
SODIUM SERPL-SCNC: 130 MMOL/L (ref 135–145)
WBC # BLD AUTO: 23.1 K/UL (ref 4.8–10.8)

## 2023-06-09 PROCEDURE — 99024 POSTOP FOLLOW-UP VISIT: CPT | Performed by: NURSE PRACTITIONER

## 2023-06-09 PROCEDURE — 80053 COMPREHEN METABOLIC PANEL: CPT

## 2023-06-09 PROCEDURE — 770001 HCHG ROOM/CARE - MED/SURG/GYN PRIV*

## 2023-06-09 PROCEDURE — 700102 HCHG RX REV CODE 250 W/ 637 OVERRIDE(OP): Performed by: NURSE PRACTITIONER

## 2023-06-09 PROCEDURE — A9270 NON-COVERED ITEM OR SERVICE: HCPCS | Performed by: NURSE PRACTITIONER

## 2023-06-09 PROCEDURE — 700111 HCHG RX REV CODE 636 W/ 250 OVERRIDE (IP): Performed by: NURSE PRACTITIONER

## 2023-06-09 PROCEDURE — 97535 SELF CARE MNGMENT TRAINING: CPT

## 2023-06-09 PROCEDURE — 85007 BL SMEAR W/DIFF WBC COUNT: CPT

## 2023-06-09 PROCEDURE — 85025 COMPLETE CBC W/AUTO DIFF WBC: CPT

## 2023-06-09 PROCEDURE — 71045 X-RAY EXAM CHEST 1 VIEW: CPT

## 2023-06-09 PROCEDURE — 97530 THERAPEUTIC ACTIVITIES: CPT

## 2023-06-09 PROCEDURE — 36415 COLL VENOUS BLD VENIPUNCTURE: CPT

## 2023-06-09 RX ORDER — QUETIAPINE FUMARATE 100 MG/1
100 TABLET, FILM COATED ORAL 2 TIMES DAILY
Status: DISCONTINUED | OUTPATIENT
Start: 2023-06-09 | End: 2023-06-13

## 2023-06-09 RX ORDER — HALOPERIDOL 5 MG/ML
5 INJECTION INTRAMUSCULAR EVERY 4 HOURS PRN
Status: DISCONTINUED | OUTPATIENT
Start: 2023-06-09 | End: 2023-06-12

## 2023-06-09 RX ORDER — ENOXAPARIN SODIUM 100 MG/ML
40 INJECTION SUBCUTANEOUS EVERY 12 HOURS
Status: DISCONTINUED | OUTPATIENT
Start: 2023-06-09 | End: 2023-06-30 | Stop reason: HOSPADM

## 2023-06-09 RX ADMIN — HALOPERIDOL LACTATE 5 MG: 5 INJECTION, SOLUTION INTRAMUSCULAR at 20:36

## 2023-06-09 RX ADMIN — OXYCODONE HYDROCHLORIDE 10 MG: 10 TABLET ORAL at 17:15

## 2023-06-09 RX ADMIN — HALOPERIDOL LACTATE 5 MG: 5 INJECTION, SOLUTION INTRAMUSCULAR at 05:12

## 2023-06-09 RX ADMIN — OXYCODONE HYDROCHLORIDE 10 MG: 10 TABLET ORAL at 01:39

## 2023-06-09 RX ADMIN — HALOPERIDOL LACTATE 1 MG: 5 INJECTION, SOLUTION INTRAMUSCULAR at 01:55

## 2023-06-09 RX ADMIN — DOCUSATE SODIUM 100 MG: 50 LIQUID ORAL at 17:14

## 2023-06-09 RX ADMIN — HALOPERIDOL LACTATE 5 MG: 5 INJECTION, SOLUTION INTRAMUSCULAR at 11:17

## 2023-06-09 RX ADMIN — OXYCODONE HYDROCHLORIDE 10 MG: 10 TABLET ORAL at 20:59

## 2023-06-09 RX ADMIN — QUETIAPINE FUMARATE 100 MG: 100 TABLET ORAL at 17:15

## 2023-06-09 RX ADMIN — POLYETHYLENE GLYCOL 3350 1 PACKET: 17 POWDER, FOR SOLUTION ORAL at 17:14

## 2023-06-09 RX ADMIN — QUETIAPINE FUMARATE 50 MG: 25 TABLET ORAL at 05:11

## 2023-06-09 RX ADMIN — OXYCODONE HYDROCHLORIDE 10 MG: 10 TABLET ORAL at 07:55

## 2023-06-09 RX ADMIN — ACETAMINOPHEN 1000 MG: 500 TABLET, FILM COATED ORAL at 19:46

## 2023-06-09 RX ADMIN — ENOXAPARIN SODIUM 40 MG: 100 INJECTION SUBCUTANEOUS at 17:14

## 2023-06-09 RX ADMIN — ENOXAPARIN SODIUM 30 MG: 100 INJECTION SUBCUTANEOUS at 05:11

## 2023-06-09 RX ADMIN — HYDROMORPHONE HYDROCHLORIDE 0.5 MG: 1 INJECTION, SOLUTION INTRAMUSCULAR; INTRAVENOUS; SUBCUTANEOUS at 02:22

## 2023-06-09 ASSESSMENT — PAIN DESCRIPTION - PAIN TYPE
TYPE: ACUTE PAIN
TYPE: ACUTE PAIN;SURGICAL PAIN
TYPE: ACUTE PAIN
TYPE: ACUTE PAIN
TYPE: ACUTE PAIN;SURGICAL PAIN
TYPE: ACUTE PAIN;SURGICAL PAIN
TYPE: ACUTE PAIN
TYPE: ACUTE PAIN
TYPE: ACUTE PAIN;SURGICAL PAIN

## 2023-06-09 ASSESSMENT — COGNITIVE AND FUNCTIONAL STATUS - GENERAL
PERSONAL GROOMING: A LITTLE
SUGGESTED CMS G CODE MODIFIER MOBILITY: CJ
DRESSING REGULAR UPPER BODY CLOTHING: A LITTLE
SUGGESTED CMS G CODE MODIFIER DAILY ACTIVITY: CK
CLIMB 3 TO 5 STEPS WITH RAILING: A LITTLE
MOBILITY SCORE: 21
DRESSING REGULAR LOWER BODY CLOTHING: A LOT
STANDING UP FROM CHAIR USING ARMS: A LITTLE
WALKING IN HOSPITAL ROOM: A LITTLE
TOILETING: A LITTLE
HELP NEEDED FOR BATHING: A LOT
EATING MEALS: A LITTLE
DAILY ACTIVITIY SCORE: 16

## 2023-06-09 ASSESSMENT — GAIT ASSESSMENTS
GAIT LEVEL OF ASSIST: STANDBY ASSIST
DEVIATION: BRADYKINETIC;SHUFFLED GAIT
DISTANCE (FEET): 50

## 2023-06-09 ASSESSMENT — ENCOUNTER SYMPTOMS
ROS GI COMMENTS: BM 6/8
PSYCHIATRIC NEGATIVE: 1
RESPIRATORY NEGATIVE: 1
MYALGIAS: 1
NEUROLOGICAL NEGATIVE: 1
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
## 2023-06-09 NOTE — PROGRESS NOTES
Assumed care of patient at 0645. Bedside report received. Assessment complete.    AA&Ox4. Patient continues to experience auditory hallucinations,word salad and clang associations, and referring to himself in third person. Patient requires frequent reminders, remains highly impulsive. Patient increasingly irritated and aggressive with staff. Security monitoring patient due to HI. See prior note regarding restrain use and de-escalation.     Denies CP/SOB.  Reporting moderate to severe pain. Medicated per MAR. Educated patient regarding pharmacologic and non pharmacologic modalities for pain management.  Skin per flowsheet.  Tolerating regular diet. Denies N/V.  + void. + BM. Last BM 6/8/2023  Upper left extremity non weight bearing in a cast.  Pt ambulates with assistance from staff.     LDA:    IV access 18g LFA, CDI / flushed /SL  Jay:    All needs met at this time. Bed in lowest position and locked Pt calls frequently. Bed low and locked,  Hourly rounding in place.

## 2023-06-09 NOTE — CARE PLAN
The patient is Watcher - Medium risk of patient condition declining or worsening    Shift Goals  Clinical Goals: rest, pain control, safety  Patient Goals: rest  Family Goals: REKHA    Progress made toward(s) clinical / shift goals:  Patient was able to rest intermittently overnight. Patient reported pain to be managed with PRN and scheduled medications. Educated on pharmacological and non pharmacological modalities. 1:1 security sitter at bedside for safety.     Patient is not progressing towards the following goals: Patient did remove C collar despite education. Patient intermittently became verbally aggressive towards staff. Medicated per MAR.
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
## 2023-06-09 NOTE — PROGRESS NOTES
4 Eyes Skin Assessment Completed by RENAE Garcia and RENAE Jimenez.     Head Scab, Bruising, Swelling, Redness, Incision   Ears WDL  Nose WDL  Mouth WDL  Neck Bruising C-Collar in place  Breast/Chest Redness and Bruising  Shoulder Blades Redness, rash  Spine WDL  (R) Arm/Elbow/Hand Redness, Blanching, and Rash  (L) Arm/Elbow/Hand Redness, Blanching, and Rash  Abdomen Incision  Groin WDL  Scrotum/Coccyx/Buttocks Scrotal swelling, redness and blanching to coccyx.   (R) Leg Incision, with staples  (L) Leg Redness  (R) Heel/Foot/Toe Redness, Blanching, and Discoloration  (L) Heel/Foot/Toe Redness, Blanching, and Discoloration              Devices In Places Blood Pressure Cuff, Pulse Ox, and SCD's and C-Collar         Interventions In Place Gray Ear Foams, NC W/Ear Foams, Q2 Turns, and Low Air Loss Mattress     Possible Skin Injury No     Pictures Uploaded Into Epic N/A  Wound Consult Placed N/A  RN Wound Prevention Protocol Ordered Low tamia interventions in place   28-Nov-2017 07:40 28-Nov-2017 15:43

## 2023-06-09 NOTE — PROGRESS NOTES
Bedside report received from NOC RN, assessment completed.     A&O x  2, pt requires frequent reminders, highly impulsive. Continues to make statements about harming or killing staff. 1x1 sitter changed from PSA to security.  Mobility: Moderate assist, with no lower extremity restrictions. Non weight bearing on LUE.   Fall Risk Assessment: high fall risk, bed alarm on, door notifications on  Pain Assessment / Reassessment completed, medication provided per MAR.   Skin: Q2Ts in place, multiple abrasions and bruises throughout, staples to RLE. Staples removed from head LUE cast. C-Collar in place.   Diet: regular   LDA:   IV Access: 18g LFA, CDI/ flushed/ SL/ Infusing      GI/: + void, + flatus, 6/8 LBM  DVT Prophylaxis: Lovenox, SCD in place  Jay Score: 16 see two RN skin note.           Reviewed plan of care with patient, bed in lowest position and locked, pt resting comfortably now, call light within reach, all needs met at this time. Interventions will be executed per plan of care
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
## 2023-06-09 NOTE — PROGRESS NOTES
"2155: Patient removed C-collar and threw it on floor. C-collar reapplied, assessment completed. Denies new onset numbness and tingling in extremities or any additional neuro symptoms.  Medicated per MAR for increasing agitation.     Overnight, patient continued to remove C-collar despite education. This RN educated patient on potential consequences of removing c-collar. Unable to assess learning capability. Patient stated: \"I can't breathe with this thing on.\" SpO2 97% on 1L NC.     0430: This AM patient increasingly agitated. Patient threw yankauer suction across room and shook vitals machine while shouting mild profanities at staff. Proceeded to rip off C-collar for the fourth time tonight. Extensive education provided. Patient did not demonstrate understanding at this time.    "

## 2023-06-09 NOTE — PROGRESS NOTES
Soft bilateral wrist restraints removed.     Reinforced appropriate behavior with nursing staff.    Patient calm and cooperative with plan of care at this time.     Security continuing to sit at bedside.

## 2023-06-09 NOTE — PROGRESS NOTES
4 Eyes Skin Assessment Completed by RENAE Nam and RENAE Camacho.    Head posterior head injury, one staple, dried blood   Ears WDL  Nose WDL  Mouth WDL  Neck WDL  Generalized facial redness  Breast/Chest Redness, Bruising, and Discoloration  Shoulder Blades Redness and Blanching  Spine Redness and Blanching  (R) Arm/Elbow/Hand Redness, Blanching, Bruising, and Abrasion  (L) Arm/Elbow/Hand cast, redness noted along upper cast area with abrasions   Abdomen MLI  Groin scrotal edema   Scrotum/Coccyx/Buttocks abrasion to sacrum, redness and blanching   (R) Leg incision with staples   (L) Leg redness, bruising  (R) Heel/Foot/Toe Redness and Blanching  (L) Heel/Foot/Toe Redness and Blanching    Devices In Places Blood Pressure Cuff, Pulse Ox, Nasal Cannula, and Cervical Collar    Interventions In Place NC W/Ear Foams, TAP System, Pillows, Low Air Loss Mattress, Barrier Cream, Dri-Rolly Pads, and Heels Loaded W/Pillows    Possible Skin Injury No    Pictures Uploaded Into Epic N/A  Wound Consult Placed N/A  RN Wound Prevention Protocol Ordered No

## 2023-06-09 NOTE — DISCHARGE PLANNING
Filed petition to the court via [employer]. Waiting on verified petition.
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
## 2023-06-09 NOTE — THERAPY
"Occupational Therapy  Daily Treatment     Patient Name: Fareed Muhammad  Age:  27 y.o., Sex:  male  Medical Record #: [id number]  Today's Date: 6/9/2023     Precautions  Precautions: Fall Risk, Non Weight Bearing Left Upper Extremity, Cervical Collar  , Spinal / Back Precautions , Swallow Precautions  Comments: L elbow is cast    Assessment    Pt seen for OT tx session. Pt required mod A to don underpants, CGA for functional mobility within room, pt had 1 lala LOB requiring physical assist to correct. Despite max ed on importance of sitting to don/doff underpants pt refused to attempt.  Pt very self directed, unclear what cognitive baseline is at this time. Will continue to follow.     Plan    Treatment Plan Status: (P) Continue Current Treatment Plan  Type of Treatment: Self Care / Activities of Daily Living, Therapeutic Activity  Treatment Frequency: 3 Times per Week  Treatment Duration: Until Therapy Goals Met       Discharge Recommendations: (P) Recommend post-acute placement for additional occupational therapy services prior to discharge home    Subjective    \"Why would I sit to put shorts on!? I told you I need help\"     Objective       06/09/23 1102   Treatment Charges   Charges Yes   OT Self Care / ADL (Units) 1   Total Time Spent   OT Self Care / ADL (Minutes) 20   OT Total Time Spent (Calculated) 20   Precautions   Precautions Fall Risk;Non Weight Bearing Left Upper Extremity;Cervical Collar  ;Spinal / Back Precautions ;Swallow Precautions   Cognition    Cognition / Consciousness X   Level of Consciousness Alert   Ability To Follow Commands 1 Step   Safety Awareness Impaired;Impulsive   Attention Impaired   Sequencing Impaired   Comments very self directed, episodes of eradic behavior, does not appear to internalize new ed regarding safety, would not follow cues regarding safety   Balance   Sitting Balance (Static) Fair   Sitting Balance (Dynamic) Fair   Standing Balance (Static) Fair -   Standing Balance " (Dynamic) Poor +   Weight Shift Sitting Fair   Weight Shift Standing Fair   Skilled Intervention Tactile Cuing;Verbal Cuing;Facilitation   Comments 1 lala LOB, required physical assist to correct, no AD   Bed Mobility    Supine to Sit Supervised   Sit to Supine Supervised   Scooting Supervised   Skilled Intervention Verbal Cuing   Activities of Daily Living   Grooming   (refused)   Upper Body Dressing Moderate Assist  (doffed C-collar w/ SPV, max A to don (behavioral))   Lower Body Dressing Moderate Assist  (donned underpants, refused to attempt in sitting)   Skilled Intervention Verbal Cuing;Tactile Cuing;Facilitation   How much help from another person does the patient currently need...   Putting on and taking off regular lower body clothing? 2   Bathing (including washing, rinsing, and drying)? 2   Toileting, which includes using a toilet, bedpan, or urinal? 3   Putting on and taking off regular upper body clothing? 3   Taking care of personal grooming such as brushing teeth? 3   Eating meals? 3   6 Clicks Daily Activity Score 16   Functional Mobility   Sit to Stand Standby Assist   Bed, Chair, Wheelchair Transfer Contact Guard Assist   Mobility within room w/ SBA   Skilled Intervention Verbal Cuing   Activity Tolerance   Sitting in Chair NT   Sitting Edge of Bed 10 min   Standing 5 min   Patient / Family Goals   Patient / Family Goal #1 To get his c-collar off   Short Term Goals   Short Term Goal # 1 supervised with UB dressing   Goal Outcome # 1 Progressing slower than expected   Short Term Goal # 2 min A with LB dressing   Goal Outcome # 2 Progressing slower than expected   Short Term Goal # 3 min A with ADL txfs   Goal Outcome # 3 Goal met, new goal added   Short Term Goal # 3 B Pt will demo ADL txfs at SPV level   Education Group   Role of Occupational Therapist Patient Response Patient;Acceptance;Explanation;Reinforcement Needed   Occupational Therapy Treatment Plan    O.T. Treatment Plan Continue Current  Treatment Plan   Anticipated Discharge Equipment and Recommendations   Discharge Recommendations Recommend post-acute placement for additional occupational therapy services prior to discharge home   Interdisciplinary Plan of Care Collaboration   IDT Collaboration with  Nursing;Physical Therapist   Patient Position at End of Therapy In Bed  (security sitter in place)   Collaboration Comments Report given   Session Information   Date / Session Number  6/9, 2 (2/3, 6/11)

## 2023-06-09 NOTE — CARE PLAN
The patient is Stable - Low risk of patient condition declining or worsening    Shift Goals  Clinical Goals: safety, pain control, legal hold  Patient Goals: rest, pain control.  Family Goals: REKHA    Progress made toward(s) clinical / shift goals:  pain remains controlled utilizing pharmacologic and nonpharmacologic means, patient remains free from hospital acquired injuries.     Patient is not progressing towards the following goals:      Problem: Knowledge Deficit - Standard  Goal: Patient and family/care givers will demonstrate understanding of plan of care, disease process/condition, diagnostic tests and medications  Outcome: Not Progressing     Patient continues to experience visual and auditory hallucinations, requires frequent reminders and remains uncooperative with the plan of care.

## 2023-06-09 NOTE — PROGRESS NOTES
"      Orthopaedic Progress Note    Interval changes:  Patient doing well   LUE cast CDI  Cleared for DC to facility vs shelter by ortho pending trauma clearance    ROS - Patient denies any new issues.  Pain well controlled.    BP (!) 160/96   Pulse 95   Temp 35.8 °C (96.5 °F) (Temporal)   Resp 18   Ht 1.803 m (5' 10.98\")   Wt 108 kg (238 lb 12.1 oz)   SpO2 97%     Patient seen and examined  No acute distress  Breathing non labored  RRR  LUE in long arm cast CDI, DNVI, moves all fingers, cap refill <2 sec.     Recent Labs     06/07/23  0702 06/08/23  0946 06/09/23  0731   WBC 18.9* 21.0* 23.1*   RBC 2.96* 3.26* 3.23*   HEMOGLOBIN 8.9* 9.6* 9.5*   HEMATOCRIT 27.1* 30.1* 29.3*   MCV 91.6 92.3 90.7   MCH 30.1 29.4 29.4   MCHC 32.8 31.9* 32.4   RDW 42.7 42.4 42.8   PLATELETCT 710* 894* 899*   MPV 9.4 9.7 8.7*       Active Hospital Problems    Diagnosis     Psychiatric diagnosis [F99]      Priority: High    Dysphagia, oropharyngeal [R13.12]      Priority: Medium    Acute blood loss anemia [D62]      Priority: Medium    Left pulmonary contusion [S27.321A]      Priority: Medium    Illicit drug use [F19.90]      Priority: Medium    Open fracture dislocation of left elbow joint [S42.402B]      Priority: Medium    Open skull fracture (HCC) [S02.91XB]      Priority: Medium    Spleen laceration, initial encounter [S36.039A]      Priority: Medium    Major laceration of kidney, left, initial encounter [S37.062A]      Priority: Medium    Closed fracture of multiple ribs of left side [S22.42XA]      Priority: Medium    Rotatory subluxation of atlantoaxial joint [S13.120A]      Priority: Medium    No contraindication to deep vein thrombosis (DVT) prophylaxis [Z78.9]      Priority: Medium    Liver laceration, initial encounter [S36.113A]      Priority: Medium    Trauma [T14.90XA]      Priority: Low    Abrasion, multiple sites [T07.XXXA]      Priority: Low    Traumatic hemopneumothorax, initial encounter [S27.2XXA]      Priority: " Low    Laceration of right lower extremity [S81.080C]      Priority: Low       Assessment/Plan:  Patient doing well   LUE cast CDI  Cleared for DC to facility vs shelter by ortho pending trauma clearance  POD#8 S/P:  1.  Irrigation and debridement open fracture   2.  Open reduction internal fixation left distal humerus lateral condyle fracture   3.  Open treatment acute elbow dislocation  Wt bearing status - NWB LUE  Wound care/Drains - cast left in place  Future Procedures - none planned   Lovenox: Start 6/6, Duration-until ambulatory > 150'  Sutures/Staples out- 14-21 days post operatively with cast exchange  PT/OT-initiated  Antibiotics: Perioperative completed  DVT Prophylaxis- TEDS/SCDs/Foot pumps  Edwards-not needed per ortho  Case Coordination for Discharge Planning - Disposition per therapy recs.

## 2023-06-10 ENCOUNTER — APPOINTMENT (OUTPATIENT)
Dept: RADIOLOGY | Facility: MEDICAL CENTER | Age: 28
DRG: 957 | End: 2023-06-10
Payer: MEDICAID

## 2023-06-10 LAB
ALBUMIN SERPL BCP-MCNC: 3.2 G/DL (ref 3.2–4.9)
ALBUMIN/GLOB SERPL: 0.8 G/DL
ALP SERPL-CCNC: 243 U/L (ref 30–99)
ALT SERPL-CCNC: 148 U/L (ref 2–50)
ANION GAP SERPL CALC-SCNC: 13 MMOL/L (ref 7–16)
ANISOCYTOSIS BLD QL SMEAR: ABNORMAL
AST SERPL-CCNC: 76 U/L (ref 12–45)
BASOPHILS # BLD AUTO: 0 % (ref 0–1.8)
BASOPHILS # BLD: 0 K/UL (ref 0–0.12)
BILIRUB SERPL-MCNC: 0.6 MG/DL (ref 0.1–1.5)
BUN SERPL-MCNC: 14 MG/DL (ref 8–22)
CALCIUM ALBUM COR SERPL-MCNC: 9.6 MG/DL (ref 8.5–10.5)
CALCIUM SERPL-MCNC: 9 MG/DL (ref 8.5–10.5)
CHLORIDE SERPL-SCNC: 96 MMOL/L (ref 96–112)
CO2 SERPL-SCNC: 23 MMOL/L (ref 20–33)
CREAT SERPL-MCNC: 0.76 MG/DL (ref 0.5–1.4)
EOSINOPHIL # BLD AUTO: 0 K/UL (ref 0–0.51)
EOSINOPHIL NFR BLD: 0 % (ref 0–6.9)
ERYTHROCYTE [DISTWIDTH] IN BLOOD BY AUTOMATED COUNT: 46 FL (ref 35.9–50)
GFR SERPLBLD CREATININE-BSD FMLA CKD-EPI: 127 ML/MIN/1.73 M 2
GLOBULIN SER CALC-MCNC: 3.9 G/DL (ref 1.9–3.5)
GLUCOSE SERPL-MCNC: 99 MG/DL (ref 65–99)
HCT VFR BLD AUTO: 33.7 % (ref 42–52)
HGB BLD-MCNC: 10.4 G/DL (ref 14–18)
LYMPHOCYTES # BLD AUTO: 2.88 K/UL (ref 1–4.8)
LYMPHOCYTES NFR BLD: 10 % (ref 22–41)
MACROCYTES BLD QL SMEAR: ABNORMAL
MANUAL DIFF BLD: NORMAL
MCH RBC QN AUTO: 29.1 PG (ref 27–33)
MCHC RBC AUTO-ENTMCNC: 30.9 G/DL (ref 32.3–36.5)
MCV RBC AUTO: 94.4 FL (ref 81.4–97.8)
MONOCYTES # BLD AUTO: 2.02 K/UL (ref 0–0.85)
MONOCYTES NFR BLD AUTO: 7 % (ref 0–13.4)
MORPHOLOGY BLD-IMP: NORMAL
NEUTROPHILS # BLD AUTO: 23.9 K/UL (ref 1.82–7.42)
NEUTROPHILS NFR BLD: 83 % (ref 44–72)
NRBC # BLD AUTO: 0.02 K/UL
NRBC BLD-RTO: 0.1 /100 WBC (ref 0–0.2)
PLATELET # BLD AUTO: 1067 K/UL (ref 164–446)
PLATELET BLD QL SMEAR: NORMAL
PMV BLD AUTO: 9.4 FL (ref 9–12.9)
POLYCHROMASIA BLD QL SMEAR: NORMAL
POTASSIUM SERPL-SCNC: 4.5 MMOL/L (ref 3.6–5.5)
PROT SERPL-MCNC: 7.1 G/DL (ref 6–8.2)
RBC # BLD AUTO: 3.57 M/UL (ref 4.7–6.1)
RBC BLD AUTO: PRESENT
SODIUM SERPL-SCNC: 132 MMOL/L (ref 135–145)
WBC # BLD AUTO: 28.8 K/UL (ref 4.8–10.8)

## 2023-06-10 PROCEDURE — 94640 AIRWAY INHALATION TREATMENT: CPT

## 2023-06-10 PROCEDURE — 85025 COMPLETE CBC W/AUTO DIFF WBC: CPT

## 2023-06-10 PROCEDURE — 85007 BL SMEAR W/DIFF WBC COUNT: CPT

## 2023-06-10 PROCEDURE — 71045 X-RAY EXAM CHEST 1 VIEW: CPT

## 2023-06-10 PROCEDURE — 770001 HCHG ROOM/CARE - MED/SURG/GYN PRIV*

## 2023-06-10 PROCEDURE — 700111 HCHG RX REV CODE 636 W/ 250 OVERRIDE (IP): Performed by: NURSE PRACTITIONER

## 2023-06-10 PROCEDURE — A9270 NON-COVERED ITEM OR SERVICE: HCPCS | Performed by: NURSE PRACTITIONER

## 2023-06-10 PROCEDURE — 99232 SBSQ HOSP IP/OBS MODERATE 35: CPT

## 2023-06-10 PROCEDURE — 700102 HCHG RX REV CODE 250 W/ 637 OVERRIDE(OP): Performed by: NURSE PRACTITIONER

## 2023-06-10 PROCEDURE — 80053 COMPREHEN METABOLIC PANEL: CPT

## 2023-06-10 PROCEDURE — 36415 COLL VENOUS BLD VENIPUNCTURE: CPT

## 2023-06-10 RX ORDER — IPRATROPIUM BROMIDE AND ALBUTEROL SULFATE 2.5; .5 MG/3ML; MG/3ML
3 SOLUTION RESPIRATORY (INHALATION)
Status: DISCONTINUED | OUTPATIENT
Start: 2023-06-10 | End: 2023-06-30 | Stop reason: HOSPADM

## 2023-06-10 RX ADMIN — QUETIAPINE FUMARATE 100 MG: 100 TABLET ORAL at 04:37

## 2023-06-10 RX ADMIN — OXYCODONE HYDROCHLORIDE 10 MG: 10 TABLET ORAL at 16:17

## 2023-06-10 RX ADMIN — HALOPERIDOL LACTATE 5 MG: 5 INJECTION, SOLUTION INTRAMUSCULAR at 16:07

## 2023-06-10 RX ADMIN — OXYCODONE HYDROCHLORIDE 5 MG: 5 TABLET ORAL at 08:30

## 2023-06-10 RX ADMIN — QUETIAPINE FUMARATE 100 MG: 100 TABLET ORAL at 16:17

## 2023-06-10 RX ADMIN — ENOXAPARIN SODIUM 40 MG: 100 INJECTION SUBCUTANEOUS at 16:15

## 2023-06-10 RX ADMIN — OXYCODONE HYDROCHLORIDE 10 MG: 10 TABLET ORAL at 02:10

## 2023-06-10 RX ADMIN — ENOXAPARIN SODIUM 40 MG: 100 INJECTION SUBCUTANEOUS at 04:37

## 2023-06-10 RX ADMIN — ACETAMINOPHEN 1000 MG: 500 TABLET, FILM COATED ORAL at 20:19

## 2023-06-10 ASSESSMENT — PAIN DESCRIPTION - PAIN TYPE
TYPE: ACUTE PAIN

## 2023-06-10 NOTE — CARE PLAN
The patient is Watcher - Medium risk of patient condition declining or worsening    Shift Goals  Clinical Goals: rest, pain control, safety  Patient Goals: rest, pain control, take off c-collar  Family Goals: REKHA    Progress made toward(s) clinical / shift goals:  Pain was medicated for patient per MAR parameters. Bed frame alarm is active for patient safety. Safety sitter remains at bedside.     Problem: Pain - Standard  Goal: Alleviation of pain or a reduction in pain to the patient’s comfort goal  Outcome: Progressing     Problem: Fall Risk  Goal: Patient will remain free from falls  Outcome: Progressing       Patient is not progressing towards the following goals: Patient endorsed SI/HI this morning (see other progress note). Safety sitter remains at bedside and precautions remain in place.     Problem: Knowledge Deficit - Standard  Goal: Patient and family/care givers will demonstrate understanding of plan of care, disease process/condition, diagnostic tests and medications  Outcome: Not Progressing

## 2023-06-10 NOTE — PROGRESS NOTES
1:1 security sitter for legal hold r/t HI.   Patient currently denies any current auditory and visual hallucinations at this time. Seen talking to space in room.   Patient denies any thoughts of harming self or others at this time.   Assumed care of patient at 1845. Bedside report received. Assessment complete.  AA&Ox3-4. Able to state name, place, situation, and year. Believes its May 2023.   Patient reporting SOB. Patient believes c-collar is causing SOB. SpO2 97% on RA.   Reporting 4/10 pain. Medicated per MAR.  Educated patient regarding pharmacologic and non pharmacologic modalities for pain management.  Skin per flowsheet.  Tolerating regular diet. Denies N/V at this time.   + void. + BM. Last BM 6/9.   Pt ambulates with SBA.  HOB >30 per order.   LUE casted. NWB per order.   C-collar to be on at all times per order. Patient attempting to remove C-collar at this time. Extensive education provided. No evidence of learning at this time.   All needs met at this time. Call light within reach. Pt calls appropriately. Bed low and locked, non skid socks in place. Hourly rounding in place.

## 2023-06-10 NOTE — CARE PLAN
The patient is Watcher - Medium risk of patient condition declining or worsening    Shift Goals  Clinical Goals: rest, pain control, safety  Patient Goals: rest, pain control, take off c-collar  Family Goals: REKHA    Progress made toward(s) clinical / shift goals:  Patient was able to rest intermittently overnight. Patient reported pain to be managed with PRN and scheduled medications. Educated on pharmacological and non pharmacological modalities. Patient remained safe and free from harm overnight. 1:1 security sitter in place for patient and staff safety.     Patient is not progressing towards the following goals: Patient attempted to removed c-collar once overnight. Education provided. Patient unable to demonstrate adequate understanding.

## 2023-06-10 NOTE — PROGRESS NOTES
"      Orthopaedic Progress Note    Interval changes:  Patient doing well   LUE cast CDI  Cleared for DC to facility vs shelter by ortho pending trauma clearance    ROS - Patient denies any new issues.  Pain well controlled.    /81   Pulse 97   Temp 36.9 °C (98.4 °F) (Temporal)   Resp 17   Ht 1.803 m (5' 10.98\")   Wt 108 kg (238 lb 12.1 oz)   SpO2 93%     Patient seen and examined  No acute distress  Breathing non labored  RRR  LUE in long arm cast CDI, DNVI, moves all fingers, cap refill <2 sec.     Recent Labs     06/08/23  0946 06/09/23  0731 06/10/23  0509   WBC 21.0* 23.1* 28.8*   RBC 3.26* 3.23* 3.57*   HEMOGLOBIN 9.6* 9.5* 10.4*   HEMATOCRIT 30.1* 29.3* 33.7*   MCV 92.3 90.7 94.4   MCH 29.4 29.4 29.1   MCHC 31.9* 32.4 30.9*   RDW 42.4 42.8 46.0   PLATELETCT 894* 899* 1067*   MPV 9.7 8.7* 9.4         Active Hospital Problems    Diagnosis     Psychiatric diagnosis [F99]     Dysphagia, oropharyngeal [R13.12]     Acute blood loss anemia [D62]     Left pulmonary contusion [S27.321A]     Illicit drug use [F19.90]     Trauma [T14.90XA]     Open fracture dislocation of left elbow joint [S42.402B]     Open skull fracture (HCC) [S02.91XB]     Abrasion, multiple sites [T07.XXXA]     Traumatic hemopneumothorax, initial encounter [S27.2XXA]     Spleen laceration, initial encounter [S36.039A]     Major laceration of kidney, left, initial encounter [S37.062A]     Closed fracture of multiple ribs of left side [S22.42XA]     Rotatory subluxation of atlantoaxial joint [S13.120A]     No contraindication to deep vein thrombosis (DVT) prophylaxis [Z78.9]     Liver laceration, initial encounter [S36.113A]     Laceration of right lower extremity [S81.811A]        Assessment/Plan:    Cleared for DC to facility vs shelter by ortho pending trauma clearance  POD#9 S/P:  1.  Irrigation and debridement open fracture   2.  Open reduction internal fixation left distal humerus lateral condyle fracture   3.  Open treatment acute " elbow dislocation    Wt bearing status - NWB LUE  Wound care/Drains - cast left in place  Future Procedures - none planned   Lovenox: Start 6/6, Duration-until ambulatory > 150'  Sutures/Staples out- 14-21 days post operatively with cast exchange  PT/OT-initiated  Antibiotics: Perioperative completed  DVT Prophylaxis- TEDS/SCDs/Foot pumps  Edwards-not needed per ortho  Case Coordination for Discharge Planning - Disposition per therapy recs.
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
## 2023-06-10 NOTE — PROGRESS NOTES
Received phone call regarding critical lab value for a platelet count of 1067, increased from 899.   Notified on call trauma Frida CASTAÑEDA of the value above.
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
## 2023-06-10 NOTE — PROGRESS NOTES
Family calling this RN regarding updates on patient. No orders regarding updates. Advised to call back at later time.     X3 visitors attempted to see patient. Notified visitors regarding visitor policy for patient. Visitors verbalized understanding.

## 2023-06-10 NOTE — PROGRESS NOTES
Patient became very agitated. Patient removed his cervical collar several times despite redirection and education pertaining to safety. Patient became verbally aggressive with security personnel. Patient was given 5mg IV haldol. Patient permitted administration of scheduled enoxaparin, and quetiapine. Pain medicated per MAR. Security safety sitter remains present.
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
## 2023-06-10 NOTE — PROGRESS NOTES
4 Eyes Skin Assessment Completed by RENAE Garcia and RENAE Jimenez.    Head Scab, Bruising, and Scratch  Ears WDL  Nose WDL  Mouth WDL  Neck Bruising, C-Collar in place  Breast/Chest Redness and Bruising  Shoulder Blades Rash  Spine WDL  (R) Arm/Elbow/Hand Redness, Blanching, and Rash  (L) Arm/Elbow/Hand Redness and Blanching  Abdomen redness, bruising, midline incision with staples.  Groin WDL  Scrotum/Coccyx/Buttocks, scrotal swelling. Redness, Excoriation, and Discoloration  (R) Leg Redness, Scab, and Bruising,   (L) Leg Redness and Bruising  (R) Heel/Foot/Toe Redness and Discoloration  (L) Heel/Foot/Toe Redness and Discoloration          Devices In Places Blood Pressure Cuff, Pulse Ox, and Cervical Collar, Left arm cast.       Interventions In Place Pillows, Low Air Loss Mattress, and Dri-Rolly Pads    Possible Skin Injury No    Pictures Uploaded Into Epic N/A  Wound Consult Placed N/A  RN Wound Prevention Protocol Ordered Yes

## 2023-06-10 NOTE — PROGRESS NOTES
"Assisted with boosting patient up higher in the bed. When asking if patient wanted a pillow, patient stated \"I want a pistol\" and mimed placing a gun in his mouth and firing. Patient also stated \"I want to shoot my sister.\"  All SI/HI precautions remain in place and safety sitter remains continuously present. Notified CN Bec.   "

## 2023-06-10 NOTE — PROGRESS NOTES
Report received from Viv MACDONALD and care of patient assumed. Observed patient sleeping in bed. Cervical collar observed to be in place. Left arm cast CDI. Safety sitter is present due to legal hold for HI. Precautions remain in place.

## 2023-06-11 ENCOUNTER — APPOINTMENT (OUTPATIENT)
Dept: RADIOLOGY | Facility: MEDICAL CENTER | Age: 28
DRG: 957 | End: 2023-06-11
Payer: MEDICAID

## 2023-06-11 PROBLEM — R13.12 DYSPHAGIA, OROPHARYNGEAL: Status: RESOLVED | Noted: 2023-06-05 | Resolved: 2023-06-11

## 2023-06-11 PROBLEM — E87.1 HYPONATREMIA: Status: ACTIVE | Noted: 2023-06-11

## 2023-06-11 PROBLEM — T07.XXXA ABRASION, MULTIPLE SITES: Status: RESOLVED | Noted: 2023-05-31 | Resolved: 2023-06-11

## 2023-06-11 LAB
ALBUMIN SERPL BCP-MCNC: 3.3 G/DL (ref 3.2–4.9)
ALBUMIN/GLOB SERPL: 0.9 G/DL
ALP SERPL-CCNC: 234 U/L (ref 30–99)
ALT SERPL-CCNC: 101 U/L (ref 2–50)
ANION GAP SERPL CALC-SCNC: 13 MMOL/L (ref 7–16)
AST SERPL-CCNC: 29 U/L (ref 12–45)
BASOPHILS # BLD AUTO: 0.3 % (ref 0–1.8)
BASOPHILS # BLD: 0.1 K/UL (ref 0–0.12)
BILIRUB SERPL-MCNC: 0.5 MG/DL (ref 0.1–1.5)
BUN SERPL-MCNC: 12 MG/DL (ref 8–22)
CALCIUM ALBUM COR SERPL-MCNC: 9.1 MG/DL (ref 8.5–10.5)
CALCIUM SERPL-MCNC: 8.5 MG/DL (ref 8.5–10.5)
CHLORIDE SERPL-SCNC: 88 MMOL/L (ref 96–112)
CO2 SERPL-SCNC: 21 MMOL/L (ref 20–33)
CREAT SERPL-MCNC: 0.66 MG/DL (ref 0.5–1.4)
EOSINOPHIL # BLD AUTO: 0.02 K/UL (ref 0–0.51)
EOSINOPHIL NFR BLD: 0.1 % (ref 0–6.9)
ERYTHROCYTE [DISTWIDTH] IN BLOOD BY AUTOMATED COUNT: 42.8 FL (ref 35.9–50)
GFR SERPLBLD CREATININE-BSD FMLA CKD-EPI: 132 ML/MIN/1.73 M 2
GLOBULIN SER CALC-MCNC: 3.7 G/DL (ref 1.9–3.5)
GLUCOSE SERPL-MCNC: 115 MG/DL (ref 65–99)
HCT VFR BLD AUTO: 30 % (ref 42–52)
HGB BLD-MCNC: 10.1 G/DL (ref 14–18)
IMM GRANULOCYTES # BLD AUTO: 1.04 K/UL (ref 0–0.11)
IMM GRANULOCYTES NFR BLD AUTO: 3.6 % (ref 0–0.9)
LYMPHOCYTES # BLD AUTO: 2.27 K/UL (ref 1–4.8)
LYMPHOCYTES NFR BLD: 7.8 % (ref 22–41)
MCH RBC QN AUTO: 29.6 PG (ref 27–33)
MCHC RBC AUTO-ENTMCNC: 33.7 G/DL (ref 32.3–36.5)
MCV RBC AUTO: 88 FL (ref 81.4–97.8)
MONOCYTES # BLD AUTO: 2.39 K/UL (ref 0–0.85)
MONOCYTES NFR BLD AUTO: 8.2 % (ref 0–13.4)
NEUTROPHILS # BLD AUTO: 23.31 K/UL (ref 1.82–7.42)
NEUTROPHILS NFR BLD: 80 % (ref 44–72)
NRBC # BLD AUTO: 0 K/UL
NRBC BLD-RTO: 0 /100 WBC (ref 0–0.2)
PLATELET # BLD AUTO: 1117 K/UL (ref 164–446)
PMV BLD AUTO: 9.3 FL (ref 9–12.9)
POTASSIUM SERPL-SCNC: 3.8 MMOL/L (ref 3.6–5.5)
PROT SERPL-MCNC: 7 G/DL (ref 6–8.2)
RBC # BLD AUTO: 3.41 M/UL (ref 4.7–6.1)
SODIUM SERPL-SCNC: 122 MMOL/L (ref 135–145)
SODIUM SERPL-SCNC: 123 MMOL/L (ref 135–145)
WBC # BLD AUTO: 29.1 K/UL (ref 4.8–10.8)

## 2023-06-11 PROCEDURE — 700102 HCHG RX REV CODE 250 W/ 637 OVERRIDE(OP)

## 2023-06-11 PROCEDURE — 770001 HCHG ROOM/CARE - MED/SURG/GYN PRIV*

## 2023-06-11 PROCEDURE — 700102 HCHG RX REV CODE 250 W/ 637 OVERRIDE(OP): Performed by: NURSE PRACTITIONER

## 2023-06-11 PROCEDURE — A9270 NON-COVERED ITEM OR SERVICE: HCPCS | Performed by: NURSE PRACTITIONER

## 2023-06-11 PROCEDURE — 71045 X-RAY EXAM CHEST 1 VIEW: CPT

## 2023-06-11 PROCEDURE — 85025 COMPLETE CBC W/AUTO DIFF WBC: CPT

## 2023-06-11 PROCEDURE — 36415 COLL VENOUS BLD VENIPUNCTURE: CPT

## 2023-06-11 PROCEDURE — A9270 NON-COVERED ITEM OR SERVICE: HCPCS | Performed by: PSYCHIATRY & NEUROLOGY

## 2023-06-11 PROCEDURE — 99233 SBSQ HOSP IP/OBS HIGH 50: CPT

## 2023-06-11 PROCEDURE — 700102 HCHG RX REV CODE 250 W/ 637 OVERRIDE(OP): Performed by: PSYCHIATRY & NEUROLOGY

## 2023-06-11 PROCEDURE — 84295 ASSAY OF SERUM SODIUM: CPT

## 2023-06-11 PROCEDURE — A9270 NON-COVERED ITEM OR SERVICE: HCPCS

## 2023-06-11 PROCEDURE — 80053 COMPREHEN METABOLIC PANEL: CPT

## 2023-06-11 PROCEDURE — 99024 POSTOP FOLLOW-UP VISIT: CPT | Performed by: ORTHOPAEDIC SURGERY

## 2023-06-11 PROCEDURE — 700111 HCHG RX REV CODE 636 W/ 250 OVERRIDE (IP): Performed by: NURSE PRACTITIONER

## 2023-06-11 PROCEDURE — 700105 HCHG RX REV CODE 258

## 2023-06-11 RX ORDER — DIVALPROEX SODIUM 500 MG/1
500 TABLET, DELAYED RELEASE ORAL EVERY 12 HOURS
Status: DISCONTINUED | OUTPATIENT
Start: 2023-06-11 | End: 2023-06-30 | Stop reason: HOSPADM

## 2023-06-11 RX ORDER — DIVALPROEX SODIUM 500 MG/1
500 TABLET, DELAYED RELEASE ORAL EVERY 12 HOURS
Status: DISCONTINUED | OUTPATIENT
Start: 2023-06-11 | End: 2023-06-11

## 2023-06-11 RX ORDER — OXYCODONE HYDROCHLORIDE 10 MG/1
10 TABLET ORAL EVERY 4 HOURS PRN
Status: DISCONTINUED | OUTPATIENT
Start: 2023-06-11 | End: 2023-06-19

## 2023-06-11 RX ORDER — OXYCODONE HYDROCHLORIDE 5 MG/1
5 TABLET ORAL EVERY 4 HOURS PRN
Status: DISCONTINUED | OUTPATIENT
Start: 2023-06-11 | End: 2023-06-19

## 2023-06-11 RX ORDER — SODIUM CHLORIDE 9 MG/ML
INJECTION, SOLUTION INTRAVENOUS CONTINUOUS
Status: DISCONTINUED | OUTPATIENT
Start: 2023-06-11 | End: 2023-06-16

## 2023-06-11 RX ORDER — SODIUM CHLORIDE 1 G/1
1 TABLET ORAL
Status: DISCONTINUED | OUTPATIENT
Start: 2023-06-11 | End: 2023-06-11

## 2023-06-11 RX ADMIN — OXYCODONE HYDROCHLORIDE 10 MG: 10 TABLET ORAL at 17:00

## 2023-06-11 RX ADMIN — QUETIAPINE FUMARATE 100 MG: 100 TABLET ORAL at 17:01

## 2023-06-11 RX ADMIN — ENOXAPARIN SODIUM 40 MG: 100 INJECTION SUBCUTANEOUS at 05:07

## 2023-06-11 RX ADMIN — HALOPERIDOL LACTATE 5 MG: 5 INJECTION, SOLUTION INTRAMUSCULAR at 20:27

## 2023-06-11 RX ADMIN — OXYCODONE HYDROCHLORIDE 10 MG: 10 TABLET ORAL at 20:55

## 2023-06-11 RX ADMIN — QUETIAPINE FUMARATE 100 MG: 100 TABLET ORAL at 05:08

## 2023-06-11 RX ADMIN — ENOXAPARIN SODIUM 40 MG: 100 INJECTION SUBCUTANEOUS at 17:01

## 2023-06-11 RX ADMIN — DIVALPROEX SODIUM 500 MG: 500 TABLET, DELAYED RELEASE ORAL at 09:21

## 2023-06-11 RX ADMIN — OXYCODONE HYDROCHLORIDE 10 MG: 10 TABLET ORAL at 09:21

## 2023-06-11 RX ADMIN — DIVALPROEX SODIUM 500 MG: 500 TABLET, DELAYED RELEASE ORAL at 17:00

## 2023-06-11 RX ADMIN — SODIUM CHLORIDE: 9 INJECTION, SOLUTION INTRAVENOUS at 10:59

## 2023-06-11 ASSESSMENT — PAIN DESCRIPTION - PAIN TYPE
TYPE: ACUTE PAIN
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
## 2023-06-11 NOTE — PROGRESS NOTES
"      Orthopaedic Progress Note    Interval changes:  Patient doing well   LUE cast CDI  Cleared for DC to facility vs shelter by ortho pending trauma clearance    ROS - Patient denies any new issues.  Pain well controlled.    BP (!) 153/92   Pulse 95   Temp 37 °C (98.6 °F) (Temporal)   Resp 17   Ht 1.803 m (5' 10.98\")   Wt 108 kg (238 lb 12.1 oz)   SpO2 96%     Patient seen and examined  No acute distress  Breathing non labored  RRR  LUE in long arm cast CDI, DNVI, moves all fingers, cap refill <2 sec.     Recent Labs     06/09/23  0731 06/10/23  0509 06/11/23  0508   WBC 23.1* 28.8* 29.1*   RBC 3.23* 3.57* 3.41*   HEMOGLOBIN 9.5* 10.4* 10.1*   HEMATOCRIT 29.3* 33.7* 30.0*   MCV 90.7 94.4 88.0   MCH 29.4 29.1 29.6   MCHC 32.4 30.9* 33.7   RDW 42.8 46.0 42.8   PLATELETCT 899* 1067* 1117*   MPV 8.7* 9.4 9.3         Active Hospital Problems    Diagnosis     Psychiatric diagnosis [F99]     Acute blood loss anemia [D62]     Left pulmonary contusion [S27.321A]     Illicit drug use [F19.90]     Trauma [T14.90XA]     Open fracture dislocation of left elbow joint [S42.402B]     Open skull fracture (HCC) [S02.91XB]     Abrasion, multiple sites [T07.XXXA]     Traumatic hemopneumothorax, initial encounter [S27.2XXA]     Spleen laceration, initial encounter [S36.039A]     Major laceration of kidney, left, initial encounter [S37.062A]     Closed fracture of multiple ribs of left side [S22.42XA]     Rotatory subluxation of atlantoaxial joint [S13.120A]     No contraindication to deep vein thrombosis (DVT) prophylaxis [Z78.9]     Liver laceration, initial encounter [S36.113A]     Laceration of right lower extremity [S81.811A]        Assessment/Plan:    Cleared for DC to facility vs shelter by ortho pending trauma clearance  POD#10 S/P:  1.  Irrigation and debridement open fracture   2.  Open reduction internal fixation left distal humerus lateral condyle fracture   3.  Open treatment acute elbow dislocation    Wt bearing " status - NWB LUE  Wound care/Drains - cast left in place  Future Procedures - none planned   Lovenox: Start 6/6, Duration-until ambulatory > 150'  Sutures/Staples out- 14-21 days post operatively with cast exchange  PT/OT-initiated  Antibiotics: Perioperative completed  DVT Prophylaxis- TEDS/SCDs/Foot pumps  Edwards-not needed per ortho  Case Coordination for Discharge Planning - Disposition per therapy recs.
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
## 2023-06-11 NOTE — ASSESSMENT & PLAN NOTE
6/11 Serum sodium trend down to 122 & chloride 88. Reported diarrhea and lack of oral intake. Suspect hypovolemic hyponatremia.  - Normal saline infusion initiated.  6/15 Serum sodium 131, saline locked.  6/23 Serum sodium trend down, trial Lasix.  6/28 Stop salt tabs  6/29 Sodium trend up  Trend lab studies.

## 2023-06-11 NOTE — CARE PLAN
Problem: Pain - Standard  Goal: Alleviation of pain or a reduction in pain to the patient’s comfort goal  Outcome: Progressing     Problem: Skin Integrity  Goal: Skin integrity is maintained or improved  Outcome: Progressing     Problem: Fall Risk  Goal: Patient will remain free from falls  Outcome: Progressing     The patient is Watcher - Medium risk of patient condition declining or worsening    Shift Goals  Clinical Goals: rest, pain control, safety  Patient Goals: rest, pain control, remove c-collar  Family Goals: REKHA    Progress made toward(s) clinical / shift goals:  Patient remains under a legal hold, with a safety sitter. Morning labs were collected and a critical platelet count was reported to provider, along with a drop in sodium. Patient removed his collar multiple times, and education was reinforced as to the importance of keeping it on.    Patient is not progressing towards the following goals:      Problem: Knowledge Deficit - Standard  Goal: Patient and family/care givers will demonstrate understanding of plan of care, disease process/condition, diagnostic tests and medications  Outcome: Not Progressing

## 2023-06-11 NOTE — CARE PLAN
"The patient is Watcher - Medium risk of patient condition declining or worsening    Shift Goals  Clinical Goals: patient safety  Patient Goals: \"I want to leave\"  Family Goals: not present    Progress made toward(s) clinical / shift goals:  Pain medicated per MAR. Patient is tolerating IVF. Safety sitter remains at bedside.    Patient is not progressing towards the following goals: Patient remains on legal hold.      "

## 2023-06-11 NOTE — PROGRESS NOTES
"      Orthopaedic Progress Note    Interval changes:  Patient doing well   LUE cast CDI  Cleared for DC to facility vs shelter by ortho pending trauma clearance    ROS - Patient denies any new issues.  Pain well controlled.    /82   Pulse (!) 118   Temp 36.8 °C (98.3 °F) (Temporal)   Resp 20   Ht 1.803 m (5' 10.98\")   Wt 108 kg (238 lb 12.1 oz)   SpO2 98%     Patient seen and examined  No acute distress  Breathing non labored  RRR  LUE in long arm cast CDI, DNVI, moves all fingers, cap refill <2 sec.     Recent Labs     06/08/23  0946 06/09/23  0731 06/10/23  0509   WBC 21.0* 23.1* 28.8*   RBC 3.26* 3.23* 3.57*   HEMOGLOBIN 9.6* 9.5* 10.4*   HEMATOCRIT 30.1* 29.3* 33.7*   MCV 92.3 90.7 94.4   MCH 29.4 29.4 29.1   MCHC 31.9* 32.4 30.9*   RDW 42.4 42.8 46.0   PLATELETCT 894* 899* 1067*   MPV 9.7 8.7* 9.4       Active Hospital Problems    Diagnosis     Psychiatric diagnosis [F99]      Priority: High    Dysphagia, oropharyngeal [R13.12]      Priority: Medium    Acute blood loss anemia [D62]      Priority: Medium    Left pulmonary contusion [S27.321A]      Priority: Medium    Illicit drug use [F19.90]      Priority: Medium    Open fracture dislocation of left elbow joint [S42.402B]      Priority: Medium    Open skull fracture (HCC) [S02.91XB]      Priority: Medium    Spleen laceration, initial encounter [S36.039A]      Priority: Medium    Major laceration of kidney, left, initial encounter [S37.062A]      Priority: Medium    Closed fracture of multiple ribs of left side [S22.42XA]      Priority: Medium    Rotatory subluxation of atlantoaxial joint [S13.120A]      Priority: Medium    No contraindication to deep vein thrombosis (DVT) prophylaxis [Z78.9]      Priority: Medium    Liver laceration, initial encounter [S36.113A]      Priority: Medium    Trauma [T14.90XA]      Priority: Low    Abrasion, multiple sites [T07.XXXA]      Priority: Low    Traumatic hemopneumothorax, initial encounter [S27.2XXA]      " Priority: Low    Laceration of right lower extremity [S82.214L]      Priority: Low       Assessment/Plan:  Patient doing well   LUE cast CDI  Cleared for DC to facility vs shelter by ortho pending trauma clearance  POD#9 S/P:  1.  Irrigation and debridement open fracture   2.  Open reduction internal fixation left distal humerus lateral condyle fracture   3.  Open treatment acute elbow dislocation  Wt bearing status - NWB LUE  Wound care/Drains - cast left in place  Future Procedures - none planned   Lovenox: Start 6/6, Duration-until ambulatory > 150'  Sutures/Staples out- 14-21 days post operatively with cast exchange  PT/OT-initiated  Antibiotics: Perioperative completed  DVT Prophylaxis- TEDS/SCDs/Foot pumps  Edwards-not needed per ortho  Case Coordination for Discharge Planning - Disposition per therapy recs.

## 2023-06-12 LAB
ALBUMIN SERPL BCP-MCNC: 2.9 G/DL (ref 3.2–4.9)
ALBUMIN/GLOB SERPL: 0.8 G/DL
ALP SERPL-CCNC: 231 U/L (ref 30–99)
ALT SERPL-CCNC: 79 U/L (ref 2–50)
ANION GAP SERPL CALC-SCNC: 11 MMOL/L (ref 7–16)
ANISOCYTOSIS BLD QL SMEAR: ABNORMAL
AST SERPL-CCNC: 28 U/L (ref 12–45)
BASOPHILS # BLD AUTO: 0 % (ref 0–1.8)
BASOPHILS # BLD: 0 K/UL (ref 0–0.12)
BILIRUB SERPL-MCNC: 0.4 MG/DL (ref 0.1–1.5)
BUN SERPL-MCNC: 10 MG/DL (ref 8–22)
C DIFF DNA SPEC QL NAA+PROBE: NEGATIVE
C DIFF TOX GENS STL QL NAA+PROBE: NEGATIVE
CALCIUM ALBUM COR SERPL-MCNC: 9.3 MG/DL (ref 8.5–10.5)
CALCIUM SERPL-MCNC: 8.4 MG/DL (ref 8.5–10.5)
CHLORIDE SERPL-SCNC: 91 MMOL/L (ref 96–112)
CO2 SERPL-SCNC: 22 MMOL/L (ref 20–33)
CREAT SERPL-MCNC: 0.65 MG/DL (ref 0.5–1.4)
EOSINOPHIL # BLD AUTO: 0 K/UL (ref 0–0.51)
EOSINOPHIL NFR BLD: 0 % (ref 0–6.9)
ERYTHROCYTE [DISTWIDTH] IN BLOOD BY AUTOMATED COUNT: 42.8 FL (ref 35.9–50)
GFR SERPLBLD CREATININE-BSD FMLA CKD-EPI: 133 ML/MIN/1.73 M 2
GLOBULIN SER CALC-MCNC: 3.6 G/DL (ref 1.9–3.5)
GLUCOSE SERPL-MCNC: 103 MG/DL (ref 65–99)
HCT VFR BLD AUTO: 30.1 % (ref 42–52)
HGB BLD-MCNC: 9.9 G/DL (ref 14–18)
LYMPHOCYTES # BLD AUTO: 3.11 K/UL (ref 1–4.8)
LYMPHOCYTES NFR BLD: 12 % (ref 22–41)
MACROCYTES BLD QL SMEAR: ABNORMAL
MANUAL DIFF BLD: NORMAL
MCH RBC QN AUTO: 29.6 PG (ref 27–33)
MCHC RBC AUTO-ENTMCNC: 32.9 G/DL (ref 32.3–36.5)
MCV RBC AUTO: 89.9 FL (ref 81.4–97.8)
METAMYELOCYTES NFR BLD MANUAL: 2 %
MONOCYTES # BLD AUTO: 1.55 K/UL (ref 0–0.85)
MONOCYTES NFR BLD AUTO: 6 % (ref 0–13.4)
MORPHOLOGY BLD-IMP: NORMAL
NEUTROPHILS # BLD AUTO: 20.72 K/UL (ref 1.82–7.42)
NEUTROPHILS NFR BLD: 80 % (ref 44–72)
NRBC # BLD AUTO: 0 K/UL
NRBC BLD-RTO: 0 /100 WBC (ref 0–0.2)
PLATELET # BLD AUTO: 1130 K/UL (ref 164–446)
PLATELET BLD QL SMEAR: NORMAL
PMV BLD AUTO: 9.2 FL (ref 9–12.9)
POTASSIUM SERPL-SCNC: 3.8 MMOL/L (ref 3.6–5.5)
PROT SERPL-MCNC: 6.5 G/DL (ref 6–8.2)
RBC # BLD AUTO: 3.35 M/UL (ref 4.7–6.1)
RBC BLD AUTO: PRESENT
SODIUM SERPL-SCNC: 124 MMOL/L (ref 135–145)
WBC # BLD AUTO: 25.9 K/UL (ref 4.8–10.8)

## 2023-06-12 PROCEDURE — A9270 NON-COVERED ITEM OR SERVICE: HCPCS | Performed by: NURSE PRACTITIONER

## 2023-06-12 PROCEDURE — 700102 HCHG RX REV CODE 250 W/ 637 OVERRIDE(OP): Performed by: NURSE PRACTITIONER

## 2023-06-12 PROCEDURE — 770001 HCHG ROOM/CARE - MED/SURG/GYN PRIV*

## 2023-06-12 PROCEDURE — A9270 NON-COVERED ITEM OR SERVICE: HCPCS | Performed by: PSYCHIATRY & NEUROLOGY

## 2023-06-12 PROCEDURE — 85007 BL SMEAR W/DIFF WBC COUNT: CPT

## 2023-06-12 PROCEDURE — 700111 HCHG RX REV CODE 636 W/ 250 OVERRIDE (IP): Performed by: NURSE PRACTITIONER

## 2023-06-12 PROCEDURE — 36415 COLL VENOUS BLD VENIPUNCTURE: CPT

## 2023-06-12 PROCEDURE — 99232 SBSQ HOSP IP/OBS MODERATE 35: CPT | Mod: GC | Performed by: PSYCHIATRY & NEUROLOGY

## 2023-06-12 PROCEDURE — A9270 NON-COVERED ITEM OR SERVICE: HCPCS

## 2023-06-12 PROCEDURE — 80053 COMPREHEN METABOLIC PANEL: CPT

## 2023-06-12 PROCEDURE — 99024 POSTOP FOLLOW-UP VISIT: CPT | Performed by: ORTHOPAEDIC SURGERY

## 2023-06-12 PROCEDURE — 87493 C DIFF AMPLIFIED PROBE: CPT

## 2023-06-12 PROCEDURE — 700102 HCHG RX REV CODE 250 W/ 637 OVERRIDE(OP): Performed by: PSYCHIATRY & NEUROLOGY

## 2023-06-12 PROCEDURE — 92526 ORAL FUNCTION THERAPY: CPT

## 2023-06-12 PROCEDURE — 99024 POSTOP FOLLOW-UP VISIT: CPT | Performed by: NURSE PRACTITIONER

## 2023-06-12 PROCEDURE — 700105 HCHG RX REV CODE 258

## 2023-06-12 PROCEDURE — 85025 COMPLETE CBC W/AUTO DIFF WBC: CPT

## 2023-06-12 PROCEDURE — 700102 HCHG RX REV CODE 250 W/ 637 OVERRIDE(OP)

## 2023-06-12 PROCEDURE — 97530 THERAPEUTIC ACTIVITIES: CPT

## 2023-06-12 PROCEDURE — 700105 HCHG RX REV CODE 258: Performed by: SURGERY

## 2023-06-12 RX ORDER — POLYETHYLENE GLYCOL 3350 17 G/17G
1 POWDER, FOR SOLUTION ORAL
Status: DISCONTINUED | OUTPATIENT
Start: 2023-06-12 | End: 2023-06-30 | Stop reason: HOSPADM

## 2023-06-12 RX ORDER — HALOPERIDOL 5 MG/ML
5 INJECTION INTRAMUSCULAR EVERY 6 HOURS PRN
Status: DISCONTINUED | OUTPATIENT
Start: 2023-06-12 | End: 2023-06-13

## 2023-06-12 RX ORDER — HALOPERIDOL 5 MG/ML
5 INJECTION INTRAMUSCULAR EVERY 4 HOURS PRN
Status: DISCONTINUED | OUTPATIENT
Start: 2023-06-12 | End: 2023-06-13

## 2023-06-12 RX ORDER — LOPERAMIDE HYDROCHLORIDE 2 MG/1
2 CAPSULE ORAL 4 TIMES DAILY PRN
Status: DISCONTINUED | OUTPATIENT
Start: 2023-06-12 | End: 2023-06-30 | Stop reason: HOSPADM

## 2023-06-12 RX ORDER — SODIUM CHLORIDE, SODIUM LACTATE, POTASSIUM CHLORIDE, AND CALCIUM CHLORIDE .6; .31; .03; .02 G/100ML; G/100ML; G/100ML; G/100ML
1000 INJECTION, SOLUTION INTRAVENOUS ONCE
Status: COMPLETED | OUTPATIENT
Start: 2023-06-12 | End: 2023-06-12

## 2023-06-12 RX ADMIN — ENOXAPARIN SODIUM 40 MG: 100 INJECTION SUBCUTANEOUS at 05:01

## 2023-06-12 RX ADMIN — SODIUM CHLORIDE, POTASSIUM CHLORIDE, SODIUM LACTATE AND CALCIUM CHLORIDE 1000 ML: 600; 310; 30; 20 INJECTION, SOLUTION INTRAVENOUS at 14:30

## 2023-06-12 RX ADMIN — HALOPERIDOL LACTATE 5 MG: 5 INJECTION, SOLUTION INTRAMUSCULAR at 00:52

## 2023-06-12 RX ADMIN — QUETIAPINE FUMARATE 100 MG: 100 TABLET ORAL at 05:01

## 2023-06-12 RX ADMIN — HALOPERIDOL LACTATE 5 MG: 5 INJECTION, SOLUTION INTRAMUSCULAR at 13:57

## 2023-06-12 RX ADMIN — HALOPERIDOL LACTATE 5 MG: 5 INJECTION, SOLUTION INTRAMUSCULAR at 09:15

## 2023-06-12 RX ADMIN — OXYCODONE HYDROCHLORIDE 10 MG: 10 TABLET ORAL at 13:57

## 2023-06-12 RX ADMIN — DIVALPROEX SODIUM 500 MG: 500 TABLET, DELAYED RELEASE ORAL at 17:36

## 2023-06-12 RX ADMIN — DIVALPROEX SODIUM 500 MG: 500 TABLET, DELAYED RELEASE ORAL at 05:01

## 2023-06-12 RX ADMIN — QUETIAPINE FUMARATE 100 MG: 100 TABLET ORAL at 17:36

## 2023-06-12 RX ADMIN — OXYCODONE HYDROCHLORIDE 10 MG: 10 TABLET ORAL at 09:16

## 2023-06-12 RX ADMIN — ENOXAPARIN SODIUM 40 MG: 100 INJECTION SUBCUTANEOUS at 17:36

## 2023-06-12 RX ADMIN — OXYCODONE HYDROCHLORIDE 10 MG: 10 TABLET ORAL at 02:44

## 2023-06-12 RX ADMIN — OXYCODONE HYDROCHLORIDE 10 MG: 10 TABLET ORAL at 21:41

## 2023-06-12 RX ADMIN — SODIUM CHLORIDE: 9 INJECTION, SOLUTION INTRAVENOUS at 23:04

## 2023-06-12 ASSESSMENT — COGNITIVE AND FUNCTIONAL STATUS - GENERAL
SUGGESTED CMS G CODE MODIFIER MOBILITY: CJ
STANDING UP FROM CHAIR USING ARMS: A LITTLE
MOBILITY SCORE: 21
CLIMB 3 TO 5 STEPS WITH RAILING: A LITTLE
WALKING IN HOSPITAL ROOM: A LITTLE

## 2023-06-12 ASSESSMENT — PAIN DESCRIPTION - PAIN TYPE
TYPE: ACUTE PAIN

## 2023-06-12 ASSESSMENT — ENCOUNTER SYMPTOMS
CONSTIPATION: 0
SHORTNESS OF BREATH: 0
FEVER: 0
CHILLS: 0
VOMITING: 0
NAUSEA: 0
DIARRHEA: 0
HEADACHES: 0

## 2023-06-12 ASSESSMENT — GAIT ASSESSMENTS
DISTANCE (FEET): 100
GAIT LEVEL OF ASSIST: SUPERVISED
DEVIATION: BRADYKINETIC;SHUFFLED GAIT

## 2023-06-12 NOTE — CARE PLAN
The patient is Stable - Low risk of patient condition declining or worsening    Shift Goals  Clinical Goals: Safety  Patient Goals: REKHA  Family Goals: not present    Progress made toward(s) clinical / shift goals:  Pt resting. Security sitter at bedside.     Patient is not progressing towards the following goals:
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
## 2023-06-12 NOTE — THERAPY
Speech Language Pathology   Daily Treatment     Patient Name: Fareed Muhammad  AGE:  27 y.o., SEX:  male  Medical Record #: [id number]  Date of Service: 6/12/2023      Precautions:  Precautions: Fall Risk, Non Weight Bearing Left Upper Extremity, Cervical Collar  , Spinal / Back Precautions      Updated CXR 6/11/2023  No new infiltrates or consolidations  have developed in the lungs compared to prior examination.  Overall appearance of the lungs is unchanged compared to prior exam.  No new pneumothorax or pleural fluid collection is identified.       Subjective  Patient received asleep, needing verbal cues to rouse. Patient agreeable to participate in session.      Assessment  Patient seen for dysphagia management on this date. Patient was agreeable to PO snack of apple juice and percy crackers. Oral phase grossly intact. Appropriate oral bolus acceptance/containment/transit. Mastication was intact. No coughing or throat clearing appreciated. No overt s/sx of aspiration noted. RN and patient endorsed tolerance of current diet.       Clinical Impressions  Patient presents with a functional swallow. SLP will no longer actively follow patient, re-consult with any change in status.       Recommendations  Treatment Completed: Dysphagia Treatment       Dysphagia Treatment  Diet Consistency: Regular solids and thin liquids  Instrumentation: None indicated at this time  Medication: As tolerated  Supervision: Monitor due to impulsive behavior  Positioning: Fully upright and midline during oral intake  Risk Management : Small bites/sips, Slow rate of intake, Reduce environmental distractions  Oral Care: BID              SLP Treatment Plan  Treatment Plan: Dysphagia Treatment            Anticipated Discharge Needs  Discharge Recommendations: Anticipate that the patient will have no further speech therapy needs after discharge from the hospital  Therapy Recommendations Upon DC: Not Indicated      Patient / Family Goals  Patient  "/ Family Goal #1: \"More water.\"  Goal #1 Outcome: Goal met  Short Term Goals  Short Term Goal # 1: Patient will consume meals of regular solids and thin liquids with no s/sx of aspiration given supervision for meals.  Goal Outcome # 1: Goal met      Rocío Mclaughlin MS,CCC-SLP    "

## 2023-06-12 NOTE — PROGRESS NOTES
"      Orthopaedic Progress Note    Interval changes:  Patient doing well   LUE cast CDI  Cleared for DC to facility vs shelter by ortho pending trauma clearance    ROS - Patient denies any new issues.  Pain well controlled.    /80   Pulse 95   Temp 37.3 °C (99.2 °F) (Temporal)   Resp 18   Ht 1.803 m (5' 10.98\")   Wt 108 kg (238 lb 12.1 oz)   SpO2 93%     Patient seen and examined  No acute distress  Breathing non labored  RRR  LUE in long arm cast CDI, DNVI, moves all fingers, cap refill <2 sec.     Recent Labs     06/10/23  0509 06/11/23  0508 06/12/23  0458   WBC 28.8* 29.1* 25.9*   RBC 3.57* 3.41* 3.35*   HEMOGLOBIN 10.4* 10.1* 9.9*   HEMATOCRIT 33.7* 30.0* 30.1*   MCV 94.4 88.0 89.9   MCH 29.1 29.6 29.6   MCHC 30.9* 33.7 32.9   RDW 46.0 42.8 42.8   PLATELETCT 1067* 1117* 1130*   MPV 9.4 9.3 9.2       Active Hospital Problems    Diagnosis     Hyponatremia [E87.1]      Priority: High    Psychiatric diagnosis [F99]      Priority: High    Open fracture dislocation of left elbow joint [S42.402B]      Priority: High    Spleen laceration, initial encounter [S36.039A]      Priority: High    Acute blood loss anemia [D62]      Priority: Medium    Left pulmonary contusion [S27.321A]      Priority: Medium    Illicit drug use [F19.90]      Priority: Medium    Open skull fracture (HCC) [S02.91XB]      Priority: Medium    Major laceration of kidney, left, initial encounter [S37.062A]      Priority: Medium    Closed fracture of multiple ribs of left side [S22.42XA]      Priority: Medium    Rotatory subluxation of atlantoaxial joint [S13.120A]      Priority: Medium    No contraindication to deep vein thrombosis (DVT) prophylaxis [Z78.9]      Priority: Medium    Liver laceration, initial encounter [S36.113A]      Priority: Medium    Trauma [T14.90XA]      Priority: Low    Traumatic hemopneumothorax, initial encounter [S27.2XXA]      Priority: Low    Laceration of right lower extremity [S81.811A]      Priority: Low "       Assessment/Plan:  Patient doing well   LUE cast CDI  Cleared for DC to facility vs shelter by ortho pending trauma clearance  POD#11 S/P:  1.  Irrigation and debridement open fracture   2.  Open reduction internal fixation left distal humerus lateral condyle fracture   3.  Open treatment acute elbow dislocation  Wt bearing status - NWB LUE  Wound care/Drains - cast left in place  Future Procedures - none planned   Lovenox: Start 6/6, Duration-until ambulatory > 150'  Sutures/Staples out- 14-21 days post operatively with cast exchange  PT/OT-initiated  Antibiotics: Perioperative completed  DVT Prophylaxis- TEDS/SCDs/Foot pumps  Edwards-not needed per ortho  Case Coordination for Discharge Planning - Disposition per therapy recs.

## 2023-06-12 NOTE — THERAPY
"Physical Therapy   Discharge     Patient Name: Fareed Muhammad  Age:  27 y.o., Sex:  male  Medical Record #: [id number]  Today's Date: 6/12/2023     Precautions  Precautions: Fall Risk;Non Weight Bearing Left Upper Extremity;Cervical Collar  ;Spinal / Back Precautions ;Swallow Precautions  Comments: L elbow is cast    Assessment    Pt received standing at EOB and able to ambulate in the room with no AD. Pt continues to be limited by cognitive and psychological factors that limit his ability to internalize cues for safety. Pt mobilized at a supervised level with no assistive device and is essentially up self with a security sitter. Pt is not in need of further acute PT at this time. Recommend discharge per psych recommendations versus to a shelter.    Plan    Reason for Discharge From Therapy: Discharge Secondary to Goals Met, Discharge Secondary to Patient Non Compliance    DC Equipment Recommendations: None  Discharge Recommendations: Other - (Discharge per psych, no further PT needs at this time)      Subjective    \"I need to use the bathroom\" pt stated as he proceeded to defecate all over the floor.     Objective       06/12/23 0931   Precautions   Precautions Fall Risk;Non Weight Bearing Left Upper Extremity;Cervical Collar  ;Spinal / Back Precautions ;Swallow Precautions   Comments L elbow is cast   Pain 0 - 10 Group   Therapist Pain Assessment Post Activity Pain Same as Prior to Activity;0   Cognition    Comments Pt self-directed and not able to follow commands well for log roll precautions. Pt is eradic with behavior and is not able to receive new education to improve safety.   Balance   Sitting Balance (Static) Fair +   Sitting Balance (Dynamic) Fair   Standing Balance (Static) Fair   Standing Balance (Dynamic) Fair   Weight Shift Sitting Fair   Weight Shift Standing Fair   Skilled Intervention Verbal Cuing   Comments no LOB, able to manage IV pole   Bed Mobility    Supine to Sit Supervised   Sit to Supine " Supervised   Scooting Supervised   Rolling Supervised   Skilled Intervention Verbal Cuing   Gait Analysis   Gait Level Of Assist Supervised   Assistive Device None   Distance (Feet) 100   # of Times Distance was Traveled 1   Deviation Bradykinetic;Shuffled Gait   Skilled Intervention Verbal Cuing   Comments Able to walk around the room with no losses of balance.   Functional Mobility   Sit to Stand Supervised   Bed, Chair, Wheelchair Transfer Supervised   Toilet Transfers Supervised   Mobility up in room with no AD   How much difficulty does the patient currently have...   Turning over in bed (including adjusting bedclothes, sheets and blankets)? 4   Sitting down on and standing up from a chair with arms (e.g., wheelchair, bedside commode, etc.) 4   Moving from lying on back to sitting on the side of the bed? 4   How much help from another person does the patient currently need...   Moving to and from a bed to a chair (including a wheelchair)? 3   Need to walk in a hospital room? 3   Climbing 3-5 steps with a railing? 3   6 clicks Mobility Score 21   Short Term Goals    Short Term Goal # 1 bed mobility at SPV level by tx 6   Goal Outcome # 1 Goal met   Short Term Goal # 2 pt will transfer bed to chair with LRAD at CGA level by tx 6   Goal Outcome # 2 Goal met   Short Term Goal # 3 Pt will ambulate for 100 ft with no AD and supervision by tx 6   Goal Outcome # 3 Goal met   Physical Therapy Treatment Plan   Physical Therapy Treatment Plan Modify Current Treatment Plan   Reason For Discharge Discharge Secondary to Goals Met;Discharge Secondary to Patient Non Compliance   Anticipated Discharge Equipment and Recommendations   DC Equipment Recommendations None   Discharge Recommendations Other -  (Discharge per psych, no further PT needs at this time)   Interdisciplinary Plan of Care Collaboration   IDT Collaboration with  Nursing   Patient Position at End of Therapy   (in shower with RN assisting)   Collaboration  Comments RN updated

## 2023-06-12 NOTE — PROGRESS NOTES
Bedside report received from night shift nurse. Assumed care at 0645.   Pt A&Ox0. Refuses orientation questions, rants about other topics.   Tolerating regular diet, denies n/v. Normoactive bowel sounds, passing flatus, LBM 6/12/23. IV access through 20g RFA that is running NS @ 100mL/hr.  Hard cast to DOLORES, CDI. MLI w/ staples, JEROME, CDI. R shin wound, CDI.   Saturating >90% on RA.  Pt ambulates independently. NWB to LUE, HOB > 30. C collar on 24/7.  Pain is controlled through medication orders. Updated on plan of care. Safety education provided. Bed locked in low. Call light within reach. Rounding in place.

## 2023-06-12 NOTE — THERAPY
Occupational Therapy Contact Note:     06/12/23 3967   Interdisciplinary Plan of Care Collaboration   Collaboration Comments Pt up self in his room, pt becomes agitated when given ed/training on c-spine precautions and brace management w/ no carry over of information. Pt's deficits are primarily phsycological in nature. Will DC from acute OT services at this time. Pt would benefit from more supportive environment at time of DC, but is likely close to current functional baseline.         Barb Amador, OTR/L

## 2023-06-12 NOTE — PROGRESS NOTES
Report received from Ivonne MACDONALD, assumed care at 1900  REKHA orientation, pt non cooperative at this time  Pt declines any SOB on room air, chest pain, new onset of numbness/tingling  Pt rates pain at 7/10, on a scale of 1-10, pt medicated per MAR  + voiding   Pt has + flatus, + bowel sounds, + BM on 6/11  Pt ambulates with a standby assist  Pt is tolerating a regular diet, pt denies any nausea/vomiting  MLI with staples  C collar in place  Cast to E  Security sitter at bedside.   Plan of care discussed, all questions answered.Call light is within reach, treaded slipper socks on, bed in lowest/locked position, hourly rounding in place, all needs met at this time.
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
## 2023-06-12 NOTE — PROGRESS NOTES
"      Orthopaedic Progress Note    Interval changes:  Patient doing well   LUE cast CDI  Cleared for DC to facility vs shelter by ortho pending trauma clearance    ROS - Patient denies any new issues.  Pain well controlled.    BP (!) 150/95   Pulse 84   Temp 36.8 °C (98.3 °F) (Temporal)   Resp 16   Ht 1.803 m (5' 10.98\")   Wt 108 kg (238 lb 12.1 oz)   SpO2 95%     Patient seen and examined  No acute distress  Breathing non labored  RRR  LUE in long arm cast CDI, DNVI, moves all fingers, cap refill <2 sec.     Recent Labs     06/09/23  0731 06/10/23  0509 06/11/23  0508   WBC 23.1* 28.8* 29.1*   RBC 3.23* 3.57* 3.41*   HEMOGLOBIN 9.5* 10.4* 10.1*   HEMATOCRIT 29.3* 33.7* 30.0*   MCV 90.7 94.4 88.0   MCH 29.4 29.1 29.6   MCHC 32.4 30.9* 33.7   RDW 42.8 46.0 42.8   PLATELETCT 899* 1067* 1117*   MPV 8.7* 9.4 9.3       Active Hospital Problems    Diagnosis     Hyponatremia [E87.1]      Priority: High    Psychiatric diagnosis [F99]      Priority: High    Open fracture dislocation of left elbow joint [S42.402B]      Priority: High    Spleen laceration, initial encounter [S36.039A]      Priority: High    Acute blood loss anemia [D62]      Priority: Medium    Left pulmonary contusion [S27.321A]      Priority: Medium    Illicit drug use [F19.90]      Priority: Medium    Open skull fracture (HCC) [S02.91XB]      Priority: Medium    Major laceration of kidney, left, initial encounter [S37.062A]      Priority: Medium    Closed fracture of multiple ribs of left side [S22.42XA]      Priority: Medium    Rotatory subluxation of atlantoaxial joint [S13.120A]      Priority: Medium    No contraindication to deep vein thrombosis (DVT) prophylaxis [Z78.9]      Priority: Medium    Liver laceration, initial encounter [S36.113A]      Priority: Medium    Trauma [T14.90XA]      Priority: Low    Traumatic hemopneumothorax, initial encounter [S27.2XXA]      Priority: Low    Laceration of right lower extremity [J38.244P]      Priority: Low "       Assessment/Plan:  Patient doing well   LUE cast CDI  Cleared for DC to facility vs shelter by ortho pending trauma clearance  POD#10 S/P:  1.  Irrigation and debridement open fracture   2.  Open reduction internal fixation left distal humerus lateral condyle fracture   3.  Open treatment acute elbow dislocation  Wt bearing status - NWB LUE  Wound care/Drains - cast left in place  Future Procedures - none planned   Lovenox: Start 6/6, Duration-until ambulatory > 150'  Sutures/Staples out- 14-21 days post operatively with cast exchange  PT/OT-initiated  Antibiotics: Perioperative completed  DVT Prophylaxis- TEDS/SCDs/Foot pumps  Edwards-not needed per ortho  Case Coordination for Discharge Planning - Disposition per therapy recs.

## 2023-06-12 NOTE — CARE PLAN
Problem: Knowledge Deficit - Standard  Goal: Patient and family/care givers will demonstrate understanding of plan of care, disease process/condition, diagnostic tests and medications  6/11/2023 2144 by Doug Brown R.N.  Outcome: Not Progressing  6/11/2023 2143 by Doug Brown R.N.  Outcome: Progressing     Problem: Pain - Standard  Goal: Alleviation of pain or a reduction in pain to the patient’s comfort goal  6/11/2023 2144 by Doug Brown R.N.  Outcome: Progressing  6/11/2023 2143 by Doug Brown R.N.  Outcome: Progressing   The patient is Stable - Low risk of patient condition declining or worsening    Shift Goals  Clinical Goals: safety, keep C collar on  Patient Goals: REKHA  Family Goals: not present    Patient is not progressing towards the following goals:      Problem: Knowledge Deficit - Standard  Goal: Patient and family/care givers will demonstrate understanding of plan of care, disease process/condition, diagnostic tests and medications  6/11/2023 2144 by Doug Brown R.N.  Outcome: Not Progressing  6/11/2023 2143 by Doug Brown R.N.  Outcome: Progressing

## 2023-06-13 LAB
ALBUMIN SERPL BCP-MCNC: 2.7 G/DL (ref 3.2–4.9)
ALBUMIN/GLOB SERPL: 0.8 G/DL
ALP SERPL-CCNC: 211 U/L (ref 30–99)
ALT SERPL-CCNC: 61 U/L (ref 2–50)
ANION GAP SERPL CALC-SCNC: 9 MMOL/L (ref 7–16)
ANISOCYTOSIS BLD QL SMEAR: ABNORMAL
AST SERPL-CCNC: 22 U/L (ref 12–45)
BASOPHILS # BLD AUTO: 0 % (ref 0–1.8)
BASOPHILS # BLD: 0 K/UL (ref 0–0.12)
BILIRUB SERPL-MCNC: 0.3 MG/DL (ref 0.1–1.5)
BUN SERPL-MCNC: 8 MG/DL (ref 8–22)
CALCIUM ALBUM COR SERPL-MCNC: 9.2 MG/DL (ref 8.5–10.5)
CALCIUM SERPL-MCNC: 8.2 MG/DL (ref 8.5–10.5)
CHLORIDE SERPL-SCNC: 100 MMOL/L (ref 96–112)
CO2 SERPL-SCNC: 23 MMOL/L (ref 20–33)
COMMENT NL1176: NORMAL
CREAT SERPL-MCNC: 0.6 MG/DL (ref 0.5–1.4)
EOSINOPHIL # BLD AUTO: 0 K/UL (ref 0–0.51)
EOSINOPHIL NFR BLD: 0 % (ref 0–6.9)
ERYTHROCYTE [DISTWIDTH] IN BLOOD BY AUTOMATED COUNT: 44.8 FL (ref 35.9–50)
GFR SERPLBLD CREATININE-BSD FMLA CKD-EPI: 136 ML/MIN/1.73 M 2
GLOBULIN SER CALC-MCNC: 3.3 G/DL (ref 1.9–3.5)
GLUCOSE SERPL-MCNC: 97 MG/DL (ref 65–99)
HCT VFR BLD AUTO: 28.7 % (ref 42–52)
HGB BLD-MCNC: 9.2 G/DL (ref 14–18)
LYMPHOCYTES # BLD AUTO: 3.21 K/UL (ref 1–4.8)
LYMPHOCYTES NFR BLD: 15 % (ref 22–41)
MACROCYTES BLD QL SMEAR: ABNORMAL
MANUAL DIFF BLD: NORMAL
MCH RBC QN AUTO: 29.6 PG (ref 27–33)
MCHC RBC AUTO-ENTMCNC: 32.1 G/DL (ref 32.3–36.5)
MCV RBC AUTO: 92.3 FL (ref 81.4–97.8)
METAMYELOCYTES NFR BLD MANUAL: 1 %
MICROCYTES BLD QL SMEAR: ABNORMAL
MONOCYTES # BLD AUTO: 1.93 K/UL (ref 0–0.85)
MONOCYTES NFR BLD AUTO: 9 % (ref 0–13.4)
MORPHOLOGY BLD-IMP: NORMAL
MYELOCYTES NFR BLD MANUAL: 2 %
NEUTROPHILS # BLD AUTO: 15.62 K/UL (ref 1.82–7.42)
NEUTROPHILS NFR BLD: 73 % (ref 44–72)
NRBC # BLD AUTO: 0 K/UL
NRBC BLD-RTO: 0 /100 WBC (ref 0–0.2)
PLATELET # BLD AUTO: 1112 K/UL (ref 164–446)
PLATELET BLD QL SMEAR: NORMAL
PMV BLD AUTO: 9 FL (ref 9–12.9)
POTASSIUM SERPL-SCNC: 4 MMOL/L (ref 3.6–5.5)
PROT SERPL-MCNC: 6 G/DL (ref 6–8.2)
RBC # BLD AUTO: 3.11 M/UL (ref 4.7–6.1)
RBC BLD AUTO: PRESENT
SODIUM SERPL-SCNC: 132 MMOL/L (ref 135–145)
WBC # BLD AUTO: 21.4 K/UL (ref 4.8–10.8)

## 2023-06-13 PROCEDURE — 700102 HCHG RX REV CODE 250 W/ 637 OVERRIDE(OP)

## 2023-06-13 PROCEDURE — A9270 NON-COVERED ITEM OR SERVICE: HCPCS | Performed by: PSYCHIATRY & NEUROLOGY

## 2023-06-13 PROCEDURE — 770001 HCHG ROOM/CARE - MED/SURG/GYN PRIV*

## 2023-06-13 PROCEDURE — 36415 COLL VENOUS BLD VENIPUNCTURE: CPT

## 2023-06-13 PROCEDURE — A9270 NON-COVERED ITEM OR SERVICE: HCPCS | Performed by: STUDENT IN AN ORGANIZED HEALTH CARE EDUCATION/TRAINING PROGRAM

## 2023-06-13 PROCEDURE — 99233 SBSQ HOSP IP/OBS HIGH 50: CPT | Mod: GC | Performed by: PSYCHIATRY & NEUROLOGY

## 2023-06-13 PROCEDURE — A9270 NON-COVERED ITEM OR SERVICE: HCPCS

## 2023-06-13 PROCEDURE — 99024 POSTOP FOLLOW-UP VISIT: CPT | Performed by: NURSE PRACTITIONER

## 2023-06-13 PROCEDURE — 700102 HCHG RX REV CODE 250 W/ 637 OVERRIDE(OP): Performed by: STUDENT IN AN ORGANIZED HEALTH CARE EDUCATION/TRAINING PROGRAM

## 2023-06-13 PROCEDURE — 80053 COMPREHEN METABOLIC PANEL: CPT

## 2023-06-13 PROCEDURE — 700102 HCHG RX REV CODE 250 W/ 637 OVERRIDE(OP): Performed by: PSYCHIATRY & NEUROLOGY

## 2023-06-13 PROCEDURE — 85025 COMPLETE CBC W/AUTO DIFF WBC: CPT

## 2023-06-13 PROCEDURE — A9270 NON-COVERED ITEM OR SERVICE: HCPCS | Performed by: NURSE PRACTITIONER

## 2023-06-13 PROCEDURE — 700111 HCHG RX REV CODE 636 W/ 250 OVERRIDE (IP): Performed by: NURSE PRACTITIONER

## 2023-06-13 PROCEDURE — 700102 HCHG RX REV CODE 250 W/ 637 OVERRIDE(OP): Performed by: NURSE PRACTITIONER

## 2023-06-13 PROCEDURE — 85007 BL SMEAR W/DIFF WBC COUNT: CPT

## 2023-06-13 RX ORDER — RISPERIDONE 0.5 MG/1
0.5 TABLET ORAL EVERY MORNING
Status: DISCONTINUED | OUTPATIENT
Start: 2023-06-14 | End: 2023-06-30 | Stop reason: HOSPADM

## 2023-06-13 RX ORDER — HALOPERIDOL 5 MG/ML
5 INJECTION INTRAMUSCULAR EVERY 6 HOURS PRN
Status: DISCONTINUED | OUTPATIENT
Start: 2023-06-13 | End: 2023-06-30 | Stop reason: HOSPADM

## 2023-06-13 RX ORDER — HALOPERIDOL 5 MG/1
5 TABLET ORAL EVERY 6 HOURS PRN
Status: DISCONTINUED | OUTPATIENT
Start: 2023-06-13 | End: 2023-06-30 | Stop reason: HOSPADM

## 2023-06-13 RX ORDER — QUETIAPINE FUMARATE 100 MG/1
100 TABLET, FILM COATED ORAL NIGHTLY
Status: DISCONTINUED | OUTPATIENT
Start: 2023-06-13 | End: 2023-06-30 | Stop reason: HOSPADM

## 2023-06-13 RX ADMIN — OXYCODONE HYDROCHLORIDE 10 MG: 10 TABLET ORAL at 17:48

## 2023-06-13 RX ADMIN — DIVALPROEX SODIUM 500 MG: 500 TABLET, DELAYED RELEASE ORAL at 17:48

## 2023-06-13 RX ADMIN — QUETIAPINE FUMARATE 100 MG: 100 TABLET ORAL at 05:09

## 2023-06-13 RX ADMIN — QUETIAPINE FUMARATE 100 MG: 100 TABLET ORAL at 20:34

## 2023-06-13 RX ADMIN — ENOXAPARIN SODIUM 40 MG: 100 INJECTION SUBCUTANEOUS at 05:11

## 2023-06-13 RX ADMIN — OXYCODONE HYDROCHLORIDE 5 MG: 5 TABLET ORAL at 13:15

## 2023-06-13 RX ADMIN — LOPERAMIDE HYDROCHLORIDE 2 MG: 2 CAPSULE ORAL at 05:14

## 2023-06-13 RX ADMIN — OXYCODONE HYDROCHLORIDE 10 MG: 10 TABLET ORAL at 05:14

## 2023-06-13 RX ADMIN — ENOXAPARIN SODIUM 40 MG: 100 INJECTION SUBCUTANEOUS at 17:48

## 2023-06-13 RX ADMIN — DIVALPROEX SODIUM 500 MG: 500 TABLET, DELAYED RELEASE ORAL at 05:09

## 2023-06-13 ASSESSMENT — ENCOUNTER SYMPTOMS
NAUSEA: 0
CONSTIPATION: 0
DEPRESSION: 0
VOMITING: 0
HALLUCINATIONS: 1
CHILLS: 0
FEVER: 0
HEADACHES: 0
PALPITATIONS: 0
MYALGIAS: 0
DIARRHEA: 0
SHORTNESS OF BREATH: 0

## 2023-06-13 ASSESSMENT — PAIN DESCRIPTION - PAIN TYPE
TYPE: ACUTE PAIN

## 2023-06-13 NOTE — CARE PLAN
The patient is Stable - Low risk of patient condition declining or worsening    Shift Goals  Clinical Goals: Safety  Patient Goals: REKHA  Family Goals: not present    Progress made toward(s) clinical / shift goals:  Pt resting. 1:1 security sitter at bedside for safety. LH for HI/SI.     Patient is not progressing towards the following goals:

## 2023-06-13 NOTE — PROGRESS NOTES
Bedside report received from night shift nurse. Assumed care at 0645.   Pt A&Ox4. Denies hallucinations but has vivid dreams.   Tolerating regular diet, denies n/v. Normoactive bowel sounds, passing flatus, LBM 6/13/23. IV access through 20g RFA that is running NS @ 100mL/hr.  Hard cast to DOLORES, CDI. MLI w/ staples, JEROME, CDI. R shin wound, CDI.   Saturating >90% on RA.  Pt ambulates independently. NWB to LUE, HOB > 30. C collar on 24/7.  Pain is controlled through medication orders. Updated on plan of care. Safety education provided. Bed locked in low. Call light within reach. Rounding in place.
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
## 2023-06-13 NOTE — PROGRESS NOTES
Received report from previous shift RN at 1900.  Assessment complete.  A&O x3, disoriented to situation. Patient doesn't call appropriately. Security sitter in place for safety due to SI/HI concerns. Legal hold in place.  Patient ambulates independently.   Patient has 7/10 pain. Pain managed with prescribed medications per MAR and rest.  Denies N&V. Tolerating regular diet with double portions, no sharps.  Skin per flowsheets.  + void, + flatus, + BM on 6/12.  Patient denies SOB on room air.    Patient cooperative throughout assessment.  Reviewed plan of care with patient, pt verbalizes some understanding. Hourly rounding in place. All needs met at this time.
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
## 2023-06-13 NOTE — CARE PLAN
The patient is Watcher - Medium risk of patient condition declining or worsening    Shift Goals  Clinical Goals: Safety; Pain Control  Patient Goals: REKHA  Family Goals: not present    Progress made toward(s) clinical / shift goals:  Patient medicated per MAR. Non-pharmacologic comfort measures implemented. Safety discussed. Education provided. Ambulation and repositioning encouraged.     Problem: Knowledge Deficit - Standard  Goal: Patient and family/care givers will demonstrate understanding of plan of care, disease process/condition, diagnostic tests and medications  Outcome: Progressing     Problem: Pain - Standard  Goal: Alleviation of pain or a reduction in pain to the patient’s comfort goal  Outcome: Progressing     Problem: Skin Integrity  Goal: Skin integrity is maintained or improved  Outcome: Progressing     Problem: Fall Risk  Goal: Patient will remain free from falls  Outcome: Progressing

## 2023-06-13 NOTE — PROGRESS NOTES
"      Orthopaedic Progress Note    Interval changes:  Patient doing well   LUE cast edges taped to reduce risk of abraision  Cleared for DC to facility vs shelter by ortho pending trauma clearance    ROS - Patient denies any new issues.  Pain well controlled.    BP (!) 122/93   Pulse 96   Temp 36.7 °C (98.1 °F) (Temporal)   Resp 16   Ht 1.803 m (5' 10.98\")   Wt 108 kg (238 lb 12.1 oz)   SpO2 94%     Patient seen and examined  No acute distress  Breathing non labored  RRR  LUE in long arm cast CDI, DNVI, moves all fingers, cap refill <2 sec.     Recent Labs     06/11/23  0508 06/12/23  0458 06/13/23  0201   WBC 29.1* 25.9* 21.4*   RBC 3.41* 3.35* 3.11*   HEMOGLOBIN 10.1* 9.9* 9.2*   HEMATOCRIT 30.0* 30.1* 28.7*   MCV 88.0 89.9 92.3   MCH 29.6 29.6 29.6   MCHC 33.7 32.9 32.1*   RDW 42.8 42.8 44.8   PLATELETCT 1117* 1130* 1112*   MPV 9.3 9.2 9.0       Active Hospital Problems    Diagnosis     Hyponatremia [E87.1]      Priority: High    Psychiatric diagnosis [F99]      Priority: High    Open fracture dislocation of left elbow joint [S42.402B]      Priority: High    Spleen laceration, initial encounter [S36.039A]      Priority: High    Acute blood loss anemia [D62]      Priority: Medium    Left pulmonary contusion [S27.321A]      Priority: Medium    Illicit drug use [F19.90]      Priority: Medium    Open skull fracture (HCC) [S02.91XB]      Priority: Medium    Major laceration of kidney, left, initial encounter [S37.062A]      Priority: Medium    Closed fracture of multiple ribs of left side [S22.42XA]      Priority: Medium    Rotatory subluxation of atlantoaxial joint [S13.120A]      Priority: Medium    No contraindication to deep vein thrombosis (DVT) prophylaxis [Z78.9]      Priority: Medium    Liver laceration, initial encounter [S36.113A]      Priority: Medium    Trauma [T14.90XA]      Priority: Low    Traumatic hemopneumothorax, initial encounter [S27.2XXA]      Priority: Low    Laceration of right lower " extremity [X99.779V]      Priority: Low       Assessment/Plan:  Patient doing well   LUE cast edges taped to reduce risk of abraision  Cleared for DC to facility vs shelter by ortho pending trauma clearance  POD#12 S/P:  1.  Irrigation and debridement open fracture   2.  Open reduction internal fixation left distal humerus lateral condyle fracture   3.  Open treatment acute elbow dislocation  Wt bearing status - NWB LUE  Wound care/Drains - cast left in place  Future Procedures - none planned   Lovenox: Start 6/6, Duration-until ambulatory > 150'  Sutures/Staples out- 14-21 days post operatively with cast exchange  PT/OT-initiated  Antibiotics: Perioperative completed  DVT Prophylaxis- TEDS/SCDs/Foot pumps  Edwards-not needed per ortho  Case Coordination for Discharge Planning - Disposition per therapy recs.

## 2023-06-14 LAB
ALBUMIN SERPL BCP-MCNC: 3.2 G/DL (ref 3.2–4.9)
ALBUMIN/GLOB SERPL: 0.9 G/DL
ALP SERPL-CCNC: 218 U/L (ref 30–99)
ALT SERPL-CCNC: 54 U/L (ref 2–50)
ANION GAP SERPL CALC-SCNC: 10 MMOL/L (ref 7–16)
ANISOCYTOSIS BLD QL SMEAR: ABNORMAL
AST SERPL-CCNC: 18 U/L (ref 12–45)
BASOPHILS # BLD AUTO: 2 % (ref 0–1.8)
BASOPHILS # BLD: 0.51 K/UL (ref 0–0.12)
BILIRUB SERPL-MCNC: 0.3 MG/DL (ref 0.1–1.5)
BUN SERPL-MCNC: 9 MG/DL (ref 8–22)
CALCIUM ALBUM COR SERPL-MCNC: 9.4 MG/DL (ref 8.5–10.5)
CALCIUM SERPL-MCNC: 8.8 MG/DL (ref 8.5–10.5)
CHLORIDE SERPL-SCNC: 95 MMOL/L (ref 96–112)
CO2 SERPL-SCNC: 25 MMOL/L (ref 20–33)
CREAT SERPL-MCNC: 0.69 MG/DL (ref 0.5–1.4)
EOSINOPHIL # BLD AUTO: 0 K/UL (ref 0–0.51)
EOSINOPHIL NFR BLD: 0 % (ref 0–6.9)
ERYTHROCYTE [DISTWIDTH] IN BLOOD BY AUTOMATED COUNT: 46.2 FL (ref 35.9–50)
GFR SERPLBLD CREATININE-BSD FMLA CKD-EPI: 130 ML/MIN/1.73 M 2
GLOBULIN SER CALC-MCNC: 3.5 G/DL (ref 1.9–3.5)
GLUCOSE SERPL-MCNC: 105 MG/DL (ref 65–99)
HCT VFR BLD AUTO: 31.1 % (ref 42–52)
HGB BLD-MCNC: 10.1 G/DL (ref 14–18)
LYMPHOCYTES # BLD AUTO: 1.53 K/UL (ref 1–4.8)
LYMPHOCYTES NFR BLD: 6 % (ref 22–41)
MACROCYTES BLD QL SMEAR: ABNORMAL
MANUAL DIFF BLD: NORMAL
MCH RBC QN AUTO: 30 PG (ref 27–33)
MCHC RBC AUTO-ENTMCNC: 32.5 G/DL (ref 32.3–36.5)
MCV RBC AUTO: 92.3 FL (ref 81.4–97.8)
MONOCYTES # BLD AUTO: 2.81 K/UL (ref 0–0.85)
MONOCYTES NFR BLD AUTO: 11 % (ref 0–13.4)
MORPHOLOGY BLD-IMP: NORMAL
MYELOCYTES NFR BLD MANUAL: 2 %
NEUTROPHILS # BLD AUTO: 20.15 K/UL (ref 1.82–7.42)
NEUTROPHILS NFR BLD: 79 % (ref 44–72)
NRBC # BLD AUTO: 0 K/UL
NRBC BLD-RTO: 0 /100 WBC (ref 0–0.2)
PLATELET # BLD AUTO: 1276 K/UL (ref 164–446)
PLATELET BLD QL SMEAR: NORMAL
PMV BLD AUTO: 8.9 FL (ref 9–12.9)
POTASSIUM SERPL-SCNC: 4.2 MMOL/L (ref 3.6–5.5)
PROT SERPL-MCNC: 6.7 G/DL (ref 6–8.2)
RBC # BLD AUTO: 3.37 M/UL (ref 4.7–6.1)
RBC BLD AUTO: PRESENT
SODIUM SERPL-SCNC: 130 MMOL/L (ref 135–145)
WBC # BLD AUTO: 25.5 K/UL (ref 4.8–10.8)

## 2023-06-14 PROCEDURE — 36415 COLL VENOUS BLD VENIPUNCTURE: CPT

## 2023-06-14 PROCEDURE — 700102 HCHG RX REV CODE 250 W/ 637 OVERRIDE(OP): Performed by: STUDENT IN AN ORGANIZED HEALTH CARE EDUCATION/TRAINING PROGRAM

## 2023-06-14 PROCEDURE — A9270 NON-COVERED ITEM OR SERVICE: HCPCS | Performed by: PSYCHIATRY & NEUROLOGY

## 2023-06-14 PROCEDURE — 700111 HCHG RX REV CODE 636 W/ 250 OVERRIDE (IP): Performed by: NURSE PRACTITIONER

## 2023-06-14 PROCEDURE — 85025 COMPLETE CBC W/AUTO DIFF WBC: CPT

## 2023-06-14 PROCEDURE — 770001 HCHG ROOM/CARE - MED/SURG/GYN PRIV*

## 2023-06-14 PROCEDURE — 700102 HCHG RX REV CODE 250 W/ 637 OVERRIDE(OP)

## 2023-06-14 PROCEDURE — 80053 COMPREHEN METABOLIC PANEL: CPT

## 2023-06-14 PROCEDURE — 700105 HCHG RX REV CODE 258

## 2023-06-14 PROCEDURE — A9270 NON-COVERED ITEM OR SERVICE: HCPCS | Performed by: STUDENT IN AN ORGANIZED HEALTH CARE EDUCATION/TRAINING PROGRAM

## 2023-06-14 PROCEDURE — 99024 POSTOP FOLLOW-UP VISIT: CPT | Performed by: STUDENT IN AN ORGANIZED HEALTH CARE EDUCATION/TRAINING PROGRAM

## 2023-06-14 PROCEDURE — 99232 SBSQ HOSP IP/OBS MODERATE 35: CPT

## 2023-06-14 PROCEDURE — 700102 HCHG RX REV CODE 250 W/ 637 OVERRIDE(OP): Performed by: PSYCHIATRY & NEUROLOGY

## 2023-06-14 PROCEDURE — A9270 NON-COVERED ITEM OR SERVICE: HCPCS

## 2023-06-14 PROCEDURE — 85007 BL SMEAR W/DIFF WBC COUNT: CPT

## 2023-06-14 PROCEDURE — 99232 SBSQ HOSP IP/OBS MODERATE 35: CPT | Mod: GC | Performed by: PSYCHIATRY & NEUROLOGY

## 2023-06-14 RX ADMIN — OXYCODONE HYDROCHLORIDE 10 MG: 10 TABLET ORAL at 08:52

## 2023-06-14 RX ADMIN — SODIUM CHLORIDE: 9 INJECTION, SOLUTION INTRAVENOUS at 10:07

## 2023-06-14 RX ADMIN — DIVALPROEX SODIUM 500 MG: 500 TABLET, DELAYED RELEASE ORAL at 05:33

## 2023-06-14 RX ADMIN — RISPERIDONE 0.5 MG: 0.5 TABLET ORAL at 05:33

## 2023-06-14 RX ADMIN — OXYCODONE HYDROCHLORIDE 10 MG: 10 TABLET ORAL at 20:38

## 2023-06-14 RX ADMIN — ENOXAPARIN SODIUM 40 MG: 100 INJECTION SUBCUTANEOUS at 05:33

## 2023-06-14 RX ADMIN — OXYCODONE HYDROCHLORIDE 10 MG: 10 TABLET ORAL at 04:32

## 2023-06-14 RX ADMIN — ENOXAPARIN SODIUM 40 MG: 100 INJECTION SUBCUTANEOUS at 17:57

## 2023-06-14 RX ADMIN — QUETIAPINE FUMARATE 100 MG: 100 TABLET ORAL at 20:38

## 2023-06-14 RX ADMIN — DIVALPROEX SODIUM 500 MG: 500 TABLET, DELAYED RELEASE ORAL at 17:56

## 2023-06-14 ASSESSMENT — ENCOUNTER SYMPTOMS
DEPRESSION: 0
HEADACHES: 0
BACK PAIN: 1
VOMITING: 0
CONSTIPATION: 0
SHORTNESS OF BREATH: 0
DIARRHEA: 1
HALLUCINATIONS: 0
NAUSEA: 0
CHILLS: 0
FEVER: 0

## 2023-06-14 ASSESSMENT — PAIN DESCRIPTION - PAIN TYPE
TYPE: ACUTE PAIN

## 2023-06-14 NOTE — CARE PLAN
The patient is Stable - Low risk of patient condition declining or worsening    Shift Goals  Clinical Goals: safety and pain control  Patient Goals: call his sister  Family Goals: not present    Progress made toward(s) clinical / shift goals:  Provided safety education and expectations. Pain controlled per MAR, cold packs, and ambulation. Pt sleeping intermittently. Pt had 2 supervised phone calls to mom and sister.    Problem: Pain - Standard  Goal: Alleviation of pain or a reduction in pain to the patient’s comfort goal  Outcome: Progressing     Problem: Safety - Violent/Self-destructive Restraint  Goal: Remains free of injury from restraints (Restraint for Violent/Self-Destructive Behavior)  Outcome: Progressing

## 2023-06-14 NOTE — PROGRESS NOTES
Report received from Angeles MACDONALD, assumed care at 0700.  Pt is A0X4, denies visual/auditory hallucinations.  Pt declines any SOB, chest pain, new onset of numbness/ tingling  Pt sleeping in bed, clam and unlabored breathing.  Pt is voiding adequatly and without hesitancy  Pt has + flatus, + bowel sounds, + BM on 6/13/23.  Pt ambulates with a standby assist   Pt is tolerating a regular diet, pt denies any nausea/vomiting.  Hard cast to LUE. MLI w/ staples CDI. C collar present w/ HOB >30 degrees.   Security sitter at bedside.   All safety risks removed from the room.   Plan of care discussed, all questions answered. Explained importance of calling before getting OOB and pt verbalizes understanding. Explained importance of oral care. Call light is within reach, treaded slipper socks on, bed in lowest/ locked position, hourly rounding in place, all needs met at this time.

## 2023-06-14 NOTE — PROGRESS NOTES
"     Orthopedic PA Progress Note    Interval changes:  Sitter at bedside.  LUE cast is CDI  Cast to remain on for 4 weeks total   NWB LUE  No additional ortho procedures pending  Cleared per ortho for any discharge planning    ROS - Unable to obtain due to status.     /67   Pulse 87   Temp 36.3 °C (97.4 °F) (Temporal)   Resp 16   Ht 1.803 m (5' 10.98\")   Wt 108 kg (238 lb 12.1 oz)   SpO2 97%     Patient seen and examined  Intubated  RRR  LUE: Cast CDI. Flexes and extends all fingers. Cap refill <2s.     Recent Labs     06/12/23  0458 06/13/23  0201   WBC 25.9* 21.4*   RBC 3.35* 3.11*   HEMOGLOBIN 9.9* 9.2*   HEMATOCRIT 30.1* 28.7*   MCV 89.9 92.3   MCH 29.6 29.6   MCHC 32.9 32.1*   RDW 42.8 44.8   PLATELETCT 1130* 1112*   MPV 9.2 9.0         Active Hospital Problems    Diagnosis     Hyponatremia [E87.1]      Priority: High    Psychiatric diagnosis [F99]      Priority: High    Open fracture dislocation of left elbow joint [S42.402B]      Priority: High    Spleen laceration, initial encounter [S36.039A]      Priority: High    Acute blood loss anemia [D62]      Priority: Medium    Left pulmonary contusion [S27.321A]      Priority: Medium    Illicit drug use [F19.90]      Priority: Medium    Open skull fracture (HCC) [S02.91XB]      Priority: Medium    Major laceration of kidney, left, initial encounter [S37.062A]      Priority: Medium    Closed fracture of multiple ribs of left side [S22.42XA]      Priority: Medium    Rotatory subluxation of atlantoaxial joint [S13.120A]      Priority: Medium    No contraindication to deep vein thrombosis (DVT) prophylaxis [Z78.9]      Priority: Medium    Liver laceration, initial encounter [S36.113A]      Priority: Medium    Trauma [T14.90XA]      Priority: Low    Traumatic hemopneumothorax, initial encounter [S27.2XXA]      Priority: Low    Laceration of right lower extremity [S81.811A]      Priority: Low     A/P:  Sitter at bedside.  LUE cast is CDI  Cast to remain on " for 4 weeks total   NWB LUE  No additional ortho procedures pending  Cleared per ortho for any discharge planning    POD#12 S/p  1.  Irrigation and debridement open fracture   2.  Open reduction internal fixation left distal humerus lateral condyle fracture   3.  Open treatment acute elbow dislocation  Wt bearing status - NWB LUE  Wound care/Drains - Dressings to be left in place  Future Procedures - None planned   Lovenox: defer to trauma team  Sutures/Staples out- 14-21 days post operatively. Removal will completed by ortho ADAM's unless transferred.  PT/OT-initiated  Antibiotics:  Perioperative completed  DVT Prophylaxis- TEDS/SCDs/Foot pumps  Edwards-not needed per ortho  Case Coordination for Discharge Planning - Disposition per therapy recs.

## 2023-06-14 NOTE — CARE PLAN
Problem: Knowledge Deficit - Standard  Goal: Patient and family/care givers will demonstrate understanding of plan of care, disease process/condition, diagnostic tests and medications  Outcome: Progressing     Problem: Pain - Standard  Goal: Alleviation of pain or a reduction in pain to the patient’s comfort goal  Outcome: Progressing     Problem: Safety - Violent/Self-destructive Restraint  Goal: Remains free of injury from restraints (Restraint for Violent/Self-Destructive Behavior)  Outcome: Progressing   The patient is Stable - Low risk of patient condition declining or worsening    Shift Goals  Clinical Goals: safety  Patient Goals: call family  Family Goals: not present    Progress made toward(s) clinical / shift goals:  Pt is ambulating in room with supervision. Pt tolerating regular diet. All personal belongings removed from room. All medical equipment posing safety risk removed from room. Pts pain managed with PRN medications. Legal hold in place at this time. Phone calls okay, no visitors at this time.
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
## 2023-06-14 NOTE — PROGRESS NOTES
Bedside report received, assessment completed    Legal hold in place for SI/HI, 1:1 security sitter in place  A&O x  4, pt doesn't call appropriately  Mobility: Up self  Fall Risk Assessment: High, door notifications in use  Pain Assessment / Reassessment completed, medication provided per MAR  Diet: Regular  LDA:   IV Access: 20 R FA, CDI/ flushed  Cervical collar  Hard cast: LUE    GI/: + void, + flatus, 6/13 BM  DVT Prophylaxis: Lovenox, SCD's refused   Skin: per flowsheets     Reviewed plan of care with patient, bed in lowest position and locked, pt resting comfortably now, call light within reach, all needs met at this time. Interventions will be executed per plan of care
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
## 2023-06-15 LAB
ALBUMIN SERPL BCP-MCNC: 3.2 G/DL (ref 3.2–4.9)
ALBUMIN/GLOB SERPL: 0.9 G/DL
ALP SERPL-CCNC: 214 U/L (ref 30–99)
ALT SERPL-CCNC: 40 U/L (ref 2–50)
ANION GAP SERPL CALC-SCNC: 11 MMOL/L (ref 7–16)
ANISOCYTOSIS BLD QL SMEAR: ABNORMAL
AST SERPL-CCNC: 11 U/L (ref 12–45)
BASOPHILS # BLD AUTO: 0 % (ref 0–1.8)
BASOPHILS # BLD: 0 K/UL (ref 0–0.12)
BILIRUB SERPL-MCNC: 0.3 MG/DL (ref 0.1–1.5)
BUN SERPL-MCNC: 6 MG/DL (ref 8–22)
CALCIUM ALBUM COR SERPL-MCNC: 9.4 MG/DL (ref 8.5–10.5)
CALCIUM SERPL-MCNC: 8.8 MG/DL (ref 8.5–10.5)
CHLORIDE SERPL-SCNC: 93 MMOL/L (ref 96–112)
CO2 SERPL-SCNC: 27 MMOL/L (ref 20–33)
CREAT SERPL-MCNC: 0.66 MG/DL (ref 0.5–1.4)
EOSINOPHIL # BLD AUTO: 0 K/UL (ref 0–0.51)
EOSINOPHIL NFR BLD: 0 % (ref 0–6.9)
ERYTHROCYTE [DISTWIDTH] IN BLOOD BY AUTOMATED COUNT: 45.5 FL (ref 35.9–50)
GFR SERPLBLD CREATININE-BSD FMLA CKD-EPI: 132 ML/MIN/1.73 M 2
GLOBULIN SER CALC-MCNC: 3.4 G/DL (ref 1.9–3.5)
GLUCOSE SERPL-MCNC: 111 MG/DL (ref 65–99)
HCT VFR BLD AUTO: 31.7 % (ref 42–52)
HGB BLD-MCNC: 10.4 G/DL (ref 14–18)
HYPOCHROMIA BLD QL SMEAR: ABNORMAL
LYMPHOCYTES # BLD AUTO: 2.55 K/UL (ref 1–4.8)
LYMPHOCYTES NFR BLD: 13.1 % (ref 22–41)
MANUAL DIFF BLD: NORMAL
MCH RBC QN AUTO: 29.9 PG (ref 27–33)
MCHC RBC AUTO-ENTMCNC: 32.8 G/DL (ref 32.3–36.5)
MCV RBC AUTO: 91.1 FL (ref 81.4–97.8)
MICROCYTES BLD QL SMEAR: ABNORMAL
MONOCYTES # BLD AUTO: 1.7 K/UL (ref 0–0.85)
MONOCYTES NFR BLD AUTO: 8.7 % (ref 0–13.4)
MORPHOLOGY BLD-IMP: NORMAL
MYELOCYTES NFR BLD MANUAL: 1.7 %
NEUTROPHILS # BLD AUTO: 14.92 K/UL (ref 1.82–7.42)
NEUTROPHILS NFR BLD: 76.5 % (ref 44–72)
NRBC # BLD AUTO: 0 K/UL
NRBC BLD-RTO: 0 /100 WBC (ref 0–0.2)
PLATELET # BLD AUTO: 1374 K/UL (ref 164–446)
PLATELET BLD QL SMEAR: NORMAL
PMV BLD AUTO: 8.9 FL (ref 9–12.9)
POTASSIUM SERPL-SCNC: 4.4 MMOL/L (ref 3.6–5.5)
PROT SERPL-MCNC: 6.6 G/DL (ref 6–8.2)
RBC # BLD AUTO: 3.48 M/UL (ref 4.7–6.1)
RBC BLD AUTO: PRESENT
SODIUM SERPL-SCNC: 131 MMOL/L (ref 135–145)
STOMATOCYTES BLD QL SMEAR: NORMAL
WBC # BLD AUTO: 19.5 K/UL (ref 4.8–10.8)

## 2023-06-15 PROCEDURE — 700102 HCHG RX REV CODE 250 W/ 637 OVERRIDE(OP)

## 2023-06-15 PROCEDURE — 99232 SBSQ HOSP IP/OBS MODERATE 35: CPT

## 2023-06-15 PROCEDURE — A9270 NON-COVERED ITEM OR SERVICE: HCPCS | Performed by: STUDENT IN AN ORGANIZED HEALTH CARE EDUCATION/TRAINING PROGRAM

## 2023-06-15 PROCEDURE — 85007 BL SMEAR W/DIFF WBC COUNT: CPT

## 2023-06-15 PROCEDURE — A9270 NON-COVERED ITEM OR SERVICE: HCPCS | Performed by: PSYCHIATRY & NEUROLOGY

## 2023-06-15 PROCEDURE — 36415 COLL VENOUS BLD VENIPUNCTURE: CPT

## 2023-06-15 PROCEDURE — 700111 HCHG RX REV CODE 636 W/ 250 OVERRIDE (IP): Performed by: NURSE PRACTITIONER

## 2023-06-15 PROCEDURE — 700105 HCHG RX REV CODE 258

## 2023-06-15 PROCEDURE — 80053 COMPREHEN METABOLIC PANEL: CPT

## 2023-06-15 PROCEDURE — 700102 HCHG RX REV CODE 250 W/ 637 OVERRIDE(OP): Performed by: PSYCHIATRY & NEUROLOGY

## 2023-06-15 PROCEDURE — 700102 HCHG RX REV CODE 250 W/ 637 OVERRIDE(OP): Performed by: STUDENT IN AN ORGANIZED HEALTH CARE EDUCATION/TRAINING PROGRAM

## 2023-06-15 PROCEDURE — 770001 HCHG ROOM/CARE - MED/SURG/GYN PRIV*

## 2023-06-15 PROCEDURE — A9270 NON-COVERED ITEM OR SERVICE: HCPCS

## 2023-06-15 PROCEDURE — 85025 COMPLETE CBC W/AUTO DIFF WBC: CPT

## 2023-06-15 PROCEDURE — 99232 SBSQ HOSP IP/OBS MODERATE 35: CPT | Performed by: PSYCHIATRY & NEUROLOGY

## 2023-06-15 RX ORDER — SODIUM CHLORIDE 1 G/1
1 TABLET ORAL
Status: DISPENSED | OUTPATIENT
Start: 2023-06-15 | End: 2023-06-16

## 2023-06-15 RX ADMIN — DIVALPROEX SODIUM 500 MG: 500 TABLET, DELAYED RELEASE ORAL at 06:01

## 2023-06-15 RX ADMIN — SODIUM CHLORIDE 1 G: 1 TABLET ORAL at 17:36

## 2023-06-15 RX ADMIN — DIVALPROEX SODIUM 500 MG: 500 TABLET, DELAYED RELEASE ORAL at 17:38

## 2023-06-15 RX ADMIN — OXYCODONE HYDROCHLORIDE 10 MG: 10 TABLET ORAL at 17:38

## 2023-06-15 RX ADMIN — RISPERIDONE 0.5 MG: 0.5 TABLET ORAL at 06:01

## 2023-06-15 RX ADMIN — QUETIAPINE FUMARATE 100 MG: 100 TABLET ORAL at 20:29

## 2023-06-15 RX ADMIN — ENOXAPARIN SODIUM 40 MG: 100 INJECTION SUBCUTANEOUS at 17:38

## 2023-06-15 RX ADMIN — ENOXAPARIN SODIUM 40 MG: 100 INJECTION SUBCUTANEOUS at 06:00

## 2023-06-15 RX ADMIN — SODIUM CHLORIDE: 9 INJECTION, SOLUTION INTRAVENOUS at 19:32

## 2023-06-15 ASSESSMENT — ENCOUNTER SYMPTOMS
SHORTNESS OF BREATH: 0
INSOMNIA: 0
NECK PAIN: 1
HEADACHES: 0
NEUROLOGICAL NEGATIVE: 1
ABDOMINAL PAIN: 1
VOMITING: 0
DEPRESSION: 0
RESPIRATORY NEGATIVE: 1
NAUSEA: 0
HALLUCINATIONS: 0
EYES NEGATIVE: 1
ABDOMINAL PAIN: 0
CONSTITUTIONAL NEGATIVE: 1
CARDIOVASCULAR NEGATIVE: 1
DIARRHEA: 0
CONSTIPATION: 0

## 2023-06-15 ASSESSMENT — PAIN DESCRIPTION - PAIN TYPE
TYPE: ACUTE PAIN

## 2023-06-15 NOTE — PROGRESS NOTES
Assumed care of patient at 0645. Bedside report received. Assessment complete.  AA&Ox4. Denies CP/SOB. Patient Delusional, Pacing, Yelling into Hallway  Reporting 0/10 pain.Declined intervention at this time.  Educated patient regarding pharmacologic and non pharmacologic modalities for pain management.  Skin per flowsheets  Tolerating Regular diet. Denies N/V.  + void. + BM. Last BM 6/15  Pt ambulates LUE NWB, Cast in place.  All needs met at this time. Call light within reach. Pt calls appropriately. Bed low and locked, non skid socks in place. Hourly rounding in place.

## 2023-06-15 NOTE — THERAPY
"Physical Therapy Contact Note    Patient Name: Fareed Muhammad  Age:  27 y.o., Sex:  male  Medical Record #: [id number]  Today's Date: 6/15/2023    Re-order for PT evaluation received for \"disposition planning\". Per APRN note today, pt remains on legal hold with plan for inpatient psych once medically cleared. Psychiatry saw pt today as well and extended legal hold with continued recommendation for inpatient psych disposition. Pt is able to mobilize at a supervised level with no AD and this appears to be at or close to his baseline level of function. Re-evaluation is not indicated at this time.    Shital Carrillo, PT, DPT  "

## 2023-06-15 NOTE — DISCHARGE PLANNING
Referral: Legal Hold Follow Up.    Intervention: LSW spoke with Timothy with [facility] regarding the status of patient's LH. Per Josee, the Court Order Legal Hold is extended through 6/22/23. Per Dr. Emily Aguirre with the Psych Team evaluated patient today and extended the LH.    Plan: As Above.
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
## 2023-06-15 NOTE — PROGRESS NOTES
Lab notified this RN of Elevated Platelet Level  Per MD Order, Patient is Post Splenectomy, MD is Aware, RN not to notify treatment team
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
## 2023-06-15 NOTE — CARE PLAN
Problem: Knowledge Deficit - Standard  Goal: Patient and family/care givers will demonstrate understanding of plan of care, disease process/condition, diagnostic tests and medications  Outcome: Progressing     Problem: Pain - Standard  Goal: Alleviation of pain or a reduction in pain to the patient’s comfort goal  Outcome: Progressing     Problem: Skin Integrity  Goal: Skin integrity is maintained or improved  Outcome: Progressing   The patient is Stable - Low risk of patient condition declining or worsening    Shift Goals  Clinical Goals: Safety  Patient Goals: PO Intake  Family Goals: not present    Progress made toward(s) clinical / shift goals:  Security Sitter in place, Patient turns self in bed, pain medicated per MAR     Patient is not progressing towards the following goals:

## 2023-06-15 NOTE — PROGRESS NOTES
Received report from previous shift RN.  Assessment complete. Legal hold for HI, security sitter in place.  A&O x 4. Patient calls appropriately.  Patient ambulates independently.  Patient denies SOB.  Patient has 8/10 pain. Pain managed per MAR.  Denies N&V. Tolerating regular diet.  Hard cast to L arm, MLI with steri strips, [place] J collar in place, padding replaced during day shift.   + void, + flatus, last BM 6/14.  Call light and personal belongings with in reach. Hourly rounding in place. All needs met at this time.

## 2023-06-15 NOTE — CARE PLAN
The patient is Stable - Low risk of patient condition declining or worsening    Shift Goals  Clinical Goals: Safety  Patient Goals: Sleep    Progress made toward(s) clinical / shift goals:  Patient able to ambulate safely to bathroom, calls appropriately for assistance. Patient able to sleep comfortably this shift.    Problem: Pain - Standard  Goal: Alleviation of pain or a reduction in pain to the patient’s comfort goal  Outcome: Progressing     Problem: Fall Risk  Goal: Patient will remain free from falls  Outcome: Progressing       Patient is not progressing towards the following goals:

## 2023-06-15 NOTE — THERAPY
"Occupational Therapy Contact Note    Patient Name: Fareed Muhammad  Age:  27 y.o., Sex:  male  Medical Record #: [id number]  Today's Date: 6/15/2023       06/15/23 0736   Interdisciplinary Plan of Care Collaboration   Collaboration Comments Re-order received for OT evaluation for disposition planning. Per APRN note dated 6/14/23 at 8:12am \"Remains on legal hold, plan for inpatient psych once medically clear.\" Psychiatry also recommending \"transfer to inpatient psychiatric hospital when medically cleared and bed is available.\" Considering barriers to safe DC are primarily psychiatric in nature and Psychiatry recommends inpatient psych facility once medically clear, re-evaluation by OT for disposition planning is not necessary at this time.     Please re-order OT if legal hold is discontinued and Psychiatry no longer recommends inpatient psych facility.        "

## 2023-06-15 NOTE — PROGRESS NOTES
"     Orthopedic PA Progress Note    Interval changes:  Security at door  LUE cast is CDI  Cast to remain on for 4 weeks total   NWB LUE  No additional ortho procedures pending  Cleared per ortho for any discharge planning    ROS - Unable to obtain due to status.     /76   Pulse 93   Temp 36.7 °C (98.1 °F) (Temporal)   Resp 18   Ht 1.803 m (5' 10.98\")   Wt 108 kg (238 lb 12.1 oz)   SpO2 92%     Patient seen and examined  Intubated  RRR  LUE: Cast CDI. Flexes and extends all fingers. Cap refill <2s.     Recent Labs     06/13/23  0201 06/14/23  0820 06/15/23  1107   WBC 21.4* 25.5* 19.5*   RBC 3.11* 3.37* 3.48*   HEMOGLOBIN 9.2* 10.1* 10.4*   HEMATOCRIT 28.7* 31.1* 31.7*   MCV 92.3 92.3 91.1   MCH 29.6 30.0 29.9   MCHC 32.1* 32.5 32.8   RDW 44.8 46.2 45.5   PLATELETCT 1112* 1276* 1374*   MPV 9.0 8.9* 8.9*         Active Hospital Problems    Diagnosis     Hyponatremia [E87.1]      Priority: High    Psychiatric diagnosis [F99]      Priority: High    Open fracture dislocation of left elbow joint [S42.402B]      Priority: High    Spleen laceration, initial encounter [S36.039A]      Priority: High    Acute blood loss anemia [D62]      Priority: Medium    Left pulmonary contusion [S27.321A]      Priority: Medium    Illicit drug use [F19.90]      Priority: Medium    Open skull fracture (HCC) [S02.91XB]      Priority: Medium    Major laceration of kidney, left, initial encounter [S37.062A]      Priority: Medium    Closed fracture of multiple ribs of left side [S22.42XA]      Priority: Medium    Rotatory subluxation of atlantoaxial joint [S13.120A]      Priority: Medium    No contraindication to deep vein thrombosis (DVT) prophylaxis [Z78.9]      Priority: Medium    Liver laceration, initial encounter [S36.113A]      Priority: Medium    Trauma [T14.90XA]      Priority: Low    Traumatic hemopneumothorax, initial encounter [S27.2XXA]      Priority: Low    Laceration of right lower extremity [S81.701A]      Priority: " Low     A/P:  Security at door  LUE cast is CDI  Cast to remain on for 4 weeks total   NWB LUE  No additional ortho procedures pending  Cleared per ortho for any discharge planning    POD#13 S/p  1.  Irrigation and debridement open fracture   2.  Open reduction internal fixation left distal humerus lateral condyle fracture   3.  Open treatment acute elbow dislocation  Wt bearing status - NWB LUE  Wound care/Drains - Dressings to be left in place  Future Procedures - None planned   Lovenox: defer to trauma team  Sutures/Staples out- 14-21 days post operatively. Removal will completed by ortho ADAM's unless transferred.  PT/OT-initiated  Antibiotics:  Perioperative completed  DVT Prophylaxis- TEDS/SCDs/Foot pumps  Edwards-not needed per ortho  Case Coordination for Discharge Planning - Disposition per therapy recs.

## 2023-06-16 LAB
ALBUMIN SERPL BCP-MCNC: 3 G/DL (ref 3.2–4.9)
ALBUMIN/GLOB SERPL: 0.8 G/DL
ALP SERPL-CCNC: 204 U/L (ref 30–99)
ALT SERPL-CCNC: 39 U/L (ref 2–50)
ANION GAP SERPL CALC-SCNC: 11 MMOL/L (ref 7–16)
ANISOCYTOSIS BLD QL SMEAR: ABNORMAL
AST SERPL-CCNC: 14 U/L (ref 12–45)
BASOPHILS # BLD AUTO: 1.7 % (ref 0–1.8)
BASOPHILS # BLD: 0.33 K/UL (ref 0–0.12)
BILIRUB SERPL-MCNC: 0.3 MG/DL (ref 0.1–1.5)
BUN SERPL-MCNC: 7 MG/DL (ref 8–22)
CALCIUM ALBUM COR SERPL-MCNC: 9.6 MG/DL (ref 8.5–10.5)
CALCIUM SERPL-MCNC: 8.8 MG/DL (ref 8.5–10.5)
CHLORIDE SERPL-SCNC: 96 MMOL/L (ref 96–112)
CO2 SERPL-SCNC: 26 MMOL/L (ref 20–33)
CREAT SERPL-MCNC: 0.7 MG/DL (ref 0.5–1.4)
EOSINOPHIL # BLD AUTO: 0 K/UL (ref 0–0.51)
EOSINOPHIL NFR BLD: 0 % (ref 0–6.9)
ERYTHROCYTE [DISTWIDTH] IN BLOOD BY AUTOMATED COUNT: 45.8 FL (ref 35.9–50)
GFR SERPLBLD CREATININE-BSD FMLA CKD-EPI: 130 ML/MIN/1.73 M 2
GLOBULIN SER CALC-MCNC: 3.6 G/DL (ref 1.9–3.5)
GLUCOSE SERPL-MCNC: 99 MG/DL (ref 65–99)
HCT VFR BLD AUTO: 31.4 % (ref 42–52)
HGB BLD-MCNC: 10.2 G/DL (ref 14–18)
HYPOCHROMIA BLD QL SMEAR: ABNORMAL
LYMPHOCYTES # BLD AUTO: 3.94 K/UL (ref 1–4.8)
LYMPHOCYTES NFR BLD: 20.2 % (ref 22–41)
MANUAL DIFF BLD: NORMAL
MCH RBC QN AUTO: 29.7 PG (ref 27–33)
MCHC RBC AUTO-ENTMCNC: 32.5 G/DL (ref 32.3–36.5)
MCV RBC AUTO: 91.3 FL (ref 81.4–97.8)
MICROCYTES BLD QL SMEAR: ABNORMAL
MONOCYTES # BLD AUTO: 2.28 K/UL (ref 0–0.85)
MONOCYTES NFR BLD AUTO: 11.7 % (ref 0–13.4)
MORPHOLOGY BLD-IMP: NORMAL
MYELOCYTES NFR BLD MANUAL: 1.7 %
NEUTROPHILS # BLD AUTO: 12.62 K/UL (ref 1.82–7.42)
NEUTROPHILS NFR BLD: 64.7 % (ref 44–72)
NRBC # BLD AUTO: 0 K/UL
NRBC BLD-RTO: 0 /100 WBC (ref 0–0.2)
PLATELET # BLD AUTO: 1271 K/UL (ref 164–446)
PLATELET BLD QL SMEAR: NORMAL
PMV BLD AUTO: 9 FL (ref 9–12.9)
POTASSIUM SERPL-SCNC: 4.4 MMOL/L (ref 3.6–5.5)
PROT SERPL-MCNC: 6.6 G/DL (ref 6–8.2)
RBC # BLD AUTO: 3.44 M/UL (ref 4.7–6.1)
RBC BLD AUTO: PRESENT
SODIUM SERPL-SCNC: 133 MMOL/L (ref 135–145)
VALPROATE SERPL-MCNC: 51.1 UG/ML (ref 50–100)
WBC # BLD AUTO: 19.5 K/UL (ref 4.8–10.8)

## 2023-06-16 PROCEDURE — 85007 BL SMEAR W/DIFF WBC COUNT: CPT

## 2023-06-16 PROCEDURE — 700102 HCHG RX REV CODE 250 W/ 637 OVERRIDE(OP): Performed by: STUDENT IN AN ORGANIZED HEALTH CARE EDUCATION/TRAINING PROGRAM

## 2023-06-16 PROCEDURE — 700102 HCHG RX REV CODE 250 W/ 637 OVERRIDE(OP)

## 2023-06-16 PROCEDURE — 85025 COMPLETE CBC W/AUTO DIFF WBC: CPT

## 2023-06-16 PROCEDURE — 36415 COLL VENOUS BLD VENIPUNCTURE: CPT

## 2023-06-16 PROCEDURE — 700102 HCHG RX REV CODE 250 W/ 637 OVERRIDE(OP): Performed by: PSYCHIATRY & NEUROLOGY

## 2023-06-16 PROCEDURE — A9270 NON-COVERED ITEM OR SERVICE: HCPCS | Performed by: PSYCHIATRY & NEUROLOGY

## 2023-06-16 PROCEDURE — 99232 SBSQ HOSP IP/OBS MODERATE 35: CPT

## 2023-06-16 PROCEDURE — 770001 HCHG ROOM/CARE - MED/SURG/GYN PRIV*

## 2023-06-16 PROCEDURE — A9270 NON-COVERED ITEM OR SERVICE: HCPCS | Performed by: STUDENT IN AN ORGANIZED HEALTH CARE EDUCATION/TRAINING PROGRAM

## 2023-06-16 PROCEDURE — A9270 NON-COVERED ITEM OR SERVICE: HCPCS

## 2023-06-16 PROCEDURE — 700111 HCHG RX REV CODE 636 W/ 250 OVERRIDE (IP): Performed by: NURSE PRACTITIONER

## 2023-06-16 PROCEDURE — 80164 ASSAY DIPROPYLACETIC ACD TOT: CPT

## 2023-06-16 PROCEDURE — 80053 COMPREHEN METABOLIC PANEL: CPT

## 2023-06-16 RX ADMIN — OXYCODONE HYDROCHLORIDE 10 MG: 10 TABLET ORAL at 17:22

## 2023-06-16 RX ADMIN — DIVALPROEX SODIUM 500 MG: 500 TABLET, DELAYED RELEASE ORAL at 04:05

## 2023-06-16 RX ADMIN — QUETIAPINE FUMARATE 100 MG: 100 TABLET ORAL at 20:41

## 2023-06-16 RX ADMIN — OXYCODONE HYDROCHLORIDE 10 MG: 10 TABLET ORAL at 06:51

## 2023-06-16 RX ADMIN — ENOXAPARIN SODIUM 40 MG: 100 INJECTION SUBCUTANEOUS at 17:22

## 2023-06-16 RX ADMIN — SODIUM CHLORIDE 1 G: 1 TABLET ORAL at 10:54

## 2023-06-16 RX ADMIN — SODIUM CHLORIDE 1 G: 1 TABLET ORAL at 17:22

## 2023-06-16 RX ADMIN — ENOXAPARIN SODIUM 40 MG: 100 INJECTION SUBCUTANEOUS at 04:05

## 2023-06-16 RX ADMIN — RISPERIDONE 0.5 MG: 0.5 TABLET ORAL at 04:05

## 2023-06-16 RX ADMIN — DIVALPROEX SODIUM 500 MG: 500 TABLET, DELAYED RELEASE ORAL at 17:23

## 2023-06-16 RX ADMIN — OXYCODONE HYDROCHLORIDE 10 MG: 10 TABLET ORAL at 22:40

## 2023-06-16 ASSESSMENT — ENCOUNTER SYMPTOMS
RESPIRATORY NEGATIVE: 1
EYES NEGATIVE: 1
CONSTITUTIONAL NEGATIVE: 1
DIARRHEA: 0
NEUROLOGICAL NEGATIVE: 1
NECK PAIN: 1
CONSTIPATION: 0
NAUSEA: 0
CARDIOVASCULAR NEGATIVE: 1
VOMITING: 0
HALLUCINATIONS: 0
ABDOMINAL PAIN: 1

## 2023-06-16 ASSESSMENT — PAIN DESCRIPTION - PAIN TYPE
TYPE: ACUTE PAIN
TYPE: ACUTE PAIN
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
## 2023-06-16 NOTE — PROGRESS NOTES
Received report from day shift RN. Assumed patient care. Assessment completed at beginning of shift.     Patient is A+O x4.  Patient is on 0L - RA.   Tolerating regular diet. Denies N/V.  Denies chest pain or SOB.  Pain 6 on a 0-10 pain scale.   + void, - BM. Last BM 6/14.  Skin per flowsheets.   Patient ambulates with stand by assistance.   1:1 security sitter at the bedside.   Denies SI/HI.     Plan of care discussed, all questions answered. Educated on the importance of calling before getting OOB and pt verbalizes understanding. Educated regarding importance of oral care. Oral care kit at bedside. Call light is within reach, treaded slipper socks on, bed in lowest/ locked position, hourly rounding in place, all needs met at this time

## 2023-06-16 NOTE — PROGRESS NOTES
"Bedside report received, assessment completed    A&O x  4, pt calls appropriately  Mobility: Up self, no assistive devices needed   Fall Risk Assessment: low, bed alarm n/a, door notifications n/a   - Security sitter in place HI  Pain Assessment / Reassessment completed, medication provided per MAR  Diet: Regular, no sharps   LDA:   IV Access: 20G, CDI/ flushed/ Infusing NS @ 100  GI/: bathroom/ urinal void, + flatus, 6/16 BM  DVT Prophylaxis: lovenox, SCD refusing   Jay Score: 20, Interventions per flow sheet  Procedures:    - 5/31 Splenectomy   - 6/1 L elbow ORIF  D/C Plan:    - Legal hold -  \"Received Stipulation and Order from the court continuing pt's legal hold until 6/22, scanned copy into pt's chart.\"       Reviewed plan of care with patient, bed in lowest position and locked, pt resting comfortably now, call light within reach, all needs met at this time. Interventions will be executed per plan of care   "
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
## 2023-06-16 NOTE — CARE PLAN
The patient is Stable - Low risk of patient condition declining or worsening    Shift Goals  Clinical Goals: Safety, behavioral compliance, rest  Patient Goals: PO Intake  Family Goals: not present    Progress made toward(s) clinical / shift goals:  Patient verbalized understanding of plan of care. Patient's pain is managed with current pharmacologic and nonpharmacologic interventions. Patient verbalizes understanding to call for assistance before getting out of bed. Skin interventions per flowsheets. Security sitter at the bedside for safety.     Patient is not progressing towards the following goals:

## 2023-06-16 NOTE — PROGRESS NOTES
"     Orthopedic PA Progress Note    Interval changes:  Sitter outside room.  Psychiatry following  LUE cast is CDI  Cast to remain on for 4 weeks total   NWB LUE  No additional ortho procedures pending  Cleared per ortho for any discharge planning    ROS - Unable to obtain due to status.     /76   Pulse (!) 102   Temp 36.6 °C (97.9 °F) (Temporal)   Resp 17   Ht 1.803 m (5' 10.98\")   Wt 108 kg (238 lb 12.1 oz)   SpO2 94%     Patient seen and examined  Intubated  RRR  LUE: Cast CDI. Flexes and extends all fingers. Cap refill <2s.     Recent Labs     06/14/23  0820 06/15/23  1107 06/16/23  0127   WBC 25.5* 19.5* 19.5*   RBC 3.37* 3.48* 3.44*   HEMOGLOBIN 10.1* 10.4* 10.2*   HEMATOCRIT 31.1* 31.7* 31.4*   MCV 92.3 91.1 91.3   MCH 30.0 29.9 29.7   MCHC 32.5 32.8 32.5   RDW 46.2 45.5 45.8   PLATELETCT 1276* 1374* 1271*   MPV 8.9* 8.9* 9.0         Active Hospital Problems    Diagnosis     Hyponatremia [E87.1]      Priority: High    Psychiatric diagnosis [F99]      Priority: High    Open fracture dislocation of left elbow joint [S42.402B]      Priority: High    Spleen laceration, initial encounter [S36.039A]      Priority: High    Acute blood loss anemia [D62]      Priority: Medium    Left pulmonary contusion [S27.321A]      Priority: Medium    Illicit drug use [F19.90]      Priority: Medium    Open skull fracture (HCC) [S02.91XB]      Priority: Medium    Major laceration of kidney, left, initial encounter [S37.062A]      Priority: Medium    Closed fracture of multiple ribs of left side [S22.42XA]      Priority: Medium    Rotatory subluxation of atlantoaxial joint [S13.120A]      Priority: Medium    No contraindication to deep vein thrombosis (DVT) prophylaxis [Z78.9]      Priority: Medium    Liver laceration, initial encounter [S36.113A]      Priority: Medium    Trauma [T14.90XA]      Priority: Low    Traumatic hemopneumothorax, initial encounter [S27.2XXA]      Priority: Low    Laceration of right lower " extremity [Z47.642L]      Priority: Low     A/P:  Security at door  LUE cast is CDI  Cast to remain on for 4 weeks total   NWB LUE  No additional ortho procedures pending  Cleared per ortho for any discharge planning    POD#14 S/p  1.  Irrigation and debridement open fracture   2.  Open reduction internal fixation left distal humerus lateral condyle fracture   3.  Open treatment acute elbow dislocation  Wt bearing status - NWB LUE  Wound care/Drains - Dressings to be left in place  Future Procedures - None planned   Lovenox: defer to trauma team  Sutures/Staples out- 14-21 days post operatively. Removal will completed by ortho ADAM's unless transferred.  PT/OT-initiated  Antibiotics:  Perioperative completed  DVT Prophylaxis- TEDS/SCDs/Foot pumps  Edwards-not needed per ortho  Case Coordination for Discharge Planning - Disposition per therapy recs.

## 2023-06-17 LAB
ANION GAP SERPL CALC-SCNC: 10 MMOL/L (ref 7–16)
BUN SERPL-MCNC: 10 MG/DL (ref 8–22)
CALCIUM SERPL-MCNC: 9.2 MG/DL (ref 8.5–10.5)
CHLORIDE SERPL-SCNC: 93 MMOL/L (ref 96–112)
CO2 SERPL-SCNC: 29 MMOL/L (ref 20–33)
CREAT SERPL-MCNC: 0.75 MG/DL (ref 0.5–1.4)
GFR SERPLBLD CREATININE-BSD FMLA CKD-EPI: 127 ML/MIN/1.73 M 2
GLUCOSE SERPL-MCNC: 100 MG/DL (ref 65–99)
POTASSIUM SERPL-SCNC: 4.7 MMOL/L (ref 3.6–5.5)
SODIUM SERPL-SCNC: 132 MMOL/L (ref 135–145)

## 2023-06-17 PROCEDURE — 36415 COLL VENOUS BLD VENIPUNCTURE: CPT

## 2023-06-17 PROCEDURE — 80048 BASIC METABOLIC PNL TOTAL CA: CPT

## 2023-06-17 PROCEDURE — 99024 POSTOP FOLLOW-UP VISIT: CPT | Performed by: PHYSICIAN ASSISTANT

## 2023-06-17 PROCEDURE — 700102 HCHG RX REV CODE 250 W/ 637 OVERRIDE(OP): Performed by: STUDENT IN AN ORGANIZED HEALTH CARE EDUCATION/TRAINING PROGRAM

## 2023-06-17 PROCEDURE — A9270 NON-COVERED ITEM OR SERVICE: HCPCS | Performed by: STUDENT IN AN ORGANIZED HEALTH CARE EDUCATION/TRAINING PROGRAM

## 2023-06-17 PROCEDURE — 700102 HCHG RX REV CODE 250 W/ 637 OVERRIDE(OP)

## 2023-06-17 PROCEDURE — 700102 HCHG RX REV CODE 250 W/ 637 OVERRIDE(OP): Performed by: PSYCHIATRY & NEUROLOGY

## 2023-06-17 PROCEDURE — A9270 NON-COVERED ITEM OR SERVICE: HCPCS | Performed by: PSYCHIATRY & NEUROLOGY

## 2023-06-17 PROCEDURE — 700111 HCHG RX REV CODE 636 W/ 250 OVERRIDE (IP): Performed by: NURSE PRACTITIONER

## 2023-06-17 PROCEDURE — 770001 HCHG ROOM/CARE - MED/SURG/GYN PRIV*

## 2023-06-17 PROCEDURE — A9270 NON-COVERED ITEM OR SERVICE: HCPCS

## 2023-06-17 RX ADMIN — OXYCODONE HYDROCHLORIDE 10 MG: 10 TABLET ORAL at 10:07

## 2023-06-17 RX ADMIN — ENOXAPARIN SODIUM 40 MG: 100 INJECTION SUBCUTANEOUS at 04:05

## 2023-06-17 RX ADMIN — OXYCODONE HYDROCHLORIDE 10 MG: 10 TABLET ORAL at 20:23

## 2023-06-17 RX ADMIN — DIVALPROEX SODIUM 500 MG: 500 TABLET, DELAYED RELEASE ORAL at 18:46

## 2023-06-17 RX ADMIN — RISPERIDONE 0.5 MG: 0.5 TABLET ORAL at 04:05

## 2023-06-17 RX ADMIN — ENOXAPARIN SODIUM 40 MG: 100 INJECTION SUBCUTANEOUS at 18:46

## 2023-06-17 RX ADMIN — DIVALPROEX SODIUM 500 MG: 500 TABLET, DELAYED RELEASE ORAL at 04:05

## 2023-06-17 RX ADMIN — QUETIAPINE FUMARATE 100 MG: 100 TABLET ORAL at 20:23

## 2023-06-17 ASSESSMENT — PAIN DESCRIPTION - PAIN TYPE
TYPE: ACUTE PAIN

## 2023-06-17 ASSESSMENT — ENCOUNTER SYMPTOMS
ABDOMINAL PAIN: 1
CONSTITUTIONAL NEGATIVE: 1
HALLUCINATIONS: 0
VOMITING: 0
CARDIOVASCULAR NEGATIVE: 1
DIARRHEA: 0
RESPIRATORY NEGATIVE: 1
EYES NEGATIVE: 1
CONSTIPATION: 0
NECK PAIN: 1
NAUSEA: 0
NEUROLOGICAL NEGATIVE: 1

## 2023-06-17 NOTE — CARE PLAN
Problem: Knowledge Deficit - Standard  Goal: Patient and family/care givers will demonstrate understanding of plan of care, disease process/condition, diagnostic tests and medications  Outcome: Progressing     Problem: Pain - Standard  Goal: Alleviation of pain or a reduction in pain to the patient’s comfort goal  Outcome: Progressing   The patient is Stable - Low risk of patient condition declining or worsening    Shift Goals  Clinical Goals: safety, behavioral compliance  Patient Goals: rest  Family Goals: not present    Progress made toward(s) clinical / shift goals:  Pt denies HI/SI this shift. Pulmonary hygiene provided. Pt brushed teeth. Pt ambulating independently in room. Patient verbalized understanding of plan of care. Patient's pain managed with pharmacologic and nonpharmacologic interventions.
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
## 2023-06-17 NOTE — CARE PLAN
The patient is Stable - Low risk of patient condition declining or worsening    Shift Goals  Clinical Goals: Safety, behavorial compliance  Patient Goals: Rest  Family Goals: not present    Progress made toward(s) clinical / shift goals:  Patient verbalized understanding of plan of care. Patient's pain is managed with current pharmacologic and nonpharmacologic interventions. Patient ambulating with a steady gait, no assistance needed. Skin interventions per flowsheets.    Patient is not progressing towards the following goals:

## 2023-06-17 NOTE — PROGRESS NOTES
Report received from Angeles MACDONALD, assumed care at 0700.  Pt is A0X4.  Pt on RA  Pt declines any SOB, chest pain, new onset of numbness/ tingling  Pt sleeping in bed.  Pt is voiding adequatly and without hesitancy  Pt has + flatus, + bowel sounds, + BM on 6/14  Pt ambulates independently in room.  Security sitter at bedside  Pt denies HI/SI.   All potentially dangerous items removed from room.   Pt is tolerating a regular diet, pt denies any nausea/vomiting at this time.  Plan of care discussed, all questions answered. Explained importance of calling before getting OOB and pt verbalizes understanding. Explained importance of oral care. Call light is within reach, treaded slipper socks on, bed in lowest/ locked position, hourly rounding in place, all needs met at this time.
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
## 2023-06-17 NOTE — PROGRESS NOTES
Received report from day shift RN. Assumed patient care. Assessment completed at beginning of shift.      Patient is A+O x4.  Patient is on 0L - RA.   Tolerating regular diet. Denies N/V.  Denies chest pain or SOB.  Pain 8 on a 0-10 pain scale. Denies need for pain medication at this time.  + void, - BM. Last BM 6/14.  Skin per flowsheets.   Patient ambulates independently.   1:1 security sitter at the bedside.   Denies SI/HI.      Plan of care discussed, all questions answered. Educated on the importance of calling before getting OOB and pt verbalizes understanding. Educated regarding importance of oral care. Oral care kit at bedside. Call light is within reach, treaded slipper socks on, bed in lowest/ locked position, hourly rounding in place, all needs met at this time

## 2023-06-18 LAB
ANION GAP SERPL CALC-SCNC: 10 MMOL/L (ref 7–16)
ANISOCYTOSIS BLD QL SMEAR: ABNORMAL
BASOPHILS # BLD AUTO: 0 % (ref 0–1.8)
BASOPHILS # BLD: 0 K/UL (ref 0–0.12)
BUN SERPL-MCNC: 13 MG/DL (ref 8–22)
CALCIUM SERPL-MCNC: 9 MG/DL (ref 8.5–10.5)
CHLORIDE SERPL-SCNC: 91 MMOL/L (ref 96–112)
CO2 SERPL-SCNC: 27 MMOL/L (ref 20–33)
CREAT SERPL-MCNC: 0.71 MG/DL (ref 0.5–1.4)
EOSINOPHIL # BLD AUTO: 0.12 K/UL (ref 0–0.51)
EOSINOPHIL NFR BLD: 0.8 % (ref 0–6.9)
ERYTHROCYTE [DISTWIDTH] IN BLOOD BY AUTOMATED COUNT: 44.2 FL (ref 35.9–50)
GFR SERPLBLD CREATININE-BSD FMLA CKD-EPI: 129 ML/MIN/1.73 M 2
GLUCOSE SERPL-MCNC: 100 MG/DL (ref 65–99)
HCT VFR BLD AUTO: 31.5 % (ref 42–52)
HGB BLD-MCNC: 10.2 G/DL (ref 14–18)
HYPOCHROMIA BLD QL SMEAR: ABNORMAL
LG PLATELETS BLD QL SMEAR: NORMAL
LYMPHOCYTES # BLD AUTO: 3.68 K/UL (ref 1–4.8)
LYMPHOCYTES NFR BLD: 24.4 % (ref 22–41)
MANUAL DIFF BLD: NORMAL
MCH RBC QN AUTO: 29.4 PG (ref 27–33)
MCHC RBC AUTO-ENTMCNC: 32.4 G/DL (ref 32.3–36.5)
MCV RBC AUTO: 90.8 FL (ref 81.4–97.8)
METAMYELOCYTES NFR BLD MANUAL: 1.7 %
MICROCYTES BLD QL SMEAR: ABNORMAL
MONOCYTES # BLD AUTO: 1.78 K/UL (ref 0–0.85)
MONOCYTES NFR BLD AUTO: 11.8 % (ref 0–13.4)
MORPHOLOGY BLD-IMP: NORMAL
MYELOCYTES NFR BLD MANUAL: 5 %
NEUTROPHILS # BLD AUTO: 8.5 K/UL (ref 1.82–7.42)
NEUTROPHILS NFR BLD: 56.3 % (ref 44–72)
NRBC # BLD AUTO: 0 K/UL
NRBC BLD-RTO: 0 /100 WBC (ref 0–0.2)
PLATELET # BLD AUTO: 1239 K/UL (ref 164–446)
PLATELET BLD QL SMEAR: NORMAL
PMV BLD AUTO: 8.5 FL (ref 9–12.9)
POTASSIUM SERPL-SCNC: 4.6 MMOL/L (ref 3.6–5.5)
RBC # BLD AUTO: 3.47 M/UL (ref 4.7–6.1)
RBC BLD AUTO: PRESENT
SODIUM SERPL-SCNC: 128 MMOL/L (ref 135–145)
WBC # BLD AUTO: 15.1 K/UL (ref 4.8–10.8)

## 2023-06-18 PROCEDURE — A9270 NON-COVERED ITEM OR SERVICE: HCPCS | Performed by: STUDENT IN AN ORGANIZED HEALTH CARE EDUCATION/TRAINING PROGRAM

## 2023-06-18 PROCEDURE — 700102 HCHG RX REV CODE 250 W/ 637 OVERRIDE(OP)

## 2023-06-18 PROCEDURE — 80048 BASIC METABOLIC PNL TOTAL CA: CPT

## 2023-06-18 PROCEDURE — 700102 HCHG RX REV CODE 250 W/ 637 OVERRIDE(OP): Performed by: PSYCHIATRY & NEUROLOGY

## 2023-06-18 PROCEDURE — 770001 HCHG ROOM/CARE - MED/SURG/GYN PRIV*

## 2023-06-18 PROCEDURE — A9270 NON-COVERED ITEM OR SERVICE: HCPCS | Performed by: PSYCHIATRY & NEUROLOGY

## 2023-06-18 PROCEDURE — 700102 HCHG RX REV CODE 250 W/ 637 OVERRIDE(OP): Performed by: STUDENT IN AN ORGANIZED HEALTH CARE EDUCATION/TRAINING PROGRAM

## 2023-06-18 PROCEDURE — 99024 POSTOP FOLLOW-UP VISIT: CPT | Performed by: PHYSICIAN ASSISTANT

## 2023-06-18 PROCEDURE — 85025 COMPLETE CBC W/AUTO DIFF WBC: CPT

## 2023-06-18 PROCEDURE — 700102 HCHG RX REV CODE 250 W/ 637 OVERRIDE(OP): Performed by: PHYSICIAN ASSISTANT

## 2023-06-18 PROCEDURE — 700111 HCHG RX REV CODE 636 W/ 250 OVERRIDE (IP): Performed by: NURSE PRACTITIONER

## 2023-06-18 PROCEDURE — 85007 BL SMEAR W/DIFF WBC COUNT: CPT

## 2023-06-18 PROCEDURE — A9270 NON-COVERED ITEM OR SERVICE: HCPCS | Performed by: PHYSICIAN ASSISTANT

## 2023-06-18 PROCEDURE — 36415 COLL VENOUS BLD VENIPUNCTURE: CPT

## 2023-06-18 PROCEDURE — A9270 NON-COVERED ITEM OR SERVICE: HCPCS

## 2023-06-18 RX ORDER — SODIUM CHLORIDE 1 G/1
1 TABLET ORAL
Status: DISCONTINUED | OUTPATIENT
Start: 2023-06-18 | End: 2023-06-28

## 2023-06-18 RX ADMIN — DIVALPROEX SODIUM 500 MG: 500 TABLET, DELAYED RELEASE ORAL at 04:24

## 2023-06-18 RX ADMIN — RISPERIDONE 0.5 MG: 0.5 TABLET ORAL at 04:24

## 2023-06-18 RX ADMIN — SODIUM CHLORIDE 1 G: 1 TABLET ORAL at 13:38

## 2023-06-18 RX ADMIN — OXYCODONE HYDROCHLORIDE 5 MG: 5 TABLET ORAL at 22:12

## 2023-06-18 RX ADMIN — ENOXAPARIN SODIUM 40 MG: 100 INJECTION SUBCUTANEOUS at 17:10

## 2023-06-18 RX ADMIN — SODIUM CHLORIDE 1 G: 1 TABLET ORAL at 17:09

## 2023-06-18 RX ADMIN — ENOXAPARIN SODIUM 40 MG: 100 INJECTION SUBCUTANEOUS at 04:24

## 2023-06-18 RX ADMIN — DIVALPROEX SODIUM 500 MG: 500 TABLET, DELAYED RELEASE ORAL at 17:09

## 2023-06-18 RX ADMIN — QUETIAPINE FUMARATE 100 MG: 100 TABLET ORAL at 21:52

## 2023-06-18 RX ADMIN — SODIUM CHLORIDE 1 G: 1 TABLET ORAL at 08:19

## 2023-06-18 RX ADMIN — OXYCODONE HYDROCHLORIDE 10 MG: 10 TABLET ORAL at 10:00

## 2023-06-18 ASSESSMENT — PAIN DESCRIPTION - PAIN TYPE
TYPE: ACUTE PAIN

## 2023-06-18 ASSESSMENT — ENCOUNTER SYMPTOMS
DIARRHEA: 0
VOMITING: 0
CARDIOVASCULAR NEGATIVE: 1
ABDOMINAL PAIN: 1
NAUSEA: 0
CONSTIPATION: 0
RESPIRATORY NEGATIVE: 1
CONSTITUTIONAL NEGATIVE: 1
EYES NEGATIVE: 1
NEUROLOGICAL NEGATIVE: 1
NECK PAIN: 1
HALLUCINATIONS: 0
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
## 2023-06-18 NOTE — PROGRESS NOTES
Report received from Angeles MACDONALD, assumed care at 0700  Pt is A0X4  Pt denies HI/SI  Pt declines any SOB, chest pain, new onset of numbness/ tingling  Pt rates pain at 3/10, on a scale of 1-10, pt declines medication at this time.  Pt is voiding adequatly and without hesitancy  Pt has + flatus, + bowel sounds, + BM on 6/18.  Pt ambulates with a standby assist   Pt is tolerating a regular diet, pt denies any nausea/vomiting  1500mL fluid restriction in place.  Security sitter at bedside.  Plan of care discussed, all questions answered. Explained importance of calling before getting OOB and pt verbalizes understanding. Explained importance of oral care. Call light is within reach, treaded slipper socks on, bed in lowest/ locked position, hourly rounding in place, all needs met at this time.

## 2023-06-18 NOTE — CARE PLAN
The patient is Stable - Low risk of patient condition declining or worsening    Shift Goals  Clinical Goals: Safety, behavioral compliance  Patient Goals: rest  Family Goals: not present    Progress made toward(s) clinical / shift goals:  Patient verbalized understanding of plan of care. Patient's pain is managed with current pharmacologic and nonpharmacologic interventions. Patient ambulates independently without assistance needed. Skin interventions per flowsheets.    Patient is not progressing towards the following goals:

## 2023-06-18 NOTE — CARE PLAN
Problem: Knowledge Deficit - Standard  Goal: Patient and family/care givers will demonstrate understanding of plan of care, disease process/condition, diagnostic tests and medications  Outcome: Progressing     Problem: Pain - Standard  Goal: Alleviation of pain or a reduction in pain to the patient’s comfort goal  Outcome: Progressing   The patient is Stable - Low risk of patient condition declining or worsening    Shift Goals  Clinical Goals: safety, behavioral compliance, monitor labs  Patient Goals: rest  Family Goals: not present    Progress made toward(s) clinical / shift goals:  Pt ambulating with standby assist in room. Pt has adequate PO intake. Pt on 1500mL fluid restriction. Pt had Lg watery BM this shift. Pt denies HI/SI this shift. Security sitter at bedside. All potentially dangerous items removed from room.

## 2023-06-18 NOTE — PROGRESS NOTES
Received report from day shift RN. Assumed patient care. Assessment completed at beginning of shift.      Patient is A+O x4.  Patient is on 0L - RA.   Tolerating regular diet. Denies N/V.  Denies chest pain or SOB.  Pain 8 on a 0-10 pain scale. Medicated per MAR.  + void, + BM. Last BM 6/17, per patient report.  Skin per flowsheets.   Patient ambulates independently.   1:1 security sitter at the bedside.   Denies SI/HI.      Plan of care discussed, all questions answered. Educated on the importance of calling before getting OOB and pt verbalizes understanding. Educated regarding importance of oral care. Oral care kit at bedside. Call light is within reach, treaded slipper socks on, bed in lowest/ locked position, hourly rounding in place, all needs met at this time
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
## 2023-06-19 LAB
ANION GAP SERPL CALC-SCNC: 11 MMOL/L (ref 7–16)
BUN SERPL-MCNC: 11 MG/DL (ref 8–22)
CALCIUM SERPL-MCNC: 8.8 MG/DL (ref 8.5–10.5)
CHLORIDE SERPL-SCNC: 91 MMOL/L (ref 96–112)
CO2 SERPL-SCNC: 27 MMOL/L (ref 20–33)
CREAT SERPL-MCNC: 0.7 MG/DL (ref 0.5–1.4)
GFR SERPLBLD CREATININE-BSD FMLA CKD-EPI: 130 ML/MIN/1.73 M 2
GLUCOSE SERPL-MCNC: 97 MG/DL (ref 65–99)
POTASSIUM SERPL-SCNC: 4.2 MMOL/L (ref 3.6–5.5)
SODIUM SERPL-SCNC: 129 MMOL/L (ref 135–145)

## 2023-06-19 PROCEDURE — A9270 NON-COVERED ITEM OR SERVICE: HCPCS | Performed by: PSYCHIATRY & NEUROLOGY

## 2023-06-19 PROCEDURE — 36415 COLL VENOUS BLD VENIPUNCTURE: CPT

## 2023-06-19 PROCEDURE — 99024 POSTOP FOLLOW-UP VISIT: CPT | Performed by: NURSE PRACTITIONER

## 2023-06-19 PROCEDURE — 80048 BASIC METABOLIC PNL TOTAL CA: CPT

## 2023-06-19 PROCEDURE — 700102 HCHG RX REV CODE 250 W/ 637 OVERRIDE(OP): Performed by: STUDENT IN AN ORGANIZED HEALTH CARE EDUCATION/TRAINING PROGRAM

## 2023-06-19 PROCEDURE — 700102 HCHG RX REV CODE 250 W/ 637 OVERRIDE(OP): Performed by: PHYSICIAN ASSISTANT

## 2023-06-19 PROCEDURE — 770001 HCHG ROOM/CARE - MED/SURG/GYN PRIV*

## 2023-06-19 PROCEDURE — 700102 HCHG RX REV CODE 250 W/ 637 OVERRIDE(OP): Performed by: NURSE PRACTITIONER

## 2023-06-19 PROCEDURE — A9270 NON-COVERED ITEM OR SERVICE: HCPCS | Performed by: STUDENT IN AN ORGANIZED HEALTH CARE EDUCATION/TRAINING PROGRAM

## 2023-06-19 PROCEDURE — A9270 NON-COVERED ITEM OR SERVICE: HCPCS | Performed by: PHYSICIAN ASSISTANT

## 2023-06-19 PROCEDURE — 700111 HCHG RX REV CODE 636 W/ 250 OVERRIDE (IP): Performed by: NURSE PRACTITIONER

## 2023-06-19 PROCEDURE — 99232 SBSQ HOSP IP/OBS MODERATE 35: CPT | Mod: GC | Performed by: PSYCHIATRY & NEUROLOGY

## 2023-06-19 PROCEDURE — 700102 HCHG RX REV CODE 250 W/ 637 OVERRIDE(OP): Performed by: PSYCHIATRY & NEUROLOGY

## 2023-06-19 PROCEDURE — A9270 NON-COVERED ITEM OR SERVICE: HCPCS | Performed by: NURSE PRACTITIONER

## 2023-06-19 RX ORDER — OXYCODONE HYDROCHLORIDE 5 MG/1
5 TABLET ORAL EVERY 4 HOURS PRN
Status: DISCONTINUED | OUTPATIENT
Start: 2023-06-19 | End: 2023-06-30 | Stop reason: HOSPADM

## 2023-06-19 RX ORDER — OXYCODONE HYDROCHLORIDE 5 MG/1
2.5 TABLET ORAL EVERY 4 HOURS PRN
Status: DISCONTINUED | OUTPATIENT
Start: 2023-06-19 | End: 2023-06-30 | Stop reason: HOSPADM

## 2023-06-19 RX ADMIN — SODIUM CHLORIDE 1 G: 1 TABLET ORAL at 16:35

## 2023-06-19 RX ADMIN — ENOXAPARIN SODIUM 40 MG: 100 INJECTION SUBCUTANEOUS at 06:14

## 2023-06-19 RX ADMIN — ENOXAPARIN SODIUM 40 MG: 100 INJECTION SUBCUTANEOUS at 16:35

## 2023-06-19 RX ADMIN — DIVALPROEX SODIUM 500 MG: 500 TABLET, DELAYED RELEASE ORAL at 16:35

## 2023-06-19 RX ADMIN — DIVALPROEX SODIUM 500 MG: 500 TABLET, DELAYED RELEASE ORAL at 06:14

## 2023-06-19 RX ADMIN — SODIUM CHLORIDE 1 G: 1 TABLET ORAL at 08:21

## 2023-06-19 RX ADMIN — SODIUM CHLORIDE 1 G: 1 TABLET ORAL at 12:43

## 2023-06-19 RX ADMIN — OXYCODONE HYDROCHLORIDE 5 MG: 5 TABLET ORAL at 20:10

## 2023-06-19 RX ADMIN — RISPERIDONE 0.5 MG: 0.5 TABLET ORAL at 06:15

## 2023-06-19 RX ADMIN — OXYCODONE HYDROCHLORIDE 5 MG: 5 TABLET ORAL at 10:39

## 2023-06-19 RX ADMIN — QUETIAPINE FUMARATE 100 MG: 100 TABLET ORAL at 21:15

## 2023-06-19 ASSESSMENT — PAIN DESCRIPTION - PAIN TYPE
TYPE: ACUTE PAIN

## 2023-06-19 ASSESSMENT — ENCOUNTER SYMPTOMS
HEADACHES: 0
DEPRESSION: 0
DIARRHEA: 1
CONSTIPATION: 0
BACK PAIN: 1
NAUSEA: 0
HALLUCINATIONS: 0
VOMITING: 0
CHILLS: 0
FEVER: 0
SHORTNESS OF BREATH: 0

## 2023-06-19 NOTE — PROGRESS NOTES
Assumed care of patient at 0645. Bedside report received. Assessment complete.  AA&Ox4. Patient refusing Education, Impulsive. Denies CP/SOB.  Reporting 7/10 pain. Medicated per MAR.   Educated patient regarding pharmacologic and non pharmacologic modalities for pain management.  Skin per flowsheets  Tolerating Regular diet 1500 mL Fluid Restriction in place. Denies N/V.  + void. Last BM 6/18  Pt ambulates Independently, Sharron Whelan at all times, DOLORES JAQUEZ.  All needs met at this time. Call light within reach. Pt calls appropriately. Bed low and locked, non skid socks in place. Hourly rounding in place.

## 2023-06-19 NOTE — PROGRESS NOTES
Legal Hold Discontinued  Patient Educated on Legal Hold Discontinuation   Patient Verbalized Understanding   Safety Alda Dismissed

## 2023-06-19 NOTE — CARE PLAN
The patient is Stable - Low risk of patient condition declining or worsening    Shift Goals  Clinical Goals: safety, behavior, labs  Patient Goals: rest  Family Goals: not present    Progress made toward(s) clinical / shift goals:      Problem: Knowledge Deficit - Standard  Goal: Patient and family/care givers will demonstrate understanding of plan of care, disease process/condition, diagnostic tests and medications  Outcome: Progressing     Problem: Skin Integrity  Goal: Skin integrity is maintained or improved  Outcome: Progressing     Problem: Fall Risk  Goal: Patient will remain free from falls  Outcome: Progressing
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
## 2023-06-19 NOTE — CARE PLAN
Problem: Knowledge Deficit - Standard  Goal: Patient and family/care givers will demonstrate understanding of plan of care, disease process/condition, diagnostic tests and medications  Outcome: Progressing     Problem: Pain - Standard  Goal: Alleviation of pain or a reduction in pain to the patient’s comfort goal  Outcome: Progressing     Problem: Skin Integrity  Goal: Skin integrity is maintained or improved  Outcome: Progressing   The patient is Stable - Low risk of patient condition declining or worsening    Shift Goals  Clinical Goals: Safety, Resolution of Hyponatremia  Patient Goals: Rest  Family Goals: not present    Progress made toward(s) clinical / shift goals:  Patient turns self in bed, pain medicated per MAR, Educated patient on plan of care this shift     Patient is not progressing towards the following goals:
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
## 2023-06-19 NOTE — PROGRESS NOTES
Patient [place] Score of 13 (Moderate)  Bed Alarm attempted to be Implemented   Patient refused, Charge RN Notified

## 2023-06-20 PROBLEM — S27.2XXA TRAUMATIC HEMOPNEUMOTHORAX, INITIAL ENCOUNTER: Status: RESOLVED | Noted: 2023-05-31 | Resolved: 2023-06-20

## 2023-06-20 PROBLEM — S81.811A LACERATION OF RIGHT LOWER EXTREMITY: Status: RESOLVED | Noted: 2023-05-31 | Resolved: 2023-06-20

## 2023-06-20 PROBLEM — D62 ACUTE BLOOD LOSS ANEMIA: Status: RESOLVED | Noted: 2023-06-03 | Resolved: 2023-06-20

## 2023-06-20 PROBLEM — S27.321A LEFT PULMONARY CONTUSION: Status: RESOLVED | Noted: 2023-06-01 | Resolved: 2023-06-20

## 2023-06-20 LAB
ANION GAP SERPL CALC-SCNC: 9 MMOL/L (ref 7–16)
BUN SERPL-MCNC: 12 MG/DL (ref 8–22)
CALCIUM SERPL-MCNC: 9.1 MG/DL (ref 8.5–10.5)
CHLORIDE SERPL-SCNC: 95 MMOL/L (ref 96–112)
CO2 SERPL-SCNC: 27 MMOL/L (ref 20–33)
CREAT SERPL-MCNC: 0.72 MG/DL (ref 0.5–1.4)
GFR SERPLBLD CREATININE-BSD FMLA CKD-EPI: 129 ML/MIN/1.73 M 2
GLUCOSE SERPL-MCNC: 115 MG/DL (ref 65–99)
POTASSIUM SERPL-SCNC: 4.3 MMOL/L (ref 3.6–5.5)
SODIUM SERPL-SCNC: 131 MMOL/L (ref 135–145)

## 2023-06-20 PROCEDURE — 700102 HCHG RX REV CODE 250 W/ 637 OVERRIDE(OP): Performed by: PHYSICIAN ASSISTANT

## 2023-06-20 PROCEDURE — 700102 HCHG RX REV CODE 250 W/ 637 OVERRIDE(OP): Performed by: NURSE PRACTITIONER

## 2023-06-20 PROCEDURE — 700102 HCHG RX REV CODE 250 W/ 637 OVERRIDE(OP): Performed by: PSYCHIATRY & NEUROLOGY

## 2023-06-20 PROCEDURE — 700102 HCHG RX REV CODE 250 W/ 637 OVERRIDE(OP): Performed by: STUDENT IN AN ORGANIZED HEALTH CARE EDUCATION/TRAINING PROGRAM

## 2023-06-20 PROCEDURE — A9270 NON-COVERED ITEM OR SERVICE: HCPCS | Performed by: STUDENT IN AN ORGANIZED HEALTH CARE EDUCATION/TRAINING PROGRAM

## 2023-06-20 PROCEDURE — 92523 SPEECH SOUND LANG COMPREHEN: CPT

## 2023-06-20 PROCEDURE — A9270 NON-COVERED ITEM OR SERVICE: HCPCS | Performed by: PHYSICIAN ASSISTANT

## 2023-06-20 PROCEDURE — 99024 POSTOP FOLLOW-UP VISIT: CPT | Performed by: NURSE PRACTITIONER

## 2023-06-20 PROCEDURE — 36415 COLL VENOUS BLD VENIPUNCTURE: CPT

## 2023-06-20 PROCEDURE — 80048 BASIC METABOLIC PNL TOTAL CA: CPT

## 2023-06-20 PROCEDURE — A9270 NON-COVERED ITEM OR SERVICE: HCPCS | Performed by: NURSE PRACTITIONER

## 2023-06-20 PROCEDURE — 770001 HCHG ROOM/CARE - MED/SURG/GYN PRIV*

## 2023-06-20 PROCEDURE — 700111 HCHG RX REV CODE 636 W/ 250 OVERRIDE (IP): Performed by: NURSE PRACTITIONER

## 2023-06-20 PROCEDURE — A9270 NON-COVERED ITEM OR SERVICE: HCPCS | Performed by: PSYCHIATRY & NEUROLOGY

## 2023-06-20 RX ADMIN — DIVALPROEX SODIUM 500 MG: 500 TABLET, DELAYED RELEASE ORAL at 06:23

## 2023-06-20 RX ADMIN — ACETAMINOPHEN 1000 MG: 500 TABLET, FILM COATED ORAL at 07:20

## 2023-06-20 RX ADMIN — SODIUM CHLORIDE 1 G: 1 TABLET ORAL at 16:56

## 2023-06-20 RX ADMIN — RISPERIDONE 0.5 MG: 0.5 TABLET ORAL at 06:23

## 2023-06-20 RX ADMIN — ENOXAPARIN SODIUM 40 MG: 100 INJECTION SUBCUTANEOUS at 16:56

## 2023-06-20 RX ADMIN — OXYCODONE HYDROCHLORIDE 5 MG: 5 TABLET ORAL at 11:35

## 2023-06-20 RX ADMIN — DIVALPROEX SODIUM 500 MG: 500 TABLET, DELAYED RELEASE ORAL at 16:56

## 2023-06-20 RX ADMIN — OXYCODONE HYDROCHLORIDE 5 MG: 5 TABLET ORAL at 06:25

## 2023-06-20 RX ADMIN — SODIUM CHLORIDE 1 G: 1 TABLET ORAL at 07:20

## 2023-06-20 RX ADMIN — QUETIAPINE FUMARATE 100 MG: 100 TABLET ORAL at 19:40

## 2023-06-20 RX ADMIN — OXYCODONE HYDROCHLORIDE 2.5 MG: 5 TABLET ORAL at 19:40

## 2023-06-20 RX ADMIN — SENNOSIDES AND DOCUSATE SODIUM 1 TABLET: 50; 8.6 TABLET ORAL at 19:40

## 2023-06-20 RX ADMIN — ENOXAPARIN SODIUM 40 MG: 100 INJECTION SUBCUTANEOUS at 06:23

## 2023-06-20 RX ADMIN — SODIUM CHLORIDE 1 G: 1 TABLET ORAL at 11:34

## 2023-06-20 ASSESSMENT — ENCOUNTER SYMPTOMS
NEUROLOGICAL NEGATIVE: 1
MYALGIAS: 1
ROS GI COMMENTS: BM 6/19
RESPIRATORY NEGATIVE: 1
PSYCHIATRIC NEGATIVE: 1

## 2023-06-20 ASSESSMENT — PAIN DESCRIPTION - PAIN TYPE
TYPE: ACUTE PAIN
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
## 2023-06-20 NOTE — CARE PLAN
The patient is Stable - Low risk of patient condition declining or worsening    Shift Goals  Clinical Goals: safety, hemodynamics, and pain control  Patient Goals: pain control and rest  Family Goals: not present    Progress made toward(s) clinical / shift goals:  Expectations of safety discussed with pt. Hemodynamics maintained with salt tablets and fluid restrictions. Pain controlled per MAR. Pt sleeping intermittently throughout shift.      Problem: Safety - Violent/Self-destructive Restraint  Goal: Free from restraints (Restraint for Violent or Self-Destructive Behavior)  Outcome: Progressing     Problem: Hemodynamics  Goal: Patient's hemodynamics, fluid balance and neurologic status will be stable or improve  Outcome: Progressing
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
## 2023-06-20 NOTE — CARE PLAN
Problem: Knowledge Deficit - Standard  Goal: Patient and family/care givers will demonstrate understanding of plan of care, disease process/condition, diagnostic tests and medications  Outcome: Progressing     Problem: Pain - Standard  Goal: Alleviation of pain or a reduction in pain to the patient’s comfort goal  Outcome: Progressing     Problem: Skin Integrity  Goal: Skin integrity is maintained or improved  Outcome: Progressing   The patient is Stable - Low risk of patient condition declining or worsening    Shift Goals  Clinical Goals: Maintain Safety  Patient Goals: Pain Control  Family Goals: N/A    Progress made toward(s) clinical / shift goals:  Patient free from falls, reoriented patient on importance of maintaining C-Collar on, Pain medicated per MAR, patient self-ambulatory this shift     Patient is not progressing towards the following goals:

## 2023-06-20 NOTE — DISCHARGE PLANNING
"1014-  Agency/Facility Name: [facility]  Spoke To: Fco   Outcome: DPA informed Fco in admissions at pt is no longer on a legal hold. Per Fco, she will look at pt referral and call DPA back with acceptance or declination of pt referral.     1117-  Agency/Facility Name: [facility]   Outcome: Fco in admissions left a voicemail informing DPA that pt referral has been declined due to \"no skilled needed\".     "

## 2023-06-20 NOTE — PROGRESS NOTES
Bedside report received, assessment completed    A&O x  4, pt calls appropriately  Mobility: Up self  Fall Risk Assessment: Moderate, bed alarm refused, door notifications in use  Pain Assessment / Reassessment completed, medication provided per MAR  Diet: Regular, 1500 mL fluid restriction   LDA:   IV Access: 20 R FA, CDI/ flushed/ SL  Sharron Whelan  LUE: NWB in cast    GI/: + void, + flatus, 6/19 BM  DVT Prophylaxis: Lovenox, SCD's refused  Skin: per flowsheets     Reviewed plan of care with patient, bed in lowest position and locked, pt resting comfortably now, call light within reach, all needs met at this time. Interventions will be executed per plan of care

## 2023-06-20 NOTE — PROGRESS NOTES
Assumed care of patient at 0645. Bedside report received. Assessment complete.  AA&Ox4. Denies CP/SOB.  Reporting 8/10 pain. Medicated per MAR.   Educated patient regarding pharmacologic and non pharmacologic modalities for pain management.  Skin per flowsheets  Tolerating Regular, 1,500 mL Fluid Restriction diet. Denies N/V.  + void. Last BM 6/19  Pt ambulates Independently LUE NWB.  All needs met at this time. Call light within reach. Pt calls appropriately. Bed low and locked, non skid socks in place. Hourly rounding in place.

## 2023-06-21 PROCEDURE — 700102 HCHG RX REV CODE 250 W/ 637 OVERRIDE(OP): Performed by: STUDENT IN AN ORGANIZED HEALTH CARE EDUCATION/TRAINING PROGRAM

## 2023-06-21 PROCEDURE — A9270 NON-COVERED ITEM OR SERVICE: HCPCS | Performed by: PSYCHIATRY & NEUROLOGY

## 2023-06-21 PROCEDURE — 97168 OT RE-EVAL EST PLAN CARE: CPT

## 2023-06-21 PROCEDURE — A9270 NON-COVERED ITEM OR SERVICE: HCPCS | Performed by: PHYSICIAN ASSISTANT

## 2023-06-21 PROCEDURE — 97535 SELF CARE MNGMENT TRAINING: CPT

## 2023-06-21 PROCEDURE — A9270 NON-COVERED ITEM OR SERVICE: HCPCS | Performed by: NURSE PRACTITIONER

## 2023-06-21 PROCEDURE — 700102 HCHG RX REV CODE 250 W/ 637 OVERRIDE(OP): Performed by: NURSE PRACTITIONER

## 2023-06-21 PROCEDURE — 700111 HCHG RX REV CODE 636 W/ 250 OVERRIDE (IP): Performed by: NURSE PRACTITIONER

## 2023-06-21 PROCEDURE — 770001 HCHG ROOM/CARE - MED/SURG/GYN PRIV*

## 2023-06-21 PROCEDURE — 700102 HCHG RX REV CODE 250 W/ 637 OVERRIDE(OP): Performed by: PHYSICIAN ASSISTANT

## 2023-06-21 PROCEDURE — 700102 HCHG RX REV CODE 250 W/ 637 OVERRIDE(OP): Performed by: PSYCHIATRY & NEUROLOGY

## 2023-06-21 PROCEDURE — 99232 SBSQ HOSP IP/OBS MODERATE 35: CPT | Performed by: PHYSICIAN ASSISTANT

## 2023-06-21 PROCEDURE — A9270 NON-COVERED ITEM OR SERVICE: HCPCS | Performed by: STUDENT IN AN ORGANIZED HEALTH CARE EDUCATION/TRAINING PROGRAM

## 2023-06-21 RX ADMIN — DIVALPROEX SODIUM 500 MG: 500 TABLET, DELAYED RELEASE ORAL at 17:20

## 2023-06-21 RX ADMIN — SODIUM CHLORIDE 1 G: 1 TABLET ORAL at 19:48

## 2023-06-21 RX ADMIN — SODIUM CHLORIDE 1 G: 1 TABLET ORAL at 10:20

## 2023-06-21 RX ADMIN — SENNOSIDES AND DOCUSATE SODIUM 1 TABLET: 50; 8.6 TABLET ORAL at 19:48

## 2023-06-21 RX ADMIN — OXYCODONE HYDROCHLORIDE 5 MG: 5 TABLET ORAL at 10:26

## 2023-06-21 RX ADMIN — RISPERIDONE 0.5 MG: 0.5 TABLET ORAL at 04:41

## 2023-06-21 RX ADMIN — QUETIAPINE FUMARATE 100 MG: 100 TABLET ORAL at 19:48

## 2023-06-21 RX ADMIN — DIVALPROEX SODIUM 500 MG: 500 TABLET, DELAYED RELEASE ORAL at 04:41

## 2023-06-21 RX ADMIN — SODIUM CHLORIDE 1 G: 1 TABLET ORAL at 15:41

## 2023-06-21 RX ADMIN — ENOXAPARIN SODIUM 40 MG: 100 INJECTION SUBCUTANEOUS at 17:19

## 2023-06-21 RX ADMIN — OXYCODONE HYDROCHLORIDE 2.5 MG: 5 TABLET ORAL at 16:07

## 2023-06-21 RX ADMIN — ENOXAPARIN SODIUM 40 MG: 100 INJECTION SUBCUTANEOUS at 04:41

## 2023-06-21 ASSESSMENT — GAIT ASSESSMENTS
DISTANCE (FEET): 400
DEVIATION: NO DEVIATION
GAIT LEVEL OF ASSIST: INDEPENDENT

## 2023-06-21 ASSESSMENT — ENCOUNTER SYMPTOMS
FEVER: 0
ABDOMINAL PAIN: 0
CHILLS: 0
NECK PAIN: 1
MYALGIAS: 1
VOMITING: 0
SENSORY CHANGE: 0
NAUSEA: 0
DIZZINESS: 0
HEADACHES: 0
ROS GI COMMENTS: BM 6/21

## 2023-06-21 ASSESSMENT — COGNITIVE AND FUNCTIONAL STATUS - GENERAL
HELP NEEDED FOR BATHING: A LITTLE
DAILY ACTIVITIY SCORE: 23
SUGGESTED CMS G CODE MODIFIER DAILY ACTIVITY: CI
MOBILITY SCORE: 24
SUGGESTED CMS G CODE MODIFIER MOBILITY: CH

## 2023-06-21 ASSESSMENT — PAIN DESCRIPTION - PAIN TYPE
TYPE: ACUTE PAIN
TYPE: ACUTE PAIN

## 2023-06-21 NOTE — THERAPY
Physical Therapy   Daily Treatment     Patient Name: Fareed Muhammad  Age:  27 y.o., Sex:  male  Medical Record #: [id number]  Today's Date: 6/21/2023     Precautions  Precautions: Non Weight Bearing Left Upper Extremity;Cervical Collar  ;Spinal / Back Precautions     Assessment    PT re-order received for dc recommendations. Pt continues to be up self with no need of AD or assistance. No further acute PT needs.     Plan  DC Equipment Recommendations: None  Discharge Recommendations: Anticipate that the patient will have no further physical therapy needs after discharge from the hospital         06/21/23 1342   Cognition    Level of Consciousness Alert   Comments appropriate   Other Treatments   Other Treatments Provided reported no concerns with mobility   Balance   Sitting Balance (Static) Fair +   Sitting Balance (Dynamic) Fair +   Standing Balance (Static) Fair +   Standing Balance (Dynamic) Fair +   Weight Shift Sitting Good   Weight Shift Standing Good   Comments no LOB, no AD   Bed Mobility    Supine to Sit Modified Independent   Scooting Modified Independent   Gait Analysis   Gait Level Of Assist Independent   Assistive Device None   Distance (Feet) 400   # of Times Distance was Traveled 1   Deviation No deviation   Comments no LOB   Functional Mobility   Sit to Stand Independent   Bed, Chair, Wheelchair Transfer Independent   Mobility in room and around unit   Anticipated Discharge Equipment and Recommendations   DC Equipment Recommendations None   Discharge Recommendations Anticipate that the patient will have no further physical therapy needs after discharge from the hospital

## 2023-06-21 NOTE — PROGRESS NOTES
Bedside report received.  Assessment complete.  A&O x 4. Patient calls appropriately.  Patient ambulates with no assistance.  Patient has 7/10 pain. Pain managed with prescribed medications.  Denies N&V. Tolerating regular diet.  Healed midline incision open to air.  + void, + flatus, 6/21/2023 BM.  Patient denies SOB.  SCD's Refused.  Patient has C-collar in place.  Review plan with of care with patient. Call light and personal belongings within reach. Hourly rounding in place. All needs met at this time.

## 2023-06-21 NOTE — CARE PLAN
The patient is Watcher - Medium risk of patient condition declining or worsening    Shift Goals  Clinical Goals: safety, pain control, rest  Patient Goals: rest, pain control  Family Goals: N/A    Progress made toward(s) clinical / shift goals:  Patient reported pain to be managed with PRN and scheduled medications. Educated on pharmacological and non pharmacological modalities. Patient was able to rest intermittently overnight. Patient remained safe and free from falls overnight.     Patient is not progressing towards the following goals:

## 2023-06-21 NOTE — THERAPY
"Occupational Therapy  Discharge      Patient Name: Fareed Muhammad  Age:  27 y.o., Sex:  male  Medical Record #: [id number]  Today's Date: 6/21/2023     Precautions: Non Weight Bearing Left Upper Extremity, Cervical Collar, Spinal / Back Precautions   Comments: L UE cast    Assessment    Pt seen for OT session to assist with DC planning. Reviewed c-collar, ADL independence and compensatory strategies for ADL independence with janae-dressing techniques. Pt reports he's been managing ADLs without assist and has been ambulating up-self in his hospital. Anticipate no additional OT needs upon DC.     Plan    Reason for Discharge From Therapy: Discharge Secondary to Goals Met    DC Equipment Recommendations: None  Discharge Recommendations: Anticipate that the patient will have no further occupational therapy needs after discharge from the hospital    Subjective    \"It's not that hard, I've been doing it all right-handed.\"     Objective       06/21/23 1425   Precautions   Precautions Non Weight Bearing Left Upper Extremity;Cervical Collar  ;Spinal / Back Precautions    Comments L UE cast   Pain 0 - 10 Group   Therapist Pain Assessment Post Activity Pain Same as Prior to Activity;Nurse Notified   Cognition    Cognition / Consciousness X   Level of Consciousness Alert   Comments calm, receptive, appropriate, odd affect but likely baseline   Balance Assessment   Sitting Balance (Static) Fair +   Sitting Balance (Dynamic) Fair +   Standing Balance (Static) Fair +   Standing Balance (Dynamic) Fair   Weight Shift Sitting Good   Weight Shift Standing Fair   Bed Mobility    Supine to Sit Modified Independent   Sit to Supine Modified Independent   Scooting Modified Independent   ADL Assessment   Eating Modified Independent   Grooming Modified Independent;Standing   Upper Body Dressing   (discussed strategies, pt reports no concerns)   Lower Body Dressing Modified Independent   Toileting Modified Independent   How much help from " another person does the patient currently need...   Putting on and taking off regular lower body clothing? 4   Bathing (including washing, rinsing, and drying)? 3   Toileting, which includes using a toilet, bedpan, or urinal? 4   Putting on and taking off regular upper body clothing? 4   Taking care of personal grooming such as brushing teeth? 4   Eating meals? 4   6 Clicks Daily Activity Score 23   Functional Mobility   Sit to Stand Modified Independent   Bed, Chair, Wheelchair Transfer Modified Independent   Toilet Transfers Modified Independent   Mobility no AD   Activity Tolerance   Sitting in Chair 10+min   Sitting Edge of Bed 5min   Standing 8min   Comments functional for ADLs   Patient / Family Goals   Patient / Family Goal #1 To get his c-collar off   Goal #1 Outcome Goal not met   Short Term Goals   Short Term Goal # 1 supervised with UB dressing   Goal Outcome # 1 Goal met   Short Term Goal # 2 min A with LB dressing   Goal Outcome # 2 Goal met   Short Term Goal # 3 min A with ADL txfs   Goal Outcome # 3 Goal met   Short Term Goal # 3 B Pt will demo ADL txfs at SPV level   Goal Outcome # 3 B Goal met   Education Group   Education Provided Activities of Daily Living;Role of Occupational Therapist;Brace Wear and Care;Weight Bearing Precautions   Role of Occupational Therapist Patient Response Patient;Acceptance;Explanation;Verbal Demonstration   Brace Wear & Care Patient Response Patient;Acceptance;Explanation;Verbal Demonstration   ADL Patient Response Patient;Acceptance;Explanation;Verbal Demonstration   Weight Bearing Precautions Patient Response Patient;Acceptance;Explanation;Verbal Demonstration   Occupational Therapy Initial Treatment Plan    Duration Discharge Needs Only   Problem List   Problem List None   Anticipated Discharge Equipment and Recommendations   DC Equipment Recommendations None   Discharge Recommendations Anticipate that the patient will have no further occupational therapy needs after  discharge from the hospital

## 2023-06-21 NOTE — CARE PLAN
The patient is Stable - Low risk of patient condition declining or worsening    Shift Goals  Clinical Goals: Safety, pain control  Patient Goals: rest, pain control  Family Goals: N/A    Progress made toward(s) clinical / shift goals: Patients pain is being managed with PRN pain medications as ordered as well as non pharmacological pain relief methods. Patient is able to turn self in bed and ambulate as needed without assistance.
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
## 2023-06-21 NOTE — DISCHARGE PLANNING
Case Management Discharge Planning    Admission Date: 5/31/2023  GMLOS: 9.7  ALOS: 21    6-Clicks ADL Score: 16  6-Clicks Mobility Score: 21  PT and/or OT Eval ordered: Yes  Post-acute Referrals Ordered: Yes  Post-acute Choice Obtained: Yes  Has referral(s) been sent to post-acute provider:  Yes      Anticipated Discharge Dispo: Discharge Disposition: Discharged to home/self care (01)    DME Needed: No    Action(s) Taken: OTHER. Per Maricel, HCN Manager, since patient is no longer on a legal hold, the recommendation if for patient to discharge to the location of his choice.    LSW spoke with Kimber at [facility] [phone]. Per Kimber, patient is eligible for outpatient PCP, Psych, and PT OT Therapy.     Per Kimber, patient was previously at [facility] through [employer]. Per Kimber, if patient would like to be screened for temporary housing, patient has to notify [facility] directly.    LSW spoke with patient regarding the possibility of discharging to [facility]. Patient reports he does not know where he will live, patient stated he might discharge to the shelter.    Per LATRICIA MillardRJOAQUIN, PT OT reevaluation will be order to determinate patient's appropriate level of care and post acute needs, LSW notified Maricel via Teams    3:54pm - Per Maricel, Therapy indicates patient is safe to discharge, RENAE Hernandez notified by LSW via Teams.    Escalations Completed: Leadership    Medically Clear: No    Next Steps: Awaiting further instructions from the HCN Leadership Team.    Barriers to Discharge: Medical clearance    Is the patient up for discharge tomorrow:

## 2023-06-21 NOTE — PROGRESS NOTES
Assumed care of patient at 1845. Bedside report received. Assessment complete.  AA&Ox4. Denies CP/SOB.  Reporting 6/10 pain. Medicated per MAR.   Educated patient regarding pharmacologic and non pharmacologic modalities for pain management.  Skin per flowsheet.  Tolerating regular diet with a 1500mL fluid restriction. Denies N/V at this time.   + void. + BM. Last BM 6/20.   Pt ambulates independently and frequently.  C-collar on at all times per order.   LUE NWB.   HOB >30.   All needs met at this time. Call light within reach. Bed alarm refused.  Pt calls appropriately. Bed low and locked, non skid socks in place. Hourly rounding in place.

## 2023-06-22 ENCOUNTER — APPOINTMENT (OUTPATIENT)
Dept: RADIOLOGY | Facility: MEDICAL CENTER | Age: 28
DRG: 957 | End: 2023-06-22
Attending: PHYSICIAN ASSISTANT
Payer: MEDICAID

## 2023-06-22 PROBLEM — Z02.9 DISCHARGE PLANNING ISSUES: Status: ACTIVE | Noted: 2023-06-22

## 2023-06-22 PROBLEM — Z75.8 DISCHARGE PLANNING ISSUES: Status: ACTIVE | Noted: 2023-06-22

## 2023-06-22 PROCEDURE — A9270 NON-COVERED ITEM OR SERVICE: HCPCS | Performed by: PHYSICIAN ASSISTANT

## 2023-06-22 PROCEDURE — 73070 X-RAY EXAM OF ELBOW: CPT | Mod: LT

## 2023-06-22 PROCEDURE — 700102 HCHG RX REV CODE 250 W/ 637 OVERRIDE(OP): Performed by: STUDENT IN AN ORGANIZED HEALTH CARE EDUCATION/TRAINING PROGRAM

## 2023-06-22 PROCEDURE — 770001 HCHG ROOM/CARE - MED/SURG/GYN PRIV*

## 2023-06-22 PROCEDURE — 2W09X2Z CHANGE CAST ON LEFT UPPER EXTREMITY: ICD-10-PCS | Performed by: ORTHOPAEDIC SURGERY

## 2023-06-22 PROCEDURE — A9270 NON-COVERED ITEM OR SERVICE: HCPCS | Performed by: NURSE PRACTITIONER

## 2023-06-22 PROCEDURE — 700111 HCHG RX REV CODE 636 W/ 250 OVERRIDE (IP): Performed by: NURSE PRACTITIONER

## 2023-06-22 PROCEDURE — 99232 SBSQ HOSP IP/OBS MODERATE 35: CPT | Performed by: NURSE PRACTITIONER

## 2023-06-22 PROCEDURE — A9270 NON-COVERED ITEM OR SERVICE: HCPCS | Performed by: PSYCHIATRY & NEUROLOGY

## 2023-06-22 PROCEDURE — 700102 HCHG RX REV CODE 250 W/ 637 OVERRIDE(OP): Performed by: PHYSICIAN ASSISTANT

## 2023-06-22 PROCEDURE — 700102 HCHG RX REV CODE 250 W/ 637 OVERRIDE(OP): Performed by: NURSE PRACTITIONER

## 2023-06-22 PROCEDURE — A9270 NON-COVERED ITEM OR SERVICE: HCPCS | Performed by: STUDENT IN AN ORGANIZED HEALTH CARE EDUCATION/TRAINING PROGRAM

## 2023-06-22 PROCEDURE — 700102 HCHG RX REV CODE 250 W/ 637 OVERRIDE(OP): Performed by: PSYCHIATRY & NEUROLOGY

## 2023-06-22 RX ORDER — MORPHINE SULFATE 4 MG/ML
4 INJECTION INTRAVENOUS ONCE
Status: COMPLETED | OUTPATIENT
Start: 2023-06-22 | End: 2023-06-22

## 2023-06-22 RX ORDER — OXYCODONE HYDROCHLORIDE 5 MG/1
5 TABLET ORAL ONCE
Status: COMPLETED | OUTPATIENT
Start: 2023-06-22 | End: 2023-06-22

## 2023-06-22 RX ADMIN — OXYCODONE HYDROCHLORIDE 5 MG: 5 TABLET ORAL at 12:24

## 2023-06-22 RX ADMIN — ENOXAPARIN SODIUM 40 MG: 100 INJECTION SUBCUTANEOUS at 18:00

## 2023-06-22 RX ADMIN — SENNOSIDES AND DOCUSATE SODIUM 1 TABLET: 50; 8.6 TABLET ORAL at 19:44

## 2023-06-22 RX ADMIN — OXYCODONE HYDROCHLORIDE 2.5 MG: 5 TABLET ORAL at 09:54

## 2023-06-22 RX ADMIN — SODIUM CHLORIDE 1 G: 1 TABLET ORAL at 09:51

## 2023-06-22 RX ADMIN — ENOXAPARIN SODIUM 40 MG: 100 INJECTION SUBCUTANEOUS at 04:06

## 2023-06-22 RX ADMIN — SODIUM CHLORIDE 1 G: 1 TABLET ORAL at 12:24

## 2023-06-22 RX ADMIN — QUETIAPINE FUMARATE 100 MG: 100 TABLET ORAL at 19:44

## 2023-06-22 RX ADMIN — OXYCODONE HYDROCHLORIDE 5 MG: 5 TABLET ORAL at 00:24

## 2023-06-22 RX ADMIN — DIVALPROEX SODIUM 500 MG: 500 TABLET, DELAYED RELEASE ORAL at 04:06

## 2023-06-22 RX ADMIN — DIVALPROEX SODIUM 500 MG: 500 TABLET, DELAYED RELEASE ORAL at 18:10

## 2023-06-22 RX ADMIN — OXYCODONE HYDROCHLORIDE 5 MG: 5 TABLET ORAL at 15:43

## 2023-06-22 RX ADMIN — SODIUM CHLORIDE 1 G: 1 TABLET ORAL at 18:10

## 2023-06-22 RX ADMIN — RISPERIDONE 0.5 MG: 0.5 TABLET ORAL at 04:06

## 2023-06-22 ASSESSMENT — ENCOUNTER SYMPTOMS
SPEECH CHANGE: 0
MYALGIAS: 1
DOUBLE VISION: 0
CHILLS: 0
CONSTIPATION: 0
FOCAL WEAKNESS: 0
SENSORY CHANGE: 0
TINGLING: 0
NECK PAIN: 1
BACK PAIN: 0
FEVER: 0
HEADACHES: 0
SHORTNESS OF BREATH: 0
DIZZINESS: 0
ABDOMINAL PAIN: 0
VOMITING: 0
NAUSEA: 0
BLURRED VISION: 0

## 2023-06-22 ASSESSMENT — PAIN DESCRIPTION - PAIN TYPE
TYPE: ACUTE PAIN
TYPE: ACUTE PAIN
TYPE: ACUTE PAIN;SURGICAL PAIN
TYPE: ACUTE PAIN

## 2023-06-22 NOTE — PROCEDURES
Patient seen for cast and staple removal with subsequent replacement of long arm cast.  The prior LUE cast was bivalved and removed.  Staples were removed. Sterile dressings placed.  New stockinet and soft roll applied and then a well padded long arm cast was applied.  The patient reported good fit and comfort.  Post cast placement xrays ordered but not completed at time of this note.

## 2023-06-22 NOTE — ASSESSMENT & PLAN NOTE
Date of admission: 5/31/2023.  6/5 Transfer orders from SICU.  6/6 Rehab referral, denied.  Cleared for discharge: Yes - Date: 6/22.  Discharge delayed: Yes - Reason: Financial and Homeless.  6/22 Pt would be agreeable to group home placement.  6/23 Patient unable to discharge home with sister.  6/27 Refused from group homes   6/29 Capacity exam completed, patient has capacity   Discharge date: tbd.
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
## 2023-06-22 NOTE — CARE PLAN
The patient is Watcher - Medium risk of patient condition declining or worsening    Shift Goals  Clinical Goals: safety, pain control, rest, I&OS  Patient Goals: rest, pain control  Family Goals: N/A    Progress made toward(s) clinical / shift goals:  Patient reported pain to be managed with PRN and scheduled medications. Educated on pharmacological and non pharmacological modalities. Patient was able to rest intermittently overnight. Patient remained safe and free from falls overnight. I&Os per flowsheets.     Patient is not progressing towards the following goals:

## 2023-06-22 NOTE — PROGRESS NOTES
Bedside report received.  Assessment complete.  A&O x 4. Patient calls appropriately.  Patient ambulates with no assist. Bed alarm off.   Patient reports moderate to sever pain. Pain managed with prescribed medications.  Denies N&V. Tolerating regular diet with double portions.  Left arm non weight bearing in cast. C-collar in place  + void, + flatus, + BM.  Patient denies SOB.  SCD's refused. Patient ambulates in room and hallway frequently.  Patient is calm and cooperative with plan of care.  Review plan with of care with patient. Call light and personal belongings within reach. Hourly rounding in place. All needs met at this time.

## 2023-06-22 NOTE — PROGRESS NOTES
Assumed care of patient at 1845. Bedside report received. Assessment complete.  AA&Ox4. Denies CP/SOB.  Reporting 4/10 pain. Declined intervention at this time.  Educated patient regarding pharmacologic and non pharmacologic modalities for pain management.  Skin per flowsheet.  Tolerating regular, double portion diet. 1500 mL fluid restriction in place.   Denies N/V at this time.   + void. + BM. Last BM 6/21  Pt ambulates independently and frequently.  HOB >30 degrees per order.  LUE NWB. Cast in place.  C-collar on at all times per order.   All needs met at this time. Call light within reach. Pt calls appropriately. Bed low and locked, non skid socks in place. Hourly rounding in place.

## 2023-06-22 NOTE — DISCHARGE PLANNING
Case Management Discharge Planning    Admission Date: 5/31/2023  GMLOS: 9.7  ALOS: 22    6-Clicks ADL Score: 23  6-Clicks Mobility Score: 24      Anticipated Discharge Dispo: Discharge Disposition: Discharged to home/self care (01)    DME Needed: No    Action(s) Taken: OTHER. Per YOBANY Rascon, patient is medically clear after cast replacement. Per Claire, patient is appropriate for group home as patient requires close ADL's Supervision.    LSW met with patient at bedside. Patient states he is agreeable to the [facility]. [facility] states if [facility] declines, [facility] only alternate plan is [facility].    LSW facilitated the phone screening with [facility]. LSW spoke with Kimber, Intake with [facility]. Per Kimber, patient is DECLINED by MD due to patient requiring a higher level of care, LSW notified Maricel Calvillo, [facility] Director.    RENAE Castillo reported patient spoke with his mother and there is a possibility patient will stay at her house; however, patient's mother won't know if patient can stay with her until later today.    LSW spoke with Stephania, patient's mother via phone [phone]. Per Stephania, patient will NOT be able to stay at her house. Stephania stated she would like an update from the doctor prior to discharge, RN aware.    11:55pm - Per Maricel Calvillo, the recommendation is for patient to discharge after the cast removal to the location of his choice.    2:50pm - Per RENAE Castillo, patient reports his sister Debi might allow him to stay at her house. Patient claims a definite answer from Debi will be provided to him today at 7:00pm, LSW notified Tarah and Maricel.    Tarah notified by LSW via Voalte.    Escalations Completed: Leadership    Medically Clear: Yes. Pending Cast Replacement by Ortho.    Next Steps:     Barriers to Discharge: Homelessness    Is the patient up for discharge tomorrow:

## 2023-06-22 NOTE — CARE PLAN
The patient is Stable - Low risk of patient condition declining or worsening    Shift Goals  Clinical Goals: provide pain medication PRN, safety  Patient Goals: rest  Family Goals: N/A    Progress made toward(s) clinical / shift goals:  patient remains free from hospital acquired injury. Patient is calm and verbalizes understanding with POC.     Problem: Knowledge Deficit - Standard  Goal: Patient and family/care givers will demonstrate understanding of plan of care, disease process/condition, diagnostic tests and medications  Outcome: Progressing     Problem: Pain - Standard  Goal: Alleviation of pain or a reduction in pain to the patient’s comfort goal  Outcome: Progressing       Patient is not progressing towards the following goals:

## 2023-06-23 LAB
ANION GAP SERPL CALC-SCNC: 11 MMOL/L (ref 7–16)
BUN SERPL-MCNC: 14 MG/DL (ref 8–22)
CALCIUM SERPL-MCNC: 8.6 MG/DL (ref 8.5–10.5)
CHLORIDE SERPL-SCNC: 92 MMOL/L (ref 96–112)
CO2 SERPL-SCNC: 26 MMOL/L (ref 20–33)
CREAT SERPL-MCNC: 0.68 MG/DL (ref 0.5–1.4)
GFR SERPLBLD CREATININE-BSD FMLA CKD-EPI: 131 ML/MIN/1.73 M 2
GLUCOSE SERPL-MCNC: 106 MG/DL (ref 65–99)
POTASSIUM SERPL-SCNC: 4.5 MMOL/L (ref 3.6–5.5)
SODIUM SERPL-SCNC: 129 MMOL/L (ref 135–145)

## 2023-06-23 PROCEDURE — 770001 HCHG ROOM/CARE - MED/SURG/GYN PRIV*

## 2023-06-23 PROCEDURE — 700102 HCHG RX REV CODE 250 W/ 637 OVERRIDE(OP): Performed by: NURSE PRACTITIONER

## 2023-06-23 PROCEDURE — 700102 HCHG RX REV CODE 250 W/ 637 OVERRIDE(OP): Performed by: STUDENT IN AN ORGANIZED HEALTH CARE EDUCATION/TRAINING PROGRAM

## 2023-06-23 PROCEDURE — 99024 POSTOP FOLLOW-UP VISIT: CPT | Performed by: ORTHOPAEDIC SURGERY

## 2023-06-23 PROCEDURE — A9270 NON-COVERED ITEM OR SERVICE: HCPCS | Performed by: NURSE PRACTITIONER

## 2023-06-23 PROCEDURE — 99232 SBSQ HOSP IP/OBS MODERATE 35: CPT | Performed by: NURSE PRACTITIONER

## 2023-06-23 PROCEDURE — A9270 NON-COVERED ITEM OR SERVICE: HCPCS | Performed by: STUDENT IN AN ORGANIZED HEALTH CARE EDUCATION/TRAINING PROGRAM

## 2023-06-23 PROCEDURE — 80048 BASIC METABOLIC PNL TOTAL CA: CPT

## 2023-06-23 PROCEDURE — 36415 COLL VENOUS BLD VENIPUNCTURE: CPT

## 2023-06-23 PROCEDURE — A9270 NON-COVERED ITEM OR SERVICE: HCPCS | Performed by: PHYSICIAN ASSISTANT

## 2023-06-23 PROCEDURE — 700102 HCHG RX REV CODE 250 W/ 637 OVERRIDE(OP): Performed by: PSYCHIATRY & NEUROLOGY

## 2023-06-23 PROCEDURE — A9270 NON-COVERED ITEM OR SERVICE: HCPCS | Performed by: PSYCHIATRY & NEUROLOGY

## 2023-06-23 PROCEDURE — 700111 HCHG RX REV CODE 636 W/ 250 OVERRIDE (IP): Performed by: NURSE PRACTITIONER

## 2023-06-23 PROCEDURE — 700102 HCHG RX REV CODE 250 W/ 637 OVERRIDE(OP): Performed by: PHYSICIAN ASSISTANT

## 2023-06-23 RX ORDER — FUROSEMIDE 20 MG/1
40 TABLET ORAL ONCE
Status: COMPLETED | OUTPATIENT
Start: 2023-06-23 | End: 2023-06-23

## 2023-06-23 RX ADMIN — OXYCODONE HYDROCHLORIDE 5 MG: 5 TABLET ORAL at 21:30

## 2023-06-23 RX ADMIN — QUETIAPINE FUMARATE 100 MG: 100 TABLET ORAL at 21:30

## 2023-06-23 RX ADMIN — SODIUM CHLORIDE 1 G: 1 TABLET ORAL at 17:15

## 2023-06-23 RX ADMIN — ENOXAPARIN SODIUM 40 MG: 100 INJECTION SUBCUTANEOUS at 04:30

## 2023-06-23 RX ADMIN — OXYCODONE HYDROCHLORIDE 5 MG: 5 TABLET ORAL at 08:43

## 2023-06-23 RX ADMIN — FUROSEMIDE 40 MG: 20 TABLET ORAL at 10:55

## 2023-06-23 RX ADMIN — DIVALPROEX SODIUM 500 MG: 500 TABLET, DELAYED RELEASE ORAL at 17:15

## 2023-06-23 RX ADMIN — DIVALPROEX SODIUM 500 MG: 500 TABLET, DELAYED RELEASE ORAL at 04:30

## 2023-06-23 RX ADMIN — SODIUM CHLORIDE 1 G: 1 TABLET ORAL at 08:44

## 2023-06-23 RX ADMIN — ENOXAPARIN SODIUM 40 MG: 100 INJECTION SUBCUTANEOUS at 17:15

## 2023-06-23 RX ADMIN — OXYCODONE HYDROCHLORIDE 5 MG: 5 TABLET ORAL at 17:15

## 2023-06-23 RX ADMIN — RISPERIDONE 0.5 MG: 0.5 TABLET ORAL at 04:30

## 2023-06-23 RX ADMIN — SENNOSIDES AND DOCUSATE SODIUM 1 TABLET: 50; 8.6 TABLET ORAL at 21:30

## 2023-06-23 RX ADMIN — SODIUM CHLORIDE 1 G: 1 TABLET ORAL at 10:55

## 2023-06-23 ASSESSMENT — ENCOUNTER SYMPTOMS
BACK PAIN: 0
VOMITING: 0
CHILLS: 0
TINGLING: 0
FEVER: 0
NAUSEA: 0
MYALGIAS: 1
DIZZINESS: 0
NECK PAIN: 1
SHORTNESS OF BREATH: 0
ABDOMINAL PAIN: 0
SENSORY CHANGE: 0
HEADACHES: 0
FOCAL WEAKNESS: 0
BLURRED VISION: 0
SPEECH CHANGE: 0
DOUBLE VISION: 0

## 2023-06-23 ASSESSMENT — PAIN DESCRIPTION - PAIN TYPE
TYPE: ACUTE PAIN

## 2023-06-23 NOTE — DISCHARGE PLANNING
Case Management Discharge Planning    Admission Date: 5/31/2023  GMLOS: 9.7  ALOS: 23    6-Clicks ADL Score: 23  6-Clicks Mobility Score: 24      Anticipated Discharge Dispo: Discharge Disposition: Discharged to home/self care (01)    DME Needed: No    Action(s) Taken: OTHER. LSW spoke with patient at bedside. Patient reports his sister Debi is not able to assist with housing.    Patient reports if discharge from the hospital, patient will stay at [facility], YOBANY Alejandro notified by DANIELLE.    Per Jessica, patient need close supervision with ADL's and medication management, Maricel Calvillo, [facility] Director notified.    Escalations Completed: Leadership    Medically Clear: Yes    Next Steps: Awaiting further advise from [facility] Leadership Team.    Barriers to Discharge: Pending Placement    Is the patient up for discharge tomorrow:

## 2023-06-23 NOTE — PROGRESS NOTES
POD#22  S/P ORIF and pinning elbow fracture dislocation  Xrays show hardware in position  Cast and pin retention for 3 more weeks

## 2023-06-23 NOTE — CARE PLAN
Problem: Knowledge Deficit - Standard  Goal: Patient and family/care givers will demonstrate understanding of plan of care, disease process/condition, diagnostic tests and medications  Outcome: Progressing     Problem: Pain - Standard  Goal: Alleviation of pain or a reduction in pain to the patient’s comfort goal  Outcome: Progressing   The patient is Stable - Low risk of patient condition declining or worsening    Shift Goals  Clinical Goals: provide pain medication PRN, rest  Patient Goals: rest  Family Goals: N/A    Progress made toward(s) clinical / shift goals:  patient reports pain controlled through pharmacologic means, remains calm and cooperative with POC.     Patient is not progressing towards the following goals:

## 2023-06-23 NOTE — CARE PLAN
The patient is Watcher - Medium risk of patient condition declining or worsening    Shift Goals  Clinical Goals: rest, pain control, safety  Patient Goals: rest  Family Goals: N/A    Progress made toward(s) clinical / shift goals:  Patient was able to rest intermittently overnight. Patient reported pain to be managed with PRN and scheduled medications. Educated on pharmacological and non pharmacological modalities. Patient remained safe and free from harm overnight.     Patient is not progressing towards the following goals:
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
## 2023-06-23 NOTE — PROGRESS NOTES
Bedside report received.  Assessment complete.  A&O x 4. Patient calls appropriately.  O2 saturation < 90% on room air.   Patient ambulates ad-es   Patient has 10/10 pain. Pain managed with prescribed medications.  Denies N&V. Tolerating regular diet with double portions.  LUE cast in place, C-Collar in place. Healing lacerations to posterior head and RLE. Scattered bruises and abrasions.   + void, + flatus, + BM 6/23.  Patient denies SOB.  SCD's refused, patient ambulating frequently.  Patient requires frequent reorientation.  Review plan with of care with patient. Call light and personal belongings within reach. Hourly rounding in place. All needs met at this time.

## 2023-06-23 NOTE — PROGRESS NOTES
Assumed care of patient at 1845. Bedside report received. Assessment complete.  AA&Ox4. Denies CP/SOB.  Reporting 0/10 pain. No intervention necessary at this time.   Educated patient regarding pharmacologic and non pharmacologic modalities for pain management.  Skin per flowsheet.  Tolerating regular, double portion diet. 1500mL fluid restriction in place. Denies N/V at this time.   + void. + BM. Last BM 6/22 per pt.   Pt ambulates independently and frequently.   HOB >30 per order.  LUE NWB.  C-collar on at all times.   All needs met at this time. Call light within reach. Pt calls appropriately. Bed low and locked, non skid socks in place. Hourly rounding in place.
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
## 2023-06-24 PROCEDURE — 700102 HCHG RX REV CODE 250 W/ 637 OVERRIDE(OP): Performed by: NURSE PRACTITIONER

## 2023-06-24 PROCEDURE — 700102 HCHG RX REV CODE 250 W/ 637 OVERRIDE(OP): Performed by: PSYCHIATRY & NEUROLOGY

## 2023-06-24 PROCEDURE — A9270 NON-COVERED ITEM OR SERVICE: HCPCS | Performed by: PHYSICIAN ASSISTANT

## 2023-06-24 PROCEDURE — 700102 HCHG RX REV CODE 250 W/ 637 OVERRIDE(OP): Performed by: PHYSICIAN ASSISTANT

## 2023-06-24 PROCEDURE — A9270 NON-COVERED ITEM OR SERVICE: HCPCS | Performed by: PSYCHIATRY & NEUROLOGY

## 2023-06-24 PROCEDURE — A9270 NON-COVERED ITEM OR SERVICE: HCPCS | Performed by: NURSE PRACTITIONER

## 2023-06-24 PROCEDURE — 700102 HCHG RX REV CODE 250 W/ 637 OVERRIDE(OP): Performed by: STUDENT IN AN ORGANIZED HEALTH CARE EDUCATION/TRAINING PROGRAM

## 2023-06-24 PROCEDURE — A9270 NON-COVERED ITEM OR SERVICE: HCPCS | Performed by: STUDENT IN AN ORGANIZED HEALTH CARE EDUCATION/TRAINING PROGRAM

## 2023-06-24 PROCEDURE — 700111 HCHG RX REV CODE 636 W/ 250 OVERRIDE (IP): Performed by: NURSE PRACTITIONER

## 2023-06-24 PROCEDURE — 770001 HCHG ROOM/CARE - MED/SURG/GYN PRIV*

## 2023-06-24 RX ADMIN — ENOXAPARIN SODIUM 40 MG: 100 INJECTION SUBCUTANEOUS at 07:32

## 2023-06-24 RX ADMIN — ENOXAPARIN SODIUM 40 MG: 100 INJECTION SUBCUTANEOUS at 18:31

## 2023-06-24 RX ADMIN — SODIUM CHLORIDE 1 G: 1 TABLET ORAL at 18:30

## 2023-06-24 RX ADMIN — DIVALPROEX SODIUM 500 MG: 500 TABLET, DELAYED RELEASE ORAL at 07:32

## 2023-06-24 RX ADMIN — SODIUM CHLORIDE 1 G: 1 TABLET ORAL at 12:45

## 2023-06-24 RX ADMIN — OXYCODONE HYDROCHLORIDE 5 MG: 5 TABLET ORAL at 11:36

## 2023-06-24 RX ADMIN — DIVALPROEX SODIUM 500 MG: 500 TABLET, DELAYED RELEASE ORAL at 18:31

## 2023-06-24 RX ADMIN — ACETAMINOPHEN 1000 MG: 500 TABLET, FILM COATED ORAL at 11:35

## 2023-06-24 RX ADMIN — SENNOSIDES AND DOCUSATE SODIUM 1 TABLET: 50; 8.6 TABLET ORAL at 20:25

## 2023-06-24 RX ADMIN — OXYCODONE HYDROCHLORIDE 5 MG: 5 TABLET ORAL at 18:30

## 2023-06-24 RX ADMIN — OXYCODONE HYDROCHLORIDE 5 MG: 5 TABLET ORAL at 07:32

## 2023-06-24 RX ADMIN — SODIUM CHLORIDE 1 G: 1 TABLET ORAL at 08:55

## 2023-06-24 RX ADMIN — RISPERIDONE 0.5 MG: 0.5 TABLET ORAL at 07:32

## 2023-06-24 RX ADMIN — QUETIAPINE FUMARATE 100 MG: 100 TABLET ORAL at 20:25

## 2023-06-24 ASSESSMENT — PAIN DESCRIPTION - PAIN TYPE
TYPE: ACUTE PAIN

## 2023-06-24 ASSESSMENT — ENCOUNTER SYMPTOMS
DOUBLE VISION: 0
SHORTNESS OF BREATH: 0
VOMITING: 0
NAUSEA: 0
SENSORY CHANGE: 0
DIZZINESS: 0
ABDOMINAL PAIN: 0
BLURRED VISION: 0
MYALGIAS: 1
FEVER: 0
ROS GI COMMENTS: BM 6/23
HEADACHES: 0
NECK PAIN: 1
SPEECH CHANGE: 0
BACK PAIN: 0
FOCAL WEAKNESS: 0
TINGLING: 0
CHILLS: 0

## 2023-06-24 ASSESSMENT — PATIENT HEALTH QUESTIONNAIRE - PHQ9
1. LITTLE INTEREST OR PLEASURE IN DOING THINGS: NOT AT ALL
SUM OF ALL RESPONSES TO PHQ9 QUESTIONS 1 AND 2: 0
2. FEELING DOWN, DEPRESSED, IRRITABLE, OR HOPELESS: NOT AT ALL

## 2023-06-24 NOTE — CARE PLAN
Problem: Pain - Standard  Goal: Alleviation of pain or a reduction in pain to the patient’s comfort goal  Outcome: Progressing     Problem: Fall Risk  Goal: Patient will remain free from falls  Outcome: Progressing      The patient is Stable - Low risk of patient condition declining or worsening    Shift Goals  Clinical Goals: pain mgmt; safety; compliance with POC and staff  Patient Goals: pain control; rest  Family Goals: N/A    Progress made toward(s) clinical / shift goals:  patient remains calm and easily redirected. Patient reports pain controlled by pharmacologic means.     Patient is not progressing towards the following goals:      Problem: Knowledge Deficit - Standard  Goal: Patient and family/care givers will demonstrate understanding of plan of care, disease process/condition, diagnostic tests and medications  Outcome: Not Progressing     Patient remains forgetful, requires multiple reminders

## 2023-06-24 NOTE — PROGRESS NOTES
AaxOx4, however, pt talking to himself and then able to redirect to communicate with staff on own; on room air.  Denies SOB/chest pain/numbness or tingling.  Pt verbalizes pain with 2/10 pain at this time; ice pack given. PRN pain meds in use per MAR.  Skin per flowsheets. MLI JEROME.  Cast to LUE; NWB. C-collar at all times. HOB > 30 degrees.  Denies N/V at this time. Tolerating regular diet, double portion. 1500 ml free water fluid restriction.  +void. LBM 6/23, +flatus +BS.  Pt ambulates independently.  SCDs refused;.lovenox in use for VTE prophylaxis.

## 2023-06-24 NOTE — PROGRESS NOTES
Bedside report received.  Assessment complete.  A&O x 4. Patient forgetful and requires frequent reminders. Continues to experience auditory hallucinations and speak to people who are not present. Patient ambulates ad-es with no assistance.     Patient has mild to moderate Pain managed with prescribed medications.  Denies N&V. Tolerating regular diet with double portions .  Surgical sites intact. RLE healing wound, MLI healing, posterior left skull healing. Scattered bruises.  + void, + flatus, + BM.  Patient denies SOB.  SCD's refused. Patient ambulates frequently in room and ortiz.  Patient calm and easy to redirect. Education continuing .  Review plan with of care with patient. Call light and personal belongings within reach. Hourly rounding in place. All needs met at this time.
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
## 2023-06-25 PROCEDURE — 700102 HCHG RX REV CODE 250 W/ 637 OVERRIDE(OP): Performed by: STUDENT IN AN ORGANIZED HEALTH CARE EDUCATION/TRAINING PROGRAM

## 2023-06-25 PROCEDURE — 700102 HCHG RX REV CODE 250 W/ 637 OVERRIDE(OP): Performed by: PHYSICIAN ASSISTANT

## 2023-06-25 PROCEDURE — A9270 NON-COVERED ITEM OR SERVICE: HCPCS | Performed by: NURSE PRACTITIONER

## 2023-06-25 PROCEDURE — A9270 NON-COVERED ITEM OR SERVICE: HCPCS | Performed by: PSYCHIATRY & NEUROLOGY

## 2023-06-25 PROCEDURE — 700111 HCHG RX REV CODE 636 W/ 250 OVERRIDE (IP): Performed by: NURSE PRACTITIONER

## 2023-06-25 PROCEDURE — 700102 HCHG RX REV CODE 250 W/ 637 OVERRIDE(OP): Performed by: NURSE PRACTITIONER

## 2023-06-25 PROCEDURE — A9270 NON-COVERED ITEM OR SERVICE: HCPCS | Performed by: PHYSICIAN ASSISTANT

## 2023-06-25 PROCEDURE — 770001 HCHG ROOM/CARE - MED/SURG/GYN PRIV*

## 2023-06-25 PROCEDURE — A9270 NON-COVERED ITEM OR SERVICE: HCPCS | Performed by: STUDENT IN AN ORGANIZED HEALTH CARE EDUCATION/TRAINING PROGRAM

## 2023-06-25 PROCEDURE — 700102 HCHG RX REV CODE 250 W/ 637 OVERRIDE(OP): Performed by: PSYCHIATRY & NEUROLOGY

## 2023-06-25 RX ADMIN — DIVALPROEX SODIUM 500 MG: 500 TABLET, DELAYED RELEASE ORAL at 08:22

## 2023-06-25 RX ADMIN — SENNOSIDES AND DOCUSATE SODIUM 1 TABLET: 50; 8.6 TABLET ORAL at 20:47

## 2023-06-25 RX ADMIN — OXYCODONE HYDROCHLORIDE 5 MG: 5 TABLET ORAL at 06:20

## 2023-06-25 RX ADMIN — SODIUM CHLORIDE 1 G: 1 TABLET ORAL at 08:22

## 2023-06-25 RX ADMIN — ENOXAPARIN SODIUM 40 MG: 100 INJECTION SUBCUTANEOUS at 17:33

## 2023-06-25 RX ADMIN — SODIUM CHLORIDE 1 G: 1 TABLET ORAL at 17:33

## 2023-06-25 RX ADMIN — ENOXAPARIN SODIUM 40 MG: 100 INJECTION SUBCUTANEOUS at 06:20

## 2023-06-25 RX ADMIN — OXYCODONE HYDROCHLORIDE 5 MG: 5 TABLET ORAL at 01:44

## 2023-06-25 RX ADMIN — DIVALPROEX SODIUM 500 MG: 500 TABLET, DELAYED RELEASE ORAL at 18:40

## 2023-06-25 RX ADMIN — QUETIAPINE FUMARATE 100 MG: 100 TABLET ORAL at 20:47

## 2023-06-25 RX ADMIN — SODIUM CHLORIDE 1 G: 1 TABLET ORAL at 11:37

## 2023-06-25 RX ADMIN — OXYCODONE HYDROCHLORIDE 5 MG: 5 TABLET ORAL at 11:37

## 2023-06-25 RX ADMIN — OXYCODONE HYDROCHLORIDE 5 MG: 5 TABLET ORAL at 16:00

## 2023-06-25 RX ADMIN — RISPERIDONE 0.5 MG: 0.5 TABLET ORAL at 06:21

## 2023-06-25 RX ADMIN — OXYCODONE HYDROCHLORIDE 5 MG: 5 TABLET ORAL at 20:47

## 2023-06-25 ASSESSMENT — ENCOUNTER SYMPTOMS
SENSORY CHANGE: 0
DOUBLE VISION: 0
ABDOMINAL PAIN: 0
CHILLS: 0
SPEECH CHANGE: 0
FOCAL WEAKNESS: 0
SHORTNESS OF BREATH: 0
HEADACHES: 0
VOMITING: 0
NAUSEA: 0
MYALGIAS: 1
BACK PAIN: 0
TINGLING: 0
BLURRED VISION: 0
FEVER: 0
NECK PAIN: 1
ROS GI COMMENTS: BM 6/23
DIZZINESS: 0

## 2023-06-25 ASSESSMENT — PAIN DESCRIPTION - PAIN TYPE
TYPE: ACUTE PAIN
TYPE: SURGICAL PAIN;ACUTE PAIN
TYPE: ACUTE PAIN

## 2023-06-25 NOTE — PROGRESS NOTES
AaxOx4; on room air.  Denies SOB/chest pain/numbness or tingling.  Pt verbalizes pain with 3/10 pain at this time. PRN pain meds in use per MAR.  Skin per flowsheets. MLI JEROME.  Cast to LUE; NWB. C-collar at all times. HOB > 30 degrees.  Denies N/V at this time. Tolerating regular diet, double portion. 1500 ml free water fluid restriction.  +void. LBM 6/23, +flatus.  Pt ambulates independently.  SCDs refused; lovenox in use for VTE prophylaxis.

## 2023-06-25 NOTE — PROGRESS NOTES
"      Orthopaedic Progress Note    Interval changes:  Patient doing well   LUE cast with good fit and comfort  Reports of hand numbness and tingle resolved with change of position- since it fell asleep   Cleared for DC to facility vs shelter by ortho pending trauma clearance    ROS - Patient denies any new issues.  Pain well controlled.    /74   Pulse 99   Temp 36.9 °C (98.4 °F) (Temporal)   Resp 19   Ht 1.803 m (5' 10.98\")   Wt 108 kg (238 lb 12.1 oz)   SpO2 95%     Patient seen and examined  No acute distress  Breathing non labored  RRR  LUE in long arm cast CDI, DNVI, moves all fingers, cap refill <2 sec.             Active Hospital Problems    Diagnosis     Discharge planning issues [Z02.9]      Priority: High    Hyponatremia [E87.1]      Priority: Medium    Open fracture dislocation of left elbow joint [S42.402B]      Priority: Medium    Spleen laceration, initial encounter [S36.039A]      Priority: Medium    Rotatory subluxation of atlantoaxial joint [S13.120A]      Priority: Medium    Psychiatric diagnosis [F99]      Priority: Low    Illicit drug use [F19.90]      Priority: Low    Trauma [T14.90XA]      Priority: Low    Open skull fracture (HCC) [S02.91XB]      Priority: Low    Major laceration of kidney, left, initial encounter [S37.062A]      Priority: Low    Closed fracture of multiple ribs of left side [S22.42XA]      Priority: Low    No contraindication to deep vein thrombosis (DVT) prophylaxis [Z78.9]      Priority: Low    Liver laceration, initial encounter [S36.113A]      Priority: Low       Assessment/Plan:  Patient doing well   LUE cast with good fit and comfort  Reports of hand numbness and tingle resolved with change of position- since it fell asleep   Cleared for DC to facility vs shelter by ortho pending trauma clearance  POD#24 S/P:  1.  Irrigation and debridement open fracture   2.  Open reduction internal fixation left distal humerus lateral condyle fracture   3.  Open treatment " acute elbow dislocation  Wt bearing status - NWB LUE  Wound care/Drains - cast left in place  Future Procedures - none planned   Lovenox: Start 6/6, Duration-until ambulatory > 150'  Sutures/Staples -out   PT/OT-initiated  Antibiotics: Perioperative completed  DVT Prophylaxis- TEDS/SCDs/Foot pumps  Edwards-not needed per ortho  Case Coordination for Discharge Planning - Disposition per therapy recs.

## 2023-06-25 NOTE — PROGRESS NOTES
"Bedside report received.  Assessment complete.  A&O x 4. Patient calls appropriately.  Patient ambulates with no assist. Bed alarm off.   Patient has 0-10/10 pain. Pain managed with prescribed medications.  Denies N&V. Tolerating regular diet.  Surgical cast CDI. Patient wearing C-collar 24/7.  + void, + flatus, + BM.  Patient denies SOB.  SCD's refused, patient ambulating ad es.  Patient is pleasant and cooperative with the care plan.  Review plan with of care with patient. Call light and personal belongings within reach. Hourly rounding in place. All needs met at this time.   /74   Pulse 99   Temp 36.9 °C (98.4 °F) (Temporal)   Resp 19   Ht 1.803 m (5' 10.98\")   Wt 108 kg (238 lb 12.1 oz)   SpO2 95%   BMI 33.32 kg/m²     "

## 2023-06-25 NOTE — CARE PLAN
Problem: Knowledge Deficit - Standard  Goal: Patient and family/care givers will demonstrate understanding of plan of care, disease process/condition, diagnostic tests and medications  Outcome: Progressing     Problem: Pain - Standard  Goal: Alleviation of pain or a reduction in pain to the patient’s comfort goal  Outcome: Progressing     Problem: Skin Integrity  Goal: Skin integrity is maintained or improved  Outcome: Progressing     Problem: Fall Risk  Goal: Patient will remain free from falls  Outcome: Progressing   The patient is Stable - Low risk of patient condition declining or worsening    Shift Goals  Clinical Goals: administer pain medication PRN, patient safety  Patient Goals: rest  Family Goals: N/A    Progress made toward(s) clinical / shift goals:  pain medication administered PRN. Patient remained safe from injury.    Patient is not progressing towards the following goals:
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
## 2023-06-26 LAB
ANION GAP SERPL CALC-SCNC: 12 MMOL/L (ref 7–16)
BUN SERPL-MCNC: 13 MG/DL (ref 8–22)
CALCIUM SERPL-MCNC: 8.9 MG/DL (ref 8.5–10.5)
CHLORIDE SERPL-SCNC: 95 MMOL/L (ref 96–112)
CO2 SERPL-SCNC: 24 MMOL/L (ref 20–33)
CREAT SERPL-MCNC: 0.65 MG/DL (ref 0.5–1.4)
ERYTHROCYTE [DISTWIDTH] IN BLOOD BY AUTOMATED COUNT: 45.8 FL (ref 35.9–50)
GFR SERPLBLD CREATININE-BSD FMLA CKD-EPI: 133 ML/MIN/1.73 M 2
GLUCOSE SERPL-MCNC: 103 MG/DL (ref 65–99)
HCT VFR BLD AUTO: 33.2 % (ref 42–52)
HGB BLD-MCNC: 10.6 G/DL (ref 14–18)
MCH RBC QN AUTO: 29.2 PG (ref 27–33)
MCHC RBC AUTO-ENTMCNC: 31.9 G/DL (ref 32.3–36.5)
MCV RBC AUTO: 91.5 FL (ref 81.4–97.8)
PLATELET # BLD AUTO: 700 K/UL (ref 164–446)
PMV BLD AUTO: 8.4 FL (ref 9–12.9)
POTASSIUM SERPL-SCNC: 4.3 MMOL/L (ref 3.6–5.5)
RBC # BLD AUTO: 3.63 M/UL (ref 4.7–6.1)
SODIUM SERPL-SCNC: 131 MMOL/L (ref 135–145)
WBC # BLD AUTO: 14.5 K/UL (ref 4.8–10.8)

## 2023-06-26 PROCEDURE — 700102 HCHG RX REV CODE 250 W/ 637 OVERRIDE(OP): Performed by: PSYCHIATRY & NEUROLOGY

## 2023-06-26 PROCEDURE — A9270 NON-COVERED ITEM OR SERVICE: HCPCS | Performed by: NURSE PRACTITIONER

## 2023-06-26 PROCEDURE — 700102 HCHG RX REV CODE 250 W/ 637 OVERRIDE(OP): Performed by: STUDENT IN AN ORGANIZED HEALTH CARE EDUCATION/TRAINING PROGRAM

## 2023-06-26 PROCEDURE — A9270 NON-COVERED ITEM OR SERVICE: HCPCS | Performed by: PSYCHIATRY & NEUROLOGY

## 2023-06-26 PROCEDURE — 85027 COMPLETE CBC AUTOMATED: CPT

## 2023-06-26 PROCEDURE — A9270 NON-COVERED ITEM OR SERVICE: HCPCS | Performed by: STUDENT IN AN ORGANIZED HEALTH CARE EDUCATION/TRAINING PROGRAM

## 2023-06-26 PROCEDURE — 36415 COLL VENOUS BLD VENIPUNCTURE: CPT

## 2023-06-26 PROCEDURE — 700102 HCHG RX REV CODE 250 W/ 637 OVERRIDE(OP): Performed by: NURSE PRACTITIONER

## 2023-06-26 PROCEDURE — 700102 HCHG RX REV CODE 250 W/ 637 OVERRIDE(OP): Performed by: PHYSICIAN ASSISTANT

## 2023-06-26 PROCEDURE — 92507 TX SP LANG VOICE COMM INDIV: CPT

## 2023-06-26 PROCEDURE — 700111 HCHG RX REV CODE 636 W/ 250 OVERRIDE (IP): Performed by: NURSE PRACTITIONER

## 2023-06-26 PROCEDURE — 80048 BASIC METABOLIC PNL TOTAL CA: CPT

## 2023-06-26 PROCEDURE — A9270 NON-COVERED ITEM OR SERVICE: HCPCS | Performed by: PHYSICIAN ASSISTANT

## 2023-06-26 PROCEDURE — 97130 THER IVNTJ EA ADDL 15 MIN: CPT

## 2023-06-26 PROCEDURE — 770001 HCHG ROOM/CARE - MED/SURG/GYN PRIV*

## 2023-06-26 PROCEDURE — 97129 THER IVNTJ 1ST 15 MIN: CPT

## 2023-06-26 RX ADMIN — OXYCODONE HYDROCHLORIDE 5 MG: 5 TABLET ORAL at 08:51

## 2023-06-26 RX ADMIN — DIVALPROEX SODIUM 500 MG: 500 TABLET, DELAYED RELEASE ORAL at 17:21

## 2023-06-26 RX ADMIN — SENNOSIDES AND DOCUSATE SODIUM 1 TABLET: 50; 8.6 TABLET ORAL at 21:10

## 2023-06-26 RX ADMIN — SODIUM CHLORIDE 1 G: 1 TABLET ORAL at 17:21

## 2023-06-26 RX ADMIN — DIVALPROEX SODIUM 500 MG: 500 TABLET, DELAYED RELEASE ORAL at 04:00

## 2023-06-26 RX ADMIN — SODIUM CHLORIDE 1 G: 1 TABLET ORAL at 11:51

## 2023-06-26 RX ADMIN — RISPERIDONE 0.5 MG: 0.5 TABLET ORAL at 04:00

## 2023-06-26 RX ADMIN — OXYCODONE HYDROCHLORIDE 5 MG: 5 TABLET ORAL at 21:11

## 2023-06-26 RX ADMIN — ENOXAPARIN SODIUM 40 MG: 100 INJECTION SUBCUTANEOUS at 04:00

## 2023-06-26 RX ADMIN — OXYCODONE HYDROCHLORIDE 5 MG: 5 TABLET ORAL at 16:16

## 2023-06-26 RX ADMIN — SODIUM CHLORIDE 1 G: 1 TABLET ORAL at 08:49

## 2023-06-26 RX ADMIN — OXYCODONE HYDROCHLORIDE 5 MG: 5 TABLET ORAL at 03:56

## 2023-06-26 RX ADMIN — ENOXAPARIN SODIUM 40 MG: 100 INJECTION SUBCUTANEOUS at 17:21

## 2023-06-26 ASSESSMENT — ENCOUNTER SYMPTOMS
SENSORY CHANGE: 0
DIZZINESS: 0
ABDOMINAL PAIN: 0
MYALGIAS: 1
TINGLING: 0
FOCAL WEAKNESS: 0
BLURRED VISION: 0
NAUSEA: 0
BACK PAIN: 0
HEADACHES: 0
ROS GI COMMENTS: BM 6/23
FEVER: 0
DOUBLE VISION: 0
VOMITING: 0
CHILLS: 0
NECK PAIN: 1
SPEECH CHANGE: 0
SHORTNESS OF BREATH: 0

## 2023-06-26 ASSESSMENT — PAIN DESCRIPTION - PAIN TYPE
TYPE: ACUTE PAIN;SURGICAL PAIN
TYPE: ACUTE PAIN

## 2023-06-26 NOTE — CARE PLAN
Problem: Knowledge Deficit - Standard  Goal: Patient and family/care givers will demonstrate understanding of plan of care, disease process/condition, diagnostic tests and medications  Outcome: Progressing     Problem: Pain - Standard  Goal: Alleviation of pain or a reduction in pain to the patient’s comfort goal  Outcome: Progressing     Problem: Skin Integrity  Goal: Skin integrity is maintained or improved  Outcome: Progressing     Problem: Fall Risk  Goal: Patient will remain free from falls  Outcome: Progressing     Problem: Safety - Violent/Self-destructive Restraint  Goal: Remains free of injury from restraints (Restraint for Violent/Self-Destructive Behavior)  Outcome: Progressing  Goal: Free from restraints (Restraint for Violent or Self-Destructive Behavior)  Outcome: Progressing     Problem: Hemodynamics  Goal: Patient's hemodynamics, fluid balance and neurologic status will be stable or improve  Outcome: Progressing   The patient is Stable - Low risk of patient condition declining or worsening    Shift Goals  Clinical Goals: Safety, pain medication given PRN  Patient Goals: pain control  Family Goals: N/A    Progress made toward(s) clinical / shift goals: Patient ambulating. Patient asks for assistance appropriately. 1500 ml water restriction in place.    Patient is not progressing towards the following goals:

## 2023-06-26 NOTE — CARE PLAN
Problem: Pain - Standard  Goal: Alleviation of pain or a reduction in pain to the patient’s comfort goal  Outcome: Progressing     Problem: Skin Integrity  Goal: Skin integrity is maintained or improved  Outcome: Progressing   The patient is Stable - Low risk of patient condition declining or worsening    Shift Goals  Clinical Goals: pain mgmt; safety  Patient Goals: pain control  Family Goals: N/A    Progress made toward(s) clinical / shift goals:  yes

## 2023-06-26 NOTE — THERAPY
"Speech Language Pathology   Daily Treatment     Patient Name: Fareed Muhammad  AGE:  27 y.o., SEX:  male  Medical Record #: [id number]  Date of Service: 6/26/2023    Precautions: (P) Fall Risk, Spinal / Back Precautions      Subjective  Pt was cleared by RN for cognitive linguistic communication Tx. Pt was AAOX4 (month/year) and was agreeable to SLP POC.     Assessment  Utilizing spaced retrieval and MOD semantic/phonemic cues, pt was able to recall salient/functional information (I.e., purpose of medications) with 70% accuracy following 3-minute delay, 60% with 5-minute delay, 60% with 10 minute delay.  With MOD semantic cues, pt was able to identify appropriate cause-effect relationships (I.e., hard to concentrate with TV distracting) with 65% accuracy. With MOD-MAX level of supports, pt identified appropriate plan of action to discuss post hospital transition with  including questions about living in a group home.     Pt reported that he is having trouble \"remembering the little stuff.\" SLP provided pt education on strategies to support recall of salient information including: creating pneumonic devices, writing information down, making associations, and repeating information to oneself.       Clinical Impressions  Patient presents with mild-moderate cognitive-linguistic deficits in the areas of pragmatic language, short term/delayed recall, and executive functioning; consistent with Cognitive Linguistic Evaluation completed on 6/20/23. Per previous report, these deficits may be primarily chronic given hx of mood disorder, polysubstance abuse with hx multiple hospitalizations, limited formal education, though unable to r/o acute component related to closed head injury.  He will need supervision with IADLS upon discharge and will benefit from formal neuropsych testing, given complexity of psych/medical hx contributing to pt's presentation. Skilled ST indicated at this time to determine if pt is able to " "make gains, given possible acute component to his presentation.    Recommendations  Cognitive Treatment  Supervision Needs Upons Discharge: (P) Direct assistance with IADLs (see below)  IADLs: (P) Medication management, Appointment management, Financial management, Cooking    SLP Treatment Plan  Treatment Plan:Cognitive Treatment, Patient/Family/Caregiver Training  SLP Frequency:2x Per Week  Estimated Duration:Until Therapy Goals Met    Anticipated Discharge Needs  Discharge Recommendations:Recommend outpatient speech therapy services (As previously noted, formal neurocognitive evaluation at outpatient if pt is willing; may benefit from more neuropsychology vs SLP)  Therapy Recommendations Upon DC:Cognitive-Linguistic Training, Patient / Family / Caregiver Education    Patient / Family Goals  Patient / Family Goal #1: \"I just like to watch good shows.\"  Goal #1 Outcome: Progressing as expected  Short Term Goals  Short Term Goal # 1: Patient will consume meals of regular solids and thin liquids with no s/sx of aspiration given supervision for meals.  Goal Outcome # 1: Goal met  Short Term Goal # 2: Patient will recall functional information with >90% accuracy following a 5 minute delay given mod cues for strategies.  Goal Outcome # 2 : Progressing as expected  Short Term Goal # 3: Patient will use strategies to plan, organize and execute appropriate solutions to problems in the context of IADLs with >90% accuracy given mod cues and strategies.  Goal Outcome  # 3: Progressing as expected    Lupis Ayala, SLP  "
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
## 2023-06-26 NOTE — DISCHARGE PLANNING
Case Management Discharge Planning    Admission Date: 5/31/2023  GMLOS: 9.7  ALOS: 26    6-Clicks ADL Score: 23  6-Clicks Mobility Score: 24      Anticipated Discharge Dispo: Discharge Disposition: Discharged to home/self care (01)    DME Needed: No    Action(s) Taken: OTHER. Per YOBANY No, patient reports he contacted [facility] for assistance with temporary housing.    LSW met with patient at bedside to discuss post acute options. Patient reports [facility] is unable to guarantee assistance with housing. Patient reports he does not have anyone to assist with housing.    Escalations Completed: Leadership    Medically Clear: Yes    Next Steps:     Barriers to Discharge: Pending Placement    Is the patient up for discharge tomorrow:

## 2023-06-26 NOTE — PROGRESS NOTES
AaxOx4; on room air.  Denies SOB/chest pain.  +intermittent numbness or tingling per pt.  Pt verbalizes pain with 7/10 pain at this time. PRN pain meds in use per MAR.  Skin per flowsheets.  Cast to LUE; NWB LUE; elevated with pillows with ROM assist.  C-collar at all times. HOB > 30 degrees.  Denies N/V at this time. Tolerating regular diet, double portion. 1500 ml free water fluid restriction.  +void. LBM 6/25 +BM, +flatus.  Pt ambulates independently.  SCDs refused; lovenox in use for VTE prophylaxis.

## 2023-06-27 PROBLEM — R29.818 NEUROCOGNITIVE DEFICITS: Status: ACTIVE | Noted: 2023-06-27

## 2023-06-27 PROBLEM — R41.89 NEUROCOGNITIVE DEFICITS: Status: ACTIVE | Noted: 2023-06-27

## 2023-06-27 PROCEDURE — A9270 NON-COVERED ITEM OR SERVICE: HCPCS | Performed by: PSYCHIATRY & NEUROLOGY

## 2023-06-27 PROCEDURE — A9270 NON-COVERED ITEM OR SERVICE: HCPCS | Performed by: PHYSICIAN ASSISTANT

## 2023-06-27 PROCEDURE — 700111 HCHG RX REV CODE 636 W/ 250 OVERRIDE (IP): Performed by: NURSE PRACTITIONER

## 2023-06-27 PROCEDURE — 700102 HCHG RX REV CODE 250 W/ 637 OVERRIDE(OP): Performed by: NURSE PRACTITIONER

## 2023-06-27 PROCEDURE — A9270 NON-COVERED ITEM OR SERVICE: HCPCS | Performed by: NURSE PRACTITIONER

## 2023-06-27 PROCEDURE — 700102 HCHG RX REV CODE 250 W/ 637 OVERRIDE(OP): Performed by: PHYSICIAN ASSISTANT

## 2023-06-27 PROCEDURE — A9270 NON-COVERED ITEM OR SERVICE: HCPCS | Performed by: STUDENT IN AN ORGANIZED HEALTH CARE EDUCATION/TRAINING PROGRAM

## 2023-06-27 PROCEDURE — 700102 HCHG RX REV CODE 250 W/ 637 OVERRIDE(OP): Performed by: STUDENT IN AN ORGANIZED HEALTH CARE EDUCATION/TRAINING PROGRAM

## 2023-06-27 PROCEDURE — 770001 HCHG ROOM/CARE - MED/SURG/GYN PRIV*

## 2023-06-27 PROCEDURE — 700102 HCHG RX REV CODE 250 W/ 637 OVERRIDE(OP): Performed by: PSYCHIATRY & NEUROLOGY

## 2023-06-27 RX ADMIN — ONDANSETRON 4 MG: 4 TABLET, ORALLY DISINTEGRATING ORAL at 22:56

## 2023-06-27 RX ADMIN — DIVALPROEX SODIUM 500 MG: 500 TABLET, DELAYED RELEASE ORAL at 04:41

## 2023-06-27 RX ADMIN — SENNOSIDES AND DOCUSATE SODIUM 1 TABLET: 50; 8.6 TABLET ORAL at 20:42

## 2023-06-27 RX ADMIN — ENOXAPARIN SODIUM 40 MG: 100 INJECTION SUBCUTANEOUS at 17:44

## 2023-06-27 RX ADMIN — OXYCODONE HYDROCHLORIDE 5 MG: 5 TABLET ORAL at 04:41

## 2023-06-27 RX ADMIN — OXYCODONE HYDROCHLORIDE 5 MG: 5 TABLET ORAL at 13:46

## 2023-06-27 RX ADMIN — SODIUM CHLORIDE 1 G: 1 TABLET ORAL at 09:22

## 2023-06-27 RX ADMIN — SODIUM CHLORIDE 1 G: 1 TABLET ORAL at 13:49

## 2023-06-27 RX ADMIN — OXYCODONE HYDROCHLORIDE 5 MG: 5 TABLET ORAL at 20:42

## 2023-06-27 RX ADMIN — ENOXAPARIN SODIUM 40 MG: 100 INJECTION SUBCUTANEOUS at 04:42

## 2023-06-27 RX ADMIN — DIVALPROEX SODIUM 500 MG: 500 TABLET, DELAYED RELEASE ORAL at 17:44

## 2023-06-27 RX ADMIN — QUETIAPINE FUMARATE 100 MG: 100 TABLET ORAL at 20:42

## 2023-06-27 RX ADMIN — SODIUM CHLORIDE 1 G: 1 TABLET ORAL at 17:44

## 2023-06-27 RX ADMIN — RISPERIDONE 0.5 MG: 0.5 TABLET ORAL at 04:41

## 2023-06-27 ASSESSMENT — ENCOUNTER SYMPTOMS
DIZZINESS: 0
NECK PAIN: 1
ROS GI COMMENTS: BM 6/23
BACK PAIN: 0
HEADACHES: 0
VOMITING: 0
SENSORY CHANGE: 0
TINGLING: 0
SPEECH CHANGE: 0
FOCAL WEAKNESS: 0
NAUSEA: 0
DOUBLE VISION: 0
MYALGIAS: 1
ABDOMINAL PAIN: 0
SHORTNESS OF BREATH: 0
FEVER: 0
CHILLS: 0
BLURRED VISION: 0

## 2023-06-27 ASSESSMENT — PAIN DESCRIPTION - PAIN TYPE
TYPE: ACUTE PAIN
TYPE: ACUTE PAIN
TYPE: ACUTE PAIN;SURGICAL PAIN
TYPE: ACUTE PAIN;SURGICAL PAIN
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
## 2023-06-27 NOTE — ASSESSMENT & PLAN NOTE
Needs follow up psychiatry appointment upon discharge.  Patient will need direct assistance with the following IADLs:  Medication management, Appointment management, Financial management, Cooking.    6/29 Capacity exam completed, patient has capacity  Unable to acquire post acute placement  Follow up appointment to establish with PCP arranged

## 2023-06-27 NOTE — CARE PLAN
The patient is Stable - Low risk of patient condition declining or worsening    Shift Goals  Clinical Goals: manage pain and rest  Patient Goals: shower  Family Goals: NA    Progress made toward(s) clinical / shift goals: Patient frequently turns self in bed and ambulates with no assistance. Patients pain managed through the use of PRN pain medications as well as non pharmacological pain relief methods.

## 2023-06-27 NOTE — CARE PLAN
The patient is Stable - Low risk of patient condition declining or worsening    Shift Goals  Clinical Goals: manage pain and rest  Patient Goals: shower  Family Goals: NA    Progress made toward(s) clinical / shift goals:    Problem: Pain - Standard  Goal: Alleviation of pain or a reduction in pain to the patient’s comfort goal  Description: Target End Date:  Prior to discharge or change in level of care    Document on Vitals flowsheet    1.  Document pain using the appropriate pain scale per order or unit policy  2.  Educate and implement non-pharmacologic comfort measures (i.e. relaxation, distraction, massage, cold/heat therapy, etc.)  3.  Pain management medications as ordered  4.  Reassess pain after pain med administration per policy  5.  If opiods administered assess patient's response to pain medication is appropriate per POSS sedation scale  6.  Follow pain management plan developed in collaboration with patient and interdisciplinary team (including palliative care or pain specialists if applicable)  Outcome: Progressing     Problem: Skin Integrity  Goal: Skin integrity is maintained or improved  Description: Target End Date:  Prior to discharge or change in level of care    Document interventions on Skin Risk/Jay flowsheet groups and corresponding LDA    1.  Assess and monitor skin integrity, appearance and/or temperature  2.  Assess risk factors for impaired skin integrity and/or pressures ulcers  3.  Implement precautions to protect skin integrity in collaboration with interdisciplinary team  4.  Implement pressure ulcer prevention protocol if at risk for skin breakdown  5.  Confirm wound care consult if at risk for skin breakdown  6.  Ensure patient use of pressure relieving devices  (Low air loss bed, waffle overlay, heel protectors, ROHO cushion, etc)  Outcome: Progressing       Patient is not progressing towards the following goals:

## 2023-06-27 NOTE — PROGRESS NOTES
Assumed care of patient at bedside report from day RN. Updated on POC. Patient currently A & O x 4; on room air; up self with some complaints of acute pain. Assessment completed.  Call light within reach. Whiteboard updated. Fall precautions in place. Bed locked and in lowest position. All questions answered. No other needs indicated at this time.

## 2023-06-27 NOTE — PROGRESS NOTES
"Bedside report received.  Assessment complete.  A&O x 4. Patient calls appropriately.  Patient ambulates with no assistance. Bed alarm off.   Patient has 0/10 pain.  Denies N&V. Tolerating regular diet, 1500 ml water restriction.  Healed midline abd incision JEROME, LUE cast in place, Posterior head lac healed and JEROME.  + void, + flatus, + BM.  Patient denies SOB.  SCD's off.  Patient pleasant, cooperative with plan of care..  Review plan with of care with patient. Call light and personal belongings within reach. Hourly rounding in place. All needs met at this time. BP (!) 140/89   Pulse 91   Temp 36.6 °C (97.9 °F) (Temporal)   Resp 18   Ht 1.803 m (5' 10.98\")   Wt 108 kg (238 lb 12.1 oz)   SpO2 94%   BMI 33.32 kg/m²     "

## 2023-06-28 PROCEDURE — A9270 NON-COVERED ITEM OR SERVICE: HCPCS | Performed by: NURSE PRACTITIONER

## 2023-06-28 PROCEDURE — 700111 HCHG RX REV CODE 636 W/ 250 OVERRIDE (IP): Mod: JZ | Performed by: NURSE PRACTITIONER

## 2023-06-28 PROCEDURE — A9270 NON-COVERED ITEM OR SERVICE: HCPCS | Performed by: STUDENT IN AN ORGANIZED HEALTH CARE EDUCATION/TRAINING PROGRAM

## 2023-06-28 PROCEDURE — 99024 POSTOP FOLLOW-UP VISIT: CPT | Performed by: NURSE PRACTITIONER

## 2023-06-28 PROCEDURE — 770001 HCHG ROOM/CARE - MED/SURG/GYN PRIV*

## 2023-06-28 PROCEDURE — 700102 HCHG RX REV CODE 250 W/ 637 OVERRIDE(OP): Performed by: PSYCHIATRY & NEUROLOGY

## 2023-06-28 PROCEDURE — 700102 HCHG RX REV CODE 250 W/ 637 OVERRIDE(OP): Performed by: NURSE PRACTITIONER

## 2023-06-28 PROCEDURE — A9270 NON-COVERED ITEM OR SERVICE: HCPCS | Performed by: PSYCHIATRY & NEUROLOGY

## 2023-06-28 PROCEDURE — A9270 NON-COVERED ITEM OR SERVICE: HCPCS | Performed by: PHYSICIAN ASSISTANT

## 2023-06-28 PROCEDURE — 700102 HCHG RX REV CODE 250 W/ 637 OVERRIDE(OP): Performed by: PHYSICIAN ASSISTANT

## 2023-06-28 PROCEDURE — 700102 HCHG RX REV CODE 250 W/ 637 OVERRIDE(OP): Performed by: STUDENT IN AN ORGANIZED HEALTH CARE EDUCATION/TRAINING PROGRAM

## 2023-06-28 RX ADMIN — DIVALPROEX SODIUM 500 MG: 500 TABLET, DELAYED RELEASE ORAL at 06:12

## 2023-06-28 RX ADMIN — ENOXAPARIN SODIUM 40 MG: 100 INJECTION SUBCUTANEOUS at 17:47

## 2023-06-28 RX ADMIN — OXYCODONE HYDROCHLORIDE 5 MG: 5 TABLET ORAL at 17:47

## 2023-06-28 RX ADMIN — SENNOSIDES AND DOCUSATE SODIUM 1 TABLET: 50; 8.6 TABLET ORAL at 20:39

## 2023-06-28 RX ADMIN — QUETIAPINE FUMARATE 100 MG: 100 TABLET ORAL at 20:39

## 2023-06-28 RX ADMIN — RISPERIDONE 0.5 MG: 0.5 TABLET ORAL at 06:12

## 2023-06-28 RX ADMIN — ENOXAPARIN SODIUM 40 MG: 100 INJECTION SUBCUTANEOUS at 06:12

## 2023-06-28 RX ADMIN — OXYCODONE HYDROCHLORIDE 5 MG: 5 TABLET ORAL at 02:09

## 2023-06-28 RX ADMIN — OXYCODONE HYDROCHLORIDE 5 MG: 5 TABLET ORAL at 11:46

## 2023-06-28 RX ADMIN — DIVALPROEX SODIUM 500 MG: 500 TABLET, DELAYED RELEASE ORAL at 17:47

## 2023-06-28 RX ADMIN — OXYCODONE HYDROCHLORIDE 5 MG: 5 TABLET ORAL at 21:55

## 2023-06-28 RX ADMIN — SODIUM CHLORIDE 1 G: 1 TABLET ORAL at 09:26

## 2023-06-28 RX ADMIN — OXYCODONE HYDROCHLORIDE 5 MG: 5 TABLET ORAL at 06:11

## 2023-06-28 ASSESSMENT — ENCOUNTER SYMPTOMS
BLURRED VISION: 0
FOCAL WEAKNESS: 0
MYALGIAS: 1
FEVER: 0
ABDOMINAL PAIN: 0
NECK PAIN: 1
TINGLING: 0
SPEECH CHANGE: 0
BACK PAIN: 0
DIZZINESS: 0
HEADACHES: 0
VOMITING: 0
DOUBLE VISION: 0
CHILLS: 0
SHORTNESS OF BREATH: 0
SENSORY CHANGE: 0
NAUSEA: 0

## 2023-06-28 ASSESSMENT — PAIN DESCRIPTION - PAIN TYPE
TYPE: ACUTE PAIN

## 2023-06-28 NOTE — DISCHARGE PLANNING
Case Management Discharge Planning    Admission Date: 5/31/2023  GMLOS: 9.7  ALOS: 28    6-Clicks ADL Score: 23  6-Clicks Mobility Score: 24      Anticipated Discharge Dispo: Discharge Disposition: Discharged to home/self care (01)    DME Needed: No    Action(s) Taken: Updated Provider/Nurse on Discharge Plan and OTHER    This LSW called Nolvia at [facility] and left a detailed voicemail requesting a call back.  [phone] [phone]    @[email or web address] LSW called Hospital Scheduling team and scheduled a PCP for the pt.on July 31, 2023 @1pm with Rubén Martin at  [facility].     Escalations Completed: Leadership    Medically Clear: No    Next Steps: LSW will continue to follow and assist with placement options.     Barriers to Discharge: Medical clearance, Pending Placement, No Social Support, and Homelessness    Is the patient up for discharge tomorrow: No

## 2023-06-28 NOTE — PROGRESS NOTES
"     Orthopedic PA Progress Note    Interval changes:  Patient doing well  Cast exchanged last week  NWB LUE  No additional ortho procedures pending  Cleared per ortho for any discharge planning  Follow up for cast removal 3 weeks s/p cast replacement  Ortho to follow peripherally, please contact if any additional concerns arise    ROS -  No new concerns. Pain controlled.     /71   Pulse 90   Temp 36.6 °C (97.9 °F) (Temporal)   Resp 16   Ht 1.803 m (5' 10.98\")   Wt 108 kg (238 lb 12.1 oz)   SpO2 96%     Patient seen and examined  Intubated  RRR  LUE: Cast CDI. Flexes and extends all fingers. Cap refill <2s.     Recent Labs     06/26/23  0610   WBC 14.5*   RBC 3.63*   HEMOGLOBIN 10.6*   HEMATOCRIT 33.2*   MCV 91.5   MCH 29.2   MCHC 31.9*   RDW 45.8   PLATELETCT 700*   MPV 8.4*         Active Hospital Problems    Diagnosis     Neurocognitive deficits [R29.818, R41.89]      Priority: High    Discharge planning issues [Z02.9]      Priority: High    Hyponatremia [E87.1]      Priority: Medium    Psychiatric diagnosis [F99]      Priority: Medium    Open fracture dislocation of left elbow joint [S42.402B]      Priority: Medium    Spleen laceration, initial encounter [S36.039A]      Priority: Medium    Rotatory subluxation of atlantoaxial joint [S13.120A]      Priority: Medium    Illicit drug use [F19.90]      Priority: Low    Trauma [T14.90XA]      Priority: Low    Open skull fracture (HCC) [S02.91XB]      Priority: Low    Major laceration of kidney, left, initial encounter [S37.062A]      Priority: Low    Closed fracture of multiple ribs of left side [S22.42XA]      Priority: Low    No contraindication to deep vein thrombosis (DVT) prophylaxis [Z78.9]      Priority: Low    Liver laceration, initial encounter [S36.113A]      Priority: Low     A/P:  Patient doing well  Cast exchanged last week  NWB LUE  No additional ortho procedures pending  Cleared per ortho for any discharge planning  Follow up for cast " removal 3 weeks s/p cast replacement    POD#26 S/p  1.  Irrigation and debridement open fracture   2.  Open reduction internal fixation left distal humerus lateral condyle fracture   3.  Open treatment acute elbow dislocation  Wt bearing status - NWB LUE  Wound care/Drains - Dressings to be left in place  Future Procedures - None planned   Lovenox: defer to trauma team  Sutures/Staples- out  PT/OT-initiated  Antibiotics:  Perioperative completed  DVT Prophylaxis- TEDS/SCDs/Foot pumps  Edwards-not needed per ortho  Case Coordination for Discharge Planning - Disposition per therapy recs.

## 2023-06-28 NOTE — PROGRESS NOTES
Pt AO x 4  Vitals signs stable  Pt denies chest pain or SOB  O2 sat >90% on RA breathing unlabored   Pt endorsed 7/10 neck pain, PRNs given per mar   Pt c/o nausea after taking Seroquel, sublingual zofran given. pt able to eat crackers and snack. Pt stated he thought it was the seroquel making him nauseas and didn't want to keep taking it.   Pt is tolerating regular diet  + voiding  + flatus, last BM on 6/27, Bowel sounds present   Pt ambulates the halls frequently  SCDs refused     Plan of care discussed with pt. Safety education done. Falls precautions in place. Call light and belongings within reach. All needs met at this time.
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
## 2023-06-28 NOTE — PROGRESS NOTES
Report received from RN, assumed care at 0645  Pt is A0X4, however is impulsive and has delusions. Reports hearing his imaginary friends and has been talking to them most of the morning.   Pt declines any SOB, chest pain, new onset of numbness/ tingling  Reports minimal pain in ears  Pt is voiding adequatly and without hesitancy  Pt has + flatus, + bowel sounds, + BM on 6/28  Pt independently and frequently    Pt is tolerating a diet, pt denies any nausea/vomiting  Cast on left arm and C collar in place  Plan of care discussed, all questions answered. Explained importance of calling before getting OOB and pt verbalizes understanding. Explained importance of oral care. Call light is within reach, treaded slipper socks on, bed in lowest/ locked position, hourly rounding in place, all needs met at this time

## 2023-06-29 ENCOUNTER — PHARMACY VISIT (OUTPATIENT)
Dept: PHARMACY | Facility: MEDICAL CENTER | Age: 28
End: 2023-06-29
Payer: COMMERCIAL

## 2023-06-29 PROBLEM — E87.1 HYPONATREMIA: Status: RESOLVED | Noted: 2023-06-11 | Resolved: 2023-06-29

## 2023-06-29 PROBLEM — S37.062A: Status: RESOLVED | Noted: 2023-05-31 | Resolved: 2023-06-29

## 2023-06-29 LAB
ANION GAP SERPL CALC-SCNC: 12 MMOL/L (ref 7–16)
BUN SERPL-MCNC: 13 MG/DL (ref 8–22)
CALCIUM SERPL-MCNC: 8.9 MG/DL (ref 8.5–10.5)
CHLORIDE SERPL-SCNC: 94 MMOL/L (ref 96–112)
CO2 SERPL-SCNC: 26 MMOL/L (ref 20–33)
CREAT SERPL-MCNC: 0.69 MG/DL (ref 0.5–1.4)
GFR SERPLBLD CREATININE-BSD FMLA CKD-EPI: 130 ML/MIN/1.73 M 2
GLUCOSE SERPL-MCNC: 92 MG/DL (ref 65–99)
POTASSIUM SERPL-SCNC: 4.3 MMOL/L (ref 3.6–5.5)
SODIUM SERPL-SCNC: 132 MMOL/L (ref 135–145)

## 2023-06-29 PROCEDURE — RXMED WILLOW AMBULATORY MEDICATION CHARGE: Performed by: NURSE PRACTITIONER

## 2023-06-29 PROCEDURE — A9270 NON-COVERED ITEM OR SERVICE: HCPCS | Performed by: NURSE PRACTITIONER

## 2023-06-29 PROCEDURE — 700102 HCHG RX REV CODE 250 W/ 637 OVERRIDE(OP): Performed by: NURSE PRACTITIONER

## 2023-06-29 PROCEDURE — 700111 HCHG RX REV CODE 636 W/ 250 OVERRIDE (IP): Mod: JZ | Performed by: NURSE PRACTITIONER

## 2023-06-29 PROCEDURE — 700102 HCHG RX REV CODE 250 W/ 637 OVERRIDE(OP): Performed by: PSYCHIATRY & NEUROLOGY

## 2023-06-29 PROCEDURE — 92507 TX SP LANG VOICE COMM INDIV: CPT

## 2023-06-29 PROCEDURE — 770001 HCHG ROOM/CARE - MED/SURG/GYN PRIV*

## 2023-06-29 PROCEDURE — 700102 HCHG RX REV CODE 250 W/ 637 OVERRIDE(OP): Performed by: STUDENT IN AN ORGANIZED HEALTH CARE EDUCATION/TRAINING PROGRAM

## 2023-06-29 PROCEDURE — A9270 NON-COVERED ITEM OR SERVICE: HCPCS | Performed by: PSYCHIATRY & NEUROLOGY

## 2023-06-29 PROCEDURE — A9270 NON-COVERED ITEM OR SERVICE: HCPCS | Performed by: STUDENT IN AN ORGANIZED HEALTH CARE EDUCATION/TRAINING PROGRAM

## 2023-06-29 PROCEDURE — 80048 BASIC METABOLIC PNL TOTAL CA: CPT

## 2023-06-29 PROCEDURE — 36415 COLL VENOUS BLD VENIPUNCTURE: CPT

## 2023-06-29 PROCEDURE — 97129 THER IVNTJ 1ST 15 MIN: CPT

## 2023-06-29 PROCEDURE — 99239 HOSP IP/OBS DSCHRG MGMT >30: CPT | Performed by: NURSE PRACTITIONER

## 2023-06-29 RX ORDER — ACETAMINOPHEN 500 MG
1000 TABLET ORAL EVERY 6 HOURS PRN
Qty: 60 TABLET | Refills: 0 | Status: SHIPPED | OUTPATIENT
Start: 2023-06-29

## 2023-06-29 RX ORDER — DIVALPROEX SODIUM 500 MG/1
500 TABLET, DELAYED RELEASE ORAL EVERY 12 HOURS
Qty: 60 TABLET | Refills: 0 | Status: SHIPPED | OUTPATIENT
Start: 2023-06-29 | End: 2023-07-29

## 2023-06-29 RX ORDER — QUETIAPINE FUMARATE 100 MG/1
100 TABLET, FILM COATED ORAL NIGHTLY
Qty: 30 TABLET | Refills: 0 | Status: SHIPPED | OUTPATIENT
Start: 2023-06-29 | End: 2023-07-29

## 2023-06-29 RX ORDER — RISPERIDONE 0.5 MG/1
0.5 TABLET ORAL EVERY MORNING
Qty: 30 TABLET | Refills: 0 | Status: SHIPPED | OUTPATIENT
Start: 2023-06-30 | End: 2023-07-30

## 2023-06-29 RX ORDER — POLYETHYLENE GLYCOL 3350 17 G/17G
17 POWDER, FOR SOLUTION ORAL
Qty: 20 EACH | Refills: 0 | Status: SHIPPED | OUTPATIENT
Start: 2023-06-29 | End: 2023-07-19

## 2023-06-29 RX ADMIN — RISPERIDONE 0.5 MG: 0.5 TABLET ORAL at 06:25

## 2023-06-29 RX ADMIN — OXYCODONE HYDROCHLORIDE 5 MG: 5 TABLET ORAL at 11:51

## 2023-06-29 RX ADMIN — ENOXAPARIN SODIUM 40 MG: 100 INJECTION SUBCUTANEOUS at 17:24

## 2023-06-29 RX ADMIN — DIVALPROEX SODIUM 500 MG: 500 TABLET, DELAYED RELEASE ORAL at 06:25

## 2023-06-29 RX ADMIN — DIVALPROEX SODIUM 500 MG: 500 TABLET, DELAYED RELEASE ORAL at 17:24

## 2023-06-29 RX ADMIN — ENOXAPARIN SODIUM 40 MG: 100 INJECTION SUBCUTANEOUS at 06:25

## 2023-06-29 RX ADMIN — OXYCODONE HYDROCHLORIDE 5 MG: 5 TABLET ORAL at 19:49

## 2023-06-29 RX ADMIN — QUETIAPINE FUMARATE 100 MG: 100 TABLET ORAL at 20:45

## 2023-06-29 RX ADMIN — OXYCODONE HYDROCHLORIDE 5 MG: 5 TABLET ORAL at 01:21

## 2023-06-29 RX ADMIN — SENNOSIDES AND DOCUSATE SODIUM 1 TABLET: 50; 8.6 TABLET ORAL at 20:45

## 2023-06-29 RX ADMIN — OXYCODONE HYDROCHLORIDE 5 MG: 5 TABLET ORAL at 06:29

## 2023-06-29 ASSESSMENT — ENCOUNTER SYMPTOMS
MYALGIAS: 1
TINGLING: 0
FOCAL WEAKNESS: 0
FEVER: 0
VOMITING: 0
DIZZINESS: 0
BACK PAIN: 0
HEADACHES: 0
BLURRED VISION: 0
SHORTNESS OF BREATH: 0
SPEECH CHANGE: 0
CHILLS: 0
ABDOMINAL PAIN: 0
SENSORY CHANGE: 0
NAUSEA: 0
DOUBLE VISION: 0
NECK PAIN: 1

## 2023-06-29 ASSESSMENT — PAIN DESCRIPTION - PAIN TYPE
TYPE: ACUTE PAIN
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
## 2023-06-29 NOTE — DISCHARGE INSTRUCTIONS
ACTIVITY:  Nonweightbearing to left upper extremity  Cervical collar to remain in place until discontinued by neurosurgery     WOUND CARE:  Long-arm splint to remain in place left upper extremity until discontinued by orthopedic surgery     DIET: Resume regular diet, drink Gatorade.    FOLLOW UP:  Josef Bills M.D.  [address]  [place] [address]  [phone]     Schedule an appointment as soon as possible for a visit in 3 week(s)  for cast evaluation, elbow fracture follow up     Nadir Rodriguez M.D.  [address]  [address]  [place] [id number] [phone]     Schedule an appointment as soon as possible for a visit in 2 week(s)  for follow up imaging for spine fracture     Rubén Martin M.D.  [address]  [place] [id number] [phone]     Follow up  Appointment scheduled on 7/31 to establish care     [facility]   [address]  [place] [id number] [phone]     Schedule an appointment as soon as possible for a visit in 1 week(s)  with trauma follow up clinic

## 2023-06-29 NOTE — PROGRESS NOTES
Bedside report received.  Assessment complete.    A&O x 4. Patient calls appropriately.    Patient ambulates up self.    Patient has no pain at this time. No pharmacological interventions provided at this time.    Denies N&V. Tolerating regular diet.    Cast on left arm. C-collar in place.    + void, + flatus, + BM, last BM 6/28.    Patient denies SOB.    SCD's off, patient ambulatory.    Review plan with of care with patient. Call light and personal belongings within reach. Hourly rounding in place. All needs met at this time.

## 2023-06-29 NOTE — CARE PLAN
"  Problem: Knowledge Deficit - Standard  Goal: Patient and family/care givers will demonstrate understanding of plan of care, disease process/condition, diagnostic tests and medications  Outcome: Progressing     Problem: Pain - Standard  Goal: Alleviation of pain or a reduction in pain to the patient’s comfort goal  Outcome: Progressing   The patient is Stable - Low risk of patient condition declining or worsening    Shift Goals  Clinical Goals: pain control, c-spine precautions, safety, rest  Patient Goals: pain control, rest  Family Goals: NA    Progress made toward(s) clinical / shift goals:  Pt ambulating in room and hallway independently this shift. Pt has adequate PO intake with 1500 mL fluid restriction. Pt displaying delusions this shift, stating \"superman lives in the hospital\". Pts pain is tolerated with PRN medication. C collar and hard cast in place. Plan to discharge to care campus in early AM.      "

## 2023-06-29 NOTE — DISCHARGE PLANNING
Case Management Discharge Planning    Admission Date: 5/31/2023  GMLOS: 9.7  ALOS: 29    6-Clicks ADL Score: 23  6-Clicks Mobility Score: 24      Anticipated Discharge Dispo: Discharge Disposition: Discharged to home/self care (01)    DME Needed: No  Action(s) Taken: OTHER. YOBANY Lopez requested LSW follows up with [facility] to clarify patient's ability to obtain assistance with temporary housing. LSW called  [phone] ext.[phone], message left.    11:00am - LSW called the [facility] [phone], message left requesting information regarding the process to guarantee a bed for this patient.    1:30pm - LSW spoke with Tarah,  at [facility] [phone]. Per Tarah, patient was on their Do Not Admit List; however, as of May 31, 2023 patient is ELIGIBLE for Emergency Shelter.    Per Tarah, once patient is assigned a bed, patient will be allow to stay indefinitely as long as he follows the shelter rules.     Per Tarah, [facility] assigns the beds on the first come first serve basis; however, people who arrive by 6:00am have a better chance for beds. Tarah explained if no beds are available by the time patient arrives, patient will have to check periodically throughout the day, patient aware.    [facility] provided the above information to Jessica. [facility] issued a cab voucher to Bernadette Lockhart RN.     Escalations Completed: Leadership    Medically Clear: Yes    Next Steps: Awaiting call back from [facility].    Next Steps: RN to discharge this patient 6/30/23 at 5:30.    Barriers to Discharge: None    Is the patient up for discharge tomorrow: Yes. [facility] no later than 5:30am.

## 2023-06-29 NOTE — PROGRESS NOTES
Discussed medication list with patient. When reviewing medications, patient announced he also takes: percocet, vicodin, oxycodone, oxycontin, heroin, and fentanyl. Medications and indications for med list clarified and reinforced. Then discussed cast, neck brace, and follow up appointments regarding the two. Patient states he will go to his follow ups so he can use his arm and so his head doesn't paralyze him. When told he would need to keep track of these appointments, patient stated his girlfriend, which is a dragon, would help him keep track of his responsibilities.     Reviewed discharge appointments with patient again about 2 hours after the first attempt. He states he will get a calendar after we help him get money and social security to help coordinate his follow up appointments, but that he will need help finding the funds first.

## 2023-06-29 NOTE — CARE PLAN
The patient is Stable - Low risk of patient condition declining or worsening    Problem: Knowledge Deficit - Standard  Goal: Patient and family/care givers will demonstrate understanding of plan of care, disease process/condition, diagnostic tests and medications  Outcome: Progressing     Problem: Pain - Standard  Goal: Alleviation of pain or a reduction in pain to the patient’s comfort goal  Outcome: Progressing     Shift Goals  Clinical Goals: pain control, rest  Patient Goals: rest  Family Goals: NA    Progress made toward(s) clinical / shift goals:  Patient's pain managed per MAR. Patient able to rest during this shift.    Patient is not progressing towards the following goals:

## 2023-06-29 NOTE — DISCHARGE PLANNING
Meds-to-Beds: Discharge prescription orders listed below delivered to patient's bedside RENAE Castillo. Patient counseled.     RENAE Castillo will send someone to  the augmentin as this was from a previous admission but provider would like patient to have available per RN.      Current Outpatient Medications   Medication Sig Dispense Refill    acetaminophen (TYLENOL) 500 MG Tab Give 2 Tablets by Enteral Tube route every 6 hours as needed for Moderate Pain or Fever. 60 Tablet 0    divalproex (DEPAKOTE) 500 MG Tablet Delayed Response Take 1 Tablet by mouth every 12 hours for 30 days. 60 Tablet 0    polyethylene glycol/lytes (MIRALAX) 17 g Pack Mix 1 Packet per package instructions and drink by mouth 1 time a day as needed (constipation) for up to 20 days. 20 Each 0    QUEtiapine (SEROQUEL) 100 MG Tab Take 1 Tablet by mouth every evening for 30 days. 30 Tablet 0    [START ON 6/30/2023] risperiDONE (RISPERDAL) 0.5 MG Tab Take 1 Tablet by mouth every morning for 30 days. 30 Tablet 0      Mary Berg, PharmD

## 2023-06-29 NOTE — THERAPY
Speech Language Pathology   Daily Treatment     Patient Name: Fareed Muhammad  AGE:  27 y.o., SEX:  male  Medical Record #: [id number]  Date of Service: 2023    Precautions: Fall Risk    Subjective  Pt was seen on this date for cognitive linguistic communication Tx. Pt was cleared for Tx by rn. Pt was pleasant and agreeable to SLP POC.     Assessment  Pt was AAOx4 (month/year). Utilizing spaced retrieval and MIN semantic/phonemic cues, pt was able to recall salient/functional information (I.e., DC planning) with 90% accuracy following 3-minute delay, 5-minute delay, and 10 minute delay. With MOD-MAX level of supports and semantic cues, pt was able to identify appropriate problem-solving (I..e, difficulty climbing bunk-bed, talk to  prior to DC) with 65% accuracy. Pt demonstrated perseveration on topics of interest during tx: work, reading, and his  father- frequently repeating the same sentiments throughout Tx.     SLP provided pt education on strategies to support recall including: writing stuff down, making associations, and repeating information to oneself.     Clinical Impressions  Patient presents with mild-moderate cognitive-linguistic deficits in the areas of pragmatic language, short term/delayed recall, and executive functioning; consistent with Cognitive Linguistic Evaluation completed on 23. Per previous report, these deficits may be primarily chronic given hx of mood disorder, polysubstance abuse with hx multiple hospitalizations, limited formal education, though unable to r/o acute component related to closed head injury.  He will need supervision with IADLS upon discharge and will benefit from formal neuropsych testing, given complexity of psych/medical hx contributing to pt's presentation. Skilled ST indicated at this time to determine if pt is able to make gains, given possible acute component to his presentation.    Recommendations  Treatment Completed: Cognitive  "Treatment  Consult Referral(s): Neuropsychologist  Cognitive Treatment: Spaced Retrevial  Supervision Needs Upons Discharge: Direct assistance with IADLs (see below)  IADLs: Medication management, Financial management, Appointment management, Cooking    SLP Treatment Plan  Treatment Plan: Cognitive Treatment, Patient/Family/Caregiver Training  SLP Frequency: 2x Per Week  Estimated Duration: Until Therapy Goals Met    Anticipated Discharge Needs  Discharge Recommendations: Recommend outpatient speech therapy services (As previously noted, formal neurocognitive evaluation at outpatient if pt is willing; may benefit from more neuropsychology vs SLP)  Therapy Recommendations Upon DC: Cognitive-Linguistic Training, Patient / Family / Caregiver Education    Patient / Family Goals  Patient / Family Goal #1: \"I want a job.\"  Goal #1 Outcome: Progressing as expected  Short Term Goals  Short Term Goal # 1: Patient will consume meals of regular solids and thin liquids with no s/sx of aspiration given supervision for meals.  Goal Outcome # 1: Goal met  Short Term Goal # 2: Patient will recall functional information with >90% accuracy following a 5 minute delay given mod cues for strategies.  Goal Outcome # 2 : Progressing as expected  Short Term Goal # 3: Patient will use strategies to plan, organize and execute appropriate solutions to problems in the context of IADLs with >90% accuracy given mod cues and strategies.  Goal Outcome  # 3: Progressing as expected    Lupis Ayala, SLP  "
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
## 2023-06-29 NOTE — PROGRESS NOTES
Report received from Alyson MACDONALD, assumed care at 0645.  Pt is A0X4, and is impulsive.  Pt declines any SOB, chest pain, new onset of numbness/ tingling  Pt rates pain at 3/10, on a scale of 1-10, pt declines PRN medications at this time.  Pt reports minor pain in ears  Pt is voiding adequatly and without hesitancy  Pt has + flatus, + bowel sounds, + BM on 6/28  Pt ambulates independently  Pt is tolerating a regular diet, pt denies any nausea/vomiting at this time.   Cast on L arm and C collar in place  Plan of care discussed, all questions answered. Explained importance of oral care. Call light is within reach, treaded slipper socks on, bed in lowest/ locked position, hourly rounding in place, all needs met at this time.

## 2023-06-30 VITALS
WEIGHT: 238.76 LBS | TEMPERATURE: 97.9 F | HEART RATE: 87 BPM | DIASTOLIC BLOOD PRESSURE: 98 MMHG | BODY MASS INDEX: 33.43 KG/M2 | OXYGEN SATURATION: 97 % | RESPIRATION RATE: 16 BRPM | SYSTOLIC BLOOD PRESSURE: 149 MMHG | HEIGHT: 71 IN

## 2023-06-30 PROCEDURE — 700102 HCHG RX REV CODE 250 W/ 637 OVERRIDE(OP): Performed by: STUDENT IN AN ORGANIZED HEALTH CARE EDUCATION/TRAINING PROGRAM

## 2023-06-30 PROCEDURE — 700102 HCHG RX REV CODE 250 W/ 637 OVERRIDE(OP): Performed by: NURSE PRACTITIONER

## 2023-06-30 PROCEDURE — 700111 HCHG RX REV CODE 636 W/ 250 OVERRIDE (IP): Mod: JZ | Performed by: NURSE PRACTITIONER

## 2023-06-30 PROCEDURE — A9270 NON-COVERED ITEM OR SERVICE: HCPCS | Performed by: STUDENT IN AN ORGANIZED HEALTH CARE EDUCATION/TRAINING PROGRAM

## 2023-06-30 PROCEDURE — 700102 HCHG RX REV CODE 250 W/ 637 OVERRIDE(OP): Performed by: PSYCHIATRY & NEUROLOGY

## 2023-06-30 PROCEDURE — A9270 NON-COVERED ITEM OR SERVICE: HCPCS | Performed by: NURSE PRACTITIONER

## 2023-06-30 PROCEDURE — A9270 NON-COVERED ITEM OR SERVICE: HCPCS | Performed by: PSYCHIATRY & NEUROLOGY

## 2023-06-30 RX ADMIN — OXYCODONE HYDROCHLORIDE 5 MG: 5 TABLET ORAL at 04:52

## 2023-06-30 RX ADMIN — DIVALPROEX SODIUM 500 MG: 500 TABLET, DELAYED RELEASE ORAL at 04:46

## 2023-06-30 RX ADMIN — RISPERIDONE 0.5 MG: 0.5 TABLET ORAL at 04:46

## 2023-06-30 RX ADMIN — ENOXAPARIN SODIUM 40 MG: 100 INJECTION SUBCUTANEOUS at 04:46

## 2023-06-30 ASSESSMENT — PAIN DESCRIPTION - PAIN TYPE: TYPE: ACUTE PAIN

## 2023-06-30 NOTE — PROGRESS NOTES
Pt scheduled for discharge 6/30 @ 0500. Pt discharging to [facility]. Cab voucher, medications, and discharge instructions in pt drawer. Discharge instructions to be given to pt by NOC RN. Pt verbalizes understanding of where he is discharging to and importance of scheduling follow-up appointments.

## 2023-06-30 NOTE — CARE PLAN
The patient is Stable - Low risk of patient condition declining or worsening    Problem: Knowledge Deficit - Standard  Goal: Patient and family/care givers will demonstrate understanding of plan of care, disease process/condition, diagnostic tests and medications  Outcome: Progressing     Problem: Pain - Standard  Goal: Alleviation of pain or a reduction in pain to the patient’s comfort goal  Outcome: Progressing     Problem: Skin Integrity  Goal: Skin integrity is maintained or improved  Outcome: Progressing     Shift Goals  Clinical Goals: pain control, rest, D/C at 0500  Patient Goals: rest, pain control  Family Goals: NA    Progress made toward(s) clinical / shift goals:  Patient's pain managed per MAR. Patient able to rest during this shift.    Patient is not progressing towards the following goals:

## 2023-06-30 NOTE — PROGRESS NOTES
Patient to be discharged to [facility]. No IV in place. Cab voucher given and cab called. Patient transported via wheelchair by CNA [facility]. Discharge instruction and paper work given, patient verbalizes understanding. Meds with patient. Belongings gathered and sent with patient. All needs met at this time.

## 2023-07-01 ENCOUNTER — HOSPITAL ENCOUNTER (EMERGENCY)
Facility: MEDICAL CENTER | Age: 28
End: 2023-07-01
Attending: EMERGENCY MEDICINE
Payer: MEDICAID

## 2023-07-01 VITALS
HEART RATE: 92 BPM | TEMPERATURE: 98.5 F | WEIGHT: 210 LBS | HEIGHT: 70 IN | BODY MASS INDEX: 30.06 KG/M2 | DIASTOLIC BLOOD PRESSURE: 77 MMHG | OXYGEN SATURATION: 98 % | SYSTOLIC BLOOD PRESSURE: 128 MMHG | RESPIRATION RATE: 18 BRPM

## 2023-07-01 DIAGNOSIS — G47.00 INSOMNIA, UNSPECIFIED TYPE: ICD-10-CM

## 2023-07-01 PROCEDURE — 700102 HCHG RX REV CODE 250 W/ 637 OVERRIDE(OP): Mod: UD | Performed by: EMERGENCY MEDICINE

## 2023-07-01 PROCEDURE — 99283 EMERGENCY DEPT VISIT LOW MDM: CPT

## 2023-07-01 PROCEDURE — A9270 NON-COVERED ITEM OR SERVICE: HCPCS | Mod: UD | Performed by: EMERGENCY MEDICINE

## 2023-07-01 RX ORDER — TRAZODONE HYDROCHLORIDE 50 MG/1
50 TABLET ORAL NIGHTLY
Qty: 30 TABLET | Refills: 0 | Status: SHIPPED | OUTPATIENT
Start: 2023-07-01

## 2023-07-01 RX ORDER — TRAZODONE HYDROCHLORIDE 50 MG/1
50 TABLET ORAL ONCE
Status: COMPLETED | OUTPATIENT
Start: 2023-07-01 | End: 2023-07-01

## 2023-07-01 RX ADMIN — TRAZODONE HYDROCHLORIDE 50 MG: 50 TABLET ORAL at 02:16

## 2023-07-01 ASSESSMENT — PAIN DESCRIPTION - PAIN TYPE: TYPE: ACUTE PAIN

## 2023-07-01 ASSESSMENT — FIBROSIS 4 INDEX: FIB4 SCORE: 0.25
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
## 2023-07-01 NOTE — ED NOTES
.Patient discharged in stable condition per orders. Wristband removed per protocol. Patient verbalized understanding of all discharge instructions. All belongings accounted for.

## 2023-07-06 ENCOUNTER — HOSPITAL ENCOUNTER (EMERGENCY)
Facility: MEDICAL CENTER | Age: 28
End: 2023-07-06
Attending: EMERGENCY MEDICINE
Payer: MEDICAID

## 2023-07-06 ENCOUNTER — APPOINTMENT (OUTPATIENT)
Dept: RADIOLOGY | Facility: MEDICAL CENTER | Age: 28
End: 2023-07-06
Attending: EMERGENCY MEDICINE
Payer: MEDICAID

## 2023-07-06 VITALS
WEIGHT: 204.37 LBS | DIASTOLIC BLOOD PRESSURE: 65 MMHG | HEIGHT: 72 IN | HEART RATE: 95 BPM | TEMPERATURE: 97.8 F | OXYGEN SATURATION: 98 % | SYSTOLIC BLOOD PRESSURE: 130 MMHG | RESPIRATION RATE: 15 BRPM | BODY MASS INDEX: 27.68 KG/M2

## 2023-07-06 DIAGNOSIS — Z59.00 HOMELESSNESS: ICD-10-CM

## 2023-07-06 DIAGNOSIS — M79.602 LEFT ARM PAIN: ICD-10-CM

## 2023-07-06 DIAGNOSIS — S42.402D LEFT ELBOW FRACTURE, WITH ROUTINE HEALING, SUBSEQUENT ENCOUNTER: ICD-10-CM

## 2023-07-06 DIAGNOSIS — R44.0 AUDITORY HALLUCINATION: ICD-10-CM

## 2023-07-06 PROCEDURE — 73070 X-RAY EXAM OF ELBOW: CPT | Mod: LT

## 2023-07-06 PROCEDURE — 99284 EMERGENCY DEPT VISIT MOD MDM: CPT

## 2023-07-06 PROCEDURE — 700102 HCHG RX REV CODE 250 W/ 637 OVERRIDE(OP): Mod: UD | Performed by: EMERGENCY MEDICINE

## 2023-07-06 PROCEDURE — A9270 NON-COVERED ITEM OR SERVICE: HCPCS | Mod: UD | Performed by: EMERGENCY MEDICINE

## 2023-07-06 RX ORDER — ACETAMINOPHEN 325 MG/1
650 TABLET ORAL ONCE
Status: COMPLETED | OUTPATIENT
Start: 2023-07-06 | End: 2023-07-06

## 2023-07-06 RX ADMIN — ACETAMINOPHEN 650 MG: 325 TABLET, FILM COATED ORAL at 14:24

## 2023-07-06 SDOH — ECONOMIC STABILITY - HOUSING INSECURITY: HOMELESSNESS UNSPECIFIED: Z59.00

## 2023-07-06 ASSESSMENT — FIBROSIS 4 INDEX: FIB4 SCORE: 0.25

## 2023-07-06 NOTE — ED NOTES
Pt educated on discharge instructions. All questions & concerns addressed. Pt ambulates to exit with steady gait and all belongings.    No IV to d/c
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
## 2023-07-06 NOTE — DISCHARGE INSTRUCTIONS
See your bone doctor next week for wire and cast removal    Return to the ER for worsening symptoms or other concerns

## 2023-07-06 NOTE — ED TRIAGE NOTES
Fareed Osorio [place]  27 y.o. male  Chief Complaint   Patient presents with    Psych Eval       Vitals:    07/06/23 0918   BP: 122/85   Pulse: 98   Resp: 16   Temp: 36.6 °C (97.8 °F)   SpO2: 97%       Patient educated on triage process and encouraged to alert staff of any changes in condition.    Pt ambulatory to triage. Pt disorganized and difficult to triage. Pt only able to answer yes or no questions. Pt is talking to himself. Pt denies HI/SI.

## 2023-07-07 ENCOUNTER — HOSPITAL ENCOUNTER (INPATIENT)
Facility: MEDICAL CENTER | Age: 28
LOS: 7 days | DRG: 983 | End: 2023-07-14
Attending: EMERGENCY MEDICINE | Admitting: SURGERY
Payer: MEDICAID

## 2023-07-07 ENCOUNTER — APPOINTMENT (OUTPATIENT)
Dept: RADIOLOGY | Facility: MEDICAL CENTER | Age: 28
DRG: 983 | End: 2023-07-07
Attending: EMERGENCY MEDICINE
Payer: MEDICAID

## 2023-07-07 DIAGNOSIS — S13.120A: ICD-10-CM

## 2023-07-07 DIAGNOSIS — R09.02 HYPOXIA: ICD-10-CM

## 2023-07-07 DIAGNOSIS — S27.0XXA TRAUMATIC PNEUMOTHORAX, INITIAL ENCOUNTER: ICD-10-CM

## 2023-07-07 DIAGNOSIS — S42.402B OPEN FRACTURE DISLOCATION OF LEFT ELBOW JOINT, INITIAL ENCOUNTER: ICD-10-CM

## 2023-07-07 DIAGNOSIS — S22.42XA CLOSED FRACTURE OF MULTIPLE RIBS OF LEFT SIDE, INITIAL ENCOUNTER: ICD-10-CM

## 2023-07-07 PROBLEM — J93.9 PNEUMOTHORAX: Status: ACTIVE | Noted: 2023-07-07

## 2023-07-07 LAB
ALBUMIN SERPL BCP-MCNC: 3.6 G/DL (ref 3.2–4.9)
ALBUMIN/GLOB SERPL: 0.9 G/DL
ALP SERPL-CCNC: 149 U/L (ref 30–99)
ALT SERPL-CCNC: 31 U/L (ref 2–50)
AMPHET UR QL SCN: POSITIVE
ANION GAP SERPL CALC-SCNC: 11 MMOL/L (ref 7–16)
APPEARANCE UR: ABNORMAL
AST SERPL-CCNC: 26 U/L (ref 12–45)
BACTERIA #/AREA URNS HPF: NEGATIVE /HPF
BARBITURATES UR QL SCN: NEGATIVE
BASOPHILS # BLD AUTO: 1.1 % (ref 0–1.8)
BASOPHILS # BLD: 0.15 K/UL (ref 0–0.12)
BENZODIAZ UR QL SCN: POSITIVE
BILIRUB SERPL-MCNC: 0.2 MG/DL (ref 0.1–1.5)
BILIRUB UR QL STRIP.AUTO: NEGATIVE
BUN SERPL-MCNC: 15 MG/DL (ref 8–22)
BZE UR QL SCN: NEGATIVE
CALCIUM ALBUM COR SERPL-MCNC: 9.2 MG/DL (ref 8.5–10.5)
CALCIUM SERPL-MCNC: 8.9 MG/DL (ref 8.5–10.5)
CANNABINOIDS UR QL SCN: POSITIVE
CHLORIDE SERPL-SCNC: 104 MMOL/L (ref 96–112)
CO2 SERPL-SCNC: 28 MMOL/L (ref 20–33)
COLOR UR: YELLOW
CREAT SERPL-MCNC: 0.72 MG/DL (ref 0.5–1.4)
EKG IMPRESSION: NORMAL
EOSINOPHIL # BLD AUTO: 1.21 K/UL (ref 0–0.51)
EOSINOPHIL NFR BLD: 9.1 % (ref 0–6.9)
EPI CELLS #/AREA URNS HPF: NEGATIVE /HPF
ERYTHROCYTE [DISTWIDTH] IN BLOOD BY AUTOMATED COUNT: 45.7 FL (ref 35.9–50)
FENTANYL UR QL: POSITIVE
GFR SERPLBLD CREATININE-BSD FMLA CKD-EPI: 129 ML/MIN/1.73 M 2
GLOBULIN SER CALC-MCNC: 4 G/DL (ref 1.9–3.5)
GLUCOSE SERPL-MCNC: 93 MG/DL (ref 65–99)
GLUCOSE UR STRIP.AUTO-MCNC: NEGATIVE MG/DL
HCT VFR BLD AUTO: 37.9 % (ref 42–52)
HGB BLD-MCNC: 11.5 G/DL (ref 14–18)
HYALINE CASTS #/AREA URNS LPF: ABNORMAL /LPF
IMM GRANULOCYTES # BLD AUTO: 0.11 K/UL (ref 0–0.11)
IMM GRANULOCYTES NFR BLD AUTO: 0.8 % (ref 0–0.9)
KETONES UR STRIP.AUTO-MCNC: NEGATIVE MG/DL
LEUKOCYTE ESTERASE UR QL STRIP.AUTO: NEGATIVE
LIPASE SERPL-CCNC: 66 U/L (ref 11–82)
LYMPHOCYTES # BLD AUTO: 3.02 K/UL (ref 1–4.8)
LYMPHOCYTES NFR BLD: 22.7 % (ref 22–41)
MCH RBC QN AUTO: 28.2 PG (ref 27–33)
MCHC RBC AUTO-ENTMCNC: 30.3 G/DL (ref 32.3–36.5)
MCV RBC AUTO: 92.9 FL (ref 81.4–97.8)
METHADONE UR QL SCN: NEGATIVE
MICRO URNS: ABNORMAL
MONOCYTES # BLD AUTO: 1.47 K/UL (ref 0–0.85)
MONOCYTES NFR BLD AUTO: 11.1 % (ref 0–13.4)
NEUTROPHILS # BLD AUTO: 7.32 K/UL (ref 1.82–7.42)
NEUTROPHILS NFR BLD: 55.2 % (ref 44–72)
NITRITE UR QL STRIP.AUTO: NEGATIVE
NRBC # BLD AUTO: 0 K/UL
NRBC BLD-RTO: 0 /100 WBC (ref 0–0.2)
OPIATES UR QL SCN: NEGATIVE
OXYCODONE UR QL SCN: POSITIVE
PCP UR QL SCN: NEGATIVE
PH UR STRIP.AUTO: 6.5 [PH] (ref 5–8)
PLATELET # BLD AUTO: 624 K/UL (ref 164–446)
PMV BLD AUTO: 9.5 FL (ref 9–12.9)
POTASSIUM SERPL-SCNC: 3.7 MMOL/L (ref 3.6–5.5)
PROPOXYPH UR QL SCN: NEGATIVE
PROT SERPL-MCNC: 7.6 G/DL (ref 6–8.2)
PROT UR QL STRIP: NEGATIVE MG/DL
RBC # BLD AUTO: 4.08 M/UL (ref 4.7–6.1)
RBC # URNS HPF: ABNORMAL /HPF
RBC UR QL AUTO: NEGATIVE
SODIUM SERPL-SCNC: 143 MMOL/L (ref 135–145)
SP GR UR STRIP.AUTO: 1.02
UROBILINOGEN UR STRIP.AUTO-MCNC: 1 MG/DL
WBC # BLD AUTO: 13.3 K/UL (ref 4.8–10.8)
WBC #/AREA URNS HPF: ABNORMAL /HPF

## 2023-07-07 PROCEDURE — 0W9B30Z DRAINAGE OF LEFT PLEURAL CAVITY WITH DRAINAGE DEVICE, PERCUTANEOUS APPROACH: ICD-10-PCS | Performed by: SURGERY

## 2023-07-07 PROCEDURE — 99223 1ST HOSP IP/OBS HIGH 75: CPT | Mod: AI,25 | Performed by: SURGERY

## 2023-07-07 PROCEDURE — 71045 X-RAY EXAM CHEST 1 VIEW: CPT

## 2023-07-07 PROCEDURE — 85025 COMPLETE CBC W/AUTO DIFF WBC: CPT

## 2023-07-07 PROCEDURE — 99285 EMERGENCY DEPT VISIT HI MDM: CPT

## 2023-07-07 PROCEDURE — 32551 INSERTION OF CHEST TUBE: CPT

## 2023-07-07 PROCEDURE — A9270 NON-COVERED ITEM OR SERVICE: HCPCS

## 2023-07-07 PROCEDURE — 770001 HCHG ROOM/CARE - MED/SURG/GYN PRIV*

## 2023-07-07 PROCEDURE — 80053 COMPREHEN METABOLIC PANEL: CPT

## 2023-07-07 PROCEDURE — 700111 HCHG RX REV CODE 636 W/ 250 OVERRIDE (IP)

## 2023-07-07 PROCEDURE — 81001 URINALYSIS AUTO W/SCOPE: CPT

## 2023-07-07 PROCEDURE — 96375 TX/PRO/DX INJ NEW DRUG ADDON: CPT

## 2023-07-07 PROCEDURE — 36415 COLL VENOUS BLD VENIPUNCTURE: CPT

## 2023-07-07 PROCEDURE — 80307 DRUG TEST PRSMV CHEM ANLYZR: CPT

## 2023-07-07 PROCEDURE — 700102 HCHG RX REV CODE 250 W/ 637 OVERRIDE(OP)

## 2023-07-07 PROCEDURE — 700101 HCHG RX REV CODE 250: Mod: UD | Performed by: EMERGENCY MEDICINE

## 2023-07-07 PROCEDURE — 32551 INSERTION OF CHEST TUBE: CPT | Performed by: SURGERY

## 2023-07-07 PROCEDURE — 93005 ELECTROCARDIOGRAM TRACING: CPT | Performed by: NURSE PRACTITIONER

## 2023-07-07 PROCEDURE — 700101 HCHG RX REV CODE 250

## 2023-07-07 PROCEDURE — 96374 THER/PROPH/DIAG INJ IV PUSH: CPT

## 2023-07-07 PROCEDURE — 93010 ELECTROCARDIOGRAM REPORT: CPT | Performed by: INTERNAL MEDICINE

## 2023-07-07 PROCEDURE — C1729 CATH, DRAINAGE: HCPCS | Performed by: SURGERY

## 2023-07-07 PROCEDURE — 700111 HCHG RX REV CODE 636 W/ 250 OVERRIDE (IP): Mod: UD

## 2023-07-07 PROCEDURE — 83690 ASSAY OF LIPASE: CPT

## 2023-07-07 PROCEDURE — 94669 MECHANICAL CHEST WALL OSCILL: CPT

## 2023-07-07 RX ORDER — ACETAMINOPHEN 500 MG
1000 TABLET ORAL EVERY 6 HOURS PRN
Status: DISCONTINUED | OUTPATIENT
Start: 2023-07-12 | End: 2023-07-14 | Stop reason: HOSPADM

## 2023-07-07 RX ORDER — OXYCODONE HYDROCHLORIDE 10 MG/1
10 TABLET ORAL
Status: DISCONTINUED | OUTPATIENT
Start: 2023-07-07 | End: 2023-07-09

## 2023-07-07 RX ORDER — GABAPENTIN 100 MG/1
100 CAPSULE ORAL EVERY 8 HOURS
Status: DISCONTINUED | OUTPATIENT
Start: 2023-07-07 | End: 2023-07-14 | Stop reason: HOSPADM

## 2023-07-07 RX ORDER — OXYCODONE HYDROCHLORIDE 5 MG/1
5 TABLET ORAL
Status: DISCONTINUED | OUTPATIENT
Start: 2023-07-07 | End: 2023-07-14 | Stop reason: HOSPADM

## 2023-07-07 RX ORDER — ONDANSETRON 4 MG/1
4 TABLET, ORALLY DISINTEGRATING ORAL ONCE
Status: COMPLETED | OUTPATIENT
Start: 2023-07-07 | End: 2023-07-07

## 2023-07-07 RX ORDER — ENOXAPARIN SODIUM 100 MG/ML
30 INJECTION SUBCUTANEOUS EVERY 12 HOURS
Status: DISCONTINUED | OUTPATIENT
Start: 2023-07-07 | End: 2023-07-14 | Stop reason: HOSPADM

## 2023-07-07 RX ORDER — AMOXICILLIN 250 MG
1 CAPSULE ORAL NIGHTLY
Status: DISCONTINUED | OUTPATIENT
Start: 2023-07-07 | End: 2023-07-14 | Stop reason: HOSPADM

## 2023-07-07 RX ORDER — RISPERIDONE 0.5 MG/1
0.5 TABLET ORAL EVERY MORNING
Status: DISCONTINUED | OUTPATIENT
Start: 2023-07-07 | End: 2023-07-14 | Stop reason: HOSPADM

## 2023-07-07 RX ORDER — DOCUSATE SODIUM 100 MG/1
100 CAPSULE, LIQUID FILLED ORAL 2 TIMES DAILY
Status: DISCONTINUED | OUTPATIENT
Start: 2023-07-07 | End: 2023-07-14 | Stop reason: HOSPADM

## 2023-07-07 RX ORDER — ONDANSETRON 4 MG/1
4 TABLET, ORALLY DISINTEGRATING ORAL EVERY 4 HOURS PRN
Status: DISCONTINUED | OUTPATIENT
Start: 2023-07-07 | End: 2023-07-14 | Stop reason: HOSPADM

## 2023-07-07 RX ORDER — HYDROMORPHONE HYDROCHLORIDE 1 MG/ML
0.5 INJECTION, SOLUTION INTRAMUSCULAR; INTRAVENOUS; SUBCUTANEOUS
Status: DISCONTINUED | OUTPATIENT
Start: 2023-07-07 | End: 2023-07-09

## 2023-07-07 RX ORDER — POLYETHYLENE GLYCOL 3350 17 G/17G
1 POWDER, FOR SOLUTION ORAL 2 TIMES DAILY
Status: DISCONTINUED | OUTPATIENT
Start: 2023-07-07 | End: 2023-07-14 | Stop reason: HOSPADM

## 2023-07-07 RX ORDER — ACETAMINOPHEN 500 MG
1000 TABLET ORAL EVERY 6 HOURS
Status: DISPENSED | OUTPATIENT
Start: 2023-07-07 | End: 2023-07-12

## 2023-07-07 RX ORDER — BISACODYL 10 MG
10 SUPPOSITORY, RECTAL RECTAL
Status: DISCONTINUED | OUTPATIENT
Start: 2023-07-07 | End: 2023-07-14 | Stop reason: HOSPADM

## 2023-07-07 RX ORDER — CELECOXIB 200 MG/1
200 CAPSULE ORAL 2 TIMES DAILY PRN
Status: DISCONTINUED | OUTPATIENT
Start: 2023-07-12 | End: 2023-07-14 | Stop reason: HOSPADM

## 2023-07-07 RX ORDER — LIDOCAINE 50 MG/G
1-3 PATCH TOPICAL EVERY 24 HOURS
Status: DISCONTINUED | OUTPATIENT
Start: 2023-07-07 | End: 2023-07-14 | Stop reason: HOSPADM

## 2023-07-07 RX ORDER — ENEMA 19; 7 G/133ML; G/133ML
1 ENEMA RECTAL
Status: DISCONTINUED | OUTPATIENT
Start: 2023-07-07 | End: 2023-07-14 | Stop reason: HOSPADM

## 2023-07-07 RX ORDER — MIDAZOLAM HYDROCHLORIDE 1 MG/ML
1-5 INJECTION INTRAMUSCULAR; INTRAVENOUS ONCE
Status: COMPLETED | OUTPATIENT
Start: 2023-07-07 | End: 2023-07-07

## 2023-07-07 RX ORDER — LIDOCAINE HYDROCHLORIDE AND EPINEPHRINE 10; 10 MG/ML; UG/ML
20 INJECTION, SOLUTION INFILTRATION; PERINEURAL ONCE
Status: DISCONTINUED | OUTPATIENT
Start: 2023-07-07 | End: 2023-07-07

## 2023-07-07 RX ORDER — DIVALPROEX SODIUM 500 MG/1
500 TABLET, DELAYED RELEASE ORAL EVERY 12 HOURS
Status: DISCONTINUED | OUTPATIENT
Start: 2023-07-07 | End: 2023-07-14 | Stop reason: HOSPADM

## 2023-07-07 RX ORDER — QUETIAPINE FUMARATE 100 MG/1
100 TABLET, FILM COATED ORAL NIGHTLY
Status: DISCONTINUED | OUTPATIENT
Start: 2023-07-07 | End: 2023-07-14 | Stop reason: HOSPADM

## 2023-07-07 RX ORDER — AMOXICILLIN 250 MG
1 CAPSULE ORAL
Status: DISCONTINUED | OUTPATIENT
Start: 2023-07-07 | End: 2023-07-14 | Stop reason: HOSPADM

## 2023-07-07 RX ORDER — CELECOXIB 200 MG/1
200 CAPSULE ORAL 2 TIMES DAILY
Status: COMPLETED | OUTPATIENT
Start: 2023-07-07 | End: 2023-07-11

## 2023-07-07 RX ORDER — METAXALONE 800 MG/1
800 TABLET ORAL 3 TIMES DAILY
Status: DISCONTINUED | OUTPATIENT
Start: 2023-07-07 | End: 2023-07-14 | Stop reason: HOSPADM

## 2023-07-07 RX ORDER — ONDANSETRON 2 MG/ML
4 INJECTION INTRAMUSCULAR; INTRAVENOUS EVERY 4 HOURS PRN
Status: DISCONTINUED | OUTPATIENT
Start: 2023-07-07 | End: 2023-07-14 | Stop reason: HOSPADM

## 2023-07-07 RX ADMIN — METAXALONE 800 MG: 800 TABLET ORAL at 18:11

## 2023-07-07 RX ADMIN — ACETAMINOPHEN 1000 MG: 500 TABLET, FILM COATED ORAL at 18:11

## 2023-07-07 RX ADMIN — QUETIAPINE FUMARATE 100 MG: 100 TABLET ORAL at 21:02

## 2023-07-07 RX ADMIN — SENNOSIDES AND DOCUSATE SODIUM 1 TABLET: 50; 8.6 TABLET ORAL at 21:02

## 2023-07-07 RX ADMIN — ENOXAPARIN SODIUM 30 MG: 100 INJECTION SUBCUTANEOUS at 18:09

## 2023-07-07 RX ADMIN — GABAPENTIN 100 MG: 100 CAPSULE ORAL at 21:04

## 2023-07-07 RX ADMIN — RISPERIDONE 0.5 MG: 0.5 TABLET ORAL at 12:50

## 2023-07-07 RX ADMIN — ACETAMINOPHEN 1000 MG: 500 TABLET, FILM COATED ORAL at 10:35

## 2023-07-07 RX ADMIN — OXYCODONE HYDROCHLORIDE 5 MG: 5 TABLET ORAL at 14:43

## 2023-07-07 RX ADMIN — MIDAZOLAM HYDROCHLORIDE 2 MG: 1 INJECTION, SOLUTION INTRAMUSCULAR; INTRAVENOUS at 08:50

## 2023-07-07 RX ADMIN — DOCUSATE SODIUM 100 MG: 100 CAPSULE, LIQUID FILLED ORAL at 11:56

## 2023-07-07 RX ADMIN — OXYCODONE HYDROCHLORIDE 5 MG: 5 TABLET ORAL at 21:02

## 2023-07-07 RX ADMIN — METAXALONE 800 MG: 800 TABLET ORAL at 11:55

## 2023-07-07 RX ADMIN — LIDOCAINE HYDROCHLORIDE 10 ML: 10; .005 INJECTION, SOLUTION EPIDURAL; INFILTRATION; INTRACAUDAL; PERINEURAL at 08:45

## 2023-07-07 RX ADMIN — FENTANYL CITRATE 100 MCG: 50 INJECTION, SOLUTION INTRAMUSCULAR; INTRAVENOUS at 08:49

## 2023-07-07 RX ADMIN — DIVALPROEX SODIUM 500 MG: 500 TABLET, DELAYED RELEASE ORAL at 11:55

## 2023-07-07 RX ADMIN — CELECOXIB 200 MG: 200 CAPSULE ORAL at 18:11

## 2023-07-07 RX ADMIN — CELECOXIB 200 MG: 200 CAPSULE ORAL at 10:34

## 2023-07-07 RX ADMIN — GABAPENTIN 100 MG: 100 CAPSULE ORAL at 10:35

## 2023-07-07 RX ADMIN — LIDOCAINE 1 PATCH: 700 PATCH TOPICAL at 10:35

## 2023-07-07 RX ADMIN — ONDANSETRON 4 MG: 4 TABLET, ORALLY DISINTEGRATING ORAL at 06:53

## 2023-07-07 ASSESSMENT — COGNITIVE AND FUNCTIONAL STATUS - GENERAL
MOVING FROM LYING ON BACK TO SITTING ON SIDE OF FLAT BED: A LITTLE
SUGGESTED CMS G CODE MODIFIER DAILY ACTIVITY: CJ
MOBILITY SCORE: 22
DRESSING REGULAR UPPER BODY CLOTHING: A LITTLE
DAILY ACTIVITIY SCORE: 20
SUGGESTED CMS G CODE MODIFIER MOBILITY: CJ
DRESSING REGULAR LOWER BODY CLOTHING: A LITTLE
TOILETING: A LITTLE
MOVING TO AND FROM BED TO CHAIR: A LITTLE
HELP NEEDED FOR BATHING: A LITTLE

## 2023-07-07 ASSESSMENT — LIFESTYLE VARIABLES
DOES PATIENT WANT TO STOP DRINKING: NO
AVERAGE NUMBER OF DAYS PER WEEK YOU HAVE A DRINK CONTAINING ALCOHOL: 0
HAVE PEOPLE ANNOYED YOU BY CRITICIZING YOUR DRINKING: NO
CONSUMPTION TOTAL: NEGATIVE
HOW MANY TIMES IN THE PAST YEAR HAVE YOU HAD 5 OR MORE DRINKS IN A DAY: 0
TOTAL SCORE: 0
EVER HAD A DRINK FIRST THING IN THE MORNING TO STEADY YOUR NERVES TO GET RID OF A HANGOVER: NO
HAVE YOU EVER FELT YOU SHOULD CUT DOWN ON YOUR DRINKING: NO
ALCOHOL_USE: NO
TOTAL SCORE: 0
TOTAL SCORE: 0
EVER FELT BAD OR GUILTY ABOUT YOUR DRINKING: NO
ON A TYPICAL DAY WHEN YOU DRINK ALCOHOL HOW MANY DRINKS DO YOU HAVE: 0

## 2023-07-07 ASSESSMENT — PAIN DESCRIPTION - PAIN TYPE
TYPE: ACUTE PAIN

## 2023-07-07 ASSESSMENT — PAIN DESCRIPTION - DESCRIPTORS: DESCRIPTORS: ACHING

## 2023-07-07 ASSESSMENT — PATIENT HEALTH QUESTIONNAIRE - PHQ9
2. FEELING DOWN, DEPRESSED, IRRITABLE, OR HOPELESS: NOT AT ALL
SUM OF ALL RESPONSES TO PHQ9 QUESTIONS 1 AND 2: 0
1. LITTLE INTEREST OR PLEASURE IN DOING THINGS: NOT AT ALL

## 2023-07-07 ASSESSMENT — COPD QUESTIONNAIRES
HAVE YOU SMOKED AT LEAST 100 CIGARETTES IN YOUR ENTIRE LIFE: YES
DURING THE PAST 4 WEEKS HOW MUCH DID YOU FEEL SHORT OF BREATH: NONE/LITTLE OF THE TIME
COPD SCREENING SCORE: 2
DO YOU EVER COUGH UP ANY MUCUS OR PHLEGM?: NO/ONLY WITH OCCASIONAL COLDS OR INFECTIONS

## 2023-07-07 ASSESSMENT — FIBROSIS 4 INDEX: FIB4 SCORE: 0.08

## 2023-07-07 NOTE — ED NOTES
Pharmacy Medication Reconciliation      ~Medication reconciliation updated and complete per patient home pharmacy   ~Patient home pharmacy :  [facility]

## 2023-07-07 NOTE — ASSESSMENT & PLAN NOTE
From chart review. Patient is suppose to be wearing a collar x 12 weeks (8/25/2023).  Cervical collar immobilization.   Follow up with Dr. Rodriguez at [facility] with updated upright AP/lateral/flexion and extension x-rays 8/25.

## 2023-07-07 NOTE — PROGRESS NOTES
4 Eyes Skin Assessment Completed by RENAE Coronado and RENAE Hagan.    Head Redness; healed laceration   Ears WDL  Nose WDL  Mouth WDL  Neck WDL  Breast/Chest left flank chest tube  Shoulder Blades WDL  Spine WDL  (R) Arm/Elbow/Hand WDL/Redness  (L) Arm/Elbow/Hand cast in place  Abdomen Incision - healed MLI  Groin WDL  Scrotum/Coccyx/Buttocks WDL  (R) Leg WDL/Redness  (L) Leg WDL/Redness  (R) Heel/Foot/Toe WDL  (L) Heel/Foot/Toe WDL          Devices In Places Pulse Ox; cast; left chest tube -20 suction      Interventions In Place Pillows and Pressure Redistribution Mattress    Possible Skin Injury No    Pictures Uploaded Into Epic N/A  Wound Consult Placed N/A  RN Wound Prevention Protocol Ordered No
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
## 2023-07-07 NOTE — ED NOTES
Report received from RENAE Marcos.  Patient resting comfortably on gurney.  Updated on POC.  Awaiting X-ray.
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
## 2023-07-07 NOTE — ASSESSMENT & PLAN NOTE
From chart review, significant for bipolar disorder and schizophrenia.  Chronic condition treated with depakote, seroquel and risperdal.  Resumed maintenance medication on admission.

## 2023-07-07 NOTE — CARE PLAN
The patient is Watcher - Medium risk of patient condition declining or worsening    Shift Goals  Clinical Goals: pain control, monitor chest tube  Patient Goals: pain control  Family Goals: not present    Progress made toward(s) clinical / shift goals:  Patient has been admitted with a pneumothorax and has a chest tube in place to suction. Pain has been medicated per MAR. Patient requires redirection and reinforcement and is forgetful of directions.     Patient is not progressing towards the following goals: Patient has not been cleared to discharge.     Problem: Knowledge Deficit - Standard  Goal: Patient and family/care givers will demonstrate understanding of plan of care, disease process/condition, diagnostic tests and medications  Outcome: Progressing     Problem: Pain - Standard  Goal: Alleviation of pain or a reduction in pain to the patient’s comfort goal  Outcome: Progressing

## 2023-07-07 NOTE — ED TRIAGE NOTES
"Chief Complaint   Patient presents with    Abdominal Pain     C/o epigastric pain of 5/10, aching x 2 days. Reports nausea x 2 says. Aaox4. Ambulatory w/ steady gait.       26 y/o male, ambulatory to triage for above complaint. Abd pain protocol order in place.    Pt place in waiting area. Educated on triage process. Pt will inform staff of any medical changes.    /71   Pulse 92   Temp 36.1 °C (97 °F) (Temporal)   Resp 16   Ht 1.626 m (5' 4\")   Wt 95.3 kg (210 lb)   SpO2 98%   BMI 36.05 kg/m²     "
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
## 2023-07-08 ENCOUNTER — APPOINTMENT (OUTPATIENT)
Dept: RADIOLOGY | Facility: MEDICAL CENTER | Age: 28
DRG: 983 | End: 2023-07-08
Attending: SURGERY
Payer: MEDICAID

## 2023-07-08 PROBLEM — Z90.81 POST-SPLENECTOMY: Status: ACTIVE | Noted: 2023-07-08

## 2023-07-08 LAB
ANION GAP SERPL CALC-SCNC: 10 MMOL/L (ref 7–16)
BASOPHILS # BLD AUTO: 1.5 % (ref 0–1.8)
BASOPHILS # BLD: 0.19 K/UL (ref 0–0.12)
BUN SERPL-MCNC: 11 MG/DL (ref 8–22)
CALCIUM SERPL-MCNC: 8.2 MG/DL (ref 8.5–10.5)
CHLORIDE SERPL-SCNC: 107 MMOL/L (ref 96–112)
CO2 SERPL-SCNC: 23 MMOL/L (ref 20–33)
CREAT SERPL-MCNC: 0.71 MG/DL (ref 0.5–1.4)
EOSINOPHIL # BLD AUTO: 1.25 K/UL (ref 0–0.51)
EOSINOPHIL NFR BLD: 10 % (ref 0–6.9)
ERYTHROCYTE [DISTWIDTH] IN BLOOD BY AUTOMATED COUNT: 45.9 FL (ref 35.9–50)
GFR SERPLBLD CREATININE-BSD FMLA CKD-EPI: 129 ML/MIN/1.73 M 2
GLUCOSE SERPL-MCNC: 106 MG/DL (ref 65–99)
HCT VFR BLD AUTO: 35.2 % (ref 42–52)
HGB BLD-MCNC: 10.8 G/DL (ref 14–18)
IMM GRANULOCYTES # BLD AUTO: 0.12 K/UL (ref 0–0.11)
IMM GRANULOCYTES NFR BLD AUTO: 1 % (ref 0–0.9)
LYMPHOCYTES # BLD AUTO: 3.94 K/UL (ref 1–4.8)
LYMPHOCYTES NFR BLD: 31.5 % (ref 22–41)
MCH RBC QN AUTO: 28.6 PG (ref 27–33)
MCHC RBC AUTO-ENTMCNC: 30.7 G/DL (ref 32.3–36.5)
MCV RBC AUTO: 93.1 FL (ref 81.4–97.8)
MONOCYTES # BLD AUTO: 1.35 K/UL (ref 0–0.85)
MONOCYTES NFR BLD AUTO: 10.8 % (ref 0–13.4)
NEUTROPHILS # BLD AUTO: 5.66 K/UL (ref 1.82–7.42)
NEUTROPHILS NFR BLD: 45.2 % (ref 44–72)
NRBC # BLD AUTO: 0 K/UL
NRBC BLD-RTO: 0 /100 WBC (ref 0–0.2)
PLATELET # BLD AUTO: 578 K/UL (ref 164–446)
PMV BLD AUTO: 9.6 FL (ref 9–12.9)
POTASSIUM SERPL-SCNC: 4.1 MMOL/L (ref 3.6–5.5)
RBC # BLD AUTO: 3.78 M/UL (ref 4.7–6.1)
SODIUM SERPL-SCNC: 140 MMOL/L (ref 135–145)
WBC # BLD AUTO: 12.5 K/UL (ref 4.8–10.8)

## 2023-07-08 PROCEDURE — 94669 MECHANICAL CHEST WALL OSCILL: CPT

## 2023-07-08 PROCEDURE — 80048 BASIC METABOLIC PNL TOTAL CA: CPT

## 2023-07-08 PROCEDURE — 700102 HCHG RX REV CODE 250 W/ 637 OVERRIDE(OP)

## 2023-07-08 PROCEDURE — 770001 HCHG ROOM/CARE - MED/SURG/GYN PRIV*

## 2023-07-08 PROCEDURE — 85025 COMPLETE CBC W/AUTO DIFF WBC: CPT

## 2023-07-08 PROCEDURE — A9270 NON-COVERED ITEM OR SERVICE: HCPCS

## 2023-07-08 PROCEDURE — 700111 HCHG RX REV CODE 636 W/ 250 OVERRIDE (IP)

## 2023-07-08 PROCEDURE — 36415 COLL VENOUS BLD VENIPUNCTURE: CPT

## 2023-07-08 PROCEDURE — 700101 HCHG RX REV CODE 250

## 2023-07-08 PROCEDURE — 99232 SBSQ HOSP IP/OBS MODERATE 35: CPT | Mod: 24 | Performed by: NURSE PRACTITIONER

## 2023-07-08 PROCEDURE — 71045 X-RAY EXAM CHEST 1 VIEW: CPT

## 2023-07-08 RX ADMIN — QUETIAPINE FUMARATE 100 MG: 100 TABLET ORAL at 20:35

## 2023-07-08 RX ADMIN — CELECOXIB 200 MG: 200 CAPSULE ORAL at 17:16

## 2023-07-08 RX ADMIN — ACETAMINOPHEN 1000 MG: 500 TABLET, FILM COATED ORAL at 17:17

## 2023-07-08 RX ADMIN — ENOXAPARIN SODIUM 30 MG: 100 INJECTION SUBCUTANEOUS at 17:16

## 2023-07-08 RX ADMIN — ENOXAPARIN SODIUM 30 MG: 100 INJECTION SUBCUTANEOUS at 06:06

## 2023-07-08 RX ADMIN — LIDOCAINE 1 PATCH: 700 PATCH TOPICAL at 06:05

## 2023-07-08 RX ADMIN — RISPERIDONE 0.5 MG: 0.5 TABLET ORAL at 06:03

## 2023-07-08 RX ADMIN — POLYETHYLENE GLYCOL 3350 1 PACKET: 17 POWDER, FOR SOLUTION ORAL at 17:17

## 2023-07-08 RX ADMIN — ACETAMINOPHEN 1000 MG: 500 TABLET, FILM COATED ORAL at 06:02

## 2023-07-08 RX ADMIN — GABAPENTIN 100 MG: 100 CAPSULE ORAL at 20:36

## 2023-07-08 RX ADMIN — DIVALPROEX SODIUM 500 MG: 500 TABLET, DELAYED RELEASE ORAL at 06:02

## 2023-07-08 RX ADMIN — DOCUSATE SODIUM 100 MG: 100 CAPSULE, LIQUID FILLED ORAL at 17:16

## 2023-07-08 RX ADMIN — SENNOSIDES AND DOCUSATE SODIUM 1 TABLET: 50; 8.6 TABLET ORAL at 20:36

## 2023-07-08 RX ADMIN — GABAPENTIN 100 MG: 100 CAPSULE ORAL at 06:03

## 2023-07-08 RX ADMIN — DIVALPROEX SODIUM 500 MG: 500 TABLET, DELAYED RELEASE ORAL at 17:16

## 2023-07-08 RX ADMIN — OXYCODONE HYDROCHLORIDE 5 MG: 5 TABLET ORAL at 14:06

## 2023-07-08 RX ADMIN — METAXALONE 800 MG: 800 TABLET ORAL at 17:16

## 2023-07-08 RX ADMIN — ACETAMINOPHEN 1000 MG: 500 TABLET, FILM COATED ORAL at 12:34

## 2023-07-08 RX ADMIN — METAXALONE 800 MG: 800 TABLET ORAL at 12:34

## 2023-07-08 RX ADMIN — OXYCODONE HYDROCHLORIDE 10 MG: 10 TABLET ORAL at 02:43

## 2023-07-08 RX ADMIN — GABAPENTIN 100 MG: 100 CAPSULE ORAL at 14:06

## 2023-07-08 RX ADMIN — METAXALONE 800 MG: 800 TABLET ORAL at 06:03

## 2023-07-08 RX ADMIN — OXYCODONE HYDROCHLORIDE 5 MG: 5 TABLET ORAL at 20:36

## 2023-07-08 RX ADMIN — CELECOXIB 200 MG: 200 CAPSULE ORAL at 06:03

## 2023-07-08 ASSESSMENT — PAIN DESCRIPTION - PAIN TYPE
TYPE: ACUTE PAIN

## 2023-07-08 ASSESSMENT — ENCOUNTER SYMPTOMS
MYALGIAS: 1
GASTROINTESTINAL NEGATIVE: 1
NECK PAIN: 0
CONSTITUTIONAL NEGATIVE: 1
NEUROLOGICAL NEGATIVE: 1
EYES NEGATIVE: 1
SHORTNESS OF BREATH: 0

## 2023-07-08 NOTE — PROGRESS NOTES
Pt is A&O 3 disoriented to time  Pain + medicated per MAR, declining use of ice or heat pack    - nausea  Tolerating a regular diet   Incision -  + Drains L CT to -20 suction   + Voids  + flatus  - BM  Up SBA  SCD's refused  Bed alarm off, pt low fall risk per kenji christianson  Reviewed plan of care with patient, bed in lowest position and locked, pt resting comfortably now, call light within reach, all needs met at this time. Interventions will be executed per plan of care

## 2023-07-08 NOTE — PROGRESS NOTES
DEYSI Rock observed patient's heart rate escalate to 170 before dropping back into the 80s range. Notified Trauma APRN Franki Castillo of this situation. An EKG will be ordered. Patient appears to be resting comfortably.

## 2023-07-08 NOTE — CARE PLAN
The patient is Stable - Low risk of patient condition declining or worsening    Shift Goals  Clinical Goals: pulmonary hygiene, pain control, compliance  Patient Goals: pain control, rest  Family Goals: not present    Progress made toward(s) clinical / shift goals:  pt pain medicated per MAR, ice and heat packs offered but declined by pt.     Problem: Pain - Standard  Goal: Alleviation of pain or a reduction in pain to the patient’s comfort goal  Description: Target End Date:  Prior to discharge or change in level of care    Document on Vitals flowsheet    1.  Document pain using the appropriate pain scale per order or unit policy  2.  Educate and implement non-pharmacologic comfort measures (i.e. relaxation, distraction, massage, cold/heat therapy, etc.)  3.  Pain management medications as ordered  4.  Reassess pain after pain med administration per policy  5.  If opiods administered assess patient's response to pain medication is appropriate per POSS sedation scale  6.  Follow pain management plan developed in collaboration with patient and interdisciplinary team (including palliative care or pain specialists if applicable)  Outcome: Progressing     Problem: Fall Risk  Goal: Patient will remain free from falls  Description: Target End Date:  Prior to discharge or change in level of care    Document interventions on the [facility] Fall Risk Assessment    1.  Assess for fall risk factors  2.  Implement fall precautions  Outcome: Progressing       Patient is not progressing towards the following goals:    Pt not using the IS as instructed
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
## 2023-07-08 NOTE — CARE PLAN
Problem: Hyperinflation  Goal: Prevent or improve atelectasis  Description: Target End Date:  3 to 4 days    1. Instruct incentive spirometry usage  2.  Perform hyperinflation therapy as indicated  Outcome: Progressing  Flowsheets (Taken 7/7/2023 1600)  Hyperinflation Protocol Goals/Outcome: Stable Vital Capacity x24 hrs and Patient Understands / uses I.S.  Hyperinflation Protocol Indications: Chest Trauma (Blunt, Penetrative, Crushing, or Surgical)       Respiratory Update    Treatment modality: PEP  Frequency: QID  IS 2000    Pt tolerating current treatments well with no adverse reactions.

## 2023-07-08 NOTE — ASSESSMENT & PLAN NOTE
6/1 Irrigation and debridement of open fracture, open reduction internal fixation left distal humerus lateral condyle fracture  and open treatment acute elbow dislocation.  6/22 Long arm cast placed.   7/7 Readmitted with cast in place.   7/13  Pin removal left elbow.  Weight bearing status - Partial weightbearing LUE. 1 pound lifting restrictions. Gait aides can be used as needed.   Josef Bills MD. Orthopedic Surgeon. [facility].

## 2023-07-08 NOTE — PROGRESS NOTES
"Bedside report received.  Assessment complete.  A&O x 4. Patient calls appropriately.  Patient ambulates with no assist. Bed alarm off.   Patient has 0/10 pain. Pain managed with prescribed medications.  Denies N&V. Tolerating regular diet.  Left chest tube in place to -20 suction, dressing clean, dry, intact.  C-Collar in place.  + void, + flatus, 7/7/2023 BM.  Patient denies SOB.  SCD's off.  Patient cooperating with plan of care.  Review plan with of care with patient. Call light and personal belongings within reach. Hourly rounding in place. All needs met at this time. /76   Pulse 70   Temp 36.9 °C (98.4 °F) (Temporal)   Resp 16   Ht 1.626 m (5' 4\")   Wt 95.3 kg (210 lb)   SpO2 93%   BMI 36.05 kg/m²     "

## 2023-07-08 NOTE — CARE PLAN
The patient is Stable - Low risk of patient condition declining or worsening    Shift Goals  Clinical Goals: Pulmonary hygeine, pain control  Patient Goals: pain control, rest  Family Goals: not present    Progress made toward(s) clinical / shift goals: Patients pain controlled with PRN medications as well as non pharmacological pain relief methods. Patient verbalizes understanding of using call light for assistance.

## 2023-07-09 ENCOUNTER — APPOINTMENT (OUTPATIENT)
Dept: RADIOLOGY | Facility: MEDICAL CENTER | Age: 28
DRG: 983 | End: 2023-07-09
Attending: SURGERY
Payer: MEDICAID

## 2023-07-09 LAB
ANION GAP SERPL CALC-SCNC: 9 MMOL/L (ref 7–16)
BASOPHILS # BLD AUTO: 1.6 % (ref 0–1.8)
BASOPHILS # BLD: 0.2 K/UL (ref 0–0.12)
BUN SERPL-MCNC: 15 MG/DL (ref 8–22)
CALCIUM SERPL-MCNC: 8.4 MG/DL (ref 8.5–10.5)
CHLORIDE SERPL-SCNC: 102 MMOL/L (ref 96–112)
CO2 SERPL-SCNC: 25 MMOL/L (ref 20–33)
CREAT SERPL-MCNC: 0.76 MG/DL (ref 0.5–1.4)
EOSINOPHIL # BLD AUTO: 1.36 K/UL (ref 0–0.51)
EOSINOPHIL NFR BLD: 11 % (ref 0–6.9)
ERYTHROCYTE [DISTWIDTH] IN BLOOD BY AUTOMATED COUNT: 45.4 FL (ref 35.9–50)
GFR SERPLBLD CREATININE-BSD FMLA CKD-EPI: 127 ML/MIN/1.73 M 2
GLUCOSE SERPL-MCNC: 102 MG/DL (ref 65–99)
HCT VFR BLD AUTO: 34.9 % (ref 42–52)
HGB BLD-MCNC: 10.5 G/DL (ref 14–18)
IMM GRANULOCYTES # BLD AUTO: 0.18 K/UL (ref 0–0.11)
IMM GRANULOCYTES NFR BLD AUTO: 1.5 % (ref 0–0.9)
LYMPHOCYTES # BLD AUTO: 3.71 K/UL (ref 1–4.8)
LYMPHOCYTES NFR BLD: 30.1 % (ref 22–41)
MCH RBC QN AUTO: 28.1 PG (ref 27–33)
MCHC RBC AUTO-ENTMCNC: 30.1 G/DL (ref 32.3–36.5)
MCV RBC AUTO: 93.3 FL (ref 81.4–97.8)
MONOCYTES # BLD AUTO: 1.19 K/UL (ref 0–0.85)
MONOCYTES NFR BLD AUTO: 9.6 % (ref 0–13.4)
NEUTROPHILS # BLD AUTO: 5.7 K/UL (ref 1.82–7.42)
NEUTROPHILS NFR BLD: 46.2 % (ref 44–72)
NRBC # BLD AUTO: 0 K/UL
NRBC BLD-RTO: 0 /100 WBC (ref 0–0.2)
PLATELET # BLD AUTO: 660 K/UL (ref 164–446)
PMV BLD AUTO: 10 FL (ref 9–12.9)
POTASSIUM SERPL-SCNC: 3.9 MMOL/L (ref 3.6–5.5)
RBC # BLD AUTO: 3.74 M/UL (ref 4.7–6.1)
SODIUM SERPL-SCNC: 136 MMOL/L (ref 135–145)
WBC # BLD AUTO: 12.3 K/UL (ref 4.8–10.8)

## 2023-07-09 PROCEDURE — 85025 COMPLETE CBC W/AUTO DIFF WBC: CPT

## 2023-07-09 PROCEDURE — 770001 HCHG ROOM/CARE - MED/SURG/GYN PRIV*

## 2023-07-09 PROCEDURE — 71045 X-RAY EXAM CHEST 1 VIEW: CPT

## 2023-07-09 PROCEDURE — A9270 NON-COVERED ITEM OR SERVICE: HCPCS

## 2023-07-09 PROCEDURE — 99232 SBSQ HOSP IP/OBS MODERATE 35: CPT | Mod: 24 | Performed by: NURSE PRACTITIONER

## 2023-07-09 PROCEDURE — 80048 BASIC METABOLIC PNL TOTAL CA: CPT

## 2023-07-09 PROCEDURE — 94669 MECHANICAL CHEST WALL OSCILL: CPT

## 2023-07-09 PROCEDURE — 700111 HCHG RX REV CODE 636 W/ 250 OVERRIDE (IP)

## 2023-07-09 PROCEDURE — 36415 COLL VENOUS BLD VENIPUNCTURE: CPT

## 2023-07-09 PROCEDURE — 700102 HCHG RX REV CODE 250 W/ 637 OVERRIDE(OP)

## 2023-07-09 PROCEDURE — 700101 HCHG RX REV CODE 250

## 2023-07-09 RX ADMIN — MAGNESIUM HYDROXIDE 30 ML: 400 SUSPENSION ORAL at 04:46

## 2023-07-09 RX ADMIN — DIVALPROEX SODIUM 500 MG: 500 TABLET, DELAYED RELEASE ORAL at 04:44

## 2023-07-09 RX ADMIN — POLYETHYLENE GLYCOL 3350 1 PACKET: 17 POWDER, FOR SOLUTION ORAL at 04:45

## 2023-07-09 RX ADMIN — ACETAMINOPHEN 1000 MG: 500 TABLET, FILM COATED ORAL at 16:07

## 2023-07-09 RX ADMIN — OXYCODONE HYDROCHLORIDE 5 MG: 5 TABLET ORAL at 11:11

## 2023-07-09 RX ADMIN — LIDOCAINE 1 PATCH: 700 PATCH TOPICAL at 04:46

## 2023-07-09 RX ADMIN — ENOXAPARIN SODIUM 30 MG: 100 INJECTION SUBCUTANEOUS at 16:07

## 2023-07-09 RX ADMIN — ACETAMINOPHEN 1000 MG: 500 TABLET, FILM COATED ORAL at 11:11

## 2023-07-09 RX ADMIN — RISPERIDONE 0.5 MG: 0.5 TABLET ORAL at 04:45

## 2023-07-09 RX ADMIN — METAXALONE 800 MG: 800 TABLET ORAL at 04:44

## 2023-07-09 RX ADMIN — METAXALONE 800 MG: 800 TABLET ORAL at 16:07

## 2023-07-09 RX ADMIN — ACETAMINOPHEN 1000 MG: 500 TABLET, FILM COATED ORAL at 04:45

## 2023-07-09 RX ADMIN — GABAPENTIN 100 MG: 100 CAPSULE ORAL at 14:03

## 2023-07-09 RX ADMIN — DIVALPROEX SODIUM 500 MG: 500 TABLET, DELAYED RELEASE ORAL at 16:07

## 2023-07-09 RX ADMIN — GABAPENTIN 100 MG: 100 CAPSULE ORAL at 21:55

## 2023-07-09 RX ADMIN — QUETIAPINE FUMARATE 100 MG: 100 TABLET ORAL at 21:55

## 2023-07-09 RX ADMIN — OXYCODONE HYDROCHLORIDE 5 MG: 5 TABLET ORAL at 21:55

## 2023-07-09 RX ADMIN — DOCUSATE SODIUM 100 MG: 100 CAPSULE, LIQUID FILLED ORAL at 04:45

## 2023-07-09 RX ADMIN — CELECOXIB 200 MG: 200 CAPSULE ORAL at 04:45

## 2023-07-09 RX ADMIN — GABAPENTIN 100 MG: 100 CAPSULE ORAL at 04:45

## 2023-07-09 RX ADMIN — OXYCODONE HYDROCHLORIDE 5 MG: 5 TABLET ORAL at 16:07

## 2023-07-09 RX ADMIN — ENOXAPARIN SODIUM 30 MG: 100 INJECTION SUBCUTANEOUS at 04:46

## 2023-07-09 RX ADMIN — ONDANSETRON 4 MG: 4 TABLET, ORALLY DISINTEGRATING ORAL at 21:54

## 2023-07-09 RX ADMIN — CELECOXIB 200 MG: 200 CAPSULE ORAL at 16:07

## 2023-07-09 RX ADMIN — METAXALONE 800 MG: 800 TABLET ORAL at 11:11

## 2023-07-09 ASSESSMENT — LIFESTYLE VARIABLES
PAROXYSMAL SWEATS: *
ANXIETY: MODERATELY ANXIOUS OR GUARDED, SO ANXIETY IS INFERRED
VISUAL DISTURBANCES: NOT PRESENT
TREMOR: TREMOR NOT VISIBLE BUT CAN BE FELT, FINGERTIP TO FINGERTIP
AUDITORY DISTURBANCES: NOT PRESENT
NAUSEA AND VOMITING: NO NAUSEA AND NO VOMITING
TOTAL SCORE: 11
SUBSTANCE_ABUSE: 1
ORIENTATION AND CLOUDING OF SENSORIUM: ORIENTED AND CAN DO SERIAL ADDITIONS
HEADACHE, FULLNESS IN HEAD: NOT PRESENT
AGITATION: MODERATELY FIDGETY AND RESTLESS

## 2023-07-09 ASSESSMENT — PAIN DESCRIPTION - PAIN TYPE
TYPE: ACUTE PAIN
TYPE: ACUTE PAIN
TYPE: ACUTE PAIN;REFERRED PAIN
TYPE: ACUTE PAIN

## 2023-07-09 ASSESSMENT — ENCOUNTER SYMPTOMS
MYALGIAS: 1
GASTROINTESTINAL NEGATIVE: 1
EYES NEGATIVE: 1
NECK PAIN: 0
SHORTNESS OF BREATH: 0
NEUROLOGICAL NEGATIVE: 1
CONSTITUTIONAL NEGATIVE: 1
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
## 2023-07-09 NOTE — CARE PLAN
The patient is Stable - Low risk of patient condition declining or worsening    Shift Goals  Clinical Goals: pulmonary hyigene, pain control, rest, ambulation  Patient Goals: Pain control  Family Goals: not present    Progress made toward(s) clinical / shift goals:  pt medicated for pain, ambulated around room, resting at this time    Patient is not progressing towards the following goals:    Not using IS appropriately

## 2023-07-09 NOTE — PROGRESS NOTES
"Bedside report received.  Assessment complete.  A&O x 4. Patient calls appropriately.  Patient ambulates with no assist. Bed alarm off.   Patient has 0/10 pain. Pain managed with prescribed medications.  Denies N&V. Tolerating regular diet.  Left chest tube to -20 suction, no air leaks, dressing in place, clean, dry and intact. C-collar in place.  - void, - flatus, - BM.  Patient denies SOB.  SCD's off.  Patient pleasant, cooperative with plan of care.  Review plan with of care with patient. Call light and personal belongings within reach. Hourly rounding in place. All needs met at this time./80   Pulse 70   Temp 37.1 °C (98.8 °F) (Temporal)   Resp 18   Ht 1.626 m (5' 4\")   Wt 95.3 kg (210 lb)   SpO2 94%   BMI 36.05 kg/m²     "

## 2023-07-09 NOTE — CARE PLAN
The patient is Moderate- Medium risk of patient condition declining.    Shift Goals  Clinical Goals: Pulmonary hygiene, pain control  Patient Goals: Pain Control  Family Goals: not present    Progress made toward(s) clinical / shift goals: Patients pain controlled through PRN medications as well as non pharmacological pain relief methods. Bed alarm on, bed in lowest position, call light within reach.

## 2023-07-09 NOTE — CARE PLAN
Problem: Hyperinflation  Goal: Prevent or improve atelectasis  Description: Target End Date:  3 to 4 days    1. Instruct incentive spirometry usage  2.  Perform hyperinflation therapy as indicated  Outcome: Progressing  Flowsheets (Taken 7/9/2023 7795)  Hyperinflation Protocol Goals/Outcome: Stable Vital Capacity x24 hrs and Patient Understands / uses I.S.  Hyperinflation Protocol Indications: Chest Trauma (Blunt, Penetrative, Crushing, or Surgical)       Respiratory Update    Treatment modality: PEP  Frequency: BID  IS 2000    Pt tolerating current treatments well with no adverse reactions.

## 2023-07-10 ENCOUNTER — APPOINTMENT (OUTPATIENT)
Dept: RADIOLOGY | Facility: MEDICAL CENTER | Age: 28
DRG: 983 | End: 2023-07-10
Attending: SURGERY
Payer: MEDICAID

## 2023-07-10 LAB
ANION GAP SERPL CALC-SCNC: 9 MMOL/L (ref 7–16)
BASOPHILS # BLD AUTO: 1.8 % (ref 0–1.8)
BASOPHILS # BLD: 0.2 K/UL (ref 0–0.12)
BUN SERPL-MCNC: 11 MG/DL (ref 8–22)
CALCIUM SERPL-MCNC: 8.5 MG/DL (ref 8.5–10.5)
CHLORIDE SERPL-SCNC: 103 MMOL/L (ref 96–112)
CO2 SERPL-SCNC: 26 MMOL/L (ref 20–33)
CREAT SERPL-MCNC: 0.65 MG/DL (ref 0.5–1.4)
EOSINOPHIL # BLD AUTO: 1.3 K/UL (ref 0–0.51)
EOSINOPHIL NFR BLD: 11.9 % (ref 0–6.9)
ERYTHROCYTE [DISTWIDTH] IN BLOOD BY AUTOMATED COUNT: 45.1 FL (ref 35.9–50)
GFR SERPLBLD CREATININE-BSD FMLA CKD-EPI: 133 ML/MIN/1.73 M 2
GLUCOSE SERPL-MCNC: 105 MG/DL (ref 65–99)
HCT VFR BLD AUTO: 34.8 % (ref 42–52)
HGB BLD-MCNC: 10.7 G/DL (ref 14–18)
IMM GRANULOCYTES # BLD AUTO: 0.17 K/UL (ref 0–0.11)
IMM GRANULOCYTES NFR BLD AUTO: 1.6 % (ref 0–0.9)
LYMPHOCYTES # BLD AUTO: 3.36 K/UL (ref 1–4.8)
LYMPHOCYTES NFR BLD: 30.7 % (ref 22–41)
MCH RBC QN AUTO: 28 PG (ref 27–33)
MCHC RBC AUTO-ENTMCNC: 30.7 G/DL (ref 32.3–36.5)
MCV RBC AUTO: 91.1 FL (ref 81.4–97.8)
MONOCYTES # BLD AUTO: 1.23 K/UL (ref 0–0.85)
MONOCYTES NFR BLD AUTO: 11.2 % (ref 0–13.4)
NEUTROPHILS # BLD AUTO: 4.69 K/UL (ref 1.82–7.42)
NEUTROPHILS NFR BLD: 42.8 % (ref 44–72)
NRBC # BLD AUTO: 0 K/UL
NRBC BLD-RTO: 0 /100 WBC (ref 0–0.2)
PLATELET # BLD AUTO: 625 K/UL (ref 164–446)
PMV BLD AUTO: 10.1 FL (ref 9–12.9)
POTASSIUM SERPL-SCNC: 4.4 MMOL/L (ref 3.6–5.5)
RBC # BLD AUTO: 3.82 M/UL (ref 4.7–6.1)
SODIUM SERPL-SCNC: 138 MMOL/L (ref 135–145)
WBC # BLD AUTO: 11 K/UL (ref 4.8–10.8)

## 2023-07-10 PROCEDURE — 700102 HCHG RX REV CODE 250 W/ 637 OVERRIDE(OP)

## 2023-07-10 PROCEDURE — 36415 COLL VENOUS BLD VENIPUNCTURE: CPT

## 2023-07-10 PROCEDURE — 80048 BASIC METABOLIC PNL TOTAL CA: CPT

## 2023-07-10 PROCEDURE — 85025 COMPLETE CBC W/AUTO DIFF WBC: CPT

## 2023-07-10 PROCEDURE — 700111 HCHG RX REV CODE 636 W/ 250 OVERRIDE (IP)

## 2023-07-10 PROCEDURE — 770001 HCHG ROOM/CARE - MED/SURG/GYN PRIV*

## 2023-07-10 PROCEDURE — A9270 NON-COVERED ITEM OR SERVICE: HCPCS

## 2023-07-10 PROCEDURE — 700101 HCHG RX REV CODE 250

## 2023-07-10 PROCEDURE — 71045 X-RAY EXAM CHEST 1 VIEW: CPT

## 2023-07-10 PROCEDURE — 99232 SBSQ HOSP IP/OBS MODERATE 35: CPT | Performed by: NURSE PRACTITIONER

## 2023-07-10 RX ADMIN — OXYCODONE HYDROCHLORIDE 5 MG: 5 TABLET ORAL at 10:13

## 2023-07-10 RX ADMIN — ACETAMINOPHEN 1000 MG: 500 TABLET, FILM COATED ORAL at 12:14

## 2023-07-10 RX ADMIN — LIDOCAINE 2 PATCH: 700 PATCH TOPICAL at 04:53

## 2023-07-10 RX ADMIN — GABAPENTIN 100 MG: 100 CAPSULE ORAL at 22:06

## 2023-07-10 RX ADMIN — ENOXAPARIN SODIUM 30 MG: 100 INJECTION SUBCUTANEOUS at 05:02

## 2023-07-10 RX ADMIN — OXYCODONE HYDROCHLORIDE 5 MG: 5 TABLET ORAL at 17:22

## 2023-07-10 RX ADMIN — GABAPENTIN 100 MG: 100 CAPSULE ORAL at 15:42

## 2023-07-10 RX ADMIN — ACETAMINOPHEN 1000 MG: 500 TABLET, FILM COATED ORAL at 23:13

## 2023-07-10 RX ADMIN — ENOXAPARIN SODIUM 30 MG: 100 INJECTION SUBCUTANEOUS at 17:22

## 2023-07-10 RX ADMIN — METAXALONE 800 MG: 800 TABLET ORAL at 17:22

## 2023-07-10 RX ADMIN — GABAPENTIN 100 MG: 100 CAPSULE ORAL at 04:52

## 2023-07-10 RX ADMIN — DIVALPROEX SODIUM 500 MG: 500 TABLET, DELAYED RELEASE ORAL at 17:22

## 2023-07-10 RX ADMIN — METAXALONE 800 MG: 800 TABLET ORAL at 12:14

## 2023-07-10 RX ADMIN — RISPERIDONE 0.5 MG: 0.5 TABLET ORAL at 04:52

## 2023-07-10 RX ADMIN — CELECOXIB 200 MG: 200 CAPSULE ORAL at 04:52

## 2023-07-10 RX ADMIN — QUETIAPINE FUMARATE 100 MG: 100 TABLET ORAL at 20:11

## 2023-07-10 RX ADMIN — METAXALONE 800 MG: 800 TABLET ORAL at 04:52

## 2023-07-10 RX ADMIN — ACETAMINOPHEN 1000 MG: 500 TABLET, FILM COATED ORAL at 17:22

## 2023-07-10 RX ADMIN — ACETAMINOPHEN 1000 MG: 500 TABLET, FILM COATED ORAL at 04:52

## 2023-07-10 RX ADMIN — DIVALPROEX SODIUM 500 MG: 500 TABLET, DELAYED RELEASE ORAL at 04:54

## 2023-07-10 RX ADMIN — CELECOXIB 200 MG: 200 CAPSULE ORAL at 17:22

## 2023-07-10 ASSESSMENT — ENCOUNTER SYMPTOMS
EYES NEGATIVE: 1
MYALGIAS: 1
SHORTNESS OF BREATH: 0
NECK PAIN: 0
CONSTITUTIONAL NEGATIVE: 1
NEUROLOGICAL NEGATIVE: 1
GASTROINTESTINAL NEGATIVE: 1

## 2023-07-10 ASSESSMENT — PAIN DESCRIPTION - PAIN TYPE
TYPE: ACUTE PAIN

## 2023-07-10 ASSESSMENT — LIFESTYLE VARIABLES: SUBSTANCE_ABUSE: 1

## 2023-07-10 NOTE — PROGRESS NOTES
Pt is A&O 3 disoriented to time  Pain + medicated per MAR, declining use of ice or heat pack    - nausea  Tolerating a regular diet   Incision -  + Drains L CT   + Voids  + flatus  + BM  Up SBA  SCD's refused  Bed alarm on, pt moderate fall risk per kenji christianson  Reviewed plan of care with patient, bed in lowest position and locked, pt resting comfortably now, call light within reach, all needs met at this time. Interventions will be executed per plan of care

## 2023-07-10 NOTE — CARE PLAN
The patient is Stable - Low risk of patient condition declining or worsening    Shift Goals  Clinical Goals: pain control, rest, pulmonary hygiene,safety  Patient Goals: rest  Family Goals: not present    Progress made toward(s) clinical / shift goals:  Patient with bed alarm in place for safety, resting at this time, pain medicated per Mar. Pt refusing use of ice or heat packs     Problem: Knowledge Deficit - Standard  Goal: Patient and family/care givers will demonstrate understanding of plan of care, disease process/condition, diagnostic tests and medications  Description: Target End Date:  1-3 days or as soon as patient condition allows    Document in Patient Education    1.  Patient and family/caregiver oriented to unit, equipment, visitation policy and means for communicating concern  2.  Complete/review Learning Assessment  3.  Assess knowledge level of disease process/condition, treatment plan, diagnostic tests and medications  4.  Explain disease process/condition, treatment plan, diagnostic tests and medications  Outcome: Progressing     Problem: Pain - Standard  Goal: Alleviation of pain or a reduction in pain to the patient’s comfort goal  Description: Target End Date:  Prior to discharge or change in level of care    Document on Vitals flowsheet    1.  Document pain using the appropriate pain scale per order or unit policy  2.  Educate and implement non-pharmacologic comfort measures (i.e. relaxation, distraction, massage, cold/heat therapy, etc.)  3.  Pain management medications as ordered  4.  Reassess pain after pain med administration per policy  5.  If opiods administered assess patient's response to pain medication is appropriate per POSS sedation scale  6.  Follow pain management plan developed in collaboration with patient and interdisciplinary team (including palliative care or pain specialists if applicable)  Outcome: Progressing       Patient is not progressing towards the following  goals:
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
## 2023-07-10 NOTE — DISCHARGE PLANNING
Care Transition Team Assessment    Pt admitted for pneumothorax, currently with chest tube. Pt was recently discharged following a lengthy hospital stay.     Pt is currently homeless with past history of alcohol, heroin, methamphetamine use. Pt is reported sleeping [place] a bridge.     RN SUJATHA called [facility], spoke with Nikki. Pt does not currently have a no return note so he is welcome to come back at discharge.    Pt currently needing medical treatment, awaiting post acute recommendations.     Information Source  Orientation Level: Oriented to person, Oriented to place, Oriented to situation, Disoriented to time  Information Given By: Patient  Who is responsible for making decisions for patient? : Patient    Readmission Evaluation  Is this a readmission?: No    Elopement Risk  Legal Hold: No  Ambulatory or Self Mobile in Wheelchair: Yes  Disoriented: Situation-At Risk for Elopement  Psychiatric Symptoms: Delusions-at Risk for Elopement  History of Wandering: No  Elopement this Admit: No  Vocalizing Wanting to Leave: No  Displays Behaviors, Body Language Wanting to Leave: No-Not at Risk for Elopement  Elopement Risk: Not at Risk for Elopement  Wanderguard On: No (See Comments)    Interdisciplinary Discharge Planning  Lives with - Patient's Self Care Capacity: Alone and Unable to Care For Self  Patient or legal guardian wants to designate a caregiver: No  Support Systems: Family Member(s)  Housing / Facility: Homeless  Name of Care Facility: n/a  Durable Medical Equipment: Other - Specify ([place]-J collar)  DME Provider / Phone: [facility]    Discharge Preparedness  What is your plan after discharge?: Uncertain - pending medical team collaboration  What are your discharge supports?: Other (comment) (None)  Prior Functional Level: Ambulatory, Independent with Activities of Daily Living, Independent with Medication Management  Difficulity with ADLs: None  Difficulity with IADLs: Cooking, Driving, Laundry, Managing  medication, Shopping    Functional Assesment  Prior Functional Level: Ambulatory, Independent with Activities of Daily Living, Independent with Medication Management    Finances  Financial Barriers to Discharge: Yes  Average Monthly Income: 0 $  Source of Income: None  Prescription Coverage: Yes    Vision / Hearing Impairment  Vision Impairment : No  Hearing Impairment : No    Advance Directive  Advance Directive?: None  Advance Directive offered?: AD Booklet refused    Domestic Abuse  Have you ever been the victim of abuse or violence?: No  Physical Abuse or Sexual Abuse: No  Verbal Abuse or Emotional Abuse: No  Possible Abuse/Neglect Reported to:: Not Applicable    Psychological Assessment  History of Substance Abuse: Alcohol, Heroin, Methamphetamine  History of Psychiatric Problems: Yes  Non-compliant with Treatment: Yes  Newly Diagnosed Illness: No    Discharge Risks or Barriers  Discharge risks or barriers?: Transportation, Mental health, Lives alone, no community support, Complex medical needs, Homeless / couch surfing  Patient risk factors: Complex medical needs, Homeless, Lack of outside supports, Lives alone and no community support, Mental health    Anticipated Discharge Information  Discharge Disposition: Discharged to home/self care (01)

## 2023-07-10 NOTE — PROGRESS NOTES
Assumed care of patient at 0645. Bedside report received. Assessment complete.  AA&Ox4. Denies CP/SOB.  Reporting 0/10 pain.  Declined intervention at this time.  Educated patient regarding pharmacologic and non pharmacologic modalities for pain management.  L Chest Tube to Water Seal, Patent, No Air Leak Noted   Skin per flowsheets  Tolerating Regular diet. Denies N/V.  + void. Last BM 7/9  Pt ambulates SBA, LUE NWB, C-Collar Donned at all times.  All needs met at this time. Call light within reach. Pt calls appropriately. Bed low and locked, non skid socks in place. Hourly rounding in place.

## 2023-07-10 NOTE — CARE PLAN
Problem: Knowledge Deficit - Standard  Goal: Patient and family/care givers will demonstrate understanding of plan of care, disease process/condition, diagnostic tests and medications  Outcome: Progressing     Problem: Pain - Standard  Goal: Alleviation of pain or a reduction in pain to the patient’s comfort goal  Outcome: Progressing     Problem: Fall Risk  Goal: Patient will remain free from falls  Outcome: Progressing   The patient is Stable - Low risk of patient condition declining or worsening    Shift Goals  Clinical Goals: Pulmonary Hygiene  Patient Goals: Rest  Family Goals: N/A    Progress made toward(s) clinical / shift goals:  Educated patient on IS use, Patient verbalized understanding     Patient is not progressing towards the following goals:

## 2023-07-11 ENCOUNTER — APPOINTMENT (OUTPATIENT)
Dept: RADIOLOGY | Facility: MEDICAL CENTER | Age: 28
DRG: 983 | End: 2023-07-11
Attending: SURGERY
Payer: MEDICAID

## 2023-07-11 LAB
ANION GAP SERPL CALC-SCNC: 11 MMOL/L (ref 7–16)
BASOPHILS # BLD AUTO: 1.1 % (ref 0–1.8)
BASOPHILS # BLD: 0.15 K/UL (ref 0–0.12)
BUN SERPL-MCNC: 12 MG/DL (ref 8–22)
CALCIUM SERPL-MCNC: 8.9 MG/DL (ref 8.5–10.5)
CHLORIDE SERPL-SCNC: 99 MMOL/L (ref 96–112)
CO2 SERPL-SCNC: 27 MMOL/L (ref 20–33)
CREAT SERPL-MCNC: 0.74 MG/DL (ref 0.5–1.4)
EOSINOPHIL # BLD AUTO: 1.4 K/UL (ref 0–0.51)
EOSINOPHIL NFR BLD: 10.7 % (ref 0–6.9)
ERYTHROCYTE [DISTWIDTH] IN BLOOD BY AUTOMATED COUNT: 46.4 FL (ref 35.9–50)
GFR SERPLBLD CREATININE-BSD FMLA CKD-EPI: 128 ML/MIN/1.73 M 2
GLUCOSE SERPL-MCNC: 103 MG/DL (ref 65–99)
HCT VFR BLD AUTO: 35 % (ref 42–52)
HGB BLD-MCNC: 10.9 G/DL (ref 14–18)
IMM GRANULOCYTES # BLD AUTO: 0.28 K/UL (ref 0–0.11)
IMM GRANULOCYTES NFR BLD AUTO: 2.1 % (ref 0–0.9)
LYMPHOCYTES # BLD AUTO: 3.71 K/UL (ref 1–4.8)
LYMPHOCYTES NFR BLD: 28.2 % (ref 22–41)
MCH RBC QN AUTO: 28.6 PG (ref 27–33)
MCHC RBC AUTO-ENTMCNC: 31.1 G/DL (ref 32.3–36.5)
MCV RBC AUTO: 91.9 FL (ref 81.4–97.8)
MONOCYTES # BLD AUTO: 1.29 K/UL (ref 0–0.85)
MONOCYTES NFR BLD AUTO: 9.8 % (ref 0–13.4)
NEUTROPHILS # BLD AUTO: 6.31 K/UL (ref 1.82–7.42)
NEUTROPHILS NFR BLD: 48.1 % (ref 44–72)
NRBC # BLD AUTO: 0 K/UL
NRBC BLD-RTO: 0 /100 WBC (ref 0–0.2)
PLATELET # BLD AUTO: 691 K/UL (ref 164–446)
PMV BLD AUTO: 10 FL (ref 9–12.9)
POTASSIUM SERPL-SCNC: 3.9 MMOL/L (ref 3.6–5.5)
RBC # BLD AUTO: 3.81 M/UL (ref 4.7–6.1)
SODIUM SERPL-SCNC: 137 MMOL/L (ref 135–145)
WBC # BLD AUTO: 13.1 K/UL (ref 4.8–10.8)

## 2023-07-11 PROCEDURE — 71045 X-RAY EXAM CHEST 1 VIEW: CPT

## 2023-07-11 PROCEDURE — 80048 BASIC METABOLIC PNL TOTAL CA: CPT

## 2023-07-11 PROCEDURE — 99231 SBSQ HOSP IP/OBS SF/LOW 25: CPT | Mod: 24 | Performed by: NURSE PRACTITIONER

## 2023-07-11 PROCEDURE — A9270 NON-COVERED ITEM OR SERVICE: HCPCS

## 2023-07-11 PROCEDURE — 700111 HCHG RX REV CODE 636 W/ 250 OVERRIDE (IP)

## 2023-07-11 PROCEDURE — 85025 COMPLETE CBC W/AUTO DIFF WBC: CPT

## 2023-07-11 PROCEDURE — 700102 HCHG RX REV CODE 250 W/ 637 OVERRIDE(OP)

## 2023-07-11 PROCEDURE — 36415 COLL VENOUS BLD VENIPUNCTURE: CPT

## 2023-07-11 PROCEDURE — 700101 HCHG RX REV CODE 250

## 2023-07-11 PROCEDURE — 770001 HCHG ROOM/CARE - MED/SURG/GYN PRIV*

## 2023-07-11 RX ADMIN — CELECOXIB 200 MG: 200 CAPSULE ORAL at 05:13

## 2023-07-11 RX ADMIN — METAXALONE 800 MG: 800 TABLET ORAL at 11:36

## 2023-07-11 RX ADMIN — ACETAMINOPHEN 1000 MG: 500 TABLET, FILM COATED ORAL at 05:13

## 2023-07-11 RX ADMIN — ENOXAPARIN SODIUM 30 MG: 100 INJECTION SUBCUTANEOUS at 17:07

## 2023-07-11 RX ADMIN — QUETIAPINE FUMARATE 100 MG: 100 TABLET ORAL at 20:25

## 2023-07-11 RX ADMIN — ACETAMINOPHEN 1000 MG: 500 TABLET, FILM COATED ORAL at 11:37

## 2023-07-11 RX ADMIN — METAXALONE 800 MG: 800 TABLET ORAL at 17:07

## 2023-07-11 RX ADMIN — GABAPENTIN 100 MG: 100 CAPSULE ORAL at 20:25

## 2023-07-11 RX ADMIN — RISPERIDONE 0.5 MG: 0.5 TABLET ORAL at 05:13

## 2023-07-11 RX ADMIN — CELECOXIB 200 MG: 200 CAPSULE ORAL at 17:07

## 2023-07-11 RX ADMIN — ENOXAPARIN SODIUM 30 MG: 100 INJECTION SUBCUTANEOUS at 05:15

## 2023-07-11 RX ADMIN — METAXALONE 800 MG: 800 TABLET ORAL at 05:13

## 2023-07-11 RX ADMIN — OXYCODONE HYDROCHLORIDE 5 MG: 5 TABLET ORAL at 17:07

## 2023-07-11 RX ADMIN — DIVALPROEX SODIUM 500 MG: 500 TABLET, DELAYED RELEASE ORAL at 17:07

## 2023-07-11 RX ADMIN — GABAPENTIN 100 MG: 100 CAPSULE ORAL at 05:13

## 2023-07-11 RX ADMIN — GABAPENTIN 100 MG: 100 CAPSULE ORAL at 14:17

## 2023-07-11 RX ADMIN — OXYCODONE HYDROCHLORIDE 5 MG: 5 TABLET ORAL at 20:25

## 2023-07-11 RX ADMIN — DIVALPROEX SODIUM 500 MG: 500 TABLET, DELAYED RELEASE ORAL at 05:13

## 2023-07-11 RX ADMIN — LIDOCAINE 1 PATCH: 700 PATCH TOPICAL at 05:17

## 2023-07-11 RX ADMIN — ACETAMINOPHEN 1000 MG: 500 TABLET, FILM COATED ORAL at 17:07

## 2023-07-11 ASSESSMENT — LIFESTYLE VARIABLES: SUBSTANCE_ABUSE: 1

## 2023-07-11 ASSESSMENT — ENCOUNTER SYMPTOMS
MYALGIAS: 1
GASTROINTESTINAL NEGATIVE: 1
NEUROLOGICAL NEGATIVE: 1
CONSTITUTIONAL NEGATIVE: 1
EYES NEGATIVE: 1
NECK PAIN: 0
SHORTNESS OF BREATH: 0

## 2023-07-11 ASSESSMENT — PAIN DESCRIPTION - PAIN TYPE
TYPE: ACUTE PAIN
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
## 2023-07-11 NOTE — PROGRESS NOTES
Bedside report received.  Assessment complete.  A&O x 4. Patient calls appropriately.  Patient is up self assist. Bed alarm on.   Patient has 0/10 pain.  Denies N&V. Tolerating regular diet.  L chest tube to waterseal, dressing CDI L forearm in a cast.  + void, + flatus, + BM.  Patient denies SOB.  Review plan with of care with patient. Call light and personal belongings within reach. Hourly rounding in place. All needs met at this time.

## 2023-07-11 NOTE — DOCUMENTATION QUERY
[facility]                                                                       Query Response Note      PATIENT:               MAURI HENSLEY  ACCT #:                  [id number]  MRN:                     [id number]  :                      1995  ADMIT DATE:       2023 6:14 AM  DISCH DATE:          RESPONDING  PROVIDER #:        [id number]           QUERY TEXT:    Homeless patient with schizophrenia/bipolar disease and recent history of multiple trauma, use of illicit drugs/ methamphetamine abuse presents with SOB and found to have Pneumothorax.  Per H&P was not making a lot of sense.  UDS + for Amphetamines, Benzodiazepines, Oxycodone; Urine + for Fentanyl.  Treated with PO Oxycodone.  Based on the clinical indicators, can a diagnosis be made?      The patient's clinical indicators include:  Admit labs: UDS + for Amphetamines, Benzodiazepines, Oxycodone; Urine + for Fentanyl    ED note - denies any recent alcohol use or methamphetamine use...past medical hx methamphetamine abuse  H&P - psychiatric history...history of illicit drugs... was not making a lot of sense    PN - Lethargic, awakens, conversant    Treatment - UDS; PO Oxycodone    Risk factors - Not making sense; Positive UDS for Amphetamines, Benzodiazepines, Oxycodone, Fentanyl, History of illicit drug use    Thank you,  Anne MIRELES  Clinical   Connect via is[employer] furniture  Options provided:   -- Opioid dependence with intoxication delirium caused by toxic encephalopathy due to amphetamines, benzodiazepines, oxycodone, fentanyl   -- Opioid abuse with intoxication delirium caused by toxic encephalopathy due to amphetamines, benzodiazepines, oxycodone, fentanyl   -- Opioid use with intoxication delirium caused by toxic encephalopathy due to amphetamines, benzodiazepines, oxycodone, fentanyl   -- Other explanation, (please specify the other  explanation)   -- Unable to determine      Query created by: Anne Lea on 7/10/2023 3:54 PM    RESPONSE TEXT:    Other explanation- poly drug abuse          Electronically signed by:  KEYSHAWN SWAIN MD 7/10/2023 5:08 PM

## 2023-07-11 NOTE — DISCHARGE PLANNING
Case Management Discharge Planning    Admission Date: 7/7/2023  GMLOS: 2.3  ALOS: 4    6-Clicks ADL Score: 20  6-Clicks Mobility Score: 22      Anticipated Discharge Dispo: Discharge Disposition: Discharged to home/self care (01)    DME Needed: No    Action(s) Taken:   RN CM spoke with patient at bedside.  Pt up in hallway walking without difficulty, cast left arm, chest tube.  Discussed [facility] for primary care, mental health, substance abuse and transitional housing.  Pt agreeable to referral.  Referral sent to [facility].    Escalations Completed: Speciality Provider    Medically Clear: Yes    Next Steps: Follow up with [facility].    Barriers to Discharge: Medical clearance

## 2023-07-11 NOTE — CARE PLAN
The patient is Stable - Low risk of patient condition declining or worsening    Shift Goals  Clinical Goals: safety, monitor chest tube  Patient Goals: rest    Progress made toward(s) clinical / shift goals:  Pt has remained during the shift by education about the call light, bed alarm and needing assistance when ambulating. Chest tube with scant small output and no air leaks. Pt has been resting comfortably during the shift.    Patient is not progressing towards the following goals:

## 2023-07-11 NOTE — CARE PLAN
The patient is Stable - Low risk of patient condition declining or worsening    Shift Goals  Clinical Goals: Safety, monitor chest tube, rest  Patient Goals: Rest  Family Goals: N/A    Progress made toward(s) clinical / shift goals:  Pt resting. Chest tube to H2O seal, serous output. Pt educated on need to be careful with chest tube equipment.    Patient is not progressing towards the following goals:

## 2023-07-11 NOTE — PROGRESS NOTES
Bedside report received from night shift nurse. Assumed care at 0645.   Pt A&Ox4.  Tolerating regular diet, denies n/v. Hypoactive bowel sounds, passing flatus, LBM 7/10. IV access through 18g RAC that is SL.  L CT, H2O seal, CDI. L arm cast, CDI. C collar on at all times.   Saturating >90% on RA.  Pt ambulates independently.  Pain is controlled through medication orders. Updated on plan of care. Safety education provided. Bed locked in low. Call light within reach. Rounding in place.

## 2023-07-12 ENCOUNTER — ANESTHESIA EVENT (OUTPATIENT)
Dept: SURGERY | Facility: MEDICAL CENTER | Age: 28
DRG: 983 | End: 2023-07-12
Payer: MEDICAID

## 2023-07-12 ENCOUNTER — APPOINTMENT (OUTPATIENT)
Dept: RADIOLOGY | Facility: MEDICAL CENTER | Age: 28
DRG: 983 | End: 2023-07-12
Attending: SURGERY
Payer: MEDICAID

## 2023-07-12 LAB
ANION GAP SERPL CALC-SCNC: 13 MMOL/L (ref 7–16)
BASOPHILS # BLD AUTO: 1.5 % (ref 0–1.8)
BASOPHILS # BLD: 0.19 K/UL (ref 0–0.12)
BUN SERPL-MCNC: 15 MG/DL (ref 8–22)
CALCIUM SERPL-MCNC: 8.6 MG/DL (ref 8.5–10.5)
CHLORIDE SERPL-SCNC: 105 MMOL/L (ref 96–112)
CO2 SERPL-SCNC: 24 MMOL/L (ref 20–33)
CREAT SERPL-MCNC: 0.8 MG/DL (ref 0.5–1.4)
EOSINOPHIL # BLD AUTO: 1.36 K/UL (ref 0–0.51)
EOSINOPHIL NFR BLD: 10.8 % (ref 0–6.9)
ERYTHROCYTE [DISTWIDTH] IN BLOOD BY AUTOMATED COUNT: 47.8 FL (ref 35.9–50)
GFR SERPLBLD CREATININE-BSD FMLA CKD-EPI: 125 ML/MIN/1.73 M 2
GLUCOSE SERPL-MCNC: 114 MG/DL (ref 65–99)
HCT VFR BLD AUTO: 34.4 % (ref 42–52)
HGB BLD-MCNC: 10.7 G/DL (ref 14–18)
IMM GRANULOCYTES # BLD AUTO: 0.25 K/UL (ref 0–0.11)
IMM GRANULOCYTES NFR BLD AUTO: 2 % (ref 0–0.9)
LYMPHOCYTES # BLD AUTO: 3.75 K/UL (ref 1–4.8)
LYMPHOCYTES NFR BLD: 29.7 % (ref 22–41)
MCH RBC QN AUTO: 28.7 PG (ref 27–33)
MCHC RBC AUTO-ENTMCNC: 31.1 G/DL (ref 32.3–36.5)
MCV RBC AUTO: 92.2 FL (ref 81.4–97.8)
MONOCYTES # BLD AUTO: 1.26 K/UL (ref 0–0.85)
MONOCYTES NFR BLD AUTO: 10 % (ref 0–13.4)
NEUTROPHILS # BLD AUTO: 5.81 K/UL (ref 1.82–7.42)
NEUTROPHILS NFR BLD: 46 % (ref 44–72)
NRBC # BLD AUTO: 0 K/UL
NRBC BLD-RTO: 0 /100 WBC (ref 0–0.2)
PLATELET # BLD AUTO: 653 K/UL (ref 164–446)
PMV BLD AUTO: 9.8 FL (ref 9–12.9)
POTASSIUM SERPL-SCNC: 4.5 MMOL/L (ref 3.6–5.5)
RBC # BLD AUTO: 3.73 M/UL (ref 4.7–6.1)
SODIUM SERPL-SCNC: 142 MMOL/L (ref 135–145)
WBC # BLD AUTO: 12.6 K/UL (ref 4.8–10.8)

## 2023-07-12 PROCEDURE — 700111 HCHG RX REV CODE 636 W/ 250 OVERRIDE (IP)

## 2023-07-12 PROCEDURE — A9270 NON-COVERED ITEM OR SERVICE: HCPCS

## 2023-07-12 PROCEDURE — 36415 COLL VENOUS BLD VENIPUNCTURE: CPT

## 2023-07-12 PROCEDURE — 700102 HCHG RX REV CODE 250 W/ 637 OVERRIDE(OP)

## 2023-07-12 PROCEDURE — 80048 BASIC METABOLIC PNL TOTAL CA: CPT

## 2023-07-12 PROCEDURE — 71045 X-RAY EXAM CHEST 1 VIEW: CPT

## 2023-07-12 PROCEDURE — 770001 HCHG ROOM/CARE - MED/SURG/GYN PRIV*

## 2023-07-12 PROCEDURE — 700101 HCHG RX REV CODE 250

## 2023-07-12 PROCEDURE — 85025 COMPLETE CBC W/AUTO DIFF WBC: CPT

## 2023-07-12 PROCEDURE — 99231 SBSQ HOSP IP/OBS SF/LOW 25: CPT | Mod: 24 | Performed by: PHYSICIAN ASSISTANT

## 2023-07-12 RX ADMIN — GABAPENTIN 100 MG: 100 CAPSULE ORAL at 13:59

## 2023-07-12 RX ADMIN — GABAPENTIN 100 MG: 100 CAPSULE ORAL at 21:11

## 2023-07-12 RX ADMIN — LIDOCAINE 1 PATCH: 700 PATCH TOPICAL at 05:53

## 2023-07-12 RX ADMIN — METAXALONE 800 MG: 800 TABLET ORAL at 17:43

## 2023-07-12 RX ADMIN — METAXALONE 800 MG: 800 TABLET ORAL at 12:12

## 2023-07-12 RX ADMIN — ENOXAPARIN SODIUM 30 MG: 100 INJECTION SUBCUTANEOUS at 17:44

## 2023-07-12 RX ADMIN — DIVALPROEX SODIUM 500 MG: 500 TABLET, DELAYED RELEASE ORAL at 05:55

## 2023-07-12 RX ADMIN — OXYCODONE HYDROCHLORIDE 5 MG: 5 TABLET ORAL at 21:11

## 2023-07-12 RX ADMIN — GABAPENTIN 100 MG: 100 CAPSULE ORAL at 05:53

## 2023-07-12 RX ADMIN — SENNOSIDES AND DOCUSATE SODIUM 1 TABLET: 50; 8.6 TABLET ORAL at 21:11

## 2023-07-12 RX ADMIN — ENOXAPARIN SODIUM 30 MG: 100 INJECTION SUBCUTANEOUS at 05:53

## 2023-07-12 RX ADMIN — ACETAMINOPHEN 1000 MG: 500 TABLET, FILM COATED ORAL at 05:54

## 2023-07-12 RX ADMIN — DIVALPROEX SODIUM 500 MG: 500 TABLET, DELAYED RELEASE ORAL at 17:55

## 2023-07-12 RX ADMIN — RISPERIDONE 0.5 MG: 0.5 TABLET ORAL at 05:53

## 2023-07-12 RX ADMIN — ACETAMINOPHEN 1000 MG: 500 TABLET, FILM COATED ORAL at 00:36

## 2023-07-12 RX ADMIN — OXYCODONE HYDROCHLORIDE 5 MG: 5 TABLET ORAL at 05:53

## 2023-07-12 RX ADMIN — DOCUSATE SODIUM 100 MG: 100 CAPSULE, LIQUID FILLED ORAL at 05:53

## 2023-07-12 RX ADMIN — METAXALONE 800 MG: 800 TABLET ORAL at 05:54

## 2023-07-12 ASSESSMENT — ENCOUNTER SYMPTOMS
SHORTNESS OF BREATH: 0
ABDOMINAL PAIN: 0
HEADACHES: 0
SENSORY CHANGE: 0
VOMITING: 0
TINGLING: 0
COUGH: 0
FEVER: 0
CHILLS: 0
MYALGIAS: 1
ROS GI COMMENTS: BM 7/11
DIZZINESS: 0
NAUSEA: 0

## 2023-07-12 ASSESSMENT — PAIN DESCRIPTION - PAIN TYPE
TYPE: ACUTE PAIN

## 2023-07-12 NOTE — PROGRESS NOTES
Bedside report received from night shift nurse. Assumed care at 0645.   Pt A&Ox4  Tolerating Regular diet, denies n/v. Normoactive bowel sounds, pt denies passing flatus, LBM 7/12. IV access through 18G RAC that is saline locked.  Old chest tube site, gauze and transparent film placed CDI.  LUE cast.   Saturating >90% on RA.  Pt ambulates independently.  Pain is controlled through medication orders. Updated on plan of care. Safety education provided. Bed locked in low. Call light within reach. Rounding in place.

## 2023-07-12 NOTE — PROGRESS NOTES
Brief Ortho Note:  - Plan for OR tomorrow with Dr. Bills for removal of cast and elbow pin   - NPO at [facility]

## 2023-07-12 NOTE — CARE PLAN
The patient is Stable - Low risk of patient condition declining or worsening    Shift Goals  Clinical Goals: Pain control, safety, and monitor chest tube  Patient Goals: pain control and rest  Family Goals: N/A    Progress made toward(s) clinical / shift goals: Pain controlled per MAR, cold packs, and ambulation. Pt safety education provided. Intermittently monitoring chest tube. Pt sleeping intermittently throughout shift.      Problem: Knowledge Deficit - Standard  Goal: Patient and family/care givers will demonstrate understanding of plan of care, disease process/condition, diagnostic tests and medications  Outcome: Progressing     Problem: Pain - Standard  Goal: Alleviation of pain or a reduction in pain to the patient’s comfort goal  Outcome: Progressing
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
## 2023-07-12 NOTE — CARE PLAN
The patient is Stable - Low risk of patient condition declining or worsening    Shift Goals  Clinical Goals: Pain control, safety, and monitor chest tube  Patient Goals: pain control and rest  Family Goals: N/A    Progress made toward(s) clinical / shift goals:  Pt resting. Chest tube Dc'd. Pain managed w/ medication per MD order.     Patient is not progressing towards the following goals:

## 2023-07-12 NOTE — DISCHARGE PLANNING
Case Management Discharge Planning    Admission Date: 7/7/2023  GMLOS: 2.3  ALOS: 5    6-Clicks ADL Score: 20  6-Clicks Mobility Score: 22      Anticipated Discharge Dispo: Discharge Disposition: Discharged to home/self care (01)    DME Needed: No    Action(s) Taken:   Lashaun Parham  with [facility] will assess patient today at 1530.  Per Claire with [facility], patient can  his things anytime tomorrow.  Pt informed.  Pt scheduled for OR tomorrow and then likely dc per GARRY Zavala.    Escalations Completed: [facility]    Medically Clear: No    Next Steps: Follow up with [facility].    Barriers to Discharge: Medical clearance    Addendum:   7-12-23/1635  Lashaun Parham CM with [facility] [phone], met with patient at bedside.  She will review the assessment with her supervisor tomorrow and follow up with CM.

## 2023-07-12 NOTE — PROGRESS NOTES
Bedside report received, assessment completed    A&O x  4, pt calls appropriately  Mobility: Up self  Fall Risk Assessment: Low, door notifications in use  Pain Assessment / Reassessment completed, medication provided per MAR  Diet: Regular  LDA:   IV Access: 18 R AC, CDI/ flushed/ SL  CT: LUQ, to water seal    GI/: + void, + flatus, 7/11 BM  DVT Prophylaxis: Lovenox, SCD's refused  Skin: per flowsheets    Reviewed plan of care with patient, bed in lowest position and locked, pt resting comfortably now, call light within reach, all needs met at this time. Interventions will be executed per plan of care

## 2023-07-12 NOTE — PROGRESS NOTES
Chest tube removed at bedside. Occlusive dressing placed. Patient tolerated well.   Interval CXR in AM.

## 2023-07-13 ENCOUNTER — APPOINTMENT (OUTPATIENT)
Dept: RADIOLOGY | Facility: MEDICAL CENTER | Age: 28
DRG: 983 | End: 2023-07-13
Attending: ORTHOPAEDIC SURGERY
Payer: MEDICAID

## 2023-07-13 ENCOUNTER — APPOINTMENT (OUTPATIENT)
Dept: RADIOLOGY | Facility: MEDICAL CENTER | Age: 28
DRG: 983 | End: 2023-07-13
Attending: SURGERY
Payer: MEDICAID

## 2023-07-13 ENCOUNTER — ANESTHESIA (OUTPATIENT)
Dept: SURGERY | Facility: MEDICAL CENTER | Age: 28
DRG: 983 | End: 2023-07-13
Payer: MEDICAID

## 2023-07-13 LAB
ANION GAP SERPL CALC-SCNC: 11 MMOL/L (ref 7–16)
BASOPHILS # BLD AUTO: 1.2 % (ref 0–1.8)
BASOPHILS # BLD AUTO: 1.2 % (ref 0–1.8)
BASOPHILS # BLD: 0.18 K/UL (ref 0–0.12)
BASOPHILS # BLD: 0.21 K/UL (ref 0–0.12)
BUN SERPL-MCNC: 14 MG/DL (ref 8–22)
CALCIUM SERPL-MCNC: 8.9 MG/DL (ref 8.5–10.5)
CHLORIDE SERPL-SCNC: 99 MMOL/L (ref 96–112)
CO2 SERPL-SCNC: 24 MMOL/L (ref 20–33)
CREAT SERPL-MCNC: 0.74 MG/DL (ref 0.5–1.4)
EOSINOPHIL # BLD AUTO: 1.38 K/UL (ref 0–0.51)
EOSINOPHIL # BLD AUTO: 1.61 K/UL (ref 0–0.51)
EOSINOPHIL NFR BLD: 9.3 % (ref 0–6.9)
EOSINOPHIL NFR BLD: 9.4 % (ref 0–6.9)
ERYTHROCYTE [DISTWIDTH] IN BLOOD BY AUTOMATED COUNT: 47.2 FL (ref 35.9–50)
ERYTHROCYTE [DISTWIDTH] IN BLOOD BY AUTOMATED COUNT: 47.4 FL (ref 35.9–50)
GFR SERPLBLD CREATININE-BSD FMLA CKD-EPI: 128 ML/MIN/1.73 M 2
GLUCOSE SERPL-MCNC: 93 MG/DL (ref 65–99)
HCT VFR BLD AUTO: 36.5 % (ref 42–52)
HCT VFR BLD AUTO: 38 % (ref 42–52)
HGB BLD-MCNC: 11.3 G/DL (ref 14–18)
HGB BLD-MCNC: 12 G/DL (ref 14–18)
IMM GRANULOCYTES # BLD AUTO: 0.22 K/UL (ref 0–0.11)
IMM GRANULOCYTES # BLD AUTO: 0.4 K/UL (ref 0–0.11)
IMM GRANULOCYTES NFR BLD AUTO: 1.5 % (ref 0–0.9)
IMM GRANULOCYTES NFR BLD AUTO: 2.3 % (ref 0–0.9)
LYMPHOCYTES # BLD AUTO: 3.16 K/UL (ref 1–4.8)
LYMPHOCYTES # BLD AUTO: 3.66 K/UL (ref 1–4.8)
LYMPHOCYTES NFR BLD: 21.3 % (ref 22–41)
LYMPHOCYTES NFR BLD: 21.4 % (ref 22–41)
MCH RBC QN AUTO: 28.2 PG (ref 27–33)
MCH RBC QN AUTO: 28.3 PG (ref 27–33)
MCHC RBC AUTO-ENTMCNC: 31 G/DL (ref 32.3–36.5)
MCHC RBC AUTO-ENTMCNC: 31.6 G/DL (ref 32.3–36.5)
MCV RBC AUTO: 89.4 FL (ref 81.4–97.8)
MCV RBC AUTO: 91.5 FL (ref 81.4–97.8)
MONOCYTES # BLD AUTO: 1.24 K/UL (ref 0–0.85)
MONOCYTES # BLD AUTO: 1.59 K/UL (ref 0–0.85)
MONOCYTES NFR BLD AUTO: 8.3 % (ref 0–13.4)
MONOCYTES NFR BLD AUTO: 9.3 % (ref 0–13.4)
NEUTROPHILS # BLD AUTO: 8.68 K/UL (ref 1.82–7.42)
NEUTROPHILS # BLD AUTO: 9.67 K/UL (ref 1.82–7.42)
NEUTROPHILS NFR BLD: 56.4 % (ref 44–72)
NEUTROPHILS NFR BLD: 58.4 % (ref 44–72)
NRBC # BLD AUTO: 0 K/UL
NRBC # BLD AUTO: 0 K/UL
NRBC BLD-RTO: 0 /100 WBC (ref 0–0.2)
NRBC BLD-RTO: 0 /100 WBC (ref 0–0.2)
PLATELET # BLD AUTO: 704 K/UL (ref 164–446)
PLATELET # BLD AUTO: 722 K/UL (ref 164–446)
PMV BLD AUTO: 9.2 FL (ref 9–12.9)
PMV BLD AUTO: 9.4 FL (ref 9–12.9)
POTASSIUM SERPL-SCNC: 4.2 MMOL/L (ref 3.6–5.5)
RBC # BLD AUTO: 3.99 M/UL (ref 4.7–6.1)
RBC # BLD AUTO: 4.25 M/UL (ref 4.7–6.1)
SODIUM SERPL-SCNC: 134 MMOL/L (ref 135–145)
WBC # BLD AUTO: 14.9 K/UL (ref 4.8–10.8)
WBC # BLD AUTO: 17.1 K/UL (ref 4.8–10.8)

## 2023-07-13 PROCEDURE — 160009 HCHG ANES TIME/MIN: Performed by: ORTHOPAEDIC SURGERY

## 2023-07-13 PROCEDURE — 80048 BASIC METABOLIC PNL TOTAL CA: CPT

## 2023-07-13 PROCEDURE — 0RPM34Z REMOVAL OF INTERNAL FIXATION DEVICE FROM LEFT ELBOW JOINT, PERCUTANEOUS APPROACH: ICD-10-PCS | Performed by: ORTHOPAEDIC SURGERY

## 2023-07-13 PROCEDURE — 700101 HCHG RX REV CODE 250: Performed by: ANESTHESIOLOGY

## 2023-07-13 PROCEDURE — 8968 PR NO CHARGE - PROCEDURE: Mod: 80ROC,58,LT | Performed by: STUDENT IN AN ORGANIZED HEALTH CARE EDUCATION/TRAINING PROGRAM

## 2023-07-13 PROCEDURE — 71045 X-RAY EXAM CHEST 1 VIEW: CPT

## 2023-07-13 PROCEDURE — A9270 NON-COVERED ITEM OR SERVICE: HCPCS

## 2023-07-13 PROCEDURE — 160002 HCHG RECOVERY MINUTES (STAT): Performed by: ORTHOPAEDIC SURGERY

## 2023-07-13 PROCEDURE — 160035 HCHG PACU - 1ST 60 MINS PHASE I: Performed by: ORTHOPAEDIC SURGERY

## 2023-07-13 PROCEDURE — 700102 HCHG RX REV CODE 250 W/ 637 OVERRIDE(OP): Performed by: ANESTHESIOLOGY

## 2023-07-13 PROCEDURE — 700105 HCHG RX REV CODE 258: Mod: JZ | Performed by: ANESTHESIOLOGY

## 2023-07-13 PROCEDURE — A9270 NON-COVERED ITEM OR SERVICE: HCPCS | Performed by: ANESTHESIOLOGY

## 2023-07-13 PROCEDURE — 770001 HCHG ROOM/CARE - MED/SURG/GYN PRIV*

## 2023-07-13 PROCEDURE — 01740 ANES OPN/ARTHRS PX ELBW NOS: CPT | Performed by: ANESTHESIOLOGY

## 2023-07-13 PROCEDURE — 700111 HCHG RX REV CODE 636 W/ 250 OVERRIDE (IP): Mod: JZ

## 2023-07-13 PROCEDURE — 700102 HCHG RX REV CODE 250 W/ 637 OVERRIDE(OP)

## 2023-07-13 PROCEDURE — 160048 HCHG OR STATISTICAL LEVEL 1-5: Performed by: ORTHOPAEDIC SURGERY

## 2023-07-13 PROCEDURE — 700111 HCHG RX REV CODE 636 W/ 250 OVERRIDE (IP): Performed by: ANESTHESIOLOGY

## 2023-07-13 PROCEDURE — 99024 POSTOP FOLLOW-UP VISIT: CPT | Performed by: PHYSICIAN ASSISTANT

## 2023-07-13 PROCEDURE — 160028 HCHG SURGERY MINUTES - 1ST 30 MINS LEVEL 3: Performed by: ORTHOPAEDIC SURGERY

## 2023-07-13 PROCEDURE — 700111 HCHG RX REV CODE 636 W/ 250 OVERRIDE (IP)

## 2023-07-13 PROCEDURE — 85025 COMPLETE CBC W/AUTO DIFF WBC: CPT

## 2023-07-13 PROCEDURE — 700101 HCHG RX REV CODE 250

## 2023-07-13 PROCEDURE — 36415 COLL VENOUS BLD VENIPUNCTURE: CPT

## 2023-07-13 PROCEDURE — 20670 REMOVAL IMPLANT SUPERFICIAL: CPT | Mod: 58,LT | Performed by: ORTHOPAEDIC SURGERY

## 2023-07-13 RX ORDER — HYDRALAZINE HYDROCHLORIDE 20 MG/ML
5 INJECTION INTRAMUSCULAR; INTRAVENOUS
Status: DISCONTINUED | OUTPATIENT
Start: 2023-07-13 | End: 2023-07-13 | Stop reason: HOSPADM

## 2023-07-13 RX ORDER — DIPHENHYDRAMINE HYDROCHLORIDE 50 MG/ML
12.5 INJECTION INTRAMUSCULAR; INTRAVENOUS
Status: DISCONTINUED | OUTPATIENT
Start: 2023-07-13 | End: 2023-07-13 | Stop reason: HOSPADM

## 2023-07-13 RX ORDER — MIDAZOLAM HYDROCHLORIDE 1 MG/ML
INJECTION INTRAMUSCULAR; INTRAVENOUS PRN
Status: DISCONTINUED | OUTPATIENT
Start: 2023-07-13 | End: 2023-07-13 | Stop reason: SURG

## 2023-07-13 RX ORDER — ONDANSETRON 2 MG/ML
4 INJECTION INTRAMUSCULAR; INTRAVENOUS
Status: DISCONTINUED | OUTPATIENT
Start: 2023-07-13 | End: 2023-07-13 | Stop reason: HOSPADM

## 2023-07-13 RX ORDER — HALOPERIDOL 5 MG/ML
1 INJECTION INTRAMUSCULAR
Status: DISCONTINUED | OUTPATIENT
Start: 2023-07-13 | End: 2023-07-13 | Stop reason: HOSPADM

## 2023-07-13 RX ORDER — SODIUM CHLORIDE, SODIUM LACTATE, POTASSIUM CHLORIDE, CALCIUM CHLORIDE 600; 310; 30; 20 MG/100ML; MG/100ML; MG/100ML; MG/100ML
INJECTION, SOLUTION INTRAVENOUS
Status: DISCONTINUED | OUTPATIENT
Start: 2023-07-13 | End: 2023-07-13 | Stop reason: SURG

## 2023-07-13 RX ORDER — EPHEDRINE SULFATE 50 MG/ML
5 INJECTION, SOLUTION INTRAVENOUS
Status: DISCONTINUED | OUTPATIENT
Start: 2023-07-13 | End: 2023-07-13 | Stop reason: HOSPADM

## 2023-07-13 RX ORDER — SODIUM CHLORIDE, SODIUM LACTATE, POTASSIUM CHLORIDE, CALCIUM CHLORIDE 600; 310; 30; 20 MG/100ML; MG/100ML; MG/100ML; MG/100ML
INJECTION, SOLUTION INTRAVENOUS CONTINUOUS
Status: DISCONTINUED | OUTPATIENT
Start: 2023-07-13 | End: 2023-07-13 | Stop reason: HOSPADM

## 2023-07-13 RX ORDER — LABETALOL HYDROCHLORIDE 5 MG/ML
5 INJECTION, SOLUTION INTRAVENOUS
Status: DISCONTINUED | OUTPATIENT
Start: 2023-07-13 | End: 2023-07-13 | Stop reason: HOSPADM

## 2023-07-13 RX ORDER — CEFAZOLIN SODIUM 1 G/3ML
INJECTION, POWDER, FOR SOLUTION INTRAMUSCULAR; INTRAVENOUS PRN
Status: DISCONTINUED | OUTPATIENT
Start: 2023-07-13 | End: 2023-07-13 | Stop reason: SURG

## 2023-07-13 RX ORDER — LIDOCAINE HYDROCHLORIDE 20 MG/ML
INJECTION, SOLUTION EPIDURAL; INFILTRATION; INTRACAUDAL; PERINEURAL PRN
Status: DISCONTINUED | OUTPATIENT
Start: 2023-07-13 | End: 2023-07-13 | Stop reason: SURG

## 2023-07-13 RX ORDER — OXYCODONE HCL 5 MG/5 ML
5 SOLUTION, ORAL ORAL
Status: COMPLETED | OUTPATIENT
Start: 2023-07-13 | End: 2023-07-13

## 2023-07-13 RX ORDER — OXYCODONE HCL 5 MG/5 ML
10 SOLUTION, ORAL ORAL
Status: COMPLETED | OUTPATIENT
Start: 2023-07-13 | End: 2023-07-13

## 2023-07-13 RX ADMIN — DIVALPROEX SODIUM 500 MG: 500 TABLET, DELAYED RELEASE ORAL at 05:01

## 2023-07-13 RX ADMIN — SODIUM CHLORIDE, POTASSIUM CHLORIDE, SODIUM LACTATE AND CALCIUM CHLORIDE: 600; 310; 30; 20 INJECTION, SOLUTION INTRAVENOUS at 12:32

## 2023-07-13 RX ADMIN — ENOXAPARIN SODIUM 30 MG: 100 INJECTION SUBCUTANEOUS at 05:00

## 2023-07-13 RX ADMIN — DIVALPROEX SODIUM 500 MG: 500 TABLET, DELAYED RELEASE ORAL at 17:16

## 2023-07-13 RX ADMIN — LIDOCAINE 1 PATCH: 700 PATCH TOPICAL at 05:01

## 2023-07-13 RX ADMIN — RISPERIDONE 0.5 MG: 0.5 TABLET ORAL at 05:01

## 2023-07-13 RX ADMIN — FENTANYL CITRATE 100 MCG: 50 INJECTION, SOLUTION INTRAMUSCULAR; INTRAVENOUS at 12:35

## 2023-07-13 RX ADMIN — OXYCODONE HYDROCHLORIDE 5 MG: 5 TABLET ORAL at 05:01

## 2023-07-13 RX ADMIN — OXYCODONE HYDROCHLORIDE 5 MG: 5 SOLUTION ORAL at 12:59

## 2023-07-13 RX ADMIN — OXYCODONE HYDROCHLORIDE 5 MG: 5 TABLET ORAL at 20:40

## 2023-07-13 RX ADMIN — CEFAZOLIN 2 G: 1 INJECTION, POWDER, FOR SOLUTION INTRAMUSCULAR; INTRAVENOUS at 12:34

## 2023-07-13 RX ADMIN — ONDANSETRON 4 MG: 2 INJECTION INTRAMUSCULAR; INTRAVENOUS at 20:39

## 2023-07-13 RX ADMIN — GABAPENTIN 100 MG: 100 CAPSULE ORAL at 13:57

## 2023-07-13 RX ADMIN — PROPOFOL 50 MG: 10 INJECTION, EMULSION INTRAVENOUS at 12:39

## 2023-07-13 RX ADMIN — LIDOCAINE HYDROCHLORIDE 100 MG: 20 INJECTION, SOLUTION EPIDURAL; INFILTRATION; INTRACAUDAL at 12:38

## 2023-07-13 RX ADMIN — ENOXAPARIN SODIUM 30 MG: 100 INJECTION SUBCUTANEOUS at 17:15

## 2023-07-13 RX ADMIN — GABAPENTIN 100 MG: 100 CAPSULE ORAL at 20:40

## 2023-07-13 RX ADMIN — GABAPENTIN 100 MG: 100 CAPSULE ORAL at 05:01

## 2023-07-13 RX ADMIN — METAXALONE 800 MG: 800 TABLET ORAL at 05:01

## 2023-07-13 RX ADMIN — MIDAZOLAM 2 MG: 1 INJECTION, SOLUTION INTRAMUSCULAR; INTRAVENOUS at 12:35

## 2023-07-13 ASSESSMENT — PAIN DESCRIPTION - PAIN TYPE
TYPE: ACUTE PAIN
TYPE: ACUTE PAIN
TYPE: SURGICAL PAIN
TYPE: SURGICAL PAIN
TYPE: ACUTE PAIN
TYPE: ACUTE PAIN

## 2023-07-13 ASSESSMENT — COGNITIVE AND FUNCTIONAL STATUS - GENERAL
DAILY ACTIVITIY SCORE: 20
MOVING FROM LYING ON BACK TO SITTING ON SIDE OF FLAT BED: A LITTLE
MOBILITY SCORE: 22
TOILETING: A LITTLE
DRESSING REGULAR UPPER BODY CLOTHING: A LITTLE
SUGGESTED CMS G CODE MODIFIER DAILY ACTIVITY: CJ
HELP NEEDED FOR BATHING: A LITTLE
SUGGESTED CMS G CODE MODIFIER MOBILITY: CJ
DRESSING REGULAR LOWER BODY CLOTHING: A LITTLE
MOVING TO AND FROM BED TO CHAIR: A LITTLE

## 2023-07-13 ASSESSMENT — ENCOUNTER SYMPTOMS
COUGH: 0
NAUSEA: 1
NECK PAIN: 0
HEADACHES: 0
FEVER: 0
SHORTNESS OF BREATH: 0
ABDOMINAL PAIN: 0
DIZZINESS: 0
BACK PAIN: 0
CHILLS: 0
VOMITING: 0
MYALGIAS: 0
ROS GI COMMENTS: BM 7/12

## 2023-07-13 NOTE — PROGRESS NOTES
4 Eyes Skin Assessment Completed by Misbah RN and Sarita RN.    Head WDL  Ears WDL  Nose WDL  Mouth WDL  Neck WDL  Breast/Chest WDL  Shoulder Blades WDL  Spine WDL  (R) Arm/Elbow/Hand WDL  (L) Arm/Elbow/Hand Arm Wrapped in Ace Wrap/ REKHA, Swelling to Hand   Abdomen Healed Midline Incision, JEROME  Groin WDL  Scrotum/Coccyx/Buttocks WDL  (R) Leg WDL  (L) Leg WDL  (R) Heel/Foot/Toe WDL  (L) Heel/Foot/Toe WDL          Devices In Places Blood Pressure Cuff, Pulse Ox, and SCD's, Cervical Collar, LUE Ace Wrap + Sling      Interventions In Place Pillows, Heels Loaded W/Pillows, and Pressure Redistribution Mattress    Possible Skin Injury No    Pictures Uploaded Into Epic N/A  Wound Consult Placed N/A  RN Wound Prevention Protocol Ordered No

## 2023-07-13 NOTE — ANESTHESIA TIME REPORT
Anesthesia Start and Stop Event Times     Date Time Event    7/13/2023 1221 Ready for Procedure     1232 Anesthesia Start     1251 Anesthesia Stop        Responsible Staff  07/13/23    Name Role Begin End    Nato Miller M.D. Anesth 1232 1251        Overtime Reason:  no overtime (within assigned shift)    Comments:

## 2023-07-13 NOTE — OP REPORT
DATE OF OPERATION: 7/13/2023     PREOPERATIVE DIAGNOSIS:  1.  Left elbow fracture dislocation    POSTOPERATIVE DIAGNOSIS: Same    PROCEDURE PERFORMED:  1.  Pin removal left elbow    SURGEON: Josef Bills M.D.     ASSISTANT: Jose Warren      ANESTHESIOLOGIST: Nato Miller MD.    ANESTHESIA: General    ESTIMATED BLOOD LOSS: 0 mL    INDICATIONS: The patient is a 27 y.o. male with retained left elbow pin following operative repair.  After 6 weeks x-rays demonstrated healing bone in a located elbow.  Given these findings, hardware removal was indicated.  I discussed the risks and benefits of the procedure, including the risks of infection, wound healing complication, neurovascular injury, pain, malunion, non-union, malrotation, and the medical risks of anesthesia including DVT, PE, MI, stroke, and death.  Benefits include reduced pain and hardware complications in the future. Alternatives to surgery were also discussed, including non-operative management.  The patient signed the informed consent and the operative extremity was marked.        PROCEDURE:  The patient underwent anesthesia, and was positioned supine on a radiolucent table and all bony prominences were well padded.  Preoperative antibiotics were administered. Sequential compression devices were employed. The correct operative site was prepped and draped into a sterile field. A procedural pause was conducted to verify correct patient, correct extremity, presence of the surgeons initials on the operative extremity.     The elbow pin was removed and sterile dressings were applied.  The patient had a stable elbow with range of motion from 60 to 90 degrees.  Sling was applied     The patient tolerated the procedure well. There were no apparent complications. All sponge, needle, and instrument counts were correct on two separate occasions. They were awakened, extubated, and transferred to the recovery room in satisfactory condition.      The use of  Jose Warren as a surgical assistant was necessary for assistance with exposure, retraction,  and closure.     Post-Operative Plan:     1.  The patient should remain 1 pound weightbearing on their operative extremity.  Gait aids (crutch or crutches, cane, walker) may be used as needed, and may be discontinued when no longer required.  2.  IV antibiotics - may be continued for 24 hours, but are not required.  3.  Discharge planning   ____________________________________   Josef Bills M.D.   DD: 7/13/2023  12:53 PM

## 2023-07-13 NOTE — PROGRESS NOTES
"/71   Pulse 89   Temp 36.8 °C (98.2 °F) (Temporal)   Resp 17   Ht 1.626 m (5' 4\")   Wt 95.3 kg (210 lb)   SpO2 95%   BMI 36.05 kg/m²     Patient to T401 from PACU @1350    Patient reports pain at 4 on a scale of 0-10. Educated patient regarding pharmacologic and non pharmacologic modalities for pain management. Oriented to room call light and smoking policy.  Reviewed plan of care (equipment, incentive spirometer, sequential compression devices, medications, activity, diet, fall precautions, skin care, and pain) with patient and family. Welcome packet given and reviewed with patient, all questions answered. Education provided on oral hygiene program.    AA&Ox4. Denies CP/SOB.  See 2 RN skin note  Started on Regular diet. Denies N/V.  + void. + BM. Last BM 7/13  LUE on 1 lbs Weight lifting restriction per Ortho MD Note .  All needs met at this time. Call light within reach. Pt calls appropriately. Bed low and locked, non skid socks in place. Hourly rounding in place.    "

## 2023-07-13 NOTE — CARE PLAN
The patient is Stable - Low risk of patient condition declining or worsening    Shift Goals  Clinical Goals: Pain control and NPO at midnight  Patient Goals: pain control and rest  Family Goals: N/A    Progress made toward(s) clinical / shift goals:  Pain controlled per MAR and cold packs. Pt has been NPO since midnight. Pt sleeping intermittently throughout shift.      Problem: Knowledge Deficit - Standard  Goal: Patient and family/care givers will demonstrate understanding of plan of care, disease process/condition, diagnostic tests and medications  Outcome: Progressing     Problem: Pain - Standard  Goal: Alleviation of pain or a reduction in pain to the patient’s comfort goal  Outcome: Progressing

## 2023-07-13 NOTE — ANESTHESIA POSTPROCEDURE EVALUATION
Patient: Fareed Muhammad    Procedure Summary     Date: 07/13/23 Room / Location: [place] [address] / SURGERY [facility]    Anesthesia Start: 1232 Anesthesia Stop: 1251    Procedure: ELBOW PIN REMOVAL WITH CAST REMOVAL (Left: Elbow) Diagnosis: (infected elbow)    Surgeons: Josef Bills M.D. Responsible Provider: Nato Miller M.D.    Anesthesia Type: general ASA Status: 2          Final Anesthesia Type: general  Last vitals  BP   Blood Pressure: 122/75    Temp   37 °C (98.6 °F)    Pulse   80   Resp   17    SpO2   95 %      Anesthesia Post Evaluation    Patient location during evaluation: PACU  Patient participation: complete - patient participated  Level of consciousness: awake and alert    Airway patency: patent  Anesthetic complications: no  Cardiovascular status: hemodynamically stable  Respiratory status: acceptable  Hydration status: euvolemic    PONV: none          No notable events documented.     Nurse Pain Score: 6 (NPRS)
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
## 2023-07-13 NOTE — OR NURSING
1250 Patient arrived to PACU from OR.  Report from anesthesia and RN.  Patient is awake and alert.  Dressing to left elbow is clean, dry and soft.  Patient denies numbness or tingling to left arm/hand, able to wiggle fingers.  Patient updated on plan of care.  1300 Patient medicated per MAR for pain.  1302 Sister Mary called and updated on patient status.  1318 Report called to Misbah MACDONALD.    1325 Patient resting quietly in bed at this time.  Tolerating oral intake.  1340 Patient transferred to [facility] via bed with transport.

## 2023-07-13 NOTE — CARE PLAN
Problem: Knowledge Deficit - Standard  Goal: Patient and family/care givers will demonstrate understanding of plan of care, disease process/condition, diagnostic tests and medications  Outcome: Progressing     Problem: Pain - Standard  Goal: Alleviation of pain or a reduction in pain to the patient’s comfort goal  Outcome: Progressing     Problem: Fall Risk  Goal: Patient will remain free from falls  Outcome: Progressing   The patient is Stable - Low risk of patient condition declining or worsening    Shift Goals  Clinical Goals: Surgery Performed  Patient Goals: Pain Control  Family Goals: N/A    Progress made toward(s) clinical / shift goals:  Procedure performed this shift, patient free from falls at this time, Pain medicated per MAR, Patient free from falls at this time     Patient is not progressing towards the following goals:

## 2023-07-13 NOTE — PROGRESS NOTES
Assumed care of patient at 0645. Bedside report received. Assessment complete.  AA&Ox4. Denies CP/SOB.  Reporting 4/10 pain. Declined intervention at this time.  Educated patient regarding pharmacologic and non pharmacologic modalities for pain management.  Skin per flowsheets  NPO prior to procedure. Resumed to Regular diet after procedure. Denies N/V.  + void. +BM Last BM 7/13 per patient self report   Pt ambulates LUE 1 lbs Weight ligfting restriction. C-Collar in place at all times   All needs met at this time. Call light within reach. Pt calls appropriately. Bed low and locked, non skid socks in place. Hourly rounding in place.
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
## 2023-07-13 NOTE — PROGRESS NOTES
Bedside report received, assessment completed    A&O x  3, pt calls appropriately, disoriented to time  Mobility: Up self  Fall Risk Assessment: Low, door notifications in use  Pain Assessment / Reassessment completed, medication provided per MAR  Diet: Regular, NPO at midnight   LDA:   IV Access: 20 R FA, CDI/ flushed/ SL      GI/: + void, + flatus, 7/12 BM  DVT Prophylaxis: Lovenox, SCD's refused  Skin: per flowsheets     Reviewed plan of care with patient, bed in lowest position and locked, pt resting comfortably now, call light within reach, all needs met at this time. Interventions will be executed per plan of care

## 2023-07-13 NOTE — PROGRESS NOTES
"Patient to Pre-OP with patient transporter  Report Given to Pre-OP RN   /80   Pulse 85   Temp 37.1 °C (98.8 °F) (Temporal)   Resp 18   Ht 1.626 m (5' 4\")   Wt 95.3 kg (210 lb)   SpO2 95%   BMI 36.05 kg/m²   Patient Denies pain at this time   "

## 2023-07-14 ENCOUNTER — PHARMACY VISIT (OUTPATIENT)
Dept: PHARMACY | Facility: MEDICAL CENTER | Age: 28
End: 2023-07-14
Payer: COMMERCIAL

## 2023-07-14 ENCOUNTER — APPOINTMENT (OUTPATIENT)
Dept: RADIOLOGY | Facility: MEDICAL CENTER | Age: 28
DRG: 983 | End: 2023-07-14
Attending: SURGERY
Payer: MEDICAID

## 2023-07-14 VITALS
WEIGHT: 210 LBS | SYSTOLIC BLOOD PRESSURE: 138 MMHG | TEMPERATURE: 98.1 F | OXYGEN SATURATION: 95 % | BODY MASS INDEX: 35.85 KG/M2 | RESPIRATION RATE: 16 BRPM | HEIGHT: 64 IN | DIASTOLIC BLOOD PRESSURE: 76 MMHG | HEART RATE: 91 BPM

## 2023-07-14 LAB
ANION GAP SERPL CALC-SCNC: 11 MMOL/L (ref 7–16)
BASOPHILS # BLD AUTO: 1 % (ref 0–1.8)
BASOPHILS # BLD: 0.14 K/UL (ref 0–0.12)
BUN SERPL-MCNC: 13 MG/DL (ref 8–22)
CALCIUM SERPL-MCNC: 9.2 MG/DL (ref 8.5–10.5)
CHLORIDE SERPL-SCNC: 98 MMOL/L (ref 96–112)
CO2 SERPL-SCNC: 26 MMOL/L (ref 20–33)
CREAT SERPL-MCNC: 0.73 MG/DL (ref 0.5–1.4)
EOSINOPHIL # BLD AUTO: 1.46 K/UL (ref 0–0.51)
EOSINOPHIL NFR BLD: 10.6 % (ref 0–6.9)
ERYTHROCYTE [DISTWIDTH] IN BLOOD BY AUTOMATED COUNT: 47.4 FL (ref 35.9–50)
GFR SERPLBLD CREATININE-BSD FMLA CKD-EPI: 128 ML/MIN/1.73 M 2
GLUCOSE SERPL-MCNC: 97 MG/DL (ref 65–99)
HCT VFR BLD AUTO: 37.4 % (ref 42–52)
HGB BLD-MCNC: 11.5 G/DL (ref 14–18)
IMM GRANULOCYTES # BLD AUTO: 0.23 K/UL (ref 0–0.11)
IMM GRANULOCYTES NFR BLD AUTO: 1.7 % (ref 0–0.9)
LYMPHOCYTES # BLD AUTO: 3.03 K/UL (ref 1–4.8)
LYMPHOCYTES NFR BLD: 21.9 % (ref 22–41)
MCH RBC QN AUTO: 27.8 PG (ref 27–33)
MCHC RBC AUTO-ENTMCNC: 30.7 G/DL (ref 32.3–36.5)
MCV RBC AUTO: 90.3 FL (ref 81.4–97.8)
MONOCYTES # BLD AUTO: 1.62 K/UL (ref 0–0.85)
MONOCYTES NFR BLD AUTO: 11.7 % (ref 0–13.4)
NEUTROPHILS # BLD AUTO: 7.34 K/UL (ref 1.82–7.42)
NEUTROPHILS NFR BLD: 53.1 % (ref 44–72)
NRBC # BLD AUTO: 0 K/UL
NRBC BLD-RTO: 0 /100 WBC (ref 0–0.2)
PLATELET # BLD AUTO: 696 K/UL (ref 164–446)
PMV BLD AUTO: 9.2 FL (ref 9–12.9)
POTASSIUM SERPL-SCNC: 4.3 MMOL/L (ref 3.6–5.5)
RBC # BLD AUTO: 4.14 M/UL (ref 4.7–6.1)
SODIUM SERPL-SCNC: 135 MMOL/L (ref 135–145)
WBC # BLD AUTO: 13.8 K/UL (ref 4.8–10.8)

## 2023-07-14 PROCEDURE — A9270 NON-COVERED ITEM OR SERVICE: HCPCS

## 2023-07-14 PROCEDURE — 700102 HCHG RX REV CODE 250 W/ 637 OVERRIDE(OP)

## 2023-07-14 PROCEDURE — 700111 HCHG RX REV CODE 636 W/ 250 OVERRIDE (IP)

## 2023-07-14 PROCEDURE — 99239 HOSP IP/OBS DSCHRG MGMT >30: CPT | Mod: 24 | Performed by: PHYSICIAN ASSISTANT

## 2023-07-14 PROCEDURE — 36415 COLL VENOUS BLD VENIPUNCTURE: CPT

## 2023-07-14 PROCEDURE — 80048 BASIC METABOLIC PNL TOTAL CA: CPT

## 2023-07-14 PROCEDURE — 85025 COMPLETE CBC W/AUTO DIFF WBC: CPT

## 2023-07-14 PROCEDURE — 71045 X-RAY EXAM CHEST 1 VIEW: CPT

## 2023-07-14 PROCEDURE — RXMED WILLOW AMBULATORY MEDICATION CHARGE: Performed by: PHYSICIAN ASSISTANT

## 2023-07-14 PROCEDURE — 700101 HCHG RX REV CODE 250

## 2023-07-14 RX ORDER — METAXALONE 800 MG/1
800 TABLET ORAL 3 TIMES DAILY
Qty: 21 TABLET | Refills: 0 | Status: SHIPPED | OUTPATIENT
Start: 2023-07-14 | End: 2023-08-02

## 2023-07-14 RX ORDER — GABAPENTIN 100 MG/1
100 CAPSULE ORAL EVERY 8 HOURS
Qty: 21 CAPSULE | Refills: 0 | Status: SHIPPED | OUTPATIENT
Start: 2023-07-14 | End: 2023-08-02

## 2023-07-14 RX ORDER — LIDOCAINE 50 MG/G
1-3 PATCH TOPICAL EVERY 24 HOURS
Qty: 5 PATCH | Refills: 0 | Status: SHIPPED | OUTPATIENT
Start: 2023-07-15

## 2023-07-14 RX ADMIN — LIDOCAINE 1 PATCH: 700 PATCH TOPICAL at 05:54

## 2023-07-14 RX ADMIN — OXYCODONE HYDROCHLORIDE 5 MG: 5 TABLET ORAL at 10:38

## 2023-07-14 RX ADMIN — RISPERIDONE 0.5 MG: 0.5 TABLET ORAL at 05:54

## 2023-07-14 RX ADMIN — ONDANSETRON 4 MG: 4 TABLET, ORALLY DISINTEGRATING ORAL at 00:41

## 2023-07-14 RX ADMIN — ENOXAPARIN SODIUM 30 MG: 100 INJECTION SUBCUTANEOUS at 05:54

## 2023-07-14 RX ADMIN — OXYCODONE HYDROCHLORIDE 5 MG: 5 TABLET ORAL at 05:54

## 2023-07-14 RX ADMIN — METAXALONE 800 MG: 800 TABLET ORAL at 10:37

## 2023-07-14 RX ADMIN — DIVALPROEX SODIUM 500 MG: 500 TABLET, DELAYED RELEASE ORAL at 05:54

## 2023-07-14 RX ADMIN — GABAPENTIN 100 MG: 100 CAPSULE ORAL at 05:54

## 2023-07-14 ASSESSMENT — ENCOUNTER SYMPTOMS
MYALGIAS: 1
NAUSEA: 0
ABDOMINAL PAIN: 0
DIZZINESS: 0
ROS GI COMMENTS: 7/13
FEVER: 0
SHORTNESS OF BREATH: 0
HEADACHES: 0
CHILLS: 0
VOMITING: 0

## 2023-07-14 ASSESSMENT — PAIN DESCRIPTION - PAIN TYPE: TYPE: ACUTE PAIN

## 2023-07-14 NOTE — PROGRESS NOTES
Peripheral IV has been removed. Gauze and tape applied. Patient is dressed and has belongings packed.

## 2023-07-14 NOTE — PROGRESS NOTES
"     Orthopedic PA Progress Note    Interval changes:  Patient doing well postop.  LUE dressings are CDI  1 lb WB to LUE in sling  Follow up with Dr. Bills's clinic in 2 weeks  Cleared for DC from orthopedic standpoint pending therapy recs and medical optimization    ROS - Patient denies any new issues.  Denies any numbness or tingling. Pain well controlled.    /76   Pulse 91   Temp 36.7 °C (98.1 °F) (Temporal)   Resp 16   Ht 1.626 m (5' 4\")   Wt 95.3 kg (210 lb)   SpO2 95%     Patient seen and examined  No acute distress  Breathing non labored  RRR  LUE: Surgical dressing is clean, dry, and intact. Patient clearly fires forearm flexors, forearm extensors, and moves all five fingers without issue . Sensation is intact to light touch throughout median, ulnar, and radial nerve distributions. Strong and palpable radial pulses with capillary refill less than 2 seconds.     Recent Labs     07/13/23  0054 07/13/23  0750 07/14/23  0251   WBC 17.1* 14.9* 13.8*   RBC 3.99* 4.25* 4.14*   HEMOGLOBIN 11.3* 12.0* 11.5*   HEMATOCRIT 36.5* 38.0* 37.4*   MCV 91.5 89.4 90.3   MCH 28.3 28.2 27.8   MCHC 31.0* 31.6* 30.7*   RDW 47.2 47.4 47.4   PLATELETCT 704* 722* 696*   MPV 9.2 9.4 9.2       Active Hospital Problems    Diagnosis     Psychiatric diagnosis [F99]      Priority: Medium    Rotatory subluxation of atlantoaxial joint [S13.120A]      Priority: Medium    Open fracture dislocation of left elbow joint [S42.402B]      Priority: Medium    Post-splenectomy [Z90.81]      Priority: Low    Trauma [T14.90XA]      Priority: Low    No contraindication to deep vein thrombosis (DVT) prophylaxis [Z78.9]      Priority: Low       Assessment/Plan:  Patient doing well postop.  LUE dressings are CDI  1 lb WB to LUE in sling  Follow up with Dr. Bills's clinic in 2 weeks  Cleared for DC from orthopedic standpoint pending therapy recs and medical optimization    POD#1 S/p  Pin removal left elbow  Wt bearing status - 1 lb WB " VARGASE  Wound care/Drains - Dressings to be changed every other day by nursing. Or PRN for saturation starting POD#2  Future Procedures - None planned   Sutures/Staples out- 14-21 days post operatively. Removal will completed by ortho ADAM's unless transferred.  PT/OT-initiated  Antibiotics:  Perioperative completed  DVT Prophylaxis- TEDS/SCDs/Foot pumps  Edwards-not needed per ortho  Case Coordination for Discharge Planning - Disposition- back to [facility]

## 2023-07-14 NOTE — CARE PLAN
The patient is Stable - Low risk of patient condition declining or worsening    Shift Goals  Clinical Goals: pain control, ambulation  Patient Goals: pain control  Family Goals: n/a    Progress made toward(s) clinical / shift goals:  Pain has been medicated per MAR. C-collar remains in place. Patient ambulates well.     Patient is not progressing towards the following goals: D/C orders.    Problem: Knowledge Deficit - Standard  Goal: Patient and family/care givers will demonstrate understanding of plan of care, disease process/condition, diagnostic tests and medications  Outcome: Progressing     Problem: Pain - Standard  Goal: Alleviation of pain or a reduction in pain to the patient’s comfort goal  Outcome: Progressing     Problem: Fall Risk  Goal: Patient will remain free from falls  Outcome: Progressing

## 2023-07-14 NOTE — DISCHARGE PLANNING
Case Management Discharge Planning    Admission Date: 7/7/2023  GMLOS: 2.1  ALOS: 7    6-Clicks ADL Score: 20  6-Clicks Mobility Score: 22      Anticipated Discharge Dispo: Discharge Disposition: Discharged to home/self care (01)    DME Needed: No    Action(s) Taken: Updated Provider/Nurse on Discharge Plan    Escalations Completed: [facility] CM    Medically Clear: Yes    Next Steps:   Per rounds, pt is medically cleared to DC today.    Called [facility] , left msg to return call.       1020: Received a call back from Anna with [facility]. They do intakes M-F, lates time is 1500. Pt can come there to do an assessment/intake then go from there. They have a waitlist, so if pt is not able to get a room today, they will transport pt to the [facility] after assessment.    Cab voucher to [facility] office provided for pt.
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
## 2023-07-14 NOTE — PROGRESS NOTES
Pt has discharged to [facility] with all belongings. Discharge medications were delivered to patient.

## 2023-07-14 NOTE — CARE PLAN
The patient is Stable - Low risk of patient condition declining or worsening    Shift Goals  Clinical Goals: pain control, ambulation  Patient Goals: pain control  Family Goals: n/a    Progress made toward(s) clinical / shift goals:  Patient ambulates well. Pain is well controlled. Patient has received discharge orders.     Patient is not progressing towards the following goals: N/A

## 2023-07-14 NOTE — DISCHARGE INSTRUCTIONS
- Call or seek medical attention for questions or concerns  - Follow up with the [facility] RETURN: 1 week  - Follow up with Dr. Josef Bills in ~2 weeks time  - Follow up with primary care provider as scheduled July 31st, 2023   - Follow up with Dr. Nadir Rodriguez in 6 weeks time. Remain in c-collar until follow up.   - Resume regular diet  - May take over the counter acetaminophen or ibuprofen as needed for pain  - Continue daily over the counter stool softener while on narcotics  - No operation of machinery or motorized vehicles while under the influence of narcotics  - No alcohol, marijuana or illicit drug use while under the influence of narcotics  - In the event of a narcotic overdose naloxone (Narcan) is available without a prescription from any [facility] or [facility]  - No swimming, hot tubs, baths or wound submersion until cleared by outpatient provider. May shower  - No contact sports, strenuous activities, or heavy lifting until cleared by outpatient provider  - If respiratory decompensation, persistent or worsening pain, or signs or symptoms of infection occur seek medical attention

## 2023-07-14 NOTE — CARE PLAN
The patient is Stable - Low risk of patient condition declining or worsening    Shift Goals  Clinical Goals: Pain/Nausea control and rest  Patient Goals: Pain/ nausea control  Family Goals: N/A    Progress made toward(s) clinical / shift goals:  Pain and nausea controlled per MAR. Pt slept intermittently throughout shift.      Problem: Knowledge Deficit - Standard  Goal: Patient and family/care givers will demonstrate understanding of plan of care, disease process/condition, diagnostic tests and medications  Outcome: Progressing     Problem: Pain - Standard  Goal: Alleviation of pain or a reduction in pain to the patient’s comfort goal  Outcome: Progressing
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
## 2023-07-14 NOTE — PROGRESS NOTES
Bedside report received, assessment completed     A&O x  3, pt calls appropriately, disoriented to time  Mobility: Up self  Fall Risk Assessment: Low, door notifications in use  Pain Assessment / Reassessment completed, medication provided per MAR  Diet: Regular  LDA:   IV Access: 20 R FA, CDI/ flushed/ SL        GI/: + void, + flatus, 7/13 BM  DVT Prophylaxis: Lovenox, SCD's refused  Skin: per flowsheets      Reviewed plan of care with patient, bed in lowest position and locked, pt resting comfortably now, call light within reach, all needs met at this time. Interventions will be executed per plan of care
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
## 2023-07-24 ENCOUNTER — HOSPITAL ENCOUNTER (EMERGENCY)
Facility: MEDICAL CENTER | Age: 28
End: 2023-07-24
Attending: EMERGENCY MEDICINE
Payer: MEDICAID

## 2023-07-24 ENCOUNTER — PHARMACY VISIT (OUTPATIENT)
Dept: PHARMACY | Facility: MEDICAL CENTER | Age: 28
End: 2023-07-24
Payer: COMMERCIAL

## 2023-07-24 ENCOUNTER — APPOINTMENT (OUTPATIENT)
Dept: RADIOLOGY | Facility: MEDICAL CENTER | Age: 28
End: 2023-07-24
Attending: EMERGENCY MEDICINE
Payer: MEDICAID

## 2023-07-24 VITALS
BODY MASS INDEX: 30.99 KG/M2 | TEMPERATURE: 97 F | WEIGHT: 221.34 LBS | HEART RATE: 95 BPM | DIASTOLIC BLOOD PRESSURE: 80 MMHG | HEIGHT: 71 IN | SYSTOLIC BLOOD PRESSURE: 138 MMHG | RESPIRATION RATE: 16 BRPM | OXYGEN SATURATION: 97 %

## 2023-07-24 DIAGNOSIS — S09.90XA CLOSED HEAD INJURY, INITIAL ENCOUNTER: ICD-10-CM

## 2023-07-24 DIAGNOSIS — S22.49XS CLOSED FRACTURE OF MULTIPLE RIBS, UNSPECIFIED LATERALITY, SEQUELA: ICD-10-CM

## 2023-07-24 DIAGNOSIS — S16.1XXA STRAIN OF NECK MUSCLE, INITIAL ENCOUNTER: ICD-10-CM

## 2023-07-24 PROCEDURE — 99284 EMERGENCY DEPT VISIT MOD MDM: CPT

## 2023-07-24 PROCEDURE — 73110 X-RAY EXAM OF WRIST: CPT | Mod: LT

## 2023-07-24 PROCEDURE — RXMED WILLOW AMBULATORY MEDICATION CHARGE: Performed by: EMERGENCY MEDICINE

## 2023-07-24 PROCEDURE — 73080 X-RAY EXAM OF ELBOW: CPT | Mod: LT

## 2023-07-24 PROCEDURE — 29125 APPL SHORT ARM SPLINT STATIC: CPT

## 2023-07-24 PROCEDURE — 72125 CT NECK SPINE W/O DYE: CPT

## 2023-07-24 PROCEDURE — 302874 HCHG BANDAGE ACE 2 OR 3""

## 2023-07-24 PROCEDURE — 71101 X-RAY EXAM UNILAT RIBS/CHEST: CPT | Mod: LT

## 2023-07-24 PROCEDURE — 70450 CT HEAD/BRAIN W/O DYE: CPT

## 2023-07-24 RX ORDER — IBUPROFEN 800 MG/1
800 TABLET ORAL EVERY 8 HOURS PRN
Qty: 30 TABLET | Refills: 0 | Status: SHIPPED | OUTPATIENT
Start: 2023-07-24

## 2023-07-24 RX ORDER — CYCLOBENZAPRINE HCL 10 MG
10 TABLET ORAL 3 TIMES DAILY PRN
Qty: 15 TABLET | Refills: 0 | Status: SHIPPED | OUTPATIENT
Start: 2023-07-24

## 2023-07-24 RX ORDER — ALBUTEROL SULFATE 90 UG/1
2 AEROSOL, METERED RESPIRATORY (INHALATION) EVERY 6 HOURS PRN
Qty: 8.5 G | Refills: 0 | Status: SHIPPED | OUTPATIENT
Start: 2023-07-24

## 2023-07-24 ASSESSMENT — FIBROSIS 4 INDEX: FIB4 SCORE: 0.25

## 2023-07-24 NOTE — Clinical Note
Detail Level: Generalized
Fareed Muhammad was seen and treated in our emergency department on 7/24/2023.  He may return to work on 07/27/2023.       If you have any questions or concerns, please don't hesitate to call.      Hemant Edgar D.O.
Detail Level: Detailed
Detail Level: Zone

## 2023-07-24 NOTE — ED NOTES
Incentive spirometer provided with education. Patient demonstrated proper use and verbalized understanding of use if device.
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
## 2023-08-21 ENCOUNTER — HOSPITAL ENCOUNTER (EMERGENCY)
Facility: MEDICAL CENTER | Age: 28
End: 2023-08-22
Attending: EMERGENCY MEDICINE | Admitting: SURGERY
Payer: MEDICAID

## 2023-08-21 ENCOUNTER — APPOINTMENT (OUTPATIENT)
Dept: RADIOLOGY | Facility: MEDICAL CENTER | Age: 28
End: 2023-08-21
Attending: EMERGENCY MEDICINE
Payer: MEDICAID

## 2023-08-21 DIAGNOSIS — S52.022A CLOSED FRACTURE OF OLECRANON PROCESS OF LEFT ULNA, INITIAL ENCOUNTER: ICD-10-CM

## 2023-08-21 DIAGNOSIS — S01.81XA CHIN LACERATION, INITIAL ENCOUNTER: ICD-10-CM
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
## 2023-08-21 DIAGNOSIS — F10.920 ALCOHOLIC INTOXICATION WITHOUT COMPLICATION (HCC): ICD-10-CM

## 2023-08-21 DIAGNOSIS — S09.90XA CLOSED HEAD INJURY, INITIAL ENCOUNTER: ICD-10-CM

## 2023-08-21 DIAGNOSIS — S00.83XA CONTUSION OF FACE, INITIAL ENCOUNTER: ICD-10-CM

## 2023-08-21 PROBLEM — Z87.828: Status: ACTIVE | Noted: 2023-08-21

## 2023-08-21 PROBLEM — F10.929 ACUTE ALCOHOL INTOXICATION (HCC): Status: ACTIVE | Noted: 2023-08-21

## 2023-08-21 PROBLEM — T14.90XA TRAUMA: Status: ACTIVE | Noted: 2023-08-21

## 2023-08-21 LAB
ABO + RH BLD: NORMAL
ABO GROUP BLD: NORMAL
ALBUMIN SERPL BCP-MCNC: 4.2 G/DL (ref 3.2–4.9)
ALBUMIN/GLOB SERPL: 1.2 G/DL
ALP SERPL-CCNC: 167 U/L (ref 30–99)
ALT SERPL-CCNC: 14 U/L (ref 2–50)
ANION GAP SERPL CALC-SCNC: 19 MMOL/L (ref 7–16)
APTT PPP: 29.8 SEC (ref 24.7–36)
AST SERPL-CCNC: 15 U/L (ref 12–45)
BILIRUB SERPL-MCNC: 0.2 MG/DL (ref 0.1–1.5)
BLD GP AB SCN SERPL QL: NORMAL
BUN SERPL-MCNC: 9 MG/DL (ref 8–22)
CALCIUM ALBUM COR SERPL-MCNC: 8.9 MG/DL (ref 8.5–10.5)
CALCIUM SERPL-MCNC: 9.1 MG/DL (ref 8.5–10.5)
CHLORIDE SERPL-SCNC: 97 MMOL/L (ref 96–112)
CO2 SERPL-SCNC: 19 MMOL/L (ref 20–33)
CREAT SERPL-MCNC: 0.92 MG/DL (ref 0.5–1.4)
ERYTHROCYTE [DISTWIDTH] IN BLOOD BY AUTOMATED COUNT: 49.9 FL (ref 35.9–50)
ETHANOL BLD-MCNC: 251 MG/DL
GFR SERPLBLD CREATININE-BSD FMLA CKD-EPI: 117 ML/MIN/1.73 M 2
GLOBULIN SER CALC-MCNC: 3.6 G/DL (ref 1.9–3.5)
GLUCOSE SERPL-MCNC: 93 MG/DL (ref 65–99)
HCT VFR BLD AUTO: 44 % (ref 42–52)
HGB BLD-MCNC: 14.1 G/DL (ref 14–18)
INR PPP: 1.05 (ref 0.87–1.13)
MCH RBC QN AUTO: 28.1 PG (ref 27–33)
MCHC RBC AUTO-ENTMCNC: 32 G/DL (ref 32.3–36.5)
MCV RBC AUTO: 87.8 FL (ref 81.4–97.8)
PLATELET # BLD AUTO: 610 K/UL (ref 164–446)
PMV BLD AUTO: 9.3 FL (ref 9–12.9)
POTASSIUM SERPL-SCNC: 3.4 MMOL/L (ref 3.6–5.5)
PROT SERPL-MCNC: 7.8 G/DL (ref 6–8.2)
PROTHROMBIN TIME: 13.6 SEC (ref 12–14.6)
RBC # BLD AUTO: 5.01 M/UL (ref 4.7–6.1)
RH BLD: NORMAL
SODIUM SERPL-SCNC: 135 MMOL/L (ref 135–145)
WBC # BLD AUTO: 18 K/UL (ref 4.8–10.8)

## 2023-08-21 PROCEDURE — 700111 HCHG RX REV CODE 636 W/ 250 OVERRIDE (IP): Performed by: EMERGENCY MEDICINE

## 2023-08-21 PROCEDURE — 304999 HCHG REPAIR-SIMPLE/INTERMED LEVEL 1

## 2023-08-21 PROCEDURE — 86900 BLOOD TYPING SEROLOGIC ABO: CPT

## 2023-08-21 PROCEDURE — 700105 HCHG RX REV CODE 258: Performed by: EMERGENCY MEDICINE

## 2023-08-21 PROCEDURE — 86850 RBC ANTIBODY SCREEN: CPT

## 2023-08-21 PROCEDURE — 304217 HCHG IRRIGATION SYSTEM

## 2023-08-21 PROCEDURE — 302875 HCHG BANDAGE ACE 4 OR 6""

## 2023-08-21 PROCEDURE — 99283 EMERGENCY DEPT VISIT LOW MDM: CPT | Performed by: SURGERY

## 2023-08-21 PROCEDURE — 70486 CT MAXILLOFACIAL W/O DYE: CPT

## 2023-08-21 PROCEDURE — 72128 CT CHEST SPINE W/O DYE: CPT

## 2023-08-21 PROCEDURE — 700117 HCHG RX CONTRAST REV CODE 255: Performed by: EMERGENCY MEDICINE

## 2023-08-21 PROCEDURE — 85027 COMPLETE CBC AUTOMATED: CPT

## 2023-08-21 PROCEDURE — 305948 HCHG GREEN TRAUMA ACT PRE-NOTIFY NO CC

## 2023-08-21 PROCEDURE — 80053 COMPREHEN METABOLIC PANEL: CPT

## 2023-08-21 PROCEDURE — 71260 CT THORAX DX C+: CPT

## 2023-08-21 PROCEDURE — 70450 CT HEAD/BRAIN W/O DYE: CPT

## 2023-08-21 PROCEDURE — 90471 IMMUNIZATION ADMIN: CPT

## 2023-08-21 PROCEDURE — 36415 COLL VENOUS BLD VENIPUNCTURE: CPT

## 2023-08-21 PROCEDURE — 72170 X-RAY EXAM OF PELVIS: CPT

## 2023-08-21 PROCEDURE — 71045 X-RAY EXAM CHEST 1 VIEW: CPT

## 2023-08-21 PROCEDURE — 85730 THROMBOPLASTIN TIME PARTIAL: CPT

## 2023-08-21 PROCEDURE — 29105 APPLICATION LONG ARM SPLINT: CPT

## 2023-08-21 PROCEDURE — 84100 ASSAY OF PHOSPHORUS: CPT

## 2023-08-21 PROCEDURE — 82077 ASSAY SPEC XCP UR&BREATH IA: CPT

## 2023-08-21 PROCEDURE — 302874 HCHG BANDAGE ACE 2 OR 3""

## 2023-08-21 PROCEDURE — 85610 PROTHROMBIN TIME: CPT

## 2023-08-21 PROCEDURE — 83735 ASSAY OF MAGNESIUM: CPT

## 2023-08-21 PROCEDURE — 99285 EMERGENCY DEPT VISIT HI MDM: CPT

## 2023-08-21 PROCEDURE — 96375 TX/PRO/DX INJ NEW DRUG ADDON: CPT | Mod: XU

## 2023-08-21 PROCEDURE — 90715 TDAP VACCINE 7 YRS/> IM: CPT | Performed by: EMERGENCY MEDICINE

## 2023-08-21 PROCEDURE — 72125 CT NECK SPINE W/O DYE: CPT

## 2023-08-21 PROCEDURE — 86901 BLOOD TYPING SEROLOGIC RH(D): CPT

## 2023-08-21 PROCEDURE — 72131 CT LUMBAR SPINE W/O DYE: CPT

## 2023-08-21 PROCEDURE — 700101 HCHG RX REV CODE 250: Performed by: EMERGENCY MEDICINE

## 2023-08-21 PROCEDURE — 303747 HCHG EXTRA SUTURE

## 2023-08-21 PROCEDURE — 73070 X-RAY EXAM OF ELBOW: CPT | Mod: LT

## 2023-08-21 RX ORDER — BISACODYL 10 MG
10 SUPPOSITORY, RECTAL RECTAL
Status: DISCONTINUED | OUTPATIENT
Start: 2023-08-21 | End: 2023-08-22 | Stop reason: HOSPADM

## 2023-08-21 RX ORDER — LORAZEPAM 2 MG/1
2 TABLET ORAL
Status: DISCONTINUED | OUTPATIENT
Start: 2023-08-21 | End: 2023-08-22 | Stop reason: HOSPADM

## 2023-08-21 RX ORDER — ONDANSETRON 2 MG/ML
4 INJECTION INTRAMUSCULAR; INTRAVENOUS EVERY 4 HOURS PRN
Status: DISCONTINUED | OUTPATIENT
Start: 2023-08-21 | End: 2023-08-22 | Stop reason: HOSPADM

## 2023-08-21 RX ORDER — ENEMA 19; 7 G/133ML; G/133ML
1 ENEMA RECTAL
Status: DISCONTINUED | OUTPATIENT
Start: 2023-08-21 | End: 2023-08-22 | Stop reason: HOSPADM

## 2023-08-21 RX ORDER — SODIUM CHLORIDE, SODIUM LACTATE, POTASSIUM CHLORIDE, CALCIUM CHLORIDE 600; 310; 30; 20 MG/100ML; MG/100ML; MG/100ML; MG/100ML
1000 INJECTION, SOLUTION INTRAVENOUS ONCE
Status: DISCONTINUED | OUTPATIENT
Start: 2023-08-21 | End: 2023-08-21

## 2023-08-21 RX ORDER — LORAZEPAM 2 MG/ML
1.5 INJECTION INTRAMUSCULAR
Status: DISCONTINUED | OUTPATIENT
Start: 2023-08-21 | End: 2023-08-22 | Stop reason: HOSPADM

## 2023-08-21 RX ORDER — SODIUM CHLORIDE 9 MG/ML
1000 INJECTION, SOLUTION INTRAVENOUS ONCE
Status: COMPLETED | OUTPATIENT
Start: 2023-08-21 | End: 2023-08-21

## 2023-08-21 RX ORDER — HYDROMORPHONE HYDROCHLORIDE 1 MG/ML
0.5 INJECTION, SOLUTION INTRAMUSCULAR; INTRAVENOUS; SUBCUTANEOUS
Status: DISCONTINUED | OUTPATIENT
Start: 2023-08-21 | End: 2023-08-22 | Stop reason: HOSPADM

## 2023-08-21 RX ORDER — ENOXAPARIN SODIUM 100 MG/ML
30 INJECTION SUBCUTANEOUS EVERY 12 HOURS
Status: DISCONTINUED | OUTPATIENT
Start: 2023-08-22 | End: 2023-08-22 | Stop reason: HOSPADM

## 2023-08-21 RX ORDER — METAXALONE 800 MG/1
800 TABLET ORAL 3 TIMES DAILY
Status: DISCONTINUED | OUTPATIENT
Start: 2023-08-22 | End: 2023-08-22 | Stop reason: HOSPADM

## 2023-08-21 RX ORDER — LORAZEPAM 2 MG/ML
1 INJECTION INTRAMUSCULAR ONCE
Status: COMPLETED | OUTPATIENT
Start: 2023-08-21 | End: 2023-08-21

## 2023-08-21 RX ORDER — OXYCODONE HYDROCHLORIDE 10 MG/1
10 TABLET ORAL
Status: DISCONTINUED | OUTPATIENT
Start: 2023-08-21 | End: 2023-08-22 | Stop reason: HOSPADM

## 2023-08-21 RX ORDER — CELECOXIB 100 MG/1
200 CAPSULE ORAL 2 TIMES DAILY PRN
Status: DISCONTINUED | OUTPATIENT
Start: 2023-08-26 | End: 2023-08-22 | Stop reason: HOSPADM

## 2023-08-21 RX ORDER — LORAZEPAM 2 MG/1
4 TABLET ORAL
Status: DISCONTINUED | OUTPATIENT
Start: 2023-08-21 | End: 2023-08-22 | Stop reason: HOSPADM

## 2023-08-21 RX ORDER — LORAZEPAM 2 MG/ML
0.5 INJECTION INTRAMUSCULAR EVERY 4 HOURS PRN
Status: DISCONTINUED | OUTPATIENT
Start: 2023-08-21 | End: 2023-08-22 | Stop reason: HOSPADM

## 2023-08-21 RX ORDER — LORAZEPAM 2 MG/ML
2 INJECTION INTRAMUSCULAR
Status: DISCONTINUED | OUTPATIENT
Start: 2023-08-21 | End: 2023-08-22 | Stop reason: HOSPADM

## 2023-08-21 RX ORDER — ACETAMINOPHEN 500 MG
1000 TABLET ORAL EVERY 6 HOURS PRN
Status: DISCONTINUED | OUTPATIENT
Start: 2023-08-27 | End: 2023-08-22 | Stop reason: HOSPADM

## 2023-08-21 RX ORDER — POLYETHYLENE GLYCOL 3350 17 G/17G
1 POWDER, FOR SOLUTION ORAL 2 TIMES DAILY
Status: DISCONTINUED | OUTPATIENT
Start: 2023-08-21 | End: 2023-08-22 | Stop reason: HOSPADM

## 2023-08-21 RX ORDER — SODIUM CHLORIDE, SODIUM LACTATE, POTASSIUM CHLORIDE, CALCIUM CHLORIDE 600; 310; 30; 20 MG/100ML; MG/100ML; MG/100ML; MG/100ML
INJECTION, SOLUTION INTRAVENOUS CONTINUOUS
Status: DISCONTINUED | OUTPATIENT
Start: 2023-08-21 | End: 2023-08-22 | Stop reason: HOSPADM

## 2023-08-21 RX ORDER — OXYCODONE HYDROCHLORIDE 5 MG/1
5 TABLET ORAL
Status: DISCONTINUED | OUTPATIENT
Start: 2023-08-21 | End: 2023-08-22 | Stop reason: HOSPADM

## 2023-08-21 RX ORDER — LIDOCAINE HYDROCHLORIDE AND EPINEPHRINE 10; 10 MG/ML; UG/ML
10 INJECTION, SOLUTION INFILTRATION; PERINEURAL ONCE
Status: COMPLETED | OUTPATIENT
Start: 2023-08-21 | End: 2023-08-21

## 2023-08-21 RX ORDER — LORAZEPAM 2 MG/ML
1 INJECTION INTRAMUSCULAR
Status: DISCONTINUED | OUTPATIENT
Start: 2023-08-21 | End: 2023-08-22 | Stop reason: HOSPADM

## 2023-08-21 RX ORDER — LORAZEPAM 1 MG/1
0.5 TABLET ORAL EVERY 4 HOURS PRN
Status: DISCONTINUED | OUTPATIENT
Start: 2023-08-21 | End: 2023-08-22 | Stop reason: HOSPADM

## 2023-08-21 RX ORDER — LORAZEPAM 1 MG/1
1 TABLET ORAL EVERY 4 HOURS PRN
Status: DISCONTINUED | OUTPATIENT
Start: 2023-08-21 | End: 2023-08-22 | Stop reason: HOSPADM

## 2023-08-21 RX ORDER — GAUZE BANDAGE 2" X 2"
100 BANDAGE TOPICAL DAILY
Status: DISCONTINUED | OUTPATIENT
Start: 2023-08-22 | End: 2023-08-22 | Stop reason: HOSPADM

## 2023-08-21 RX ORDER — ACETAMINOPHEN 500 MG
1000 TABLET ORAL EVERY 6 HOURS
Status: DISCONTINUED | OUTPATIENT
Start: 2023-08-22 | End: 2023-08-22 | Stop reason: HOSPADM

## 2023-08-21 RX ORDER — AMOXICILLIN 250 MG
1 CAPSULE ORAL NIGHTLY
Status: DISCONTINUED | OUTPATIENT
Start: 2023-08-21 | End: 2023-08-22 | Stop reason: HOSPADM

## 2023-08-21 RX ORDER — AMOXICILLIN 250 MG
1 CAPSULE ORAL
Status: DISCONTINUED | OUTPATIENT
Start: 2023-08-21 | End: 2023-08-22 | Stop reason: HOSPADM

## 2023-08-21 RX ORDER — GABAPENTIN 100 MG/1
100 CAPSULE ORAL EVERY 8 HOURS
Status: DISCONTINUED | OUTPATIENT
Start: 2023-08-21 | End: 2023-08-22 | Stop reason: HOSPADM

## 2023-08-21 RX ORDER — ONDANSETRON 4 MG/1
4 TABLET, ORALLY DISINTEGRATING ORAL EVERY 4 HOURS PRN
Status: DISCONTINUED | OUTPATIENT
Start: 2023-08-21 | End: 2023-08-22 | Stop reason: HOSPADM

## 2023-08-21 RX ORDER — FOLIC ACID 1 MG/1
1 TABLET ORAL DAILY
Status: DISCONTINUED | OUTPATIENT
Start: 2023-08-22 | End: 2023-08-22 | Stop reason: HOSPADM

## 2023-08-21 RX ORDER — CELECOXIB 100 MG/1
200 CAPSULE ORAL 2 TIMES DAILY
Status: DISCONTINUED | OUTPATIENT
Start: 2023-08-21 | End: 2023-08-22 | Stop reason: HOSPADM

## 2023-08-21 RX ORDER — DOCUSATE SODIUM 100 MG/1
100 CAPSULE, LIQUID FILLED ORAL 2 TIMES DAILY
Status: DISCONTINUED | OUTPATIENT
Start: 2023-08-21 | End: 2023-08-22 | Stop reason: HOSPADM

## 2023-08-21 RX ADMIN — IOHEXOL 100 ML: 350 INJECTION, SOLUTION INTRAVENOUS at 19:10

## 2023-08-21 RX ADMIN — LIDOCAINE HYDROCHLORIDE,EPINEPHRINE BITARTRATE 10 ML: 10; .01 INJECTION, SOLUTION INFILTRATION; PERINEURAL at 23:45

## 2023-08-21 RX ADMIN — CLOSTRIDIUM TETANI TOXOID ANTIGEN (FORMALDEHYDE INACTIVATED), CORYNEBACTERIUM DIPHTHERIAE TOXOID ANTIGEN (FORMALDEHYDE INACTIVATED), BORDETELLA PERTUSSIS TOXOID ANTIGEN (GLUTARALDEHYDE INACTIVATED), BORDETELLA PERTUSSIS FILAMENTOUS HEMAGGLUTININ ANTIGEN (FORMALDEHYDE INACTIVATED), BORDETELLA PERTUSSIS PERTACTIN ANTIGEN, AND BORDETELLA PERTUSSIS FIMBRIAE 2/3 ANTIGEN 0.5 ML: 5; 2; 2.5; 5; 3; 5 INJECTION, SUSPENSION INTRAMUSCULAR at 23:20

## 2023-08-21 RX ADMIN — LORAZEPAM 1 MG: 2 INJECTION INTRAMUSCULAR; INTRAVENOUS at 19:56

## 2023-08-21 RX ADMIN — LORAZEPAM 1 MG: 2 INJECTION INTRAMUSCULAR; INTRAVENOUS at 19:30

## 2023-08-21 RX ADMIN — SODIUM CHLORIDE 1000 ML: 9 INJECTION, SOLUTION INTRAVENOUS at 19:28

## 2023-08-22 ENCOUNTER — APPOINTMENT (OUTPATIENT)
Dept: RADIOLOGY | Facility: MEDICAL CENTER | Age: 28
End: 2023-08-22
Attending: NURSE PRACTITIONER
Payer: MEDICAID

## 2023-08-22 ENCOUNTER — APPOINTMENT (OUTPATIENT)
Dept: RADIOLOGY | Facility: MEDICAL CENTER | Age: 28
End: 2023-08-22
Attending: STUDENT IN AN ORGANIZED HEALTH CARE EDUCATION/TRAINING PROGRAM
Payer: MEDICAID

## 2023-08-22 VITALS
TEMPERATURE: 97.5 F | WEIGHT: 220 LBS | OXYGEN SATURATION: 91 % | SYSTOLIC BLOOD PRESSURE: 139 MMHG | DIASTOLIC BLOOD PRESSURE: 82 MMHG | BODY MASS INDEX: 32.58 KG/M2 | RESPIRATION RATE: 18 BRPM | HEART RATE: 87 BPM | HEIGHT: 69 IN

## 2023-08-22 PROBLEM — Z78.9 NO CONTRAINDICATION TO DEEP VEIN THROMBOSIS (DVT) PROPHYLAXIS: Status: ACTIVE | Noted: 2023-08-22

## 2023-08-22 LAB
MAGNESIUM SERPL-MCNC: 2.1 MG/DL (ref 1.5–2.5)
PHOSPHATE SERPL-MCNC: 2.3 MG/DL (ref 2.5–4.5)

## 2023-08-22 PROCEDURE — 71045 X-RAY EXAM CHEST 1 VIEW: CPT

## 2023-08-22 PROCEDURE — 96376 TX/PRO/DX INJ SAME DRUG ADON: CPT

## 2023-08-22 PROCEDURE — A9270 NON-COVERED ITEM OR SERVICE: HCPCS | Performed by: NURSE PRACTITIONER

## 2023-08-22 PROCEDURE — 700105 HCHG RX REV CODE 258: Mod: JZ | Performed by: NURSE PRACTITIONER

## 2023-08-22 PROCEDURE — 700101 HCHG RX REV CODE 250: Performed by: NURSE PRACTITIONER

## 2023-08-22 PROCEDURE — 700111 HCHG RX REV CODE 636 W/ 250 OVERRIDE (IP): Mod: JZ | Performed by: NURSE PRACTITIONER

## 2023-08-22 PROCEDURE — 96365 THER/PROPH/DIAG IV INF INIT: CPT

## 2023-08-22 PROCEDURE — 700102 HCHG RX REV CODE 250 W/ 637 OVERRIDE(OP): Performed by: NURSE PRACTITIONER

## 2023-08-22 PROCEDURE — 96366 THER/PROPH/DIAG IV INF ADDON: CPT

## 2023-08-22 RX ORDER — IBUPROFEN 800 MG/1
800 TABLET ORAL EVERY 8 HOURS PRN
COMMUNITY

## 2023-08-22 RX ORDER — CYCLOBENZAPRINE HCL 10 MG
10 TABLET ORAL 3 TIMES DAILY PRN
COMMUNITY

## 2023-08-22 RX ORDER — GABAPENTIN 100 MG/1
100 CAPSULE ORAL EVERY 8 HOURS PRN
COMMUNITY

## 2023-08-22 RX ORDER — METAXALONE 800 MG/1
800 TABLET ORAL 3 TIMES DAILY
COMMUNITY

## 2023-08-22 RX ADMIN — GABAPENTIN 100 MG: 100 CAPSULE ORAL at 01:36

## 2023-08-22 RX ADMIN — ACETAMINOPHEN 1000 MG: 500 TABLET, FILM COATED ORAL at 01:36

## 2023-08-22 RX ADMIN — OXYCODONE HYDROCHLORIDE 10 MG: 10 TABLET ORAL at 04:07

## 2023-08-22 RX ADMIN — Medication 100 MG: at 06:51

## 2023-08-22 RX ADMIN — ACETAMINOPHEN 1000 MG: 500 TABLET, FILM COATED ORAL at 06:49

## 2023-08-22 RX ADMIN — SODIUM CHLORIDE, POTASSIUM CHLORIDE, SODIUM LACTATE AND CALCIUM CHLORIDE: 600; 310; 30; 20 INJECTION, SOLUTION INTRAVENOUS at 02:22

## 2023-08-22 RX ADMIN — CELECOXIB 200 MG: 100 CAPSULE ORAL at 06:50

## 2023-08-22 RX ADMIN — MAGNESIUM SULFATE HEPTAHYDRATE: 500 INJECTION, SOLUTION INTRAMUSCULAR; INTRAVENOUS at 01:32

## 2023-08-22 RX ADMIN — DOCUSATE SODIUM 100 MG: 100 CAPSULE, LIQUID FILLED ORAL at 01:36

## 2023-08-22 RX ADMIN — METAXALONE 800 MG: 800 TABLET ORAL at 06:50

## 2023-08-22 RX ADMIN — MAGNESIUM HYDROXIDE 30 ML: 1200 LIQUID ORAL at 06:54

## 2023-08-22 RX ADMIN — THERA TABS 1 TABLET: TAB at 06:51

## 2023-08-22 RX ADMIN — LORAZEPAM 0.5 MG: 2 INJECTION INTRAMUSCULAR; INTRAVENOUS at 06:43

## 2023-08-22 RX ADMIN — FOLIC ACID 1 MG: 1 TABLET ORAL at 06:51

## 2023-08-22 RX ADMIN — DOCUSATE SODIUM 100 MG: 100 CAPSULE, LIQUID FILLED ORAL at 06:51

## 2023-08-22 RX ADMIN — POLYETHYLENE GLYCOL 3350 1 PACKET: 17 POWDER, FOR SOLUTION ORAL at 06:58

## 2023-08-22 RX ADMIN — CELECOXIB 200 MG: 100 CAPSULE ORAL at 01:36

## 2023-08-22 RX ADMIN — SENNOSIDES AND DOCUSATE SODIUM 1 TABLET: 50; 8.6 TABLET ORAL at 01:37

## 2023-08-22 ASSESSMENT — LIFESTYLE VARIABLES
TOTAL SCORE: 4
AUDITORY DISTURBANCES: NOT PRESENT
ORIENTATION AND CLOUDING OF SENSORIUM: ORIENTED AND CAN DO SERIAL ADDITIONS
HEADACHE, FULLNESS IN HEAD: MODERATE
ANXIETY: NO ANXIETY (AT EASE)
HEADACHE, FULLNESS IN HEAD: MODERATE
TREMOR: TREMOR NOT VISIBLE BUT CAN BE FELT, FINGERTIP TO FINGERTIP
PAROXYSMAL SWEATS: BARELY PERCEPTIBLE SWEATING. PALMS MOIST
PAROXYSMAL SWEATS: BARELY PERCEPTIBLE SWEATING. PALMS MOIST
AUDITORY DISTURBANCES: NOT PRESENT
TREMOR: NO TREMOR
AGITATION: *
PAROXYSMAL SWEATS: NO SWEAT VISIBLE
ANXIETY: NO ANXIETY (AT EASE)
AUDITORY DISTURBANCES: NOT PRESENT
VISUAL DISTURBANCES: NOT PRESENT
AGITATION: *
VISUAL DISTURBANCES: NOT PRESENT
TREMOR: *
TOTAL SCORE: 10
ORIENTATION AND CLOUDING OF SENSORIUM: ORIENTED AND CAN DO SERIAL ADDITIONS
NAUSEA AND VOMITING: MILD NAUSEA WITH NO VOMITING
NAUSEA AND VOMITING: MILD NAUSEA WITH NO VOMITING
HEADACHE, FULLNESS IN HEAD: MODERATE
TOTAL SCORE: 8
VISUAL DISTURBANCES: NOT PRESENT
NAUSEA AND VOMITING: NO NAUSEA AND NO VOMITING
ANXIETY: NO ANXIETY (AT EASE)
ORIENTATION AND CLOUDING OF SENSORIUM: ORIENTED AND CAN DO SERIAL ADDITIONS
AGITATION: NORMAL ACTIVITY

## 2023-08-22 ASSESSMENT — COPD QUESTIONNAIRES
COPD SCREENING SCORE: 2
DURING THE PAST 4 WEEKS HOW MUCH DID YOU FEEL SHORT OF BREATH: NONE/LITTLE OF THE TIME
HAVE YOU SMOKED AT LEAST 100 CIGARETTES IN YOUR ENTIRE LIFE: YES
DO YOU EVER COUGH UP ANY MUCUS OR PHLEGM?: NO/ONLY WITH OCCASIONAL COLDS OR INFECTIONS
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
## 2023-08-22 NOTE — ASSESSMENT & PLAN NOTE
Admission blood alcohol level of 251.  Trauma alcohol withdrawal protocol initiated.  Alcohol withdrawal surveillance.

## 2023-08-22 NOTE — ED NOTES
Med Rec complete per home pharmacy information. Last fill dates of medications on Med Rec are from 7/2023. Additional medications from June of 2023 are the following for 15 day supply:  Depakote 500mg BID  Quetiapine 100mg HS  Risperidone .5mg qd  Unable to assess allergies at this time.  Home Pharmacy per merging charts:  [facility]

## 2023-08-22 NOTE — ED NOTES
Bedside report received from RENAE Lacy. Assumed care of pt. Pt resting in gurney, respirations even and unlabored. Pt resting on gurney, respirations even and unlabored, on room air, cardiac monitor and vital equipment in place. Fall precautions in place, call light within reach.
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
## 2023-08-22 NOTE — ED TRIAGE NOTES
Pt to trauma bay .  Chief Complaint   Patient presents with    Trauma Green     Fell down 3 flights of stairs     Facial Injury     Hematoma     Arm Pain     Left arm     Facial Laceration     chin
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
## 2023-08-22 NOTE — ED NOTES
Patient returned from imaging to [facility] bedside report to doron Lacy placed on bed alarm for safety

## 2023-08-22 NOTE — ED NOTES
Last office visit on: Visit date not found 10/03/2023  Upcoming appointment scheduled on: Visit date not found   Per last office visit, patient is to follow up April 2023.   Last refill on: 3/21/2023  Refill approved per protocol.        Medicated pt per MAR.

## 2023-08-22 NOTE — ED NOTES
"Responded to Bed alarm, reset bed alarm patient states \"can I just leave?\" Informed patient he AMA is an option but we strongly suggest he stays. Patient sates \"If Im going to leave I need a blanket, a lot of them and a piece of candy first\" Patient informed he will not be receiving either of those, then states well I guess Im staying then\"     Moments later patient seen from nursing station ripping off cardiac leads, pulse ox and pulling at IV, RN responded patient states \"I want to get the fuck out of here\", again informed patient that he should stay, patient informed that patient is at risk of worsening health up to and including death. Patient states \"I dont care, I want to fucking leave\" Patient offered medications as prescribed, patient refusing. Neuro status assessed, answering orientation questions appropriately, aox4, gcs 15, vitals stable. Ambulatory. Security called to escort patient out of ED with all belongings.     Dr. Shipman paged to notify of elopement status      F/u.     [id number]: Dr. Shipman returned page, notified of patient elopement status   "

## 2023-08-22 NOTE — ASSESSMENT & PLAN NOTE
Acute displaced fracture of the LEFT olecranon.  Definitive operative reduction and stabilization pending.  Weight bearing status - Nonweightbearing KADE Cruz MD. [facility].
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
## 2023-08-22 NOTE — ED NOTES
Patient to BL 22 from CT, bedside report from RENAE Gallo, patient attempting to get out of bed on own, educated on calling for assistance, call light within reach, bed alarm in place.

## 2023-08-22 NOTE — ED NOTES
"Bedside report received from Christina MACDONALD. VSS. Pt said, \"I want to search my property for paraphernalia.\" Security at bedside.   "

## 2023-08-22 NOTE — ED NOTES
Pt to trauma bay bib remsa. Pt fell approx 3 flights of stairs. Unk loc. Hematoma to forehead, cut to chin, abrasions scattered on lower legs and hands. Pt awake, alert, oriented x 2 confused to event.

## 2023-08-22 NOTE — ED NOTES
Ordered hospital bed for pt, pt refused to switch over to hospital bed stating he is comfortable where he is at.

## 2023-08-23 ENCOUNTER — HOSPITAL ENCOUNTER (EMERGENCY)
Facility: MEDICAL CENTER | Age: 28
End: 2023-08-23
Payer: MEDICAID

## 2023-08-23 VITALS
SYSTOLIC BLOOD PRESSURE: 140 MMHG | HEART RATE: 105 BPM | TEMPERATURE: 98.5 F | RESPIRATION RATE: 16 BRPM | DIASTOLIC BLOOD PRESSURE: 93 MMHG | WEIGHT: 214.95 LBS | BODY MASS INDEX: 30.77 KG/M2 | OXYGEN SATURATION: 94 % | HEIGHT: 70 IN

## 2023-08-23 VITALS
HEART RATE: 96 BPM | SYSTOLIC BLOOD PRESSURE: 143 MMHG | HEIGHT: 70 IN | DIASTOLIC BLOOD PRESSURE: 91 MMHG | BODY MASS INDEX: 30.8 KG/M2 | OXYGEN SATURATION: 96 % | RESPIRATION RATE: 18 BRPM | WEIGHT: 215.17 LBS | TEMPERATURE: 97 F

## 2023-08-23 LAB
ALBUMIN SERPL BCP-MCNC: 4.2 G/DL (ref 3.2–4.9)
ALBUMIN/GLOB SERPL: 1.3 G/DL
ALP SERPL-CCNC: 166 U/L (ref 30–99)
ALT SERPL-CCNC: 12 U/L (ref 2–50)
ANION GAP SERPL CALC-SCNC: 13 MMOL/L (ref 7–16)
AST SERPL-CCNC: 14 U/L (ref 12–45)
BASOPHILS # BLD AUTO: 0.5 % (ref 0–1.8)
BASOPHILS # BLD: 0.08 K/UL (ref 0–0.12)
BILIRUB SERPL-MCNC: <0.2 MG/DL (ref 0.1–1.5)
BUN SERPL-MCNC: 14 MG/DL (ref 8–22)
CALCIUM ALBUM COR SERPL-MCNC: 9.1 MG/DL (ref 8.5–10.5)
CALCIUM SERPL-MCNC: 9.3 MG/DL (ref 8.5–10.5)
CHLORIDE SERPL-SCNC: 103 MMOL/L (ref 96–112)
CO2 SERPL-SCNC: 24 MMOL/L (ref 20–33)
CREAT SERPL-MCNC: 0.66 MG/DL (ref 0.5–1.4)
EOSINOPHIL # BLD AUTO: 0.2 K/UL (ref 0–0.51)
EOSINOPHIL NFR BLD: 1.4 % (ref 0–6.9)
ERYTHROCYTE [DISTWIDTH] IN BLOOD BY AUTOMATED COUNT: 51.8 FL (ref 35.9–50)
GFR SERPLBLD CREATININE-BSD FMLA CKD-EPI: 132 ML/MIN/1.73 M 2
GLOBULIN SER CALC-MCNC: 3.3 G/DL (ref 1.9–3.5)
GLUCOSE SERPL-MCNC: 91 MG/DL (ref 65–99)
HCT VFR BLD AUTO: 43.2 % (ref 42–52)
HGB BLD-MCNC: 13.9 G/DL (ref 14–18)
IMM GRANULOCYTES # BLD AUTO: 0.08 K/UL (ref 0–0.11)
IMM GRANULOCYTES NFR BLD AUTO: 0.5 % (ref 0–0.9)
LIPASE SERPL-CCNC: 23 U/L (ref 11–82)
LYMPHOCYTES # BLD AUTO: 3.29 K/UL (ref 1–4.8)
LYMPHOCYTES NFR BLD: 22.6 % (ref 22–41)
MCH RBC QN AUTO: 28.4 PG (ref 27–33)
MCHC RBC AUTO-ENTMCNC: 32.2 G/DL (ref 32.3–36.5)
MCV RBC AUTO: 88.3 FL (ref 81.4–97.8)
MONOCYTES # BLD AUTO: 1.34 K/UL (ref 0–0.85)
MONOCYTES NFR BLD AUTO: 9.2 % (ref 0–13.4)
NEUTROPHILS # BLD AUTO: 9.56 K/UL (ref 1.82–7.42)
NEUTROPHILS NFR BLD: 65.8 % (ref 44–72)
NRBC # BLD AUTO: 0 K/UL
NRBC BLD-RTO: 0 /100 WBC (ref 0–0.2)
PLATELET # BLD AUTO: 635 K/UL (ref 164–446)
PMV BLD AUTO: 9.5 FL (ref 9–12.9)
POTASSIUM SERPL-SCNC: 3.6 MMOL/L (ref 3.6–5.5)
PROT SERPL-MCNC: 7.5 G/DL (ref 6–8.2)
RBC # BLD AUTO: 4.89 M/UL (ref 4.7–6.1)
SODIUM SERPL-SCNC: 140 MMOL/L (ref 135–145)
WBC # BLD AUTO: 14.6 K/UL (ref 4.8–10.8)

## 2023-08-23 PROCEDURE — 302449 STATCHG TRIAGE ONLY (STATISTIC)

## 2023-08-23 PROCEDURE — 85025 COMPLETE CBC W/AUTO DIFF WBC: CPT

## 2023-08-23 PROCEDURE — 80053 COMPREHEN METABOLIC PANEL: CPT

## 2023-08-23 PROCEDURE — 83690 ASSAY OF LIPASE: CPT

## 2023-08-23 ASSESSMENT — FIBROSIS 4 INDEX
FIB4 SCORE: 0.17
FIB4 SCORE: 0.25

## 2023-08-24 NOTE — ED NOTES
"Patient came to desk stating \"Are you going to do anything?!\" Explained triage process. Patient got upset saying \"My arm hurts and life is messed up.\" Patient proceeded to walk off property.   "

## 2023-08-24 NOTE — ED TRIAGE NOTES
"Chief Complaint   Patient presents with    Abdominal Pain     X2 days in epigastric area. Denies N/V/D. Patient is disheveled and wearing a splint that he states was placed today. Reports drinking 3 beers and a bottle of vodka today. Patient is calm and cooperative, ambulatory at this time       Patient to triage ambulatory with a steady gait, AAOx4, Appropriate precautions in place.     Explained wait time and triage process. Placed back in lobby. Told to notify ED tech or RN of any changes, verbalized understanding.    BP (!) 140/93   Pulse (!) 105   Temp 36.9 °C (98.5 °F) (Temporal)   Resp 16   Ht 1.778 m (5' 10\")   Wt 97.5 kg (214 lb 15.2 oz)   SpO2 94%   BMI 30.84 kg/m²     "

## 2023-08-24 NOTE — ED NOTES
Pt see leaving ER lobby multiple times and has returned. Pt has however left the ER lobby 15mins ago, will watch for pt to return. If pt does not return pt will be dismissed per EPIC

## 2023-08-24 NOTE — ED TRIAGE NOTES
".  Chief Complaint   Patient presents with    Arm Pain     Arm pain for \"awhile\" states he broke it and unable to see a surgeon, arm currently in a splint     .Pt is alert and oriented, speaking in full sentences, follows commands and responds appropriately to questions Resp are even and unlabored.  Skin pink warm and dry     Pt placed in lobby. Pt educated on triage process. Pt encouraged to alert staff for any changes.    "

## 2023-09-06 ENCOUNTER — HOSPITAL ENCOUNTER (EMERGENCY)
Facility: MEDICAL CENTER | Age: 28
End: 2023-09-06
Attending: EMERGENCY MEDICINE
Payer: MEDICAID

## 2023-09-06 ENCOUNTER — APPOINTMENT (OUTPATIENT)
Dept: RADIOLOGY | Facility: MEDICAL CENTER | Age: 28
End: 2023-09-06
Attending: EMERGENCY MEDICINE
Payer: MEDICAID

## 2023-09-06 VITALS
SYSTOLIC BLOOD PRESSURE: 132 MMHG | HEIGHT: 70 IN | HEART RATE: 100 BPM | TEMPERATURE: 97 F | BODY MASS INDEX: 29.04 KG/M2 | WEIGHT: 202.82 LBS | OXYGEN SATURATION: 96 % | RESPIRATION RATE: 20 BRPM | DIASTOLIC BLOOD PRESSURE: 65 MMHG

## 2023-09-06 DIAGNOSIS — S42.402S LEFT ELBOW FRACTURE, SEQUELA: ICD-10-CM

## 2023-09-06 DIAGNOSIS — F15.10 METHAMPHETAMINE ABUSE (HCC): ICD-10-CM

## 2023-09-06 DIAGNOSIS — F10.10 ALCOHOL ABUSE: ICD-10-CM

## 2023-09-06 LAB
ALBUMIN SERPL BCP-MCNC: 4.1 G/DL (ref 3.2–4.9)
ALBUMIN/GLOB SERPL: 1.3 G/DL
ALP SERPL-CCNC: 146 U/L (ref 30–99)
ALT SERPL-CCNC: 17 U/L (ref 2–50)
AMPHET UR QL SCN: POSITIVE
ANION GAP SERPL CALC-SCNC: 10 MMOL/L (ref 7–16)
AST SERPL-CCNC: 11 U/L (ref 12–45)
BARBITURATES UR QL SCN: NEGATIVE
BASOPHILS # BLD AUTO: 0.8 % (ref 0–1.8)
BASOPHILS # BLD: 0.1 K/UL (ref 0–0.12)
BENZODIAZ UR QL SCN: NEGATIVE
BILIRUB SERPL-MCNC: 0.4 MG/DL (ref 0.1–1.5)
BUN SERPL-MCNC: 10 MG/DL (ref 8–22)
BZE UR QL SCN: NEGATIVE
CALCIUM ALBUM COR SERPL-MCNC: 8.8 MG/DL (ref 8.5–10.5)
CALCIUM SERPL-MCNC: 8.9 MG/DL (ref 8.5–10.5)
CANNABINOIDS UR QL SCN: POSITIVE
CHLORIDE SERPL-SCNC: 102 MMOL/L (ref 96–112)
CO2 SERPL-SCNC: 24 MMOL/L (ref 20–33)
CREAT SERPL-MCNC: 0.71 MG/DL (ref 0.5–1.4)
EOSINOPHIL # BLD AUTO: 0.1 K/UL (ref 0–0.51)
EOSINOPHIL NFR BLD: 0.8 % (ref 0–6.9)
ERYTHROCYTE [DISTWIDTH] IN BLOOD BY AUTOMATED COUNT: 54.6 FL (ref 35.9–50)
ETHANOL BLD-MCNC: <10.1 MG/DL
FENTANYL UR QL: NEGATIVE
GFR SERPLBLD CREATININE-BSD FMLA CKD-EPI: 129 ML/MIN/1.73 M 2
GLOBULIN SER CALC-MCNC: 3.2 G/DL (ref 1.9–3.5)
GLUCOSE SERPL-MCNC: 94 MG/DL (ref 65–99)
HCT VFR BLD AUTO: 45.4 % (ref 42–52)
HGB BLD-MCNC: 14.7 G/DL (ref 14–18)
IMM GRANULOCYTES # BLD AUTO: 0.07 K/UL (ref 0–0.11)
IMM GRANULOCYTES NFR BLD AUTO: 0.5 % (ref 0–0.9)
LYMPHOCYTES # BLD AUTO: 2.28 K/UL (ref 1–4.8)
LYMPHOCYTES NFR BLD: 17.9 % (ref 22–41)
MCH RBC QN AUTO: 28.6 PG (ref 27–33)
MCHC RBC AUTO-ENTMCNC: 32.4 G/DL (ref 32.3–36.5)
MCV RBC AUTO: 88.3 FL (ref 81.4–97.8)
METHADONE UR QL SCN: NEGATIVE
MONOCYTES # BLD AUTO: 1.29 K/UL (ref 0–0.85)
MONOCYTES NFR BLD AUTO: 10.1 % (ref 0–13.4)
NEUTROPHILS # BLD AUTO: 8.91 K/UL (ref 1.82–7.42)
NEUTROPHILS NFR BLD: 69.9 % (ref 44–72)
NRBC # BLD AUTO: 0 K/UL
NRBC BLD-RTO: 0 /100 WBC (ref 0–0.2)
OPIATES UR QL SCN: NEGATIVE
OXYCODONE UR QL SCN: NEGATIVE
PCP UR QL SCN: NEGATIVE
PLATELET # BLD AUTO: 550 K/UL (ref 164–446)
PMV BLD AUTO: 9.1 FL (ref 9–12.9)
POTASSIUM SERPL-SCNC: 4.3 MMOL/L (ref 3.6–5.5)
PROPOXYPH UR QL SCN: NEGATIVE
PROT SERPL-MCNC: 7.3 G/DL (ref 6–8.2)
RBC # BLD AUTO: 5.14 M/UL (ref 4.7–6.1)
SODIUM SERPL-SCNC: 136 MMOL/L (ref 135–145)
WBC # BLD AUTO: 12.8 K/UL (ref 4.8–10.8)

## 2023-09-06 PROCEDURE — A9270 NON-COVERED ITEM OR SERVICE: HCPCS | Mod: UD | Performed by: EMERGENCY MEDICINE

## 2023-09-06 PROCEDURE — 73070 X-RAY EXAM OF ELBOW: CPT | Mod: LT

## 2023-09-06 PROCEDURE — 82077 ASSAY SPEC XCP UR&BREATH IA: CPT

## 2023-09-06 PROCEDURE — 302970 POC BREATHALIZER: Performed by: EMERGENCY MEDICINE

## 2023-09-06 PROCEDURE — 302874 HCHG BANDAGE ACE 2 OR 3""

## 2023-09-06 PROCEDURE — 700102 HCHG RX REV CODE 250 W/ 637 OVERRIDE(OP): Mod: UD | Performed by: EMERGENCY MEDICINE

## 2023-09-06 PROCEDURE — 85025 COMPLETE CBC W/AUTO DIFF WBC: CPT

## 2023-09-06 PROCEDURE — 36415 COLL VENOUS BLD VENIPUNCTURE: CPT

## 2023-09-06 PROCEDURE — 80307 DRUG TEST PRSMV CHEM ANLYZR: CPT

## 2023-09-06 PROCEDURE — 90791 PSYCH DIAGNOSTIC EVALUATION: CPT

## 2023-09-06 PROCEDURE — 80053 COMPREHEN METABOLIC PANEL: CPT

## 2023-09-06 PROCEDURE — 29105 APPLICATION LONG ARM SPLINT: CPT

## 2023-09-06 PROCEDURE — 99285 EMERGENCY DEPT VISIT HI MDM: CPT

## 2023-09-06 RX ORDER — QUETIAPINE FUMARATE 100 MG/1
100 TABLET, FILM COATED ORAL DAILY
Status: DISCONTINUED | OUTPATIENT
Start: 2023-09-06 | End: 2023-09-06 | Stop reason: HOSPADM

## 2023-09-06 RX ADMIN — QUETIAPINE FUMARATE 100 MG: 100 TABLET ORAL at 14:21

## 2023-09-06 ASSESSMENT — FIBROSIS 4 INDEX: FIB4 SCORE: 0.17

## 2023-09-06 NOTE — DISCHARGE INSTRUCTIONS
Return to the ER for any concerns, worsening symptoms    Follow-up with your primary care provider, orthopedics, and go to detox
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
## 2023-09-06 NOTE — DISCHARGE SUMMARY
"  ED Observation Discharge Summary    Patient:Fareed Muhammad  Patient : 1995  Patient MRN: [id number]  Patient PCP: Roosevelt Alston M.D.    Admit Date: 2023  Discharge Date and Time: 23 3:36 PM  Discharge Diagnosis:   1. Alcohol abuse        2. Left elbow fracture, sequela        3. Methamphetamine abuse (HCC)            Discharge Attending: Anne Ibrahim M.D.  Discharge Service: ED Observation    ED Course  Fareed is a 27 y.o. male who was evaluated at [facility] ER for suicidal ideation.  Patient was initially seen by my colleague, Dr. Ordoñez, who obtained a left elbow x-ray and blood work.  Patient found to have a leukocytosis but no infectious symptoms.  Thrombocytosis also noted.  With elevated alk phos, perhaps related to left elbow fracture.  This was not an acute fracture.  Patient did not follow-up with orthopedics as scheduled.    Patient initially reported SI but was quite vague in his plan or thoughts.  Patient seen by peer supports.  Patient seen by lifeskills who was told by the patient that he requests detox from meth.  On reevaluation, the patient denied suicidality.  He also denies HI.  Legal hold discontinued.  Patient is amenable to being transported to [facility] for detox.    Discharge Exam:  /65   Pulse 100   Temp 36.1 °C (97 °F) (Temporal)   Resp 20   Ht 1.778 m (5' 10\")   Wt 92 kg (202 lb 13.2 oz)   SpO2 96%   BMI 29.10 kg/m² .    Constitutional: Awake and alert. Nontoxic  HENT:  Grossly normal  Eyes: Grossly normal  Neck: Normal range of motion  Cardiovascular: Normal heart rate   Thorax & Lungs: No respiratory distress  Abdomen: Non distended  Skin:  No pathologic rash.   Extremities: Well perfused  Psychiatric: Affect flat    Labs  Results for orders placed or performed during the hospital encounter of 23   Urine Drug Screen   Result Value Ref Range    Amphetamines Urine Positive (A) Negative    Barbiturates Negative " Negative    Benzodiazepines Negative Negative    Cocaine Metabolite Negative Negative    Fentanyl, Urine Negative Negative    Methadone Negative Negative    Opiates Negative Negative    Oxycodone Negative Negative    Phencyclidine -Pcp Negative Negative    Propoxyphene Negative Negative    Cannabinoid Metab Positive (A) Negative   CBC WITH DIFFERENTIAL   Result Value Ref Range    WBC 12.8 (H) 4.8 - 10.8 K/uL    RBC 5.14 4.70 - 6.10 M/uL    Hemoglobin 14.7 14.0 - 18.0 g/dL    Hematocrit 45.4 42.0 - 52.0 %    MCV 88.3 81.4 - 97.8 fL    MCH 28.6 27.0 - 33.0 pg    MCHC 32.4 32.3 - 36.5 g/dL    RDW 54.6 (H) 35.9 - 50.0 fL    Platelet Count 550 (H) 164 - 446 K/uL    MPV 9.1 9.0 - 12.9 fL    Neutrophils-Polys 69.90 44.00 - 72.00 %    Lymphocytes 17.90 (L) 22.00 - 41.00 %    Monocytes 10.10 0.00 - 13.40 %    Eosinophils 0.80 0.00 - 6.90 %    Basophils 0.80 0.00 - 1.80 %    Immature Granulocytes 0.50 0.00 - 0.90 %    Nucleated RBC 0.00 0.00 - 0.20 /100 WBC    Neutrophils (Absolute) 8.91 (H) 1.82 - 7.42 K/uL    Lymphs (Absolute) 2.28 1.00 - 4.80 K/uL    Monos (Absolute) 1.29 (H) 0.00 - 0.85 K/uL    Eos (Absolute) 0.10 0.00 - 0.51 K/uL    Baso (Absolute) 0.10 0.00 - 0.12 K/uL    Immature Granulocytes (abs) 0.07 0.00 - 0.11 K/uL    NRBC (Absolute) 0.00 K/uL   CMP   Result Value Ref Range    Sodium 136 135 - 145 mmol/L    Potassium 4.3 3.6 - 5.5 mmol/L    Chloride 102 96 - 112 mmol/L    Co2 24 20 - 33 mmol/L    Anion Gap 10.0 7.0 - 16.0    Glucose 94 65 - 99 mg/dL    Bun 10 8 - 22 mg/dL    Creatinine 0.71 0.50 - 1.40 mg/dL    Calcium 8.9 8.5 - 10.5 mg/dL    Correct Calcium 8.8 8.5 - 10.5 mg/dL    AST(SGOT) 11 (L) 12 - 45 U/L    ALT(SGPT) 17 2 - 50 U/L    Alkaline Phosphatase 146 (H) 30 - 99 U/L    Total Bilirubin 0.4 0.1 - 1.5 mg/dL    Albumin 4.1 3.2 - 4.9 g/dL    Total Protein 7.3 6.0 - 8.2 g/dL    Globulin 3.2 1.9 - 3.5 g/dL    A-G Ratio 1.3 g/dL   DIAGNOSTIC ALCOHOL   Result Value Ref Range    Diagnostic Alcohol <10.1 <10.1  mg/dL   ESTIMATED GFR   Result Value Ref Range    GFR (CKD-EPI) 129 >60 mL/min/1.73 m 2       Radiology  DX-ELBOW-LIMITED 2- LEFT   Final Result      1.  Postoperative and posttraumatic changes of the distal LEFT humerus.   2.  Increasing heterotopic ossification along the lateral aspect of elbow.   3.  Subacute distracted fracture of the olecranon with callus formation, not apparent on prior exam, possibly indicating interval injury.   4.  Diffuse soft tissue swelling.          Medications:   Discharge Medication List as of 9/6/2023  4:30 PM          My final assessment includes   1. Alcohol abuse        2. Left elbow fracture, sequela        3. Methamphetamine abuse (HCC)            Upon Reevaluation, the patient's condition has: Improved; and will be discharged.    Patient discharged from ED Observation status at 4:15 PM (Time) 9/6/23 (Date).     Total time spent on this ED Observation discharge encounter is < 30 Minutes    Electronically signed by: Anen Ibrahim M.D., 9/6/2023 3:36 PM

## 2023-09-06 NOTE — ED NOTES
Preexisting UE Posterior Long orthoglass splint on Left Arm removed. Replacement measured and placed. CMS intact before and after application. Patient instructed on splint care.

## 2023-09-06 NOTE — ED NOTES
Bedside report from Shannan MACDONALD. Pt ambulated to bathroom with steady gait. 1:1 sitter remains in clear view of pt.

## 2023-09-06 NOTE — ED NOTES
AVS discussed with patient. Ortho f/u instructions given. Pt ambulatory to lobby with steady gait with ALERT team- Tanner called [facility] to [facility]
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
## 2023-09-25 ENCOUNTER — PHARMACY VISIT (OUTPATIENT)
Dept: PHARMACY | Facility: MEDICAL CENTER | Age: 28
End: 2023-09-25
Payer: COMMERCIAL

## 2023-09-25 ENCOUNTER — HOSPITAL ENCOUNTER (EMERGENCY)
Facility: MEDICAL CENTER | Age: 28
End: 2023-09-25
Attending: EMERGENCY MEDICINE
Payer: MEDICAID

## 2023-09-25 VITALS
HEIGHT: 70 IN | DIASTOLIC BLOOD PRESSURE: 90 MMHG | BODY MASS INDEX: 30.02 KG/M2 | WEIGHT: 209.66 LBS | OXYGEN SATURATION: 97 % | RESPIRATION RATE: 17 BRPM | SYSTOLIC BLOOD PRESSURE: 135 MMHG | TEMPERATURE: 97 F | HEART RATE: 85 BPM

## 2023-09-25 DIAGNOSIS — I88.9 LYMPHADENITIS: ICD-10-CM

## 2023-09-25 DIAGNOSIS — J03.90 TONSILLITIS: ICD-10-CM

## 2023-09-25 PROCEDURE — RXMED WILLOW AMBULATORY MEDICATION CHARGE: Performed by: EMERGENCY MEDICINE

## 2023-09-25 PROCEDURE — 99282 EMERGENCY DEPT VISIT SF MDM: CPT

## 2023-09-25 RX ORDER — AMOXICILLIN 500 MG/1
1000 CAPSULE ORAL 2 TIMES DAILY
Qty: 28 CAPSULE | Refills: 0 | Status: ACTIVE | OUTPATIENT
Start: 2023-09-25 | End: 2023-09-25 | Stop reason: SDUPTHER

## 2023-09-25 RX ORDER — AMOXICILLIN 500 MG/1
1000 CAPSULE ORAL 2 TIMES DAILY
Qty: 28 CAPSULE | Refills: 0 | Status: ACTIVE | OUTPATIENT
Start: 2023-09-25 | End: 2023-10-02

## 2023-09-25 ASSESSMENT — FIBROSIS 4 INDEX: FIB4 SCORE: 0.13

## 2023-09-25 NOTE — ED NOTES
Patient discharged per orders. Pt verbalized understanding of discharge instructions. Pt leaving in stable condition with all belongings.     34.5

## 2023-09-25 NOTE — ED TRIAGE NOTES
"Chief Complaint   Patient presents with    Sore Throat     Patient ambulates to triage reporting concern for possible cancer, states pain began in left side of throat and traveled up to his ear. Patient reports pain started six months ago.     Ear Pain     Airway patent, respirations even and unlabored.     BP (!) 132/93   Pulse 87   Temp (!) 35.7 °C (96.2 °F) (Temporal)   Resp 16   Ht 1.778 m (5' 10\")   Wt 95.1 kg (209 lb 10.5 oz)   SpO2 96%     Patient educated on ed triage process, instructed to notify staff of any new or worsening symptoms, verbalizes understanding. Patient returned to ed lobby, apologized for wait times.     "
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
## 2023-09-26 ENCOUNTER — HOSPITAL ENCOUNTER (EMERGENCY)
Facility: MEDICAL CENTER | Age: 28
End: 2023-09-26
Attending: EMERGENCY MEDICINE
Payer: MEDICAID

## 2023-09-26 VITALS
BODY MASS INDEX: 29.99 KG/M2 | DIASTOLIC BLOOD PRESSURE: 82 MMHG | OXYGEN SATURATION: 98 % | SYSTOLIC BLOOD PRESSURE: 136 MMHG | RESPIRATION RATE: 16 BRPM | WEIGHT: 209 LBS | HEART RATE: 95 BPM | TEMPERATURE: 98.2 F

## 2023-09-26 DIAGNOSIS — K30 UPSET STOMACH: ICD-10-CM

## 2023-09-26 PROCEDURE — 99283 EMERGENCY DEPT VISIT LOW MDM: CPT

## 2023-09-26 PROCEDURE — 700111 HCHG RX REV CODE 636 W/ 250 OVERRIDE (IP): Mod: UD | Performed by: EMERGENCY MEDICINE

## 2023-09-26 RX ORDER — ONDANSETRON 4 MG/1
4 TABLET, ORALLY DISINTEGRATING ORAL EVERY 8 HOURS PRN
Qty: 10 TABLET | Refills: 0 | Status: SHIPPED | OUTPATIENT
Start: 2023-09-26

## 2023-09-26 RX ORDER — ONDANSETRON 4 MG/1
4 TABLET, ORALLY DISINTEGRATING ORAL ONCE
Status: COMPLETED | OUTPATIENT
Start: 2023-09-26 | End: 2023-09-26

## 2023-09-26 RX ADMIN — ONDANSETRON 4 MG: 4 TABLET, ORALLY DISINTEGRATING ORAL at 11:20

## 2023-09-26 ASSESSMENT — FIBROSIS 4 INDEX: FIB4 SCORE: 0.13

## 2023-09-26 NOTE — ED NOTES
Patient walked out of ed prior to registration and discharge paperwork. Patient aox4, gcs 15 ambulatory out of ed with steady gait.

## 2023-09-26 NOTE — ED NOTES
Patient continuously coming to nurses station to ask how much longer until the doctor sees him, informed that ERP will be with paitent as soon as they can and asked to remain in room. Fall risk precautions in place, call light within reach.

## 2023-09-26 NOTE — ED TRIAGE NOTES
Chief Complaint   Patient presents with    Vomiting     Pt states vomiting due to what he thinks is cancer in his throat.  Seen here yesterday for same

## 2023-09-26 NOTE — DISCHARGE INSTRUCTIONS
The ER for fever, pain, vomiting, or other concerns    Take Zofran as needed for nausea/vomiting    Take Pepcid or Prilosec over-the-counter for acid indigestion

## 2023-09-26 NOTE — ED PROVIDER NOTES
"ED Provider Note    CHIEF COMPLAINT  Chief Complaint   Patient presents with    Vomiting     Pt states vomiting due to what he thinks is cancer in his throat.  Seen here yesterday for same       EXTERNAL RECORDS REVIEWED  Other patient was seen yesterday in the ER for concern of a lump in his neck with sore throat.  Patient concern for malignancy.  Amoxicillin was prescribed.  PCP follow-up in 1 week recommended.    HPI/ROS  LIMITATION TO HISTORY   Select: Vague historian    OUTSIDE HISTORIAN(S):  None    Fareed Muhammad is a 27 y.o. male smoker with history of asthma and bipolar disorder who presents for \"upset stomach.\"  Patient states that his stomach is \"always upset.\"  He had surgery earlier this year.  He denies vomiting, hematemesis, fever, chills, abdominal pain, sore throat, blood in the stool, diarrhea.  Patient states \"can I have something to eat?\"    PAST MEDICAL HISTORY   has a past medical history of ASTHMA, Bipolar disorder (HCC), and Drug abuse in remission (HCC).    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    FAMILY HISTORY  Family History   Problem Relation Age of Onset    Cancer Paternal Aunt         breasr canre    Hypertension Maternal Grandmother     Diabetes Maternal Grandmother     Cancer Maternal Grandmother     Hypertension Maternal Grandfather     Diabetes Maternal Grandfather        SOCIAL HISTORY  Social History     Tobacco Use    Smoking status: Every Day     Current packs/day: 1.00     Average packs/day: 1 pack/day for 3.0 years (3.0 ttl pk-yrs)     Types: Cigarettes    Smokeless tobacco: Never    Tobacco comments:     currently using e-cigarettes   Vaping Use    Vaping Use: Not on file   Substance and Sexual Activity    Alcohol use: Not Currently     Comment: last ETOH 8/2023    Drug use: Not Currently     Types: Marijuana, Methamphetamines     Comment: pot, " meth 9/1/2023    Sexual activity: Not Currently     Partners: Female     Birth control/protection: Condom       CURRENT MEDICATIONS  Home Medications       Reviewed by Wesley Mauricio R.N. (Registered Nurse) on 09/26/23 at 0952  Med List Status: <None>     Medication Last Dose Status   acetaminophen (TYLENOL) 500 MG Tab  Active   albuterol 108 (90 Base) MCG/ACT Aero Soln inhalation aerosol  Active   amoxicillin (AMOXIL) 500 MG Cap  Active   cyclobenzaprine (FLEXERIL) 10 mg Tab  Active   cyclobenzaprine (FLEXERIL) 10 mg Tab  Active   gabapentin (NEURONTIN) 100 MG Cap  Active   ibuprofen (MOTRIN) 800 MG Tab  Active   ibuprofen (MOTRIN) 800 MG Tab  Active   lidocaine (LIDODERM) 5 % Patch  Active   metaxalone (SKELAXIN) 800 MG Tab  Active   omeprazole (PRILOSEC) 20 MG delayed-release capsule  Active   ondansetron (ZOFRAN ODT) 4 MG TABLET DISPERSIBLE  Active   ondansetron (ZOFRAN ODT) 4 MG TABLET DISPERSIBLE  Active   traZODone (DESYREL) 50 MG Tab  Active                    ALLERGIES  No Known Allergies    PHYSICAL EXAM  VITAL SIGNS: /82   Pulse 95   Temp 36.8 °C (98.2 °F) (Temporal)   Resp 16   Wt 94.8 kg (209 lb)   SpO2 98%   BMI 29.99 kg/m²    General:  WDWN male, nontoxic appearing in NAD; alert, oriented to self and place V/S as above; afebrile, not tachycardic or hypotensive  Skin: warm and dry; good color; no rash  HEENT: NCAT; EOMs intact; PERRL; no scleral icterus; oropharynx clear without trismus or drooling; no uvular deviation  Neck: FROM; no stridor  Cardiovascular: Regular heart rate and rhythm.  No murmurs, rubs, or gallops  Lungs: No respiratory distress or tachypnea; Clear to auscultation with good air movement bilaterally.  No wheezes, rhonchi, or rales.   Abdomen: BS present; well-healed midline vertical scar; soft; NTND; no rebound, guarding, or rigidity.  No organomegaly or pulsatile mass; no mottling  Extremities: RAMIREZ x 4; no e/o trauma; no pedal edema  Neurologic: CNs III-XII grossly  "intact; speech clear; distal sensation intact; strength 5/5 UE/LEs; gait steady  Psychiatric: flat affect, normal mood      DIAGNOSTIC STUDIES / PROCEDURES  None indicated    COURSE & MEDICAL DECISION MAKING    ED Observation Status? No; Patient does not meet criteria for ED Observation.     INITIAL ASSESSMENT, COURSE AND PLAN  Care Narrative: This is a 27-year-old who has a history of bipolar disorder, asthma, and drug abuse who presents for \"upset stomach.\"  Patient requests something to eat when I enter the room.  He has a soft, nonsurgical, nontender abdomen.  Patient was here yesterday for sore throat.  I examined his throat and there is no concern for airway issue, peritonsillar abscess, epiglottitis, RPA.  No labs or imaging are indicated.  Zofran was given for \"upset stomach\", possible nausea.  I recommended Pepcid or Prilosec for symptoms that he states have been there since his surgery in May.  I will refer him back to his PCP and have given him return precautions for ER reevaluation.  Patient denied SI, HI, and does not appear to be responding to internal stimuli at this time.  He does not appear to be under the influence of alcohol or drugs.  Patient will be discharged with return precautions.          DISPOSITION AND DISCUSSIONS  Escalation of care considered, and ultimately not performed:blood analysis, diagnostic imaging, and acute inpatient care management, however at this time, the patient is most appropriate for outpatient management    Barriers to care at this time, including but not limited to: Patient is homeless and Patient lacks transportation .     FINAL DIAGNOSIS  1. Upset stomach        Electronically signed by: Anne Ibrahim M.D., 9/26/2023 10:29 AM      "

## 2023-10-26 ENCOUNTER — HOSPITAL ENCOUNTER (EMERGENCY)
Facility: MEDICAL CENTER | Age: 28
End: 2023-10-27
Attending: STUDENT IN AN ORGANIZED HEALTH CARE EDUCATION/TRAINING PROGRAM
Payer: MEDICAID

## 2023-10-26 ENCOUNTER — APPOINTMENT (OUTPATIENT)
Dept: RADIOLOGY | Facility: MEDICAL CENTER | Age: 28
End: 2023-10-26
Attending: STUDENT IN AN ORGANIZED HEALTH CARE EDUCATION/TRAINING PROGRAM
Payer: MEDICAID

## 2023-10-26 DIAGNOSIS — D72.829 LEUKOCYTOSIS, UNSPECIFIED TYPE: ICD-10-CM

## 2023-10-26 DIAGNOSIS — F10.90 ALCOHOL USE DISORDER: ICD-10-CM

## 2023-10-26 LAB
ALBUMIN SERPL BCP-MCNC: 4.4 G/DL (ref 3.2–4.9)
ALBUMIN/GLOB SERPL: 1.3 G/DL
ALP SERPL-CCNC: 105 U/L (ref 30–99)
ALT SERPL-CCNC: 18 U/L (ref 2–50)
ANION GAP SERPL CALC-SCNC: 15 MMOL/L (ref 7–16)
ANISOCYTOSIS BLD QL SMEAR: ABNORMAL
APAP SERPL-MCNC: <5 UG/ML (ref 10–30)
AST SERPL-CCNC: 38 U/L (ref 12–45)
BASOPHILS # BLD AUTO: 0 % (ref 0–1.8)
BASOPHILS # BLD: 0 K/UL (ref 0–0.12)
BILIRUB SERPL-MCNC: 0.5 MG/DL (ref 0.1–1.5)
BUN SERPL-MCNC: 18 MG/DL (ref 8–22)
BURR CELLS BLD QL SMEAR: NORMAL
CALCIUM ALBUM COR SERPL-MCNC: 9.2 MG/DL (ref 8.5–10.5)
CALCIUM SERPL-MCNC: 9.5 MG/DL (ref 8.5–10.5)
CHLORIDE SERPL-SCNC: 96 MMOL/L (ref 96–112)
CO2 SERPL-SCNC: 21 MMOL/L (ref 20–33)
CREAT SERPL-MCNC: 1.19 MG/DL (ref 0.5–1.4)
EOSINOPHIL # BLD AUTO: 0 K/UL (ref 0–0.51)
EOSINOPHIL NFR BLD: 0 % (ref 0–6.9)
ERYTHROCYTE [DISTWIDTH] IN BLOOD BY AUTOMATED COUNT: 45.1 FL (ref 35.9–50)
ETHANOL BLD-MCNC: 173.7 MG/DL
GFR SERPLBLD CREATININE-BSD FMLA CKD-EPI: 86 ML/MIN/1.73 M 2
GLOBULIN SER CALC-MCNC: 3.5 G/DL (ref 1.9–3.5)
GLUCOSE SERPL-MCNC: 96 MG/DL (ref 65–99)
HCT VFR BLD AUTO: 46.7 % (ref 42–52)
HGB BLD-MCNC: 15.6 G/DL (ref 14–18)
LIPASE SERPL-CCNC: 11 U/L (ref 11–82)
LYMPHOCYTES # BLD AUTO: 2.19 K/UL (ref 1–4.8)
LYMPHOCYTES NFR BLD: 6.9 % (ref 22–41)
MANUAL DIFF BLD: NORMAL
MCH RBC QN AUTO: 29.5 PG (ref 27–33)
MCHC RBC AUTO-ENTMCNC: 33.4 G/DL (ref 32.3–36.5)
MCV RBC AUTO: 88.4 FL (ref 81.4–97.8)
MICROCYTES BLD QL SMEAR: ABNORMAL
MONOCYTES # BLD AUTO: 2.19 K/UL (ref 0–0.85)
MONOCYTES NFR BLD AUTO: 6.9 % (ref 0–13.4)
MORPHOLOGY BLD-IMP: NORMAL
NEUTROPHILS # BLD AUTO: 27.33 K/UL (ref 1.82–7.42)
NEUTROPHILS NFR BLD: 85.3 % (ref 44–72)
NEUTS BAND NFR BLD MANUAL: 0.9 % (ref 0–10)
NRBC # BLD AUTO: 0 K/UL
NRBC BLD-RTO: 0 /100 WBC (ref 0–0.2)
PLATELET # BLD AUTO: 508 K/UL (ref 164–446)
PLATELET BLD QL SMEAR: NORMAL
PMV BLD AUTO: 8.7 FL (ref 9–12.9)
POIKILOCYTOSIS BLD QL SMEAR: NORMAL
POTASSIUM SERPL-SCNC: 4.6 MMOL/L (ref 3.6–5.5)
PROT SERPL-MCNC: 7.9 G/DL (ref 6–8.2)
RBC # BLD AUTO: 5.28 M/UL (ref 4.7–6.1)
RBC BLD AUTO: PRESENT
SALICYLATES SERPL-MCNC: <1 MG/DL (ref 15–25)
SODIUM SERPL-SCNC: 132 MMOL/L (ref 135–145)
WBC # BLD AUTO: 31.7 K/UL (ref 4.8–10.8)

## 2023-10-26 PROCEDURE — 83690 ASSAY OF LIPASE: CPT

## 2023-10-26 PROCEDURE — 82077 ASSAY SPEC XCP UR&BREATH IA: CPT

## 2023-10-26 PROCEDURE — 80053 COMPREHEN METABOLIC PANEL: CPT

## 2023-10-26 PROCEDURE — 85027 COMPLETE CBC AUTOMATED: CPT

## 2023-10-26 PROCEDURE — 71045 X-RAY EXAM CHEST 1 VIEW: CPT

## 2023-10-26 PROCEDURE — 80143 DRUG ASSAY ACETAMINOPHEN: CPT

## 2023-10-26 PROCEDURE — 80179 DRUG ASSAY SALICYLATE: CPT

## 2023-10-26 PROCEDURE — 85007 BL SMEAR W/DIFF WBC COUNT: CPT

## 2023-10-26 PROCEDURE — 36415 COLL VENOUS BLD VENIPUNCTURE: CPT

## 2023-10-26 PROCEDURE — 99285 EMERGENCY DEPT VISIT HI MDM: CPT

## 2023-10-27 VITALS
HEART RATE: 97 BPM | HEIGHT: 71 IN | BODY MASS INDEX: 33.6 KG/M2 | RESPIRATION RATE: 16 BRPM | TEMPERATURE: 98.9 F | OXYGEN SATURATION: 94 % | DIASTOLIC BLOOD PRESSURE: 54 MMHG | SYSTOLIC BLOOD PRESSURE: 112 MMHG | WEIGHT: 240 LBS

## 2023-10-27 LAB
AMPHET UR QL SCN: POSITIVE
APPEARANCE UR: CLEAR
BARBITURATES UR QL SCN: NEGATIVE
BASOPHILS # BLD AUTO: 0.9 % (ref 0–1.8)
BASOPHILS # BLD: 0.24 K/UL (ref 0–0.12)
BENZODIAZ UR QL SCN: NEGATIVE
BILIRUB UR QL STRIP.AUTO: NEGATIVE
BZE UR QL SCN: NEGATIVE
CANNABINOIDS UR QL SCN: POSITIVE
COLOR UR: YELLOW
EOSINOPHIL # BLD AUTO: 0 K/UL (ref 0–0.51)
EOSINOPHIL NFR BLD: 0 % (ref 0–6.9)
ERYTHROCYTE [DISTWIDTH] IN BLOOD BY AUTOMATED COUNT: 45.3 FL (ref 35.9–50)
FENTANYL UR QL: NEGATIVE
GLUCOSE UR STRIP.AUTO-MCNC: NEGATIVE MG/DL
HCT VFR BLD AUTO: 42.7 % (ref 42–52)
HGB BLD-MCNC: 14.7 G/DL (ref 14–18)
KETONES UR STRIP.AUTO-MCNC: 15 MG/DL
LACTATE SERPL-SCNC: 1.2 MMOL/L (ref 0.5–2)
LEUKOCYTE ESTERASE UR QL STRIP.AUTO: NEGATIVE
LYMPHOCYTES # BLD AUTO: 1.66 K/UL (ref 1–4.8)
LYMPHOCYTES NFR BLD: 6.1 % (ref 22–41)
MANUAL DIFF BLD: NORMAL
MCH RBC QN AUTO: 29.9 PG (ref 27–33)
MCHC RBC AUTO-ENTMCNC: 34.4 G/DL (ref 32.3–36.5)
MCV RBC AUTO: 87 FL (ref 81.4–97.8)
METHADONE UR QL SCN: NEGATIVE
MICRO URNS: ABNORMAL
MONOCYTES # BLD AUTO: 3.54 K/UL (ref 0–0.85)
MONOCYTES NFR BLD AUTO: 13 % (ref 0–13.4)
MORPHOLOGY BLD-IMP: NORMAL
NEUTROPHILS # BLD AUTO: 21.76 K/UL (ref 1.82–7.42)
NEUTROPHILS NFR BLD: 80 % (ref 44–72)
NITRITE UR QL STRIP.AUTO: NEGATIVE
NRBC # BLD AUTO: 0 K/UL
NRBC BLD-RTO: 0 /100 WBC (ref 0–0.2)
OPIATES UR QL SCN: NEGATIVE
OXYCODONE UR QL SCN: NEGATIVE
PCP UR QL SCN: NEGATIVE
PH UR STRIP.AUTO: 5.5 [PH] (ref 5–8)
PLATELET # BLD AUTO: 485 K/UL (ref 164–446)
PLATELET BLD QL SMEAR: NORMAL
PMV BLD AUTO: 8.7 FL (ref 9–12.9)
PROPOXYPH UR QL SCN: NEGATIVE
PROT UR QL STRIP: NEGATIVE MG/DL
RBC # BLD AUTO: 4.91 M/UL (ref 4.7–6.1)
RBC BLD AUTO: NORMAL
RBC UR QL AUTO: NEGATIVE
SP GR UR STRIP.AUTO: 1.02
UROBILINOGEN UR STRIP.AUTO-MCNC: 0.2 MG/DL
WBC # BLD AUTO: 27.2 K/UL (ref 4.8–10.8)

## 2023-10-27 PROCEDURE — 81003 URINALYSIS AUTO W/O SCOPE: CPT

## 2023-10-27 PROCEDURE — 87040 BLOOD CULTURE FOR BACTERIA: CPT

## 2023-10-27 PROCEDURE — 36415 COLL VENOUS BLD VENIPUNCTURE: CPT

## 2023-10-27 PROCEDURE — 70450 CT HEAD/BRAIN W/O DYE: CPT

## 2023-10-27 PROCEDURE — 80307 DRUG TEST PRSMV CHEM ANLYZR: CPT

## 2023-10-27 PROCEDURE — 83605 ASSAY OF LACTIC ACID: CPT

## 2023-10-27 PROCEDURE — 700105 HCHG RX REV CODE 258: Mod: UD | Performed by: STUDENT IN AN ORGANIZED HEALTH CARE EDUCATION/TRAINING PROGRAM

## 2023-10-27 PROCEDURE — 85007 BL SMEAR W/DIFF WBC COUNT: CPT

## 2023-10-27 PROCEDURE — 85027 COMPLETE CBC AUTOMATED: CPT

## 2023-10-27 RX ORDER — SODIUM CHLORIDE, SODIUM LACTATE, POTASSIUM CHLORIDE, CALCIUM CHLORIDE 600; 310; 30; 20 MG/100ML; MG/100ML; MG/100ML; MG/100ML
1000 INJECTION, SOLUTION INTRAVENOUS ONCE
Status: COMPLETED | OUTPATIENT
Start: 2023-10-27 | End: 2023-10-27

## 2023-10-27 RX ADMIN — SODIUM CHLORIDE, POTASSIUM CHLORIDE, SODIUM LACTATE AND CALCIUM CHLORIDE 1000 ML: 600; 310; 30; 20 INJECTION, SOLUTION INTRAVENOUS at 05:36

## 2023-10-27 NOTE — ED NOTES
Pt woke up and was trying to get out of bed. Bed alarm went off. Pt redirected and is now Pt resting with even chest rise and fall, reports no needs at this time, bed alarm remains on.

## 2023-10-27 NOTE — ED NOTES
.Pt. Resting, no changes, 3 p's addressed, call light at bedside, poc updated   Waiting on results

## 2023-10-27 NOTE — DISCHARGE SUMMARY
ED Observation Discharge Summary    Patient:Fareed Muhammad  Patient : 1995  Patient MRN: [id number]  Patient PCP: Roosevelt Alston M.D.    Admit Date: 10/26/2023  Discharge Date and Time: 10/27/23 9:53 AM  Discharge Diagnosis:   1. Leukocytosis, unspecified type        2. Alcohol use disorder            Discharge Attending: Deb Cardoso D.O.  Discharge Service: ED Observation    ED Course  Fareed is a 28 y.o. male who was evaluated at [facility], initially for EtOH withdrawal symptoms.  Patient was found by bystanders to be acting bizarrely, prompting a call to EMS.  Patient's been here for many hours.  He is gradually sobered up.    9:50 AM patient's reevaluated the bedside.  He has been resting but is awake and alert.  He is calm and cooperative.  He has no complaints.  Denies any pain.  Denies any rashes.  States he is otherwise been in his normal state of health.  His oxygen has been removed and he is maintaining a normal oxygen saturation.  I discussed with him his abnormal lab results including an elevated white blood cell count that has slightly trended downward but is still quite elevated.  He had extensive imaging in August of this year as he presented as a trauma patient.  There were no concerning findings at that time, such as masses or lymphadenopathy.  Patient denies IV drug use.  His only complaint at this time is feeling hungry and is requesting something to eat.  Patient does present here frequently in the last few months, he has been here 5 times.    10:35 AM patient is even.  He is ambulating without difficulty.  He has reassuring vital signs.  He has been off oxygen and is maintaining his oxygen saturation.  He again, has no complaints of pain.  Per review of his prior records under an alternate medical record #[id number].  Since May of this year, this is the patient's approximately 11th visit in the emergency department.  He does not have a PCP he reports although  "there was one listed in his records.  Certainly, he is welcome to follow-up with them.  He is not amenable to further evaluation at this time.  He does however, agreed to return to the emergency department in the next few days to have his CBC repeated.  He did have extensive imaging just a few months ago which was nonconcerning from any malignancy standpoint.  He does not appear to have an infection at this time.  This may be a stress response.  Again he is advised is important to follow down to a normal range and its important that he return to have it reevaluated.    Discharge Exam:  BP 99/52   Pulse 95   Temp 36.6 °C (97.9 °F) (Temporal)   Resp 16   Ht 1.803 m (5' 11\")   Wt 109 kg (240 lb)   SpO2 92%   BMI 33.47 kg/m² .    Constitutional: Awake and alert. Nontoxic  HENT:  Grossly normal  Eyes: Grossly normal  Neck: Normal range of motion  Cardiovascular: Normal heart rate   Thorax & Lungs: No respiratory distress  Abdomen: Nontender  Skin:  No pathologic rash.   Extremities: Well perfused  Psychiatric: Affect normal    Labs  Results for orders placed or performed during the hospital encounter of 10/26/23   CBC WITH DIFFERENTIAL   Result Value Ref Range    WBC 31.7 (H) 4.8 - 10.8 K/uL    RBC 5.28 4.70 - 6.10 M/uL    Hemoglobin 15.6 14.0 - 18.0 g/dL    Hematocrit 46.7 42.0 - 52.0 %    MCV 88.4 81.4 - 97.8 fL    MCH 29.5 27.0 - 33.0 pg    MCHC 33.4 32.3 - 36.5 g/dL    RDW 45.1 35.9 - 50.0 fL    Platelet Count 508 (H) 164 - 446 K/uL    MPV 8.7 (L) 9.0 - 12.9 fL    Neutrophils-Polys 85.30 (H) 44.00 - 72.00 %    Lymphocytes 6.90 (L) 22.00 - 41.00 %    Monocytes 6.90 0.00 - 13.40 %    Eosinophils 0.00 0.00 - 6.90 %    Basophils 0.00 0.00 - 1.80 %    Nucleated RBC 0.00 0.00 - 0.20 /100 WBC    Neutrophils (Absolute) 27.33 (H) 1.82 - 7.42 K/uL    Lymphs (Absolute) 2.19 1.00 - 4.80 K/uL    Monos (Absolute) 2.19 (H) 0.00 - 0.85 K/uL    Eos (Absolute) 0.00 0.00 - 0.51 K/uL    Baso (Absolute) 0.00 0.00 - 0.12 K/uL    " NRBC (Absolute) 0.00 K/uL    Anisocytosis 1+     Microcytosis 1+    COMP METABOLIC PANEL   Result Value Ref Range    Sodium 132 (L) 135 - 145 mmol/L    Potassium 4.6 3.6 - 5.5 mmol/L    Chloride 96 96 - 112 mmol/L    Co2 21 20 - 33 mmol/L    Anion Gap 15.0 7.0 - 16.0    Glucose 96 65 - 99 mg/dL    Bun 18 8 - 22 mg/dL    Creatinine 1.19 0.50 - 1.40 mg/dL    Calcium 9.5 8.5 - 10.5 mg/dL    Correct Calcium 9.2 8.5 - 10.5 mg/dL    AST(SGOT) 38 12 - 45 U/L    ALT(SGPT) 18 2 - 50 U/L    Alkaline Phosphatase 105 (H) 30 - 99 U/L    Total Bilirubin 0.5 0.1 - 1.5 mg/dL    Albumin 4.4 3.2 - 4.9 g/dL    Total Protein 7.9 6.0 - 8.2 g/dL    Globulin 3.5 1.9 - 3.5 g/dL    A-G Ratio 1.3 g/dL   LIPASE   Result Value Ref Range    Lipase 11 11 - 82 U/L   DIAGNOSTIC ALCOHOL   Result Value Ref Range    Diagnostic Alcohol 173.7 (H) <10.1 mg/dL   Salicylate   Result Value Ref Range    Salicylates, Quant. <1.0 (L) 15.0 - 25.0 mg/dL   ACETAMINOPHEN   Result Value Ref Range    Acetaminophen -Tylenol <5.0 (L) 10.0 - 30.0 ug/mL   ESTIMATED GFR   Result Value Ref Range    GFR (CKD-EPI) 86 >60 mL/min/1.73 m 2   PLATELET ESTIMATE   Result Value Ref Range    Plt Estimation Increased    MORPHOLOGY   Result Value Ref Range    RBC Morphology Present     Poikilocytosis 1+     Echinocytes 1+    PERIPHERAL SMEAR REVIEW   Result Value Ref Range    Peripheral Smear Review see below    DIFFERENTIAL MANUAL   Result Value Ref Range    Bands-Stabs 0.90 0.00 - 10.00 %    Manual Diff Status PERFORMED    CBC WITH DIFFERENTIAL   Result Value Ref Range    WBC 27.2 (H) 4.8 - 10.8 K/uL    RBC 4.91 4.70 - 6.10 M/uL    Hemoglobin 14.7 14.0 - 18.0 g/dL    Hematocrit 42.7 42.0 - 52.0 %    MCV 87.0 81.4 - 97.8 fL    MCH 29.9 27.0 - 33.0 pg    MCHC 34.4 32.3 - 36.5 g/dL    RDW 45.3 35.9 - 50.0 fL    Platelet Count 485 (H) 164 - 446 K/uL    MPV 8.7 (L) 9.0 - 12.9 fL    Neutrophils-Polys 80.00 (H) 44.00 - 72.00 %    Lymphocytes 6.10 (L) 22.00 - 41.00 %    Monocytes 13.00  0.00 - 13.40 %    Eosinophils 0.00 0.00 - 6.90 %    Basophils 0.90 0.00 - 1.80 %    Nucleated RBC 0.00 0.00 - 0.20 /100 WBC    Neutrophils (Absolute) 21.76 (H) 1.82 - 7.42 K/uL    Lymphs (Absolute) 1.66 1.00 - 4.80 K/uL    Monos (Absolute) 3.54 (H) 0.00 - 0.85 K/uL    Eos (Absolute) 0.00 0.00 - 0.51 K/uL    Baso (Absolute) 0.24 (H) 0.00 - 0.12 K/uL    NRBC (Absolute) 0.00 K/uL   LACTIC ACID   Result Value Ref Range    Lactic Acid 1.2 0.5 - 2.0 mmol/L   DIFFERENTIAL MANUAL   Result Value Ref Range    Manual Diff Status PERFORMED    PERIPHERAL SMEAR REVIEW   Result Value Ref Range    Peripheral Smear Review see below    PLATELET ESTIMATE   Result Value Ref Range    Plt Estimation Increased    MORPHOLOGY   Result Value Ref Range    RBC Morphology Normal    URINALYSIS   Result Value Ref Range    Color Yellow     Character Clear     Specific Gravity 1.022 <1.035    Ph 5.5 5.0 - 8.0    Glucose Negative Negative mg/dL    Ketones 15 (A) Negative mg/dL    Protein Negative Negative mg/dL    Bilirubin Negative Negative    Urobilinogen, Urine 0.2 Negative    Nitrite Negative Negative    Leukocyte Esterase Negative Negative    Occult Blood Negative Negative    Micro Urine Req see below    URINE DRUG SCREEN   Result Value Ref Range    Amphetamines Urine Positive (A) Negative    Barbiturates Negative Negative    Benzodiazepines Negative Negative    Cocaine Metabolite Negative Negative    Fentanyl, Urine Negative Negative    Methadone Negative Negative    Opiates Negative Negative    Oxycodone Negative Negative    Phencyclidine -Pcp Negative Negative    Propoxyphene Negative Negative    Cannabinoid Metab Positive (A) Negative       Radiology  CT-HEAD W/O   Final Result         1.  No acute intracranial abnormality.         DX-CHEST-PORTABLE (1 VIEW)   Final Result         1.  No acute cardiopulmonary disease.          Medications:   New Prescriptions    No medications on file       My final assessment includes   1. Leukocytosis,  unspecified type        2. Alcohol use disorder            Upon Reevaluation, the patient's condition has: Improved; and will be discharged.    Patient discharged from ED Observation status at 1040AM  (Time) 10/27/2023 (Date).     Total time spent on this ED Observation discharge encounter is > 30 Minutes    Electronically signed by: Deb Cardoso D.O., 10/27/2023 9:53 AM

## 2023-10-27 NOTE — ED NOTES
Pt medicated per MAR. Pt resting comfortably with even chest rise and fall, bed alarm remains on.

## 2023-10-27 NOTE — ED NOTES
Bedside report received from RENAE Calles. Assumed care of patient.  Patient aware of POC. Urinal provided for urine sample. Call light within reach.

## 2023-10-27 NOTE — ED NOTES
Pt oxygen dropped to 88% tech at bedside for nasal cannula placement. Pt resting with even chest rise and fall, reports no needs at this time, call light available and in reach. Pt remains on bed alarm.

## 2023-10-27 NOTE — ED NOTES
Bedside report received from off going RN Radha, assumed care of patient.  POC discussed with patient. Call light within reach, all needs addressed at this time.       Fall risk interventions in place: Patient's personal possessions are with in their safe reach, Place fall risk sign on patient's door, Keep floor surfaces clean and dry, Accompanied to restroom, and Bed Alarm in use (all applicable per [place] Fall risk assessment)   Continuous monitoring: Pulse Ox or Blood Pressure  IVF/IV medications: Not Applicable   Oxygen: Room Air  Bedside sitter: Not Applicable   Isolation: Not Applicable

## 2023-10-27 NOTE — ED TRIAGE NOTES
".  Chief Complaint   Patient presents with    ETOH Withdrawal     Pt. Bib remsa with complaints of ALOC, pt. Giggling and speaking incoherently, no signs of distress, no complaints      ./57   Pulse (!) 112   Temp 36.6 °C (97.9 °F) (Temporal)   Resp 16   Ht 1.803 m (5' 11\")   Wt 109 kg (240 lb)   SpO2 98%   BMI 33.47 kg/m²     "

## 2023-10-27 NOTE — ED NOTES
ERP bedside. PIV placed and blood drawn. PT provided food and water. Pt resting with even chest rise and fall, reports no needs at this time, call light available and in reach.

## 2023-10-27 NOTE — ED NOTES
Blood cultures and lactic drawn. Blood sent to lab. Phlebotomist messaged for second set of cultures.

## 2023-10-27 NOTE — DISCHARGE INSTRUCTIONS
Your white blood cell count is elevated today.  It is unclear why.  It is important that this is reevaluated.  Given that you do not have a primary care physician, I would recommend that you return to the emergency department in a few days for reevaluation.

## 2023-10-27 NOTE — ED NOTES
Pt escorted from room to CT. Pt awake and rambling, with incomprehensible speech. Pt brought back from Ct and on monitor and bed alarm. Pt resting with even chest rise and fall, reports no needs at this time, call light available and in reach.

## 2023-10-27 NOTE — ED PROVIDER NOTES
"ED Provider Note    CHIEF COMPLAINT  Chief Complaint   Patient presents with    ETOH Withdrawal     Pt. Bib remsa with complaints of ALOC, pt. Giggling and speaking incoherently, no signs of distress, no complaints        EXTERNAL RECORDS REVIEWED  Other No outside records available    HPI/ROS  LIMITATION TO HISTORY   Select: Altered mental status / Confusion  OUTSIDE HISTORIAN(S):  EMS      Devon Muhammad is a 28 y.o. male with an unknown past medical history who presents for evaluation of altered mental status.  EMS was called by bystander as the patient was giggling and speaking incoherently.  The patient has flight of ideas and when asked if he has used any alcohol or drugs he states that it has been 128,000 days.  He denies have any medical problems.  He denies any pain.    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      FAMILY HISTORY  No family history on file.    SOCIAL HISTORY  Social History     Tobacco Use    Smoking status: Not on file    Smokeless tobacco: Not on file   Substance and Sexual Activity    Alcohol use: Yes    Drug use: Meth    Sexual activity: Not on file       CURRENT MEDICATIONS  He denies taking medications      ALLERGIES  No Known Allergies    PHYSICAL EXAM  VITAL SIGNS: /57   Pulse (!) 112   Temp 36.6 °C (97.9 °F) (Temporal)   Resp 16   Ht 1.803 m (5' 11\")   Wt 109 kg (240 lb)   SpO2 98%   BMI 33.47 kg/m²      Constitutional: Well developed, well nourished, disheveled  HEENT: Normocephalic, Atraumatic,  external ears normal, pharynx pink,  Mucous  Membranes dry, No rhinorrhea or mucosal edema, no trismus  Eyes: PERRL, EOMI, Conjunctiva normal, No discharge.   Neck: Normal range of motion, No tenderness, Supple, No stridor. "   Cardiovascular: Tachycardic, regular rhythm, No murmurs,  rubs, or gallops.   Thorax & Lungs: Lungs clear to auscultation bilaterally, No respiratory distress, No wheezes, rhales or rhonchi, No chest wall tenderness.   Abdomen: oft, non tender, non distended,  No pulsatile masses., no rebound guarding or peritoneal signs.   Skin: Warm, Dry, No erythema, No rash, well-healed surgical scar to left elbow  Back:  No CVA tenderness,  No spinal tenderness, bony crepitance step offs or instability.   Extremities: Equal, intact distal pulses, No cyanosis, clubbing or edema,  No tenderness.   Musculoskeletal: Good range of motion in all major joints. No tenderness to palpation or major deformities noted.   Neurologic: Alert, oriented to self and that he is in the hospital, full strength and sensation in all 4 extremities  Psychiatric: Somewhat flight of ideas, nonsensical speech, denies SI or HI      DIAGNOSTIC STUDIES / PROCEDURES    LABS  Results for orders placed or performed during the hospital encounter of 10/26/23   CBC WITH DIFFERENTIAL   Result Value Ref Range    WBC 31.7 (H) 4.8 - 10.8 K/uL    RBC 5.28 4.70 - 6.10 M/uL    Hemoglobin 15.6 14.0 - 18.0 g/dL    Hematocrit 46.7 42.0 - 52.0 %    MCV 88.4 81.4 - 97.8 fL    MCH 29.5 27.0 - 33.0 pg    MCHC 33.4 32.3 - 36.5 g/dL    RDW 45.1 35.9 - 50.0 fL    Platelet Count 508 (H) 164 - 446 K/uL    MPV 8.7 (L) 9.0 - 12.9 fL    Neutrophils-Polys 85.30 (H) 44.00 - 72.00 %    Lymphocytes 6.90 (L) 22.00 - 41.00 %    Monocytes 6.90 0.00 - 13.40 %    Eosinophils 0.00 0.00 - 6.90 %    Basophils 0.00 0.00 - 1.80 %    Nucleated RBC 0.00 0.00 - 0.20 /100 WBC    Neutrophils (Absolute) 27.33 (H) 1.82 - 7.42 K/uL    Lymphs (Absolute) 2.19 1.00 - 4.80 K/uL    Monos (Absolute) 2.19 (H) 0.00 - 0.85 K/uL    Eos (Absolute) 0.00 0.00 - 0.51 K/uL    Baso (Absolute) 0.00 0.00 - 0.12 K/uL    NRBC (Absolute) 0.00 K/uL    Anisocytosis 1+     Microcytosis 1+    COMP METABOLIC PANEL   Result Value  Ref Range    Sodium 132 (L) 135 - 145 mmol/L    Potassium 4.6 3.6 - 5.5 mmol/L    Chloride 96 96 - 112 mmol/L    Co2 21 20 - 33 mmol/L    Anion Gap 15.0 7.0 - 16.0    Glucose 96 65 - 99 mg/dL    Bun 18 8 - 22 mg/dL    Creatinine 1.19 0.50 - 1.40 mg/dL    Calcium 9.5 8.5 - 10.5 mg/dL    Correct Calcium 9.2 8.5 - 10.5 mg/dL    AST(SGOT) 38 12 - 45 U/L    ALT(SGPT) 18 2 - 50 U/L    Alkaline Phosphatase 105 (H) 30 - 99 U/L    Total Bilirubin 0.5 0.1 - 1.5 mg/dL    Albumin 4.4 3.2 - 4.9 g/dL    Total Protein 7.9 6.0 - 8.2 g/dL    Globulin 3.5 1.9 - 3.5 g/dL    A-G Ratio 1.3 g/dL   LIPASE   Result Value Ref Range    Lipase 11 11 - 82 U/L   DIAGNOSTIC ALCOHOL   Result Value Ref Range    Diagnostic Alcohol 173.7 (H) <10.1 mg/dL   Salicylate   Result Value Ref Range    Salicylates, Quant. <1.0 (L) 15.0 - 25.0 mg/dL   ACETAMINOPHEN   Result Value Ref Range    Acetaminophen -Tylenol <5.0 (L) 10.0 - 30.0 ug/mL   ESTIMATED GFR   Result Value Ref Range    GFR (CKD-EPI) 86 >60 mL/min/1.73 m 2   PLATELET ESTIMATE   Result Value Ref Range    Plt Estimation Increased    MORPHOLOGY   Result Value Ref Range    RBC Morphology Present     Poikilocytosis 1+     Echinocytes 1+    PERIPHERAL SMEAR REVIEW   Result Value Ref Range    Peripheral Smear Review see below    DIFFERENTIAL MANUAL   Result Value Ref Range    Bands-Stabs 0.90 0.00 - 10.00 %    Manual Diff Status PERFORMED    CBC WITH DIFFERENTIAL   Result Value Ref Range    WBC 27.2 (H) 4.8 - 10.8 K/uL    RBC 4.91 4.70 - 6.10 M/uL    Hemoglobin 14.7 14.0 - 18.0 g/dL    Hematocrit 42.7 42.0 - 52.0 %    MCV 87.0 81.4 - 97.8 fL    MCH 29.9 27.0 - 33.0 pg    MCHC 34.4 32.3 - 36.5 g/dL    RDW 45.3 35.9 - 50.0 fL    Platelet Count 485 (H) 164 - 446 K/uL    MPV 8.7 (L) 9.0 - 12.9 fL    Neutrophils-Polys 80.00 (H) 44.00 - 72.00 %    Lymphocytes 6.10 (L) 22.00 - 41.00 %    Monocytes 13.00 0.00 - 13.40 %    Eosinophils 0.00 0.00 - 6.90 %    Basophils 0.90 0.00 - 1.80 %    Nucleated RBC 0.00  0.00 - 0.20 /100 WBC    Neutrophils (Absolute) 21.76 (H) 1.82 - 7.42 K/uL    Lymphs (Absolute) 1.66 1.00 - 4.80 K/uL    Monos (Absolute) 3.54 (H) 0.00 - 0.85 K/uL    Eos (Absolute) 0.00 0.00 - 0.51 K/uL    Baso (Absolute) 0.24 (H) 0.00 - 0.12 K/uL    NRBC (Absolute) 0.00 K/uL   DIFFERENTIAL MANUAL   Result Value Ref Range    Manual Diff Status PERFORMED    PERIPHERAL SMEAR REVIEW   Result Value Ref Range    Peripheral Smear Review see below    PLATELET ESTIMATE   Result Value Ref Range    Plt Estimation Increased    MORPHOLOGY   Result Value Ref Range    RBC Morphology Normal      RADIOLOGY  I have independently interpreted the diagnostic imaging associated with this visit and am waiting the final reading from the radiologist.   My preliminary interpretation is as follows: no ICH  Radiologist interpretation:   CT-HEAD W/O   Final Result         1.  No acute intracranial abnormality.         DX-CHEST-PORTABLE (1 VIEW)   Final Result         1.  No acute cardiopulmonary disease.            COURSE & MEDICAL DECISION MAKING    ED Observation Status? Yes; I am placing the patient in to an observation status due to a diagnostic uncertainty as well as therapeutic intensity. Patient placed in observation status at 09:12 PM, 10/26/2023.     Observation plan is as follows: Labs, CT head, metabolization of substances, admit if significant lab abnormality or vital sign instability    12:37 AM Patient reevaluated.  He is now more calm and his speech is sensible.  We will continue to observe    4:39 AM patient was reevaluated at bedside.  His heart rate has improved to 110 and he has never been hypotensive or febrile here.  He is resting comfortably.  He is now awake, talking.  He states that he is at a medical facility in [facility].  He states that he was intoxicated therefore he was brought to the hospital.  He is able to tell me his name and date of birth.  He is requesting for food.  He denies any SI or HI.  He is not  responding to internal stimuli and denies any hallucinations.  I do not think that he meets criteria for legal hold at this time.  Given leukocytosis, repeat CBC will be done.  IV fluids will be given.    5:40 AM repeat white blood cell count downtrending with white blood cell 27,200.  His vital signs have remained stable.  He continues to not have a fever.  We will check a lactic acid as well as blood cultures x2.  Patient's care was signed out to my colleague, Dr. Cardoso, who will follow-up on blood test.      INITIAL ASSESSMENT, COURSE AND PLAN  Care Narrative: This is a 28-year-old male who was brought in by EMS after a bystander called EMS after he was found altered.  On arrival patient has mild tachycardia to 112 but otherwise his vital signs are stable.  He is not hypoxic or hypotensive or febrile.  On physical exam, he is disheveled and appears intoxicated.  He initially was given the wrong name in the EMR but patient was able to tell me that his name is actually Fareed Muhammad and he knew that he was in the hospital.  He admits to drinking alcohol and using methamphetamines earlier today.    An IV was established and labs are obtained.  He has a white blood cell count of 31,000.  Unclear significance as patient does have history of leukocytosis due to splenectomy 4 months ago but most recently did not have such high white blood cell count.  He also has thrombocytosis which has been seen chronically.  There is no anemia.  His electrolytes are within normal limits.  His renal function is normal.  Alcohol level is elevated at 173.  Tylenol and salicylate levels are negative.    Unclear etiology of elevation white blood cell count.  I considered meningitis or encephalitis however after observation of the emergency room for several hours the patient is now alert and oriented x4, appropriate, has no meningeal signs or headache therefore I think that this is less likely.  He also has never been febrile.  Chest x-ray was  obtained to evaluate for pneumonia and there is no evidence of pneumonia.  CT head shows no evidence of intracranial hemorrhage.  UA pending.  Patient has no abdominal pain to suggest intra-abdominal source of infection.  He is requesting for food.    I do suspect that his altered mental status was related to alcohol intoxication and likely methamphetamine abuse.  His altered mental status has significantly improved and he is now requesting for food.  He has no SI, HI, concern for grave disability therefore I do not think that he needs to be seen by psychiatry at this time.    Patient with persistent leukocytosis with repeat CBC.  Patient's care was signed out to my colleague, Dr. Cardoso, who will follow-up on lactic acid and blood cultures as well as vital signs after given fluids and will appropriately disposition the patient.  Patient is agreeable to plan and no further questions.  Patient remains in observation status since 9:12 PM 10/27/2023    HYDRATION: Based on the patient's presentation of Tachycardia the patient was given IV fluids. IV Hydration was used because oral hydration was not as rapid as required. Upon recheck following hydration, the patient was improved with improving heart rate.        DISPOSITION AND DISCUSSIONS  I have discussed management of the patient with the following physicians and ADAM's:    VINITA, Dr. Cardoso, regarding transfer of care      Barriers to care at this time, including but not limited to: Patient does not have established PCP, Patient is homeless, Patient had difficult affording medications, and Patient lacks financial resources.     Patient's care signed out to day Dr. Walker TALAMANTES, pending further labs, normalization of vital signs and determination of final disposition.     FINAL DIAGNOSIS  Altered mental status  Alcohol intoxication without complication  Methamphetamine use       Electronically signed by: Wendy Quintanilla M.D., 10/26/2023 9:12 PM

## 2023-10-27 NOTE — ED NOTES
Pt continues to remove monitor and has been reminded to keep on several times now. Bp and pulse ox monitors replaced and cycled. Pt resting with even chest rise and fall, reports no needs at this time, bed alarm remains on.

## 2023-10-28 ENCOUNTER — HOSPITAL ENCOUNTER (EMERGENCY)
Facility: MEDICAL CENTER | Age: 28
End: 2023-10-28
Attending: EMERGENCY MEDICINE
Payer: MEDICAID

## 2023-10-28 VITALS
DIASTOLIC BLOOD PRESSURE: 75 MMHG | HEART RATE: 90 BPM | OXYGEN SATURATION: 95 % | WEIGHT: 188 LBS | SYSTOLIC BLOOD PRESSURE: 125 MMHG | RESPIRATION RATE: 17 BRPM | TEMPERATURE: 98 F | BODY MASS INDEX: 26.22 KG/M2

## 2023-10-28 DIAGNOSIS — F10.920 ALCOHOLIC INTOXICATION WITHOUT COMPLICATION (HCC): ICD-10-CM

## 2023-10-28 LAB — POC BREATHALIZER: 0.12 PERCENT (ref 0–0.01)

## 2023-10-28 PROCEDURE — 99284 EMERGENCY DEPT VISIT MOD MDM: CPT

## 2023-10-28 PROCEDURE — 302970 POC BREATHALIZER: Performed by: EMERGENCY MEDICINE

## 2023-10-28 ASSESSMENT — FIBROSIS 4 INDEX: FIB4 SCORE: 0.5

## 2023-10-28 NOTE — ED TRIAGE NOTES
"Pt bib ems c/o etoh intoxication. Pt states he \"drank enough for everybody\" denies other drugs. vss  "
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
## 2023-10-29 NOTE — ED NOTES
Pt appears to be sleeping, laying supine on gurney. Even chest rise and fall visualized. Pt within direct line of sight to nurse's station.

## 2023-10-29 NOTE — ED NOTES
Bedside report from Aaliyah MACDONALD. Pt on lamontrney in lowest, locked position, on room air, and in direct line of sight of nurse's station. Fall precautions in place.

## 2023-10-29 NOTE — ED NOTES
Pt left prior to being provided discharge instructions. Pt ambulates with steady gait from ED. Pt in possession of all belongings.
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
## 2023-11-01 LAB
BACTERIA BLD CULT: NORMAL
BACTERIA BLD CULT: NORMAL
SIGNIFICANT IND 70042: NORMAL
SIGNIFICANT IND 70042: NORMAL
SITE SITE: NORMAL
SITE SITE: NORMAL
SOURCE SOURCE: NORMAL
SOURCE SOURCE: NORMAL

## 2024-08-08 ENCOUNTER — APPOINTMENT (OUTPATIENT)
Dept: OCCUPATIONAL THERAPY | Facility: REHABILITATION | Age: 29
End: 2024-08-08
Attending: NURSE PRACTITIONER
Payer: MEDICAID

## 2024-08-20 ENCOUNTER — OFFICE VISIT (OUTPATIENT)
Dept: MEDICAL GROUP | Facility: MEDICAL CENTER | Age: 29
End: 2024-08-20
Payer: MEDICAID

## 2024-08-20 VITALS
TEMPERATURE: 98.7 F | HEIGHT: 71 IN | HEART RATE: 85 BPM | SYSTOLIC BLOOD PRESSURE: 130 MMHG | BODY MASS INDEX: 36.47 KG/M2 | WEIGHT: 260.5 LBS | OXYGEN SATURATION: 96 % | DIASTOLIC BLOOD PRESSURE: 80 MMHG | RESPIRATION RATE: 16 BRPM

## 2024-08-20 DIAGNOSIS — R22.1 NECK SWELLING: ICD-10-CM

## 2024-08-20 DIAGNOSIS — Z13.21 SCREENING FOR ENDOCRINE, NUTRITIONAL, METABOLIC AND IMMUNITY DISORDER: ICD-10-CM

## 2024-08-20 DIAGNOSIS — Z13.29 SCREENING FOR ENDOCRINE, NUTRITIONAL, METABOLIC AND IMMUNITY DISORDER: ICD-10-CM

## 2024-08-20 DIAGNOSIS — R29.898 LEFT ARM WEAKNESS: ICD-10-CM

## 2024-08-20 DIAGNOSIS — F31.9 BIPOLAR 1 DISORDER (HCC): ICD-10-CM

## 2024-08-20 DIAGNOSIS — Z13.0 SCREENING FOR ENDOCRINE, NUTRITIONAL, METABOLIC AND IMMUNITY DISORDER: ICD-10-CM

## 2024-08-20 DIAGNOSIS — F19.90 ILLICIT DRUG USE: ICD-10-CM

## 2024-08-20 DIAGNOSIS — R29.818 NEUROCOGNITIVE DEFICITS: ICD-10-CM

## 2024-08-20 DIAGNOSIS — F25.8 OTHER SCHIZOAFFECTIVE DISORDERS (HCC): ICD-10-CM

## 2024-08-20 DIAGNOSIS — R41.89 NEUROCOGNITIVE DEFICITS: ICD-10-CM

## 2024-08-20 DIAGNOSIS — Z13.228 SCREENING FOR ENDOCRINE, NUTRITIONAL, METABOLIC AND IMMUNITY DISORDER: ICD-10-CM

## 2024-08-20 DIAGNOSIS — S52.022A OLECRANON FRACTURE, LEFT, CLOSED, INITIAL ENCOUNTER: ICD-10-CM

## 2024-08-20 PROBLEM — T14.90XA TRAUMA: Status: RESOLVED | Noted: 2023-05-31 | Resolved: 2024-08-20

## 2024-08-20 PROBLEM — F10.929 ACUTE ALCOHOL INTOXICATION (HCC): Status: RESOLVED | Noted: 2023-08-21 | Resolved: 2024-08-20

## 2024-08-20 PROBLEM — Z78.9 NO CONTRAINDICATION TO DEEP VEIN THROMBOSIS (DVT) PROPHYLAXIS: Status: RESOLVED | Noted: 2023-05-31 | Resolved: 2024-08-20

## 2024-08-20 PROBLEM — S36.113A LIVER LACERATION, INITIAL ENCOUNTER: Status: RESOLVED | Noted: 2023-05-31 | Resolved: 2024-08-20

## 2024-08-20 PROBLEM — Z75.8 DISCHARGE PLANNING ISSUES: Status: RESOLVED | Noted: 2023-06-22 | Resolved: 2024-08-20

## 2024-08-20 PROBLEM — Z78.9 NO CONTRAINDICATION TO DEEP VEIN THROMBOSIS (DVT) PROPHYLAXIS: Status: RESOLVED | Noted: 2023-08-22 | Resolved: 2024-08-20

## 2024-08-20 PROBLEM — S22.42XA CLOSED FRACTURE OF MULTIPLE RIBS OF LEFT SIDE: Status: RESOLVED | Noted: 2023-05-31 | Resolved: 2024-08-20

## 2024-08-20 PROBLEM — F25.9 SCHIZOAFFECTIVE DISORDER (HCC): Status: ACTIVE | Noted: 2024-08-20

## 2024-08-20 PROBLEM — T14.90XA TRAUMA: Status: RESOLVED | Noted: 2023-08-21 | Resolved: 2024-08-20

## 2024-08-20 PROBLEM — S36.039A SPLEEN LACERATION, INITIAL ENCOUNTER: Status: RESOLVED | Noted: 2023-05-31 | Resolved: 2024-08-20

## 2024-08-20 PROCEDURE — 99213 OFFICE O/P EST LOW 20 MIN: CPT

## 2024-08-20 PROCEDURE — 99204 OFFICE O/P NEW MOD 45 MIN: CPT

## 2024-08-20 RX ORDER — PALIPERIDONE PALMITATE 234 MG/1.5ML
INJECTION INTRAMUSCULAR
COMMUNITY
Start: 2024-08-12

## 2024-08-20 ASSESSMENT — FIBROSIS 4 INDEX: FIB4 SCORE: 0.5
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
## 2024-08-20 NOTE — ASSESSMENT & PLAN NOTE
Last drug use was 6 years ago per patient. UDS on medical records per below.    Latest Reference Range & Units 10/27/23 07:55   Amphetamines Urine Negative  Positive !   Barbiturates Negative  Negative   Benzodiazepines Negative  Negative   Cannabinoid Metab Negative  Positive !   Cocaine Metabolite Negative  Negative   Methadone Negative  Negative   Opiates Negative  Negative   Oxycodone Negative  Negative   Phencyclidine -Pcp Negative  Negative   Propoxyphene Negative  Negative   !: Data is abnormal

## 2024-08-20 NOTE — ASSESSMENT & PLAN NOTE
He is not currently seeing a psychiatrist but he would like to. Chart review shows a hx of schizophrenia and bipolar.

## 2024-08-23 ENCOUNTER — HOSPITAL ENCOUNTER (OUTPATIENT)
Dept: LAB | Facility: MEDICAL CENTER | Age: 29
End: 2024-08-23
Payer: MEDICAID

## 2024-08-23 DIAGNOSIS — Z13.228 SCREENING FOR ENDOCRINE, NUTRITIONAL, METABOLIC AND IMMUNITY DISORDER: ICD-10-CM

## 2024-08-23 DIAGNOSIS — Z13.29 SCREENING FOR ENDOCRINE, NUTRITIONAL, METABOLIC AND IMMUNITY DISORDER: ICD-10-CM

## 2024-08-23 DIAGNOSIS — Z13.0 SCREENING FOR ENDOCRINE, NUTRITIONAL, METABOLIC AND IMMUNITY DISORDER: ICD-10-CM

## 2024-08-23 DIAGNOSIS — Z13.21 SCREENING FOR ENDOCRINE, NUTRITIONAL, METABOLIC AND IMMUNITY DISORDER: ICD-10-CM

## 2024-08-23 LAB
ALBUMIN SERPL BCP-MCNC: 4.1 G/DL (ref 3.2–4.9)
ALBUMIN/GLOB SERPL: 1.2 G/DL
ALP SERPL-CCNC: 104 U/L (ref 30–99)
ALT SERPL-CCNC: 18 U/L (ref 2–50)
ANION GAP SERPL CALC-SCNC: 11 MMOL/L (ref 7–16)
AST SERPL-CCNC: 13 U/L (ref 12–45)
BILIRUB SERPL-MCNC: 0.7 MG/DL (ref 0.1–1.5)
BUN SERPL-MCNC: 13 MG/DL (ref 8–22)
CALCIUM ALBUM COR SERPL-MCNC: 9.2 MG/DL (ref 8.5–10.5)
CALCIUM SERPL-MCNC: 9.3 MG/DL (ref 8.5–10.5)
CHLORIDE SERPL-SCNC: 101 MMOL/L (ref 96–112)
CHOLEST SERPL-MCNC: 174 MG/DL (ref 100–199)
CO2 SERPL-SCNC: 21 MMOL/L (ref 20–33)
CREAT SERPL-MCNC: 0.76 MG/DL (ref 0.5–1.4)
ERYTHROCYTE [DISTWIDTH] IN BLOOD BY AUTOMATED COUNT: 47.4 FL (ref 35.9–50)
EST. AVERAGE GLUCOSE BLD GHB EST-MCNC: 111 MG/DL
GFR SERPLBLD CREATININE-BSD FMLA CKD-EPI: 126 ML/MIN/1.73 M 2
GLOBULIN SER CALC-MCNC: 3.4 G/DL (ref 1.9–3.5)
GLUCOSE SERPL-MCNC: 95 MG/DL (ref 65–99)
HBA1C MFR BLD: 5.5 % (ref 4–5.6)
HCT VFR BLD AUTO: 47.5 % (ref 42–52)
HCV AB SER QL: NORMAL
HDLC SERPL-MCNC: 41 MG/DL
HGB BLD-MCNC: 15.6 G/DL (ref 14–18)
HIV 1+2 AB+HIV1 P24 AG SERPL QL IA: NORMAL
LDLC SERPL CALC-MCNC: 118 MG/DL
MCH RBC QN AUTO: 30.6 PG (ref 27–33)
MCHC RBC AUTO-ENTMCNC: 32.8 G/DL (ref 32.3–36.5)
MCV RBC AUTO: 93.3 FL (ref 81.4–97.8)
PLATELET # BLD AUTO: 451 K/UL (ref 164–446)
PMV BLD AUTO: 9.7 FL (ref 9–12.9)
POTASSIUM SERPL-SCNC: 4.5 MMOL/L (ref 3.6–5.5)
PROT SERPL-MCNC: 7.5 G/DL (ref 6–8.2)
RBC # BLD AUTO: 5.09 M/UL (ref 4.7–6.1)
SODIUM SERPL-SCNC: 133 MMOL/L (ref 135–145)
TRIGL SERPL-MCNC: 76 MG/DL (ref 0–149)
WBC # BLD AUTO: 16.2 K/UL (ref 4.8–10.8)

## 2024-08-23 PROCEDURE — 80061 LIPID PANEL: CPT

## 2024-08-23 PROCEDURE — 36415 COLL VENOUS BLD VENIPUNCTURE: CPT

## 2024-08-23 PROCEDURE — 80053 COMPREHEN METABOLIC PANEL: CPT

## 2024-08-23 PROCEDURE — 85027 COMPLETE CBC AUTOMATED: CPT

## 2024-08-23 PROCEDURE — 83036 HEMOGLOBIN GLYCOSYLATED A1C: CPT

## 2024-08-23 PROCEDURE — 87389 HIV-1 AG W/HIV-1&-2 AB AG IA: CPT

## 2024-08-23 PROCEDURE — 86803 HEPATITIS C AB TEST: CPT

## 2024-09-13 NOTE — PROGRESS NOTES
"Bedside report received.  Assessment complete.  A&O x 4. Patient calls appropriately.  Patient ambulates with no assist. Bed alarm off.   Patient has 7/10 pain. Pain managed with prescribed medications.  Denies N&V. Tolerating regular diet.  Healed midline incision JEROME, LUE cast in place.  + void, + flatus, + BM.  Patient denies SOB.  SCD's off.  Patient pleasant, cooperative with care plan.  Review plan with of care with patient. Call light and personal belongings within reach. Hourly rounding in place. All needs met at this time. /71   Pulse 86   Temp 36.6 °C (97.9 °F) (Temporal)   Resp 18   Ht 1.803 m (5' 10.98\")   Wt 108 kg (238 lb 12.1 oz)   SpO2 94%     " 79.4

## 2024-09-24 ENCOUNTER — PHYSICAL THERAPY (OUTPATIENT)
Dept: PHYSICAL THERAPY | Facility: REHABILITATION | Age: 29
End: 2024-09-24
Attending: NURSE PRACTITIONER
Payer: MEDICAID

## 2024-09-24 DIAGNOSIS — S52.022A OLECRANON FRACTURE, LEFT, CLOSED, INITIAL ENCOUNTER: ICD-10-CM

## 2024-09-24 DIAGNOSIS — R29.898 LEFT ARM WEAKNESS: ICD-10-CM

## 2024-09-24 PROCEDURE — 97163 PT EVAL HIGH COMPLEX 45 MIN: CPT

## 2024-09-24 ASSESSMENT — ENCOUNTER SYMPTOMS
PAIN SCALE AT LOWEST: 3
PAIN SCALE AT HIGHEST: 9
PAIN SCALE: 9
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
## 2024-10-17 ENCOUNTER — APPOINTMENT (OUTPATIENT)
Dept: PHYSICAL THERAPY | Facility: REHABILITATION | Age: 29
End: 2024-10-17
Attending: NURSE PRACTITIONER
Payer: MEDICAID

## 2024-10-22 ENCOUNTER — APPOINTMENT (OUTPATIENT)
Dept: PHYSICAL THERAPY | Facility: REHABILITATION | Age: 29
End: 2024-10-22
Attending: NURSE PRACTITIONER
Payer: MEDICAID

## 2024-10-24 ENCOUNTER — APPOINTMENT (OUTPATIENT)
Dept: PHYSICAL THERAPY | Facility: REHABILITATION | Age: 29
End: 2024-10-24
Payer: MEDICAID

## 2024-10-24 ENCOUNTER — APPOINTMENT (OUTPATIENT)
Dept: PHYSICAL THERAPY | Facility: REHABILITATION | Age: 29
End: 2024-10-24
Attending: NURSE PRACTITIONER
Payer: MEDICAID

## 2024-10-29 ENCOUNTER — APPOINTMENT (OUTPATIENT)
Dept: PHYSICAL THERAPY | Facility: REHABILITATION | Age: 29
End: 2024-10-29
Attending: NURSE PRACTITIONER
Payer: MEDICAID

## 2024-10-31 ENCOUNTER — APPOINTMENT (OUTPATIENT)
Dept: PHYSICAL THERAPY | Facility: REHABILITATION | Age: 29
End: 2024-10-31
Attending: NURSE PRACTITIONER
Payer: MEDICAID

## 2024-10-31 ENCOUNTER — APPOINTMENT (OUTPATIENT)
Dept: PHYSICAL THERAPY | Facility: REHABILITATION | Age: 29
End: 2024-10-31
Payer: MEDICAID

## 2024-11-05 ENCOUNTER — APPOINTMENT (OUTPATIENT)
Dept: PHYSICAL THERAPY | Facility: REHABILITATION | Age: 29
End: 2024-11-05
Attending: NURSE PRACTITIONER
Payer: MEDICAID

## 2024-11-07 ENCOUNTER — APPOINTMENT (OUTPATIENT)
Dept: PHYSICAL THERAPY | Facility: REHABILITATION | Age: 29
End: 2024-11-07
Payer: MEDICAID

## 2024-11-07 ENCOUNTER — APPOINTMENT (OUTPATIENT)
Dept: PHYSICAL THERAPY | Facility: REHABILITATION | Age: 29
End: 2024-11-07
Attending: NURSE PRACTITIONER
Payer: MEDICAID

## 2024-11-12 ENCOUNTER — APPOINTMENT (OUTPATIENT)
Dept: PHYSICAL THERAPY | Facility: REHABILITATION | Age: 29
End: 2024-11-12
Attending: NURSE PRACTITIONER
Payer: MEDICAID

## 2024-11-14 ENCOUNTER — APPOINTMENT (OUTPATIENT)
Dept: PHYSICAL THERAPY | Facility: REHABILITATION | Age: 29
End: 2024-11-14
Attending: NURSE PRACTITIONER
Payer: MEDICAID

## 2024-11-19 ENCOUNTER — OFFICE VISIT (OUTPATIENT)
Dept: MEDICAL GROUP | Facility: MEDICAL CENTER | Age: 29
End: 2024-11-19
Attending: NURSE PRACTITIONER
Payer: MEDICAID

## 2024-11-19 VITALS
HEIGHT: 71 IN | SYSTOLIC BLOOD PRESSURE: 108 MMHG | HEART RATE: 99 BPM | TEMPERATURE: 98.6 F | WEIGHT: 297.2 LBS | BODY MASS INDEX: 41.61 KG/M2 | DIASTOLIC BLOOD PRESSURE: 60 MMHG | OXYGEN SATURATION: 95 % | RESPIRATION RATE: 16 BRPM

## 2024-11-19 DIAGNOSIS — R68.89 THROAT CONGESTION: ICD-10-CM

## 2024-11-19 DIAGNOSIS — Z23 NEED FOR VACCINATION: ICD-10-CM

## 2024-11-19 DIAGNOSIS — R05.9 COUGH, UNSPECIFIED TYPE: ICD-10-CM

## 2024-11-19 DIAGNOSIS — J02.9 ACUTE PHARYNGITIS, UNSPECIFIED ETIOLOGY: ICD-10-CM

## 2024-11-19 DIAGNOSIS — R59.1 LYMPHADENOPATHY: ICD-10-CM

## 2024-11-19 LAB
FLUAV RNA SPEC QL NAA+PROBE: NEGATIVE
FLUBV RNA SPEC QL NAA+PROBE: NEGATIVE
RSV RNA SPEC QL NAA+PROBE: NEGATIVE
SARS-COV-2 RNA RESP QL NAA+PROBE: NEGATIVE

## 2024-11-19 PROCEDURE — 0241U POCT CEPHEID COV-2, FLU A/B, RSV - PCR: CPT | Performed by: NURSE PRACTITIONER

## 2024-11-19 PROCEDURE — 99214 OFFICE O/P EST MOD 30 MIN: CPT | Performed by: NURSE PRACTITIONER

## 2024-11-19 ASSESSMENT — FIBROSIS 4 INDEX: FIB4 SCORE: 0.19

## 2024-11-22 NOTE — PROGRESS NOTES
Verbal consent was acquired by the patient to use [employer] ambient listening note generation during this visit     Chief Complaint   Patient presents with    Cough    Lab Results       Subjective:     HPI:   History of Present Illness  The patient presents for evaluation of throat pain. He is accompanied by an adult female.    He has been experiencing throat pain for the past month, which intensifies during swallowing and breathing. He also reports a sensation of swelling in his throat. Despite these symptoms, he has not sought medical attention until now. His routine check-ups are conducted every six months. He has no known allergies. A recent COVID-19 test, conducted due to exposure to a positive case, returned negative results.    FAMILY HISTORY  His father  of heart attack in his 50s.        No problems updated.    ROS  See HPI     No Known Allergies    Current medicines (including changes today)  Current Outpatient Medications   Medication Sig Dispense Refill    amoxicillin-clavulanate (AUGMENTIN) 875-125 MG Tab Take 1 Tablet by mouth 2 times a day for 10 days. 20 Tablet 0    INVEGA SUSTENNA 234 MG/1.5ML Suspension Prefilled Syringe INJECT 234 MG INTRAMUSCULARLY EVERY 4 WEEKS      traZODone (DESYREL) 50 MG Tab Take 1 Tablet by mouth every evening. 30 Tablet 0     No current facility-administered medications for this visit.       Social History     Tobacco Use    Smoking status: Every Day     Current packs/day: 0.50     Average packs/day: 0.5 packs/day for 6.9 years (3.4 ttl pk-yrs)     Types: Cigarettes     Start date:     Smokeless tobacco: Never    Tobacco comments:     currently using e-cigarettes   Substance Use Topics    Alcohol use: Not Currently     Comment: last ETOH 2023    Drug use: Not Currently     Types: Marijuana, Methamphetamines     Comment: pot, meth 2023       Patient Active Problem List    Diagnosis Date Noted    Schizoaffective disorder (HCC) 2024    Bipolar 1 disorder  "(HCC) 08/20/2024    Olecranon fracture, left, closed, initial encounter 08/21/2023    History of recent trauma 08/21/2023    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    Pneumothorax on left 07/07/2023    Neurocognitive deficits 06/27/2023    Psychiatric diagnosis 06/06/2023    Illicit drug use 06/01/2023    Open fracture dislocation of left elbow joint 05/31/2023    Open skull fracture (HCC) 05/31/2023    Rotatory subluxation of atlantoaxial joint 05/31/2023       Family History   Problem Relation Age of Onset    Cancer Paternal Aunt         pradip marie    Hypertension Maternal Grandmother     Diabetes Maternal Grandmother     Cancer Maternal Grandmother     Hypertension Maternal Grandfather     Diabetes Maternal Grandfather           Objective:     /60 (BP Location: Left arm, Patient Position: Sitting, BP Cuff Size: Adult)   Pulse 99   Temp 37 °C (98.6 °F) (Temporal)   Resp 16   Ht 1.803 m (5' 11\")   Wt (!) 135 kg (297 lb 3.2 oz)   SpO2 95%  Body mass index is 41.45 kg/m².    Physical Exam:  Physical Exam  Vitals reviewed.   Constitutional:       General: He is awake.      Appearance: Normal appearance. He is well-developed.   HENT:      Head: Normocephalic.   Eyes:      Conjunctiva/sclera: Conjunctivae normal.   Cardiovascular:      Rate and Rhythm: Normal rate.   Pulmonary:      Effort: Pulmonary effort is normal. No respiratory distress.   Musculoskeletal:      Cervical back: Neck supple.   Skin:     General: Skin is warm and dry.   Neurological:      Mental Status: He is alert and oriented to person, place, and time.   Psychiatric:         Mood and Affect: Mood normal.         Behavior: Behavior normal. Behavior is cooperative.              Assessment and Plan:     The following treatment plan was discussed:    Problem List Items Addressed This Visit    None  Visit Diagnoses       Need for vaccination        Cough, unspecified type        Relevant Orders    POCT Cepheid CoV-2, Flu A/B, RSV - PCR (Completed)    " Throat congestion        Relevant Orders    POCT Cepheid CoV-2, Flu A/B, RSV - PCR (Completed)    Lymphadenopathy        Relevant Orders    US-SOFT TISSUES OF HEAD - NECK    Acute pharyngitis, unspecified etiology        Relevant Medications    amoxicillin-clavulanate (AUGMENTIN) 875-125 MG Tab            Assessment & Plan  1. Throat pain.  The patient has been experiencing throat pain for about a month. Elevated white blood cell count suggests a possible infection. Enlarged lymph nodes are noted. A COVID-19 test will be conducted. If the test is negative, an antibiotic regimen will be initiated. He is advised to drink plenty of water and take the antibiotics as prescribed, twice a day for 10 days, with food. An ultrasound of the neck is also planned to further investigate the swollen lymph nodes.     2. Elevated LDL cholesterol.  The patient's LDL cholesterol was slightly elevated in the labs done in August. He is advised to improve his diet and exercise regularly to manage cholesterol levels. Recommendations include consuming more vegetables, lean meats such as chicken, turkey, and fish, and reducing the intake of red meats and pork.        Any change or worsening of signs or symptoms, patient encouraged to follow-up or report to emergency room for further evaluation. Patient verbalizes understanding and agrees.      PLEASE NOTE: This dictation was created using voice recognition software. I have made every reasonable attempt to correct obvious errors, but I expect that there are errors of grammar and possibly content that I did not discover before finalizing the note.

## 2025-04-02 ENCOUNTER — HOSPITAL ENCOUNTER (EMERGENCY)
Facility: MEDICAL CENTER | Age: 30
End: 2025-04-02
Attending: EMERGENCY MEDICINE
Payer: MEDICAID

## 2025-04-02 ENCOUNTER — OFFICE VISIT (OUTPATIENT)
Dept: URGENT CARE | Facility: CLINIC | Age: 30
End: 2025-04-02
Payer: MEDICAID

## 2025-04-02 ENCOUNTER — APPOINTMENT (OUTPATIENT)
Dept: RADIOLOGY | Facility: MEDICAL CENTER | Age: 30
End: 2025-04-02
Attending: EMERGENCY MEDICINE
Payer: MEDICAID

## 2025-04-02 VITALS
OXYGEN SATURATION: 96 % | SYSTOLIC BLOOD PRESSURE: 115 MMHG | DIASTOLIC BLOOD PRESSURE: 69 MMHG | TEMPERATURE: 97.9 F | WEIGHT: 315 LBS | HEART RATE: 94 BPM | HEIGHT: 70 IN | BODY MASS INDEX: 45.1 KG/M2 | RESPIRATION RATE: 16 BRPM

## 2025-04-02 VITALS
BODY MASS INDEX: 44.1 KG/M2 | OXYGEN SATURATION: 93 % | DIASTOLIC BLOOD PRESSURE: 76 MMHG | RESPIRATION RATE: 17 BRPM | HEART RATE: 120 BPM | WEIGHT: 315 LBS | HEIGHT: 71 IN | SYSTOLIC BLOOD PRESSURE: 136 MMHG | TEMPERATURE: 100.8 F

## 2025-04-02 DIAGNOSIS — K11.20 SALIVARY GLAND INFECTION: ICD-10-CM

## 2025-04-02 DIAGNOSIS — R50.9 FEVER, UNSPECIFIED FEVER CAUSE: ICD-10-CM

## 2025-04-02 DIAGNOSIS — K12.2 SUBLINGUAL ABSCESS: ICD-10-CM

## 2025-04-02 LAB
ALBUMIN SERPL BCP-MCNC: 3.9 G/DL (ref 3.2–4.9)
ALBUMIN/GLOB SERPL: 1.1 G/DL
ALP SERPL-CCNC: 113 U/L (ref 30–99)
ALT SERPL-CCNC: 33 U/L (ref 2–50)
ANION GAP SERPL CALC-SCNC: 12 MMOL/L (ref 7–16)
AST SERPL-CCNC: 19 U/L (ref 12–45)
BASOPHILS # BLD AUTO: 1.7 % (ref 0–1.8)
BASOPHILS # BLD: 0.31 K/UL (ref 0–0.12)
BILIRUB SERPL-MCNC: <0.2 MG/DL (ref 0.1–1.5)
BUN SERPL-MCNC: 12 MG/DL (ref 8–22)
CALCIUM ALBUM COR SERPL-MCNC: 9.4 MG/DL (ref 8.5–10.5)
CALCIUM SERPL-MCNC: 9.3 MG/DL (ref 8.5–10.5)
CHLORIDE SERPL-SCNC: 99 MMOL/L (ref 96–112)
CO2 SERPL-SCNC: 25 MMOL/L (ref 20–33)
CREAT SERPL-MCNC: 0.85 MG/DL (ref 0.5–1.4)
EOSINOPHIL # BLD AUTO: 0 K/UL (ref 0–0.51)
EOSINOPHIL NFR BLD: 0 % (ref 0–6.9)
ERYTHROCYTE [DISTWIDTH] IN BLOOD BY AUTOMATED COUNT: 47.8 FL (ref 35.9–50)
GFR SERPLBLD CREATININE-BSD FMLA CKD-EPI: 121 ML/MIN/1.73 M 2
GLOBULIN SER CALC-MCNC: 3.7 G/DL (ref 1.9–3.5)
GLUCOSE SERPL-MCNC: 106 MG/DL (ref 65–99)
HCT VFR BLD AUTO: 46.4 % (ref 42–52)
HGB BLD-MCNC: 14.4 G/DL (ref 14–18)
LACTATE SERPL-SCNC: 1.2 MMOL/L (ref 0.5–2)
LYMPHOCYTES # BLD AUTO: 2.05 K/UL (ref 1–4.8)
LYMPHOCYTES NFR BLD: 11.2 % (ref 22–41)
MANUAL DIFF BLD: NORMAL
MCH RBC QN AUTO: 28 PG (ref 27–33)
MCHC RBC AUTO-ENTMCNC: 31 G/DL (ref 32.3–36.5)
MCV RBC AUTO: 90.3 FL (ref 81.4–97.8)
METAMYELOCYTES NFR BLD MANUAL: 0.9 %
MONOCYTES # BLD AUTO: 1.88 K/UL (ref 0–0.85)
MONOCYTES NFR BLD AUTO: 10.3 % (ref 0–13.4)
MORPHOLOGY BLD-IMP: NORMAL
NEUTROPHILS # BLD AUTO: 13.89 K/UL (ref 1.82–7.42)
NEUTROPHILS NFR BLD: 75.9 % (ref 44–72)
NRBC # BLD AUTO: 0 K/UL
NRBC BLD-RTO: 0 /100 WBC (ref 0–0.2)
PLATELET # BLD AUTO: 545 K/UL (ref 164–446)
PLATELET BLD QL SMEAR: NORMAL
PMV BLD AUTO: 9.2 FL (ref 9–12.9)
POTASSIUM SERPL-SCNC: 4.4 MMOL/L (ref 3.6–5.5)
PROT SERPL-MCNC: 7.6 G/DL (ref 6–8.2)
RBC # BLD AUTO: 5.14 M/UL (ref 4.7–6.1)
RBC BLD AUTO: NORMAL
SODIUM SERPL-SCNC: 136 MMOL/L (ref 135–145)
WBC # BLD AUTO: 18.3 K/UL (ref 4.8–10.8)

## 2025-04-02 PROCEDURE — 83605 ASSAY OF LACTIC ACID: CPT

## 2025-04-02 PROCEDURE — 700111 HCHG RX REV CODE 636 W/ 250 OVERRIDE (IP): Mod: JZ,UD | Performed by: EMERGENCY MEDICINE

## 2025-04-02 PROCEDURE — 99214 OFFICE O/P EST MOD 30 MIN: CPT | Performed by: PHYSICIAN ASSISTANT

## 2025-04-02 PROCEDURE — 700105 HCHG RX REV CODE 258: Mod: UD | Performed by: EMERGENCY MEDICINE

## 2025-04-02 PROCEDURE — 96365 THER/PROPH/DIAG IV INF INIT: CPT

## 2025-04-02 PROCEDURE — 80053 COMPREHEN METABOLIC PANEL: CPT

## 2025-04-02 PROCEDURE — 99284 EMERGENCY DEPT VISIT MOD MDM: CPT

## 2025-04-02 PROCEDURE — 85007 BL SMEAR W/DIFF WBC COUNT: CPT

## 2025-04-02 PROCEDURE — 85027 COMPLETE CBC AUTOMATED: CPT

## 2025-04-02 PROCEDURE — 87040 BLOOD CULTURE FOR BACTERIA: CPT | Mod: 91

## 2025-04-02 PROCEDURE — RXMED WILLOW AMBULATORY MEDICATION CHARGE: Performed by: EMERGENCY MEDICINE

## 2025-04-02 PROCEDURE — 36415 COLL VENOUS BLD VENIPUNCTURE: CPT

## 2025-04-02 RX ORDER — SODIUM CHLORIDE 9 MG/ML
1000 INJECTION, SOLUTION INTRAVENOUS ONCE
Status: COMPLETED | OUTPATIENT
Start: 2025-04-02 | End: 2025-04-02

## 2025-04-02 RX ADMIN — AMPICILLIN AND SULBACTAM 3 G: 1; 2 INJECTION, POWDER, FOR SOLUTION INTRAMUSCULAR; INTRAVENOUS at 13:57

## 2025-04-02 RX ADMIN — SODIUM CHLORIDE 1000 ML: 9 INJECTION, SOLUTION INTRAVENOUS at 13:17

## 2025-04-02 ASSESSMENT — FIBROSIS 4 INDEX
FIB4 SCORE: 0.07
FIB4 SCORE: 0.07

## 2025-04-02 ASSESSMENT — ENCOUNTER SYMPTOMS
NECK PAIN: 1
EYE DISCHARGE: 0
FEVER: 1
VOMITING: 0
MYALGIAS: 1
EYE REDNESS: 0
NAUSEA: 0
CHILLS: 1
SORE THROAT: 0

## 2025-04-02 ASSESSMENT — PAIN DESCRIPTION - PAIN TYPE: TYPE: ACUTE PAIN

## 2025-04-02 NOTE — ED PROVIDER NOTES
ED Provider Note    Scribed for Barb Huerta M.D. by Vidal Azul. 4/2/2025, 12:58 PM.    Primary care provider: JOANA Judge  Means of arrival: Walk-in  History obtained from: Patient  History limited by: None.     CHIEF COMPLAINT  Chief Complaint   Patient presents with    Sore Throat     Began last week, at  today and diagnosed with sublingual abscess, recommended come to ER, L throat 8/10 pain, +drainage, able to manage saliva/resp       HPI/ROS  Fareed Muhammad is a 29 y.o. male who presents to the Emergency Department for evaluation of a sore throat onset one week ago.  Patient notes that for the last couple of years he has felt a discomfort and lump sensation under his left tongue.  However last week he has had drainage from under the left tongue as well as fevers and chills.   Patient presented to urgent care today, where he was told to come here for further evaluation. He takes Omeprazole for heart burn. Patient last ate at 10:30 AM this morning.     EXTERNAL RECORDS REVIEWED  None pertinent    LIMITATION TO HISTORY   Select: : None    OUTSIDE HISTORIAN(S):  None.       PAST MEDICAL HISTORY   has a past medical history of Acute alcohol intoxication (HCC) (08/21/2023), ASTHMA, Bipolar disorder (HCC), Closed fracture of multiple ribs of left side (05/31/2023), Discharge planning issues (06/22/2023), Drug abuse in remission (HCC), Liver laceration, initial encounter (05/31/2023), No contraindication to deep vein thrombosis (DVT) prophylaxis (05/31/2023), Spleen laceration, initial encounter (05/31/2023), Trauma (05/31/2023), and Trauma (08/21/2023).    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    SOCIAL HISTORY  Social History     Tobacco Use    Smoking status: Every Day     Current packs/day: 0.50     Average packs/day: 0.5 packs/day for 7.2 years (3.6 ttl pk-yrs)  "    Types: Cigarettes     Start date: 2018    Smokeless tobacco: Never    Tobacco comments:     currently using e-cigarettes   Substance Use Topics    Alcohol use: Not Currently    Drug use: Not Currently     Types: Marijuana      Social History     Substance and Sexual Activity   Drug Use Not Currently    Types: Marijuana       FAMILY HISTORY  Family History   Problem Relation Age of Onset    Cancer Paternal Aunt         pradip marie    Hypertension Maternal Grandmother     Diabetes Maternal Grandmother     Cancer Maternal Grandmother     Hypertension Maternal Grandfather     Diabetes Maternal Grandfather        CURRENT MEDICATIONS  Home Medications       Reviewed by Angelica Corona R.N. (Registered Nurse) on 04/02/25 at 1236  Med List Status: Partial     Medication Last Dose Status   INVEGA SUSTENNA 234 MG/1.5ML Suspension Prefilled Syringe  Active   traZODone (DESYREL) 50 MG Tab  Active                    ALLERGIES  No Known Allergies    PHYSICAL EXAM  VITAL SIGNS: /76   Pulse (!) 104   Temp 36.4 °C (97.5 °F) (Temporal)   Resp 16   Ht 1.778 m (5' 10\")   Wt (!) 161 kg (354 lb 15.1 oz)   SpO2 95%   BMI 50.93 kg/m²   Nursing note and vitals reviewed.  Constitutional: Well-developed and well-nourished. No acute distress.  Obese male  HENT: Head is normocephalic and atraumatic.  Patient has purulent drainage noted to be coming from left sublingual gland under the tongue, no tongue elevation.  Patient is managing secretions without difficulty.  Patient has full range of motion of his neck.  Difficult to assess for lymphadenopathy given body habitus.  Eyes: extra-ocular movements intact  Cardiovascular: Tachycardic rate and regular rhythm. No murmur heard.  Pulmonary/Chest: Breath sounds normal. No wheezes or rales.   Abdominal: Soft and non-tender. No distention.  No palpable masses  Musculoskeletal: Extremities exhibit normal range of motion without edema or tenderness.   Neurological: Awake and " alert  Skin: Skin is warm and dry. No rash.       DIAGNOSTIC STUDIES:  LABS  Labs Reviewed   CBC WITH DIFFERENTIAL - Abnormal; Notable for the following components:       Result Value    WBC 18.3 (*)     MCHC 31.0 (*)     Platelet Count 545 (*)     Neutrophils-Polys 75.90 (*)     Lymphocytes 11.20 (*)     Neutrophils (Absolute) 13.89 (*)     Monos (Absolute) 1.88 (*)     Baso (Absolute) 0.31 (*)     All other components within normal limits   COMP METABOLIC PANEL - Abnormal; Notable for the following components:    Glucose 106 (*)     Alkaline Phosphatase 113 (*)     Globulin 3.7 (*)     All other components within normal limits   LACTIC ACID   ESTIMATED GFR   DIFFERENTIAL MANUAL   PERIPHERAL SMEAR REVIEW   PLATELET ESTIMATE   MORPHOLOGY   LACTIC ACID   LACTIC ACID   BLOOD CULTURE   BLOOD CULTURE       All labs reviewed and independently interpreted by myself        COURSE & MEDICAL DECISION MAKING    Hydration: Based on the patient's presentation of Tachycardia the patient was given IV fluids. IV Hydration was used because oral hydration was not adequate alone. Upon recheck following hydration, the patient was improved.    INITIAL ASSESSMENT, ED COURSE AND PLAN    Patient is a 29-year-old male who presents for evaluation of tongue drainage.  Differential diagnosis includes salivary gland infection, deep tissue abscess, mass.  Diagnostic workup includes labs and CT of the soft tissue neck.    Patient's initial vitals notable for slight tachycardia.  Patient is treated with IV fluids and Unasyn given concern for salivary gland infection.  Labs returned are notable for leukocytosis of 18.  Upon reassessment patient reports that he has to leave as he has drug testing that he has to get to on time.  I advised him of his abnormal labs and that it would be beneficial to have a CT to assess for deeper tissue infection.  Patient states that he cannot stay in the emergency department at this time.  I will start him on  Augmentin and advised him to return whenever he is able to to obtain CT for assessment of deeper tissue infection.  Patient is amenable to this plan.  His tachycardia did resolve.  He is given strict return precautions and discharged in stable condition.    DISPOSITION AND DISCUSSIONS  I have discussed management of the patient with the following physicians and ADAM's:  none    Discussion of management with other QHP or appropriate source(s): none     Escalation of care considered, and ultimately not performed:see above    Barriers to care at this time, including but not limited to: none.     Decision tools and prescription drugs considered including, but not limited to: see above.        FINAL IMPRESSION  1. Salivary gland infection          Vidal WELLER (Latiaibkenyon), kamini scribing for, and in the presence of, Barb Huerta M.D..    Electronically signed by: Vidal Azul (Rosy), 4/2/2025    Barb WELLER M.D. personally performed the services described in this documentation, as scribed by Vidal Azul in my presence, and it is both accurate and complete.    The note accurately reflects work and decisions made by me.  Barb Huerta M.D.  4/2/2025  2:36 PM

## 2025-04-02 NOTE — PROGRESS NOTES
"Subjective     Fareed Muhammad is a 29 y.o. male who presents with Pharyngitis (Puss, fever, ear pain x1 day)            Patient is a 29-year-old male presents to urgent care concern regarding drainage from underneath his tongue with slight pain to his neck and left ear.  Patient does report associated body aches with fevers of which he believes may have been going on the last few days.  Patient denies discrete sore throat only pain near the bottom of his tongue.  He also mentions that he awoke this morning with numbness to this area causing further concern as well.  Patient is not taken any medication today other than his normal chronic meds.  Denies prior history of same in the past.    Patient is somewhat poor historian.    Review of Systems   Constitutional:  Positive for chills, fever and malaise/fatigue.   HENT:  Negative for sore throat.    Eyes:  Negative for discharge and redness.   Gastrointestinal:  Negative for nausea and vomiting.   Musculoskeletal:  Positive for myalgias and neck pain.              Objective     /76   Pulse (!) 120   Temp 37.6 °C (99.6 °F) (Temporal)   Resp 17   Ht 1.803 m (5' 11\")   Wt (!) 161 kg (356 lb)   SpO2 93%   BMI 49.65 kg/m²    PMH:  has a past medical history of Acute alcohol intoxication (HCC) (08/21/2023), ASTHMA, Bipolar disorder (HCC), Closed fracture of multiple ribs of left side (05/31/2023), Discharge planning issues (06/22/2023), Drug abuse in remission ([facility]), Liver laceration, initial encounter (05/31/2023), No contraindication to deep vein thrombosis (DVT) prophylaxis (05/31/2023), Spleen laceration, initial encounter (05/31/2023), Trauma (05/31/2023), and Trauma (08/21/2023).    He has no past medical history of Depression, Diabetes, Hyperlipidemia, or Hypertension.  MEDS: Reviewed .   ALLERGIES: No Known Allergies  SURGHX:   Past Surgical History:   Procedure Laterality Date    HARDWARE REMOVAL ORTHO Left 7/13/2023    Procedure: ELBOW PIN REMOVAL " WITH CAST REMOVAL;  Surgeon: Josef Bills M.D.;  Location: SURGERY [facility];  Service: Orthopedics    ORIF, ELBOW Left 6/1/2023    Procedure: ORIF, ELBOW;  Surgeon: Josef Bills M.D.;  Location: SURGERY [facility];  Service: Orthopedics    SPLENECTOMY N/A 5/31/2023    Procedure: SPLENECTOMY;  Surgeon: Doris Bhakta M.D.;  Location: SURGERY [facility];  Service: General     SOCHX:  reports that he has been smoking cigarettes. He started smoking about 7 years ago. He has a 3.6 pack-year smoking history. He has never used smokeless tobacco. He reports that he does not currently use alcohol. He reports that he does not currently use drugs after having used the following drugs: Marijuana.  FH: Family history was reviewed, no pertinent findings to report    Physical Exam  HENT:      Head: Normocephalic and atraumatic.      Right Ear: Tympanic membrane normal.      Ears:      Comments: Cerumen present to the left auditory canal although TM is visualized without erythema.     Mouth/Throat:      Mouth: Mucous membranes are moist.      Comments: Sublingual gland with active purulent drainage with associated swelling.  Uvula is midline 1+ tonsillar edema.  Neck:      Comments: Large neck habitus mild tenderness along the left submandibular region with painful neck movement laterally.  Cardiovascular:      Rate and Rhythm: Tachycardia present.   Pulmonary:      Effort: Pulmonary effort is normal.      Breath sounds: Normal breath sounds.   Lymphadenopathy:      Cervical: Cervical adenopathy present.   Skin:     Findings: No erythema or rash.   Neurological:      Mental Status: He is alert.                                  Assessment & Plan  Sublingual abscess         Fever, unspecified fever cause            Patient is somewhat poor historian today although reports remote history of systemic symptoms of fevers, body aches-now with purulent drainage from the sublingual gland.  Patient is also with painful  neck movement which may be adenopathy from infection of the salivary gland however I do feel that patient warrants timely workup to ensure that infection is not deeper into the neck space.  As we currently do not have more sophisticated imaging readily available at site I do feel that patient would benefit from further evaluation in the emergency room at this time.  Patient is very agreeable to go with his .  He is uncertain if he will go to [facility] emergency room or [facility]-although is agreeable to go at this time.  Patient was encouraged to remain n.p.o. at this time until further evaluation is had.    Of note patient was stable throughout the duration of his care today.  Please note that this dictation was created using voice recognition software. I have made every reasonable attempt to correct obvious errors, but I expect that there are errors of grammar and possibly content that I did not discover before finalizing the note.

## 2025-04-02 NOTE — ED TRIAGE NOTES
"Chief Complaint   Patient presents with    Sore Throat     Began last week, at  today and diagnosed with sublingual abscess, recommended come to ER, L throat 8/10 pain, +drainage, able to manage saliva/resp        Ambulated to triage for above complaint.     Past Medical History:   Diagnosis Date    Acute alcohol intoxication (HCC) 08/21/2023    ASTHMA     Dx at age 5, does not use and inhaler, and has never had an attack    Bipolar disorder (HCC)     Closed fracture of multiple ribs of left side 05/31/2023    Discharge planning issues 06/22/2023    Drug abuse in remission (HCC)     meth, alcohol and tobacco since 14    Liver laceration, initial encounter 05/31/2023    No contraindication to deep vein thrombosis (DVT) prophylaxis 05/31/2023    Spleen laceration, initial encounter 05/31/2023    Trauma 05/31/2023    Trauma 08/21/2023       Pt educated of triage process and possible wait times. Pt informed to contact staff if status changes or with any developing concerns, pt returned to lobby.     BP (!) 156/104   Pulse (!) 112   Temp 36.4 °C (97.5 °F) (Temporal)   Resp 16   Ht 1.778 m (5' 10\")   Wt (!) 161 kg (354 lb 15.1 oz)   SpO2 95%   BMI 50.93 kg/m²      "

## 2025-04-02 NOTE — ED NOTES
Pt requesting to go home ASAP for a drug screen. ERP aware at bedside. DC orders placed. PIV removed

## 2025-04-03 ENCOUNTER — APPOINTMENT (OUTPATIENT)
Dept: RADIOLOGY | Facility: MEDICAL CENTER | Age: 30
End: 2025-04-03
Attending: EMERGENCY MEDICINE
Payer: MEDICAID

## 2025-04-03 ENCOUNTER — PHARMACY VISIT (OUTPATIENT)
Dept: PHARMACY | Facility: MEDICAL CENTER | Age: 30
End: 2025-04-03
Payer: COMMERCIAL

## 2025-04-03 ENCOUNTER — HOSPITAL ENCOUNTER (EMERGENCY)
Facility: MEDICAL CENTER | Age: 30
End: 2025-04-03
Attending: EMERGENCY MEDICINE
Payer: MEDICAID

## 2025-04-03 VITALS
OXYGEN SATURATION: 92 % | HEIGHT: 70 IN | RESPIRATION RATE: 17 BRPM | HEART RATE: 95 BPM | SYSTOLIC BLOOD PRESSURE: 162 MMHG | BODY MASS INDEX: 45.1 KG/M2 | WEIGHT: 315 LBS | DIASTOLIC BLOOD PRESSURE: 80 MMHG | TEMPERATURE: 98.3 F

## 2025-04-03 DIAGNOSIS — K11.20 SIALADENITIS: ICD-10-CM

## 2025-04-03 LAB
ANION GAP SERPL CALC-SCNC: 8 MMOL/L (ref 7–16)
BASOPHILS # BLD AUTO: 3.4 % (ref 0–1.8)
BASOPHILS # BLD: 0.43 K/UL (ref 0–0.12)
BUN SERPL-MCNC: 8 MG/DL (ref 8–22)
CALCIUM SERPL-MCNC: 9.2 MG/DL (ref 8.5–10.5)
CHLORIDE SERPL-SCNC: 100 MMOL/L (ref 96–112)
CO2 SERPL-SCNC: 26 MMOL/L (ref 20–33)
CREAT SERPL-MCNC: 0.8 MG/DL (ref 0.5–1.4)
EOSINOPHIL # BLD AUTO: 0 K/UL (ref 0–0.51)
EOSINOPHIL NFR BLD: 0 % (ref 0–6.9)
ERYTHROCYTE [DISTWIDTH] IN BLOOD BY AUTOMATED COUNT: 49.3 FL (ref 35.9–50)
GFR SERPLBLD CREATININE-BSD FMLA CKD-EPI: 123 ML/MIN/1.73 M 2
GLUCOSE SERPL-MCNC: 99 MG/DL (ref 65–99)
HCT VFR BLD AUTO: 44.4 % (ref 42–52)
HGB BLD-MCNC: 14 G/DL (ref 14–18)
LYMPHOCYTES # BLD AUTO: 2.26 K/UL (ref 1–4.8)
LYMPHOCYTES NFR BLD: 17.8 % (ref 22–41)
MANUAL DIFF BLD: NORMAL
MCH RBC QN AUTO: 28.9 PG (ref 27–33)
MCHC RBC AUTO-ENTMCNC: 31.5 G/DL (ref 32.3–36.5)
MCV RBC AUTO: 91.5 FL (ref 81.4–97.8)
METAMYELOCYTES NFR BLD MANUAL: 0.8 %
MONOCYTES # BLD AUTO: 1.94 K/UL (ref 0–0.85)
MONOCYTES NFR BLD AUTO: 15.3 % (ref 0–13.4)
MORPHOLOGY BLD-IMP: NORMAL
MYELOCYTES NFR BLD MANUAL: 0.8 %
NEUTROPHILS # BLD AUTO: 7.86 K/UL (ref 1.82–7.42)
NEUTROPHILS NFR BLD: 61.9 % (ref 44–72)
NRBC # BLD AUTO: 0 K/UL
NRBC BLD-RTO: 0 /100 WBC (ref 0–0.2)
PLATELET # BLD AUTO: 522 K/UL (ref 164–446)
PLATELET BLD QL SMEAR: NORMAL
PMV BLD AUTO: 9.2 FL (ref 9–12.9)
POTASSIUM SERPL-SCNC: 4.8 MMOL/L (ref 3.6–5.5)
RBC # BLD AUTO: 4.85 M/UL (ref 4.7–6.1)
RBC BLD AUTO: NORMAL
SODIUM SERPL-SCNC: 134 MMOL/L (ref 135–145)
WBC # BLD AUTO: 12.7 K/UL (ref 4.8–10.8)

## 2025-04-03 PROCEDURE — 85007 BL SMEAR W/DIFF WBC COUNT: CPT

## 2025-04-03 PROCEDURE — 85027 COMPLETE CBC AUTOMATED: CPT

## 2025-04-03 PROCEDURE — 700105 HCHG RX REV CODE 258: Mod: UD | Performed by: EMERGENCY MEDICINE

## 2025-04-03 PROCEDURE — 700117 HCHG RX CONTRAST REV CODE 255: Performed by: EMERGENCY MEDICINE

## 2025-04-03 PROCEDURE — 36415 COLL VENOUS BLD VENIPUNCTURE: CPT

## 2025-04-03 PROCEDURE — 80048 BASIC METABOLIC PNL TOTAL CA: CPT

## 2025-04-03 PROCEDURE — 99284 EMERGENCY DEPT VISIT MOD MDM: CPT

## 2025-04-03 PROCEDURE — 96375 TX/PRO/DX INJ NEW DRUG ADDON: CPT | Mod: XU

## 2025-04-03 PROCEDURE — 70487 CT MAXILLOFACIAL W/DYE: CPT

## 2025-04-03 PROCEDURE — 96365 THER/PROPH/DIAG IV INF INIT: CPT | Mod: XU

## 2025-04-03 PROCEDURE — 700111 HCHG RX REV CODE 636 W/ 250 OVERRIDE (IP): Mod: JZ,UD | Performed by: EMERGENCY MEDICINE

## 2025-04-03 RX ORDER — MORPHINE SULFATE 4 MG/ML
4 INJECTION INTRAVENOUS ONCE
Status: COMPLETED | OUTPATIENT
Start: 2025-04-03 | End: 2025-04-03

## 2025-04-03 RX ORDER — ONDANSETRON 2 MG/ML
4 INJECTION INTRAMUSCULAR; INTRAVENOUS ONCE
Status: COMPLETED | OUTPATIENT
Start: 2025-04-03 | End: 2025-04-03

## 2025-04-03 RX ADMIN — AMPICILLIN AND SULBACTAM 3 G: 1; 2 INJECTION, POWDER, FOR SOLUTION INTRAMUSCULAR; INTRAVENOUS at 10:08

## 2025-04-03 RX ADMIN — ONDANSETRON 4 MG: 2 INJECTION INTRAMUSCULAR; INTRAVENOUS at 10:08

## 2025-04-03 RX ADMIN — IOHEXOL 80 ML: 350 INJECTION, SOLUTION INTRAVENOUS at 11:45

## 2025-04-03 RX ADMIN — MORPHINE SULFATE 4 MG: 4 INJECTION INTRAVENOUS at 10:07

## 2025-04-03 ASSESSMENT — PAIN DESCRIPTION - PAIN TYPE
TYPE: ACUTE PAIN
TYPE: ACUTE PAIN

## 2025-04-03 ASSESSMENT — FIBROSIS 4 INDEX: FIB4 SCORE: 0.17

## 2025-04-03 NOTE — DISCHARGE INSTRUCTIONS
You were seen in the Emergency Department for mouth swelling.    Labs were completed without significant acute abnormalities other than mild elevation of white blood cell count.  Imaging shows evidence of a salivary gland stone without significant signs of infection.    Please use 1,000mg of tylenol or 600mg of ibuprofen every 6 hours as needed for pain.    Take antibiotics as directed and apply warm compresses.  You may attempt to use sour candies to help stimulate salivation that will help the stones pass.    Please follow up with your primary care physician.    Return to the Emergency Department with fevers, increasing swelling, trouble breathing, or other concern.

## 2025-04-03 NOTE — ED TRIAGE NOTES
"Chief Complaint   Patient presents with    Wound Infection     Seen at ER yesterday for L side salivary gland infection, advised needing a CT to assess for deeper tissue infection but pt unable to stay d/t prior obligations, returning today for further work up        Ambulated to triage for above complaint. Given IV antibiotics yesterday.     Past Medical History:   Diagnosis Date    Acute alcohol intoxication (HCC) 08/21/2023    ASTHMA     Dx at age 5, does not use and inhaler, and has never had an attack    Bipolar disorder (HCC)     Closed fracture of multiple ribs of left side 05/31/2023    Discharge planning issues 06/22/2023    Drug abuse in remission ([facility])     meth, alcohol and tobacco since 14    Liver laceration, initial encounter 05/31/2023    No contraindication to deep vein thrombosis (DVT) prophylaxis 05/31/2023    Spleen laceration, initial encounter 05/31/2023    Trauma 05/31/2023    Trauma 08/21/2023       Pt educated of triage process and possible wait times. Pt informed to contact staff if status changes or with any developing concerns, pt returned to lobby.     BP (!) 182/115   Pulse 93   Temp 36.7 °C (98 °F) (Temporal)   Resp 18   Ht 1.778 m (5' 10\")   Wt (!) 163 kg (359 lb 5.6 oz)   SpO2 94%   BMI 51.56 kg/m²      "

## 2025-05-16 ENCOUNTER — HOSPITAL ENCOUNTER (EMERGENCY)
Facility: MEDICAL CENTER | Age: 30
DRG: 602 | End: 2025-05-16
Attending: EMERGENCY MEDICINE
Payer: MEDICARE

## 2025-05-16 VITALS
WEIGHT: 315 LBS | RESPIRATION RATE: 20 BRPM | BODY MASS INDEX: 45.1 KG/M2 | DIASTOLIC BLOOD PRESSURE: 57 MMHG | TEMPERATURE: 98.7 F | SYSTOLIC BLOOD PRESSURE: 122 MMHG | OXYGEN SATURATION: 93 % | HEIGHT: 70 IN | HEART RATE: 103 BPM

## 2025-05-16 VITALS
HEART RATE: 102 BPM | OXYGEN SATURATION: 92 % | TEMPERATURE: 98 F | RESPIRATION RATE: 16 BRPM | WEIGHT: 315 LBS | SYSTOLIC BLOOD PRESSURE: 117 MMHG | DIASTOLIC BLOOD PRESSURE: 68 MMHG | BODY MASS INDEX: 51.65 KG/M2

## 2025-05-16 DIAGNOSIS — F10.929 ALCOHOLIC INTOXICATION WITH COMPLICATION (HCC): ICD-10-CM

## 2025-05-16 DIAGNOSIS — F10.10 ALCOHOL ABUSE: Primary | ICD-10-CM

## 2025-05-16 DIAGNOSIS — Z53.29 LEFT AGAINST MEDICAL ADVICE: Primary | ICD-10-CM

## 2025-05-16 DIAGNOSIS — Z76.5 MALINGERING: ICD-10-CM

## 2025-05-16 LAB
ALBUMIN SERPL BCP-MCNC: 3.8 G/DL (ref 3.2–4.9)
ALBUMIN/GLOB SERPL: 1 G/DL
ALP SERPL-CCNC: 128 U/L (ref 30–99)
ALT SERPL-CCNC: 31 U/L (ref 2–50)
ANION GAP SERPL CALC-SCNC: 12 MMOL/L (ref 7–16)
AST SERPL-CCNC: 31 U/L (ref 12–45)
BASOPHILS # BLD AUTO: 0.7 % (ref 0–1.8)
BASOPHILS # BLD: 0.12 K/UL (ref 0–0.12)
BILIRUB SERPL-MCNC: 0.4 MG/DL (ref 0.1–1.5)
BUN SERPL-MCNC: 10 MG/DL (ref 8–22)
CALCIUM ALBUM COR SERPL-MCNC: 9.1 MG/DL (ref 8.5–10.5)
CALCIUM SERPL-MCNC: 8.9 MG/DL (ref 8.5–10.5)
CHLORIDE SERPL-SCNC: 101 MMOL/L (ref 96–112)
CO2 SERPL-SCNC: 21 MMOL/L (ref 20–33)
CREAT SERPL-MCNC: 0.94 MG/DL (ref 0.5–1.4)
EOSINOPHIL # BLD AUTO: 0.07 K/UL (ref 0–0.51)
EOSINOPHIL NFR BLD: 0.4 % (ref 0–6.9)
ERYTHROCYTE [DISTWIDTH] IN BLOOD BY AUTOMATED COUNT: 49.1 FL (ref 35.9–50)
ETHANOL BLD-MCNC: <10.1 MG/DL
GFR SERPLBLD CREATININE-BSD FMLA CKD-EPI: 113 ML/MIN/1.73 M 2
GLOBULIN SER CALC-MCNC: 3.9 G/DL (ref 1.9–3.5)
GLUCOSE SERPL-MCNC: 98 MG/DL (ref 65–99)
HCT VFR BLD AUTO: 40.5 % (ref 42–52)
HGB BLD-MCNC: 13 G/DL (ref 14–18)
IMM GRANULOCYTES # BLD AUTO: 0.21 K/UL (ref 0–0.11)
IMM GRANULOCYTES NFR BLD AUTO: 1.1 % (ref 0–0.9)
LIPASE SERPL-CCNC: 14 U/L (ref 11–82)
LYMPHOCYTES # BLD AUTO: 2.61 K/UL (ref 1–4.8)
LYMPHOCYTES NFR BLD: 14.2 % (ref 22–41)
MCH RBC QN AUTO: 28.4 PG (ref 27–33)
MCHC RBC AUTO-ENTMCNC: 32.1 G/DL (ref 32.3–36.5)
MCV RBC AUTO: 88.4 FL (ref 81.4–97.8)
MONOCYTES # BLD AUTO: 1.65 K/UL (ref 0–0.85)
MONOCYTES NFR BLD AUTO: 9 % (ref 0–13.4)
NEUTROPHILS # BLD AUTO: 13.69 K/UL (ref 1.82–7.42)
NEUTROPHILS NFR BLD: 74.6 % (ref 44–72)
NRBC # BLD AUTO: 0 K/UL
NRBC BLD-RTO: 0 /100 WBC (ref 0–0.2)
PLATELET # BLD AUTO: 518 K/UL (ref 164–446)
PMV BLD AUTO: 9.2 FL (ref 9–12.9)
POTASSIUM SERPL-SCNC: 4.1 MMOL/L (ref 3.6–5.5)
PROT SERPL-MCNC: 7.7 G/DL (ref 6–8.2)
RBC # BLD AUTO: 4.58 M/UL (ref 4.7–6.1)
SODIUM SERPL-SCNC: 134 MMOL/L (ref 135–145)
WBC # BLD AUTO: 18.4 K/UL (ref 4.8–10.8)

## 2025-05-16 PROCEDURE — 99284 EMERGENCY DEPT VISIT MOD MDM: CPT

## 2025-05-16 PROCEDURE — 82077 ASSAY SPEC XCP UR&BREATH IA: CPT

## 2025-05-16 PROCEDURE — 96372 THER/PROPH/DIAG INJ SC/IM: CPT

## 2025-05-16 PROCEDURE — 80053 COMPREHEN METABOLIC PANEL: CPT

## 2025-05-16 PROCEDURE — 85025 COMPLETE CBC W/AUTO DIFF WBC: CPT

## 2025-05-16 PROCEDURE — 36415 COLL VENOUS BLD VENIPUNCTURE: CPT

## 2025-05-16 PROCEDURE — 83690 ASSAY OF LIPASE: CPT

## 2025-05-16 PROCEDURE — 700111 HCHG RX REV CODE 636 W/ 250 OVERRIDE (IP): Mod: JZ,UD | Performed by: EMERGENCY MEDICINE

## 2025-05-16 RX ORDER — PROCHLORPERAZINE EDISYLATE 5 MG/ML
10 INJECTION INTRAMUSCULAR; INTRAVENOUS ONCE
Status: COMPLETED | OUTPATIENT
Start: 2025-05-16 | End: 2025-05-16

## 2025-05-16 RX ORDER — DIPHENHYDRAMINE HYDROCHLORIDE 50 MG/ML
25 INJECTION, SOLUTION INTRAMUSCULAR; INTRAVENOUS ONCE
Status: COMPLETED | OUTPATIENT
Start: 2025-05-16 | End: 2025-05-16

## 2025-05-16 RX ORDER — DIPHENHYDRAMINE HYDROCHLORIDE 50 MG/ML
25 INJECTION, SOLUTION INTRAMUSCULAR; INTRAVENOUS ONCE
Status: DISCONTINUED | OUTPATIENT
Start: 2025-05-16 | End: 2025-05-16

## 2025-05-16 RX ADMIN — PROCHLORPERAZINE EDISYLATE 10 MG: 5 INJECTION INTRAMUSCULAR; INTRAVENOUS at 18:55

## 2025-05-16 RX ADMIN — DIPHENHYDRAMINE HYDROCHLORIDE 25 MG: 50 INJECTION, SOLUTION INTRAMUSCULAR; INTRAVENOUS at 18:55

## 2025-05-16 ASSESSMENT — LIFESTYLE VARIABLES
CONSUMPTION TOTAL: INCOMPLETE
EVER FELT BAD OR GUILTY ABOUT YOUR DRINKING: YES
DOES PATIENT WANT TO STOP DRINKING: CANNOT ASSESS
HAVE PEOPLE ANNOYED YOU BY CRITICIZING YOUR DRINKING: YES
TOTAL SCORE: 4
DO YOU DRINK ALCOHOL: YES
HAVE YOU EVER FELT YOU SHOULD CUT DOWN ON YOUR DRINKING: YES
EVER HAD A DRINK FIRST THING IN THE MORNING TO STEADY YOUR NERVES TO GET RID OF A HANGOVER: YES

## 2025-05-16 ASSESSMENT — FIBROSIS 4 INDEX
FIB4 SCORE: 0.18
FIB4 SCORE: 0.3

## 2025-05-16 NOTE — ED TRIAGE NOTES
.  Chief Complaint   Patient presents with    Alcohol Intoxication    Drug Abuse   Biba from [facility] from [facility]. Pt + etoh + meth abuse. Paranoid on arrival states he does not want to be here does not trust us. Pt reassured and advised to see ERP since he is here. C/o abdominal hernia and pain.denies si/hi.

## 2025-05-16 NOTE — ED NOTES
Pt signed out ama. ERP notified and pt ambulatory to [facility]. Advised to [facility].

## 2025-05-16 NOTE — ED PROVIDER NOTES
"CHIEF COMPLAINT  Chief Complaint   Patient presents with    Alcohol Intoxication    Drug Abuse       LIMITATION TO HISTORY   Select: Alcohol intoxication    HPI    Fareed Muhammad is a 29 y.o. male who presents to the Emergency Department for alcoholism onset prior to arrival. Patient states he went to [facility] clinic for his right upper quadrant and left upper quadrant abdominal pain patient is slurring his words and having a difficult time with history.. Patient adds that he started drinking 5 yeas ago and adds that he drinks all the time. Patient reports that he presents today to get help for his alcoholism. He notes that he doesn't think that he will stop drinking and adds that he makes mistakes which prompts him to drink. Patient reports a surgical history for a hernia. He also notes a history of kidney failure.       OUTSIDE HISTORIAN(S):  Select: None    EXTERNAL RECORDS REVIEWED  Select: Patient was last seen at ED on 04/03/2025 for sialadenitis.     PAST MEDICAL HISTORY  Past Medical History[1]  .    SURGICAL HISTORY  Past Surgical History[2]      FAMILY HISTORY  Family History   Problem Relation Age of Onset    Cancer Paternal Aunt         breasr canre    Hypertension Maternal Grandmother     Diabetes Maternal Grandmother     Cancer Maternal Grandmother     Hypertension Maternal Grandfather     Diabetes Maternal Grandfather           SOCIAL HISTORY  Social History[3]      CURRENT MEDICATIONS  Medications Ordered Prior to Encounter[4]      ALLERGIES  Allergies[5]    PHYSICAL EXAM  VITAL SIGNS:/57   Pulse (!) 103   Temp 37.1 °C (98.7 °F) (Temporal)   Resp 20   Ht 1.778 m (5' 10\")   Wt (!) 163 kg (360 lb)   SpO2 93%   BMI 51.65 kg/m²       Constitutional: Hypersomnolent but otherwise in no acute distress  HENT: Normocephalic, Atraumatic, Bilateral external ears normal.  Eyes:  conjunctiva are normal.   Neck: Supple.  Nontender midline  Cardiovascular: Regular rate and rhythm without murmurs gallops " or rubs.   Thorax & Lungs: No respiratory distress. Breathing comfortably. Lungs are clear to auscultation bilaterally, there are no wheezes no rales. Chest wall is nontender.  Abdomen: Patient d soft nondistended.  The patient has a benign examination but states that he is having abdominal pain but not specifically when I am pressing on it.  Skin: Warm, Dry, No erythema,   Back: No tenderness, No CVA tenderness.  Musculoskeletal: No clubbing cyanosis or edema good range of motion   Neurologic: Alert & oriented x 3, normal sensation moving all extremities appears normal patient is slurring his words appears to be intoxicated  Psychiatric: Affect normal, Judgment normal, Mood normal.     DIAGNOSTIC STUDIES / PROCEDURES      LABS  Results for orders placed or performed during the hospital encounter of 05/16/25   CBC WITH DIFFERENTIAL    Collection Time: 05/16/25 11:03 AM   Result Value Ref Range    WBC 18.4 (H) 4.8 - 10.8 K/uL    RBC 4.58 (L) 4.70 - 6.10 M/uL    Hemoglobin 13.0 (L) 14.0 - 18.0 g/dL    Hematocrit 40.5 (L) 42.0 - 52.0 %    MCV 88.4 81.4 - 97.8 fL    MCH 28.4 27.0 - 33.0 pg    MCHC 32.1 (L) 32.3 - 36.5 g/dL    RDW 49.1 35.9 - 50.0 fL    Platelet Count 518 (H) 164 - 446 K/uL    MPV 9.2 9.0 - 12.9 fL    Neutrophils-Polys 74.60 (H) 44.00 - 72.00 %    Lymphocytes 14.20 (L) 22.00 - 41.00 %    Monocytes 9.00 0.00 - 13.40 %    Eosinophils 0.40 0.00 - 6.90 %    Basophils 0.70 0.00 - 1.80 %    Immature Granulocytes 1.10 (H) 0.00 - 0.90 %    Nucleated RBC 0.00 0.00 - 0.20 /100 WBC    Neutrophils (Absolute) 13.69 (H) 1.82 - 7.42 K/uL    Lymphs (Absolute) 2.61 1.00 - 4.80 K/uL    Monos (Absolute) 1.65 (H) 0.00 - 0.85 K/uL    Eos (Absolute) 0.07 0.00 - 0.51 K/uL    Baso (Absolute) 0.12 0.00 - 0.12 K/uL    Immature Granulocytes (abs) 0.21 (H) 0.00 - 0.11 K/uL    NRBC (Absolute) 0.00 K/uL   COMP METABOLIC PANEL    Collection Time: 05/16/25 11:03 AM   Result Value Ref Range    Sodium 134 (L) 135 - 145 mmol/L     Potassium 4.1 3.6 - 5.5 mmol/L    Chloride 101 96 - 112 mmol/L    Co2 21 20 - 33 mmol/L    Anion Gap 12.0 7.0 - 16.0    Glucose 98 65 - 99 mg/dL    Bun 10 8 - 22 mg/dL    Creatinine 0.94 0.50 - 1.40 mg/dL    Calcium 8.9 8.5 - 10.5 mg/dL    Correct Calcium 9.1 8.5 - 10.5 mg/dL    AST(SGOT) 31 12 - 45 U/L    ALT(SGPT) 31 2 - 50 U/L    Alkaline Phosphatase 128 (H) 30 - 99 U/L    Total Bilirubin 0.4 0.1 - 1.5 mg/dL    Albumin 3.8 3.2 - 4.9 g/dL    Total Protein 7.7 6.0 - 8.2 g/dL    Globulin 3.9 (H) 1.9 - 3.5 g/dL    A-G Ratio 1.0 g/dL   LIPASE    Collection Time: 05/16/25 11:03 AM   Result Value Ref Range    Lipase 14 11 - 82 U/L   DIAGNOSTIC ALCOHOL    Collection Time: 05/16/25 11:03 AM   Result Value Ref Range    Diagnostic Alcohol <10.1 <10.1 mg/dL   ESTIMATED GFR    Collection Time: 05/16/25 11:03 AM   Result Value Ref Range    GFR (CKD-EPI) 113 >60 mL/min/1.73 m 2       COURSE & MEDICAL DECISION MAKING    ED COURSE:    ED Observation Status? No, The patient does not qualify for observation status     INTERVENTIONS BY ME:  Medications - No data to display    10:37 AM - Patient seen and examined at bedside. Discussed plan of care, including lab work. Patient agrees to the plan of care. Ordered for Liapse, CMP, CBC w/Diff, IV saline Shock, diagnostic alcohol to evaluate his symptoms.     12:15 PM patient wanted booze and did not want to stay in the hospital so he signed out AGAINST MEDICAL ADVICE    INITIAL ASSESSMENT, COURSE AND PLAN  Care Narrative: Patient presents emerged apartment with essentially nonsensical type of complaints he was describing all of his history but he is here because he was sent by the [facility].  The patient has no direct complaint of abdominal pain but initially was saying that he has abdominal pain but then he drinks every day.  When I discussed with him about what we could do to help him and asked if he wanted help with his alcoholism he initially said no then later said he wanted  "help with his alcoholism.  He just wanted to be \"checked out\" I did order some laboratory studies to evaluate for him he did agree for this but then [facility] during the workup he decided he did not want I will be in the hospital anymore because he wanted to drink more alcohol so he left AGAINST MEDICAL ADVICE.  Laboratory studies were not resulted at the time of his leaving it was noted that his white count was elevated 18 that he did not have any alcohol in his system but he is concerning that he was slurring his speech but looking at other notes the patient does have a history of polysubstance abuse with meth and others.  It is very possible he may have had other intoxicants on board however patient was appropriate to person place time and events was able to get up without assistance and essentially did not want to be here anymore so without any further information at the time that the patient was allowed to be discharged.  Patient was capable of making his own decisions at the time.  ADDITIONAL PROBLEM LIST  Polysubstance use    DISPOSITION AND DISCUSSIONS  I have discussed management of the patient with the following physicians/ ADAM's/ ancillary staff:  None    Escalation of care considered, and ultimately not performed: Considered further workup however the patient left AGAINST MEDICAL ADVICE    Barriers to care at this time, including but not limited to: None.     Decision tools and prescription drugs considered including, but not limited to: None.    The patient is leaving against medical advice.  I discussed with the patient the risks of leaving without receiving appropriate care to include permanent disability or death.  I have discussed possible alternatives.  The patient is not intoxicated.  The patient is a capable decision maker and understands the risks and benefits of their decision.  While I certainly disagree with the patient's decision, I respect the patient's autonomy and will not keep them here " against their will.  They understand that their decision to leave can be reversed at any time and they can return to us at any time for any reason at all.     FINAL DIAGNOSIS  1. Left against medical advice    2. Alcoholic intoxication with complication (HCC)         Demetris WELLER ([place]), am scribing for, and in the presence of, Timothy Gamboa M.D..    Electronically signed by: Demetris Hutson ([place]), 5/16/2025    Timothy WELLER M.D. personally performed the services described in this documentation, as scribed by Demetris Hutson in my presence, and it is both accurate and complete.     Electronically signed by: Timothy Gamboa M.D.,1:20 PM 05/16/25         [1]   Past Medical History:  Diagnosis Date    Acute alcohol intoxication (HCC) 08/21/2023    ASTHMA     Dx at age 5, does not use and inhaler, and has never had an attack    Bipolar disorder (HCC)     Closed fracture of multiple ribs of left side 05/31/2023    Discharge planning issues 06/22/2023    Drug abuse in remission (HCC)     meth, alcohol and tobacco since 14    Liver laceration, initial encounter 05/31/2023    No contraindication to deep vein thrombosis (DVT) prophylaxis 05/31/2023    Spleen laceration, initial encounter 05/31/2023    Trauma 05/31/2023    Trauma 08/21/2023   [2]   Past Surgical History:  Procedure Laterality Date    HARDWARE REMOVAL ORTHO Left 7/13/2023    Procedure: ELBOW PIN REMOVAL WITH CAST REMOVAL;  Surgeon: Josef Bills M.D.;  Location: SURGERY [facility];  Service: Orthopedics    ORIF, ELBOW Left 6/1/2023    Procedure: ORIF, ELBOW;  Surgeon: Josef Bills M.D.;  Location: SURGERY [facility];  Service: Orthopedics    SPLENECTOMY N/A 5/31/2023    Procedure: SPLENECTOMY;  Surgeon: Doris Bhakta M.D.;  Location: SURGERY [facility];  Service: General   [3]   Social History  Tobacco Use    Smoking status: Every Day     Current packs/day: 0.50     Average packs/day: 0.5 packs/day for 7.4 years (3.7 ttl  pk-yrs)     Types: Cigarettes     Start date: 2018    Smokeless tobacco: Never    Tobacco comments:     currently using e-cigarettes   Substance Use Topics    Alcohol use: Not Currently    Drug use: Not Currently     Types: Marijuana   [4]   No current facility-administered medications on file prior to encounter.     Current Outpatient Medications on File Prior to Encounter   Medication Sig Dispense Refill    INVEGA SUSTENNA 234 MG/1.5ML Suspension Prefilled Syringe INJECT 234 MG INTRAMUSCULARLY EVERY 4 WEEKS      traZODone (DESYREL) 50 MG Tab Take 1 Tablet by mouth every evening. (Patient not taking: Reported on 4/2/2025) 30 Tablet 0   [5] No Known Allergies

## 2025-05-17 ENCOUNTER — HOSPITAL ENCOUNTER (INPATIENT)
Facility: MEDICAL CENTER | Age: 30
LOS: 2 days | End: 2025-05-20
Attending: EMERGENCY MEDICINE | Admitting: STUDENT IN AN ORGANIZED HEALTH CARE EDUCATION/TRAINING PROGRAM
Payer: MEDICARE

## 2025-05-17 DIAGNOSIS — L03.311 CELLULITIS, ABDOMINAL WALL: ICD-10-CM

## 2025-05-17 DIAGNOSIS — D72.829 LEUKOCYTOSIS, UNSPECIFIED TYPE: ICD-10-CM

## 2025-05-17 DIAGNOSIS — R45.851 SUICIDAL IDEATION: Primary | ICD-10-CM

## 2025-05-17 LAB
ALBUMIN SERPL BCP-MCNC: 3.8 G/DL (ref 3.2–4.9)
ALBUMIN/GLOB SERPL: 1 G/DL
ALP SERPL-CCNC: 126 U/L (ref 30–99)
ALT SERPL-CCNC: 32 U/L (ref 2–50)
ANION GAP SERPL CALC-SCNC: 14 MMOL/L (ref 7–16)
AST SERPL-CCNC: 39 U/L (ref 12–45)
BASOPHILS # BLD AUTO: 0.6 % (ref 0–1.8)
BASOPHILS # BLD: 0.1 K/UL (ref 0–0.12)
BILIRUB SERPL-MCNC: <0.2 MG/DL (ref 0.1–1.5)
BUN SERPL-MCNC: 10 MG/DL (ref 8–22)
CALCIUM ALBUM COR SERPL-MCNC: 9 MG/DL (ref 8.5–10.5)
CALCIUM SERPL-MCNC: 8.8 MG/DL (ref 8.5–10.5)
CHLORIDE SERPL-SCNC: 99 MMOL/L (ref 96–112)
CO2 SERPL-SCNC: 22 MMOL/L (ref 20–33)
CREAT SERPL-MCNC: 0.83 MG/DL (ref 0.5–1.4)
EOSINOPHIL # BLD AUTO: 0.05 K/UL (ref 0–0.51)
EOSINOPHIL NFR BLD: 0.3 % (ref 0–6.9)
ERYTHROCYTE [DISTWIDTH] IN BLOOD BY AUTOMATED COUNT: 48.4 FL (ref 35.9–50)
ETHANOL BLD-MCNC: <10.1 MG/DL
GFR SERPLBLD CREATININE-BSD FMLA CKD-EPI: 122 ML/MIN/1.73 M 2
GLOBULIN SER CALC-MCNC: 4 G/DL (ref 1.9–3.5)
GLUCOSE SERPL-MCNC: 104 MG/DL (ref 65–99)
HCT VFR BLD AUTO: 43.4 % (ref 42–52)
HGB BLD-MCNC: 13.6 G/DL (ref 14–18)
IMM GRANULOCYTES # BLD AUTO: 0.15 K/UL (ref 0–0.11)
IMM GRANULOCYTES NFR BLD AUTO: 0.8 % (ref 0–0.9)
LIPASE SERPL-CCNC: 16 U/L (ref 11–82)
LYMPHOCYTES # BLD AUTO: 3.02 K/UL (ref 1–4.8)
LYMPHOCYTES NFR BLD: 16.9 % (ref 22–41)
MCH RBC QN AUTO: 28 PG (ref 27–33)
MCHC RBC AUTO-ENTMCNC: 31.3 G/DL (ref 32.3–36.5)
MCV RBC AUTO: 89.5 FL (ref 81.4–97.8)
MONOCYTES # BLD AUTO: 1.49 K/UL (ref 0–0.85)
MONOCYTES NFR BLD AUTO: 8.4 % (ref 0–13.4)
NEUTROPHILS # BLD AUTO: 13.03 K/UL (ref 1.82–7.42)
NEUTROPHILS NFR BLD: 73 % (ref 44–72)
NRBC # BLD AUTO: 0 K/UL
NRBC BLD-RTO: 0 /100 WBC (ref 0–0.2)
PLATELET # BLD AUTO: 509 K/UL (ref 164–446)
PMV BLD AUTO: 9.1 FL (ref 9–12.9)
POTASSIUM SERPL-SCNC: 4 MMOL/L (ref 3.6–5.5)
PROT SERPL-MCNC: 7.8 G/DL (ref 6–8.2)
RBC # BLD AUTO: 4.85 M/UL (ref 4.7–6.1)
SODIUM SERPL-SCNC: 135 MMOL/L (ref 135–145)
WBC # BLD AUTO: 17.8 K/UL (ref 4.8–10.8)

## 2025-05-17 PROCEDURE — 90791 PSYCH DIAGNOSTIC EVALUATION: CPT

## 2025-05-17 PROCEDURE — 82077 ASSAY SPEC XCP UR&BREATH IA: CPT

## 2025-05-17 PROCEDURE — 83690 ASSAY OF LIPASE: CPT

## 2025-05-17 PROCEDURE — 85025 COMPLETE CBC W/AUTO DIFF WBC: CPT

## 2025-05-17 PROCEDURE — 80053 COMPREHEN METABOLIC PANEL: CPT

## 2025-05-17 PROCEDURE — 36415 COLL VENOUS BLD VENIPUNCTURE: CPT

## 2025-05-17 PROCEDURE — 99285 EMERGENCY DEPT VISIT HI MDM: CPT

## 2025-05-17 ASSESSMENT — FIBROSIS 4 INDEX: FIB4 SCORE: 0.3

## 2025-05-17 ASSESSMENT — PAIN DESCRIPTION - PAIN TYPE: TYPE: ACUTE PAIN

## 2025-05-17 NOTE — ED NOTES
"Pt upset he's not getting a room right away so pt up to  stating \"ok I'm suicidal then, can you put me on a legal hold\".   "

## 2025-05-17 NOTE — ED NOTES
Patient discharged home per ERP.  Discharge teaching and education discussed with patient. POC discussed.   Patient verbalized understanding of discharge teaching and education. No other questions at this time. Provided with recovery resources for alcohol. Pt drinking fluids & ambulatory.    VSS. Patient alert and oriented. Patient arranged ride for self. Able to ambulate off unit safely with steady gait.

## 2025-05-17 NOTE — ED TRIAGE NOTES
.  Chief Complaint   Patient presents with    Nausea    Alcohol Intoxication   Biba, seen this am for the same. Was uncooperative and left ama at 1215. Reports drinking and now c/o nausea and belching. No emesis noted. Pt ambulatory at triage . Given Zofran 4mg pta

## 2025-05-17 NOTE — ED TRIAGE NOTES
"Chief Complaint   Patient presents with    Depression     Pt states he \"has a lot going on in life right now\". Feelings of hopelessness.     Psych Eval     Requesting to speak to someone for help.      Pt ambulatory to triage for above complaints. A+O x 4, GCS 15. Pt states he is homeless and requesting to go to [facility] for help.   "

## 2025-05-17 NOTE — ED NOTES
"Pt ambulatory to room 32 from [facility]. Pt denies SI with this RN. States \"hanging around these other people makes me wanna kill myself\". Pt has no plan or suicidal intent.   "

## 2025-05-17 NOTE — ED PROVIDER NOTES
"ER Provider Note     Kirit Khanna M.D.. 5/16/2025  6:48 PM    Primary Care Provider: JOANA Judge    CHIEF COMPLAINT  Chief Complaint   Patient presents with    Nausea    Alcohol Intoxication     EXTERNAL RECORDS REVIEWED  Records show the patient does get some primary care at [facility].  He was seen earlier today for symptoms similar to his presentation right now, difficult to understand history, denying then endorsing alcohol abuse and intoxication, denying request for and then requesting help with alcoholism, though not seeking help from an appropriate source such as the [facility] clinic itself.  He had a thorough and unremarkable laboratory evaluation this morning.     HPI/ROS  LIMITATION TO HISTORY   Alcohol abuse    OUTSIDE HISTORIAN(S):  Alcohol intoxication.    Fareed Muhammad is a 29 y.o. male who presents to the ED complaining of nausea without vomiting from ongoing alcohol abuse.  He has a well-documented striae of polysubstance abuse.  His complaint is not entirely clear.  He was at triage, was frustrated that he was not getting a room wide-awake, and then came to the triage desk and stated since he was upset he's not getting a room right away \"ok I'm suicidal then, can you put me on a legal hold.\"  Patient has no specific plan for suicide.  He is disheveled, malodorous homeless, no distress.    PAST MEDICAL HISTORY  Past Medical History[1]    SURGICAL HISTORY  Past Surgical History[2]    FAMILY HISTORY  Family History   Problem Relation Age of Onset    Cancer Paternal Aunt         breasr canre    Hypertension Maternal Grandmother     Diabetes Maternal Grandmother     Cancer Maternal Grandmother     Hypertension Maternal Grandfather     Diabetes Maternal Grandfather        SOCIAL HISTORY   reports that he has been smoking cigarettes. He started smoking about 7 years ago. He has a 3.7 pack-year smoking history. He has never used smokeless tobacco. He reports current alcohol use. He " reports current drug use. Drug: Marijuana.    CURRENT MEDICATIONS  Discharge Medication List as of 2025  7:08 PM        CONTINUE these medications which have NOT CHANGED    Details   INVEGA SUSTENNA 234 MG/1.5ML Suspension Prefilled Syringe INJECT 234 MG INTRAMUSCULARLY EVERY 4 WEEKS, KARIN, Historical Med      traZODone (DESYREL) 50 MG Tab Take 1 Tablet by mouth every evening., Disp-30 Tablet, R-0, Normal             ALLERGIES  Patient has no known allergies.    PHYSICAL EXAM  VITAL SIGNS: /68   Pulse (!) 102   Temp 36.7 °C (98 °F) (Temporal)   Resp 16   Wt (!) 163 kg (360 lb)   SpO2 92%   BMI 51.65 kg/m²   Pulse ox interpretation: I interpret this pulse ox as normal.  Constitutional: Alert in no apparent distress.  Disheveled, malodorous.  HENT: No signs of trauma, Bilateral external ears normal, Nose normal.   Eyes: Pupils are equal and reactive, Conjunctiva normal, Non-icteric.   Neck: Normal range of motion, Supple, No stridor.    Cardiovascular: Normal peripheral perfusion  Thorax & Lungs: Unlabored respirations, equal chest expansion, no accessory muscle use  Abdomen: Non-distended  Skin:  No erythema, No rash.   Back: Normal alignment and ROM  Extremities: No gross deformity  Musculoskeletal: Good range of motion in all major joints.   Neurologic: Alert, Normal motor function, No focal deficits noted.   Psychiatric: Affect aggressive, judgment fair.  No plan for suicide.      DIAGNOSTIC STUDIES    Labs:   Labs Reviewed - No data to display    EKG:   I have independently interpreted this EKG  Results for orders placed or performed during the hospital encounter of 23   EKG   Result Value Ref Range    Report       [facility]    Test Date:  2023  Pt Name:    MAURI HENSLEY                    Department: 141  MRN:        [id number]                      Room:       T401  Gender:     Male                         Technician: LAURIE  :        1995                   Requested By:BHARAT CANTRELL  [place]  Order #:    [id number]                    Reading MD: Sadiq Milton MD    Measurements  Intervals                                Axis  Rate:       83                           P:          30  IN:         136                          QRS:        11  QRSD:       83                           T:          19  QT:         379  QTc:        446    Interpretive Statements  Sinus rhythm  Compared to ECG 06/02/2023 12:15:17  Sinus tachycardia no longer present  Electronically Signed On 07- 17:45:32 PDT by Sadiq Milton MD         COURSE & MEDICAL DECISION MAKING     INITIAL ASSESSMENT, COURSE AND PLAN  Care Narrative:       6:48 PM Patient presents to the ED for the second time today with fairly vague complaints, related to alcohol abuse, in the setting of a known history of chronic polysubstance abuse and what appears to be incomplete management of chronic psychiatric issues, likely psychosis, with history of injectable monthly Invega.  The patient made an in sincere statements about suicidality out of frustration because of not being roomed quickly enough.  This is unacceptable abuse of the emergency department in triage system, which I explained to him.  He is also worried about alcohol poisoning, which I explained is not a separate diagnosis from simply drinking too much and feeling bad.  He is not vomiting.  There is no evidence of GI bleed.  He is not asking for alcohol recovery assistance, though I recommended to him strongly, and will provide resources.  For his identifiable complaint of nausea, he will be given injectable Compazine and Benadryl.    7:10 PM patient provided clear discharge instructions with recommendations to follow-up with several clinic options for alcohol recovery support services.    ADDITIONAL PROBLEM MANAGED  Chronic polysubstance abuse complicates this visit.    DISPOSITION AND DISCUSSIONS    Escalation of care considered, and ultimately not performed: acute inpatient care  management, however at this time, the patient is most appropriate for outpatient management.    Decision tools and prescription drugs considered including, but not limited to: Medication modification considered, but patient is not currently asking for alcohol recovery support.     The patient will return for new or worsening symptoms and is stable at the time of discharge.    The patient is referred to a primary physician for blood pressure management, diabetic screening, and for all other preventative health concerns.      DISPOSITION:  Patient will be discharged home in stable condition.    FOLLOW UP:  [facility]  [address]  [facility] [address]  [phone]        [facility]  [address] [place] [id number] [phone]        [facility]  [address] [facility] [phone]          OUTPATIENT MEDICATIONS:  Discharge Medication List as of 5/16/2025  7:08 PM            FINAL DIAGNOSIS  1. Alcohol abuse    2. Malingering          Kirit WELLER M.D. (Scribe), am scribing for, and in the presence of, Kirit Khanna M.D..    Electronically signed by: Kirit Khanna M.D. (Rosy), 5/16/2025    Kirit WELLER M.D. personally performed the services described in this documentation, as scribed by Kirit Khanna M.D. in my presence, and it is both accurate and complete.           [1]   Past Medical History:  Diagnosis Date    Acute alcohol intoxication (HCC) 08/21/2023    ASTHMA     Dx at age 5, does not use and inhaler, and has never had an attack    Bipolar disorder (HCC)     Closed fracture of multiple ribs of left side 05/31/2023    Discharge planning issues 06/22/2023    Drug abuse in remission (HCC)     meth, alcohol and tobacco since 14    Liver laceration, initial encounter 05/31/2023    No contraindication to deep vein thrombosis (DVT) prophylaxis  05/31/2023    Spleen laceration, initial encounter 05/31/2023    Trauma 05/31/2023    Trauma 08/21/2023   [2]   Past Surgical History:  Procedure Laterality Date    HARDWARE REMOVAL ORTHO Left 7/13/2023    Procedure: ELBOW PIN REMOVAL WITH CAST REMOVAL;  Surgeon: Josef Bills M.D.;  Location: [facility];  Service: Orthopedics    ORIF, ELBOW Left 6/1/2023    Procedure: ORIF, ELBOW;  Surgeon: Josef Bills M.D.;  Location: [facility];  Service: Orthopedics    SPLENECTOMY N/A 5/31/2023    Procedure: SPLENECTOMY;  Surgeon: Doris Bhakta M.D.;  Location: [facility];  Service: General

## 2025-05-17 NOTE — DISCHARGE INSTRUCTIONS
"\"Alcohol poisoning\" is simply a hangover. You do need to stop drinking, but you are not in danger from alcohol intoxication right now.     The clinics listed an help with physical and mental health and alcohol recovery.  "

## 2025-05-18 ENCOUNTER — APPOINTMENT (OUTPATIENT)
Dept: RADIOLOGY | Facility: MEDICAL CENTER | Age: 30
End: 2025-05-18
Attending: STUDENT IN AN ORGANIZED HEALTH CARE EDUCATION/TRAINING PROGRAM
Payer: MEDICARE

## 2025-05-18 PROBLEM — E66.01 MORBID OBESITY (HCC): Status: ACTIVE | Noted: 2025-05-18

## 2025-05-18 PROBLEM — J96.01 ACUTE RESPIRATORY FAILURE WITH HYPOXIA (HCC): Status: ACTIVE | Noted: 2025-05-18

## 2025-05-18 PROBLEM — L03.90 CELLULITIS: Status: ACTIVE | Noted: 2025-05-18

## 2025-05-18 PROBLEM — R45.851 SUICIDAL IDEATIONS: Status: ACTIVE | Noted: 2025-05-18

## 2025-05-18 LAB
AMPHET UR QL SCN: POSITIVE
APPEARANCE UR: CLEAR
BARBITURATES UR QL SCN: NEGATIVE
BASE EXCESS BLDA CALC-SCNC: -2 MMOL/L (ref -4–3)
BENZODIAZ UR QL SCN: NEGATIVE
BILIRUB UR QL STRIP.AUTO: NEGATIVE
BODY TEMPERATURE: 36.6 CENTIGRADE
BZE UR QL SCN: NEGATIVE
CANNABINOIDS UR QL SCN: POSITIVE
COLOR UR: YELLOW
D DIMER PPP IA.FEU-MCNC: 1.26 UG/ML (FEU) (ref 0–0.5)
EKG IMPRESSION: NORMAL
FENTANYL UR QL: NEGATIVE
GLUCOSE UR STRIP.AUTO-MCNC: NEGATIVE MG/DL
HCO3 BLDA-SCNC: 24 MMOL/L (ref 21–28)
KETONES UR STRIP.AUTO-MCNC: ABNORMAL MG/DL
LACTATE SERPL-SCNC: 0.8 MMOL/L (ref 0.5–2)
LEUKOCYTE ESTERASE UR QL STRIP.AUTO: NEGATIVE
METHADONE UR QL SCN: NEGATIVE
MICRO URNS: ABNORMAL
NITRITE UR QL STRIP.AUTO: NEGATIVE
OPIATES UR QL SCN: NEGATIVE
OXYCODONE UR QL SCN: NEGATIVE
PCO2 BLDA: 44.3 MMHG (ref 32–48)
PCO2 TEMP ADJ BLDA: 43.5 MMHG (ref 32–48)
PCP UR QL SCN: NEGATIVE
PH BLDA: 7.35 [PH] (ref 7.35–7.45)
PH TEMP ADJ BLDA: 7.35 [PH] (ref 7.35–7.45)
PH UR STRIP.AUTO: 5 [PH] (ref 5–8)
PO2 BLDA: 79.1 MMHG (ref 83–108)
PO2 TEMP ADJ BLDA: 77 MMHG (ref 83–108)
PROCALCITONIN SERPL-MCNC: 0.09 NG/ML
PROPOXYPH UR QL SCN: NEGATIVE
PROT UR QL STRIP: NEGATIVE MG/DL
RBC UR QL AUTO: NEGATIVE
SAO2 % BLDA: 93.7 % (ref 93–99)
SCCMEC + MECA PNL NOSE NAA+PROBE: NEGATIVE
SP GR UR STRIP.AUTO: 1.02
UROBILINOGEN UR STRIP.AUTO-MCNC: 1 EU/DL

## 2025-05-18 PROCEDURE — 85379 FIBRIN DEGRADATION QUANT: CPT

## 2025-05-18 PROCEDURE — A9270 NON-COVERED ITEM OR SERVICE: HCPCS | Performed by: STUDENT IN AN ORGANIZED HEALTH CARE EDUCATION/TRAINING PROGRAM

## 2025-05-18 PROCEDURE — 93005 ELECTROCARDIOGRAM TRACING: CPT | Mod: TC | Performed by: STUDENT IN AN ORGANIZED HEALTH CARE EDUCATION/TRAINING PROGRAM

## 2025-05-18 PROCEDURE — 700111 HCHG RX REV CODE 636 W/ 250 OVERRIDE (IP): Mod: JZ | Performed by: STUDENT IN AN ORGANIZED HEALTH CARE EDUCATION/TRAINING PROGRAM

## 2025-05-18 PROCEDURE — 84145 PROCALCITONIN (PCT): CPT

## 2025-05-18 PROCEDURE — 700102 HCHG RX REV CODE 250 W/ 637 OVERRIDE(OP)

## 2025-05-18 PROCEDURE — 82803 BLOOD GASES ANY COMBINATION: CPT

## 2025-05-18 PROCEDURE — 306311 ANTI-EMBOLISM STOCKINGS XXLRG LNG: Performed by: STUDENT IN AN ORGANIZED HEALTH CARE EDUCATION/TRAINING PROGRAM

## 2025-05-18 PROCEDURE — 700111 HCHG RX REV CODE 636 W/ 250 OVERRIDE (IP): Performed by: EMERGENCY MEDICINE

## 2025-05-18 PROCEDURE — 71275 CT ANGIOGRAPHY CHEST: CPT

## 2025-05-18 PROCEDURE — 36415 COLL VENOUS BLD VENIPUNCTURE: CPT

## 2025-05-18 PROCEDURE — 700102 HCHG RX REV CODE 250 W/ 637 OVERRIDE(OP): Performed by: STUDENT IN AN ORGANIZED HEALTH CARE EDUCATION/TRAINING PROGRAM

## 2025-05-18 PROCEDURE — 770001 HCHG ROOM/CARE - MED/SURG/GYN PRIV*

## 2025-05-18 PROCEDURE — 83605 ASSAY OF LACTIC ACID: CPT

## 2025-05-18 PROCEDURE — 97602 WOUND(S) CARE NON-SELECTIVE: CPT

## 2025-05-18 PROCEDURE — 99223 1ST HOSP IP/OBS HIGH 75: CPT | Performed by: STUDENT IN AN ORGANIZED HEALTH CARE EDUCATION/TRAINING PROGRAM

## 2025-05-18 PROCEDURE — 93010 ELECTROCARDIOGRAM REPORT: CPT | Performed by: INTERNAL MEDICINE

## 2025-05-18 PROCEDURE — 87641 MR-STAPH DNA AMP PROBE: CPT

## 2025-05-18 PROCEDURE — 80307 DRUG TEST PRSMV CHEM ANLYZR: CPT

## 2025-05-18 PROCEDURE — 700105 HCHG RX REV CODE 258: Performed by: EMERGENCY MEDICINE

## 2025-05-18 PROCEDURE — 700105 HCHG RX REV CODE 258: Performed by: STUDENT IN AN ORGANIZED HEALTH CARE EDUCATION/TRAINING PROGRAM

## 2025-05-18 PROCEDURE — 81003 URINALYSIS AUTO W/O SCOPE: CPT

## 2025-05-18 PROCEDURE — 700117 HCHG RX CONTRAST REV CODE 255: Performed by: STUDENT IN AN ORGANIZED HEALTH CARE EDUCATION/TRAINING PROGRAM

## 2025-05-18 PROCEDURE — A9270 NON-COVERED ITEM OR SERVICE: HCPCS

## 2025-05-18 RX ORDER — LABETALOL HYDROCHLORIDE 5 MG/ML
10 INJECTION, SOLUTION INTRAVENOUS EVERY 4 HOURS PRN
Status: DISCONTINUED | OUTPATIENT
Start: 2025-05-18 | End: 2025-05-20 | Stop reason: HOSPADM

## 2025-05-18 RX ORDER — HYDROMORPHONE HYDROCHLORIDE 1 MG/ML
0.25 INJECTION, SOLUTION INTRAMUSCULAR; INTRAVENOUS; SUBCUTANEOUS
Status: DISCONTINUED | OUTPATIENT
Start: 2025-05-18 | End: 2025-05-20 | Stop reason: HOSPADM

## 2025-05-18 RX ORDER — ENOXAPARIN SODIUM 100 MG/ML
40 INJECTION SUBCUTANEOUS DAILY
Status: DISCONTINUED | OUTPATIENT
Start: 2025-05-18 | End: 2025-05-18

## 2025-05-18 RX ORDER — ONDANSETRON 2 MG/ML
4 INJECTION INTRAMUSCULAR; INTRAVENOUS EVERY 4 HOURS PRN
Status: DISCONTINUED | OUTPATIENT
Start: 2025-05-18 | End: 2025-05-20 | Stop reason: HOSPADM

## 2025-05-18 RX ORDER — TRAZODONE HYDROCHLORIDE 50 MG/1
50 TABLET ORAL
Status: DISCONTINUED | OUTPATIENT
Start: 2025-05-18 | End: 2025-05-20 | Stop reason: HOSPADM

## 2025-05-18 RX ORDER — PROCHLORPERAZINE EDISYLATE 5 MG/ML
5-10 INJECTION INTRAMUSCULAR; INTRAVENOUS EVERY 4 HOURS PRN
Status: DISCONTINUED | OUTPATIENT
Start: 2025-05-18 | End: 2025-05-20 | Stop reason: HOSPADM

## 2025-05-18 RX ORDER — CEFAZOLIN 2 G/1
2 INJECTION, POWDER, FOR SOLUTION INTRAMUSCULAR; INTRAVENOUS ONCE
Status: COMPLETED | OUTPATIENT
Start: 2025-05-18 | End: 2025-05-18

## 2025-05-18 RX ORDER — ACETAMINOPHEN 325 MG/1
650 TABLET ORAL EVERY 6 HOURS PRN
Status: DISCONTINUED | OUTPATIENT
Start: 2025-05-18 | End: 2025-05-20 | Stop reason: HOSPADM

## 2025-05-18 RX ORDER — SODIUM CHLORIDE 9 MG/ML
INJECTION, SOLUTION INTRAVENOUS CONTINUOUS
Status: DISCONTINUED | OUTPATIENT
Start: 2025-05-18 | End: 2025-05-20 | Stop reason: HOSPADM

## 2025-05-18 RX ORDER — ALBUTEROL SULFATE 90 UG/1
2 INHALANT RESPIRATORY (INHALATION)
Status: DISCONTINUED | OUTPATIENT
Start: 2025-05-18 | End: 2025-05-20 | Stop reason: HOSPADM

## 2025-05-18 RX ORDER — OXYCODONE HYDROCHLORIDE 5 MG/1
2.5 TABLET ORAL
Refills: 0 | Status: DISCONTINUED | OUTPATIENT
Start: 2025-05-18 | End: 2025-05-20 | Stop reason: HOSPADM

## 2025-05-18 RX ORDER — SODIUM CHLORIDE 9 MG/ML
1000 INJECTION, SOLUTION INTRAVENOUS ONCE
Status: COMPLETED | OUTPATIENT
Start: 2025-05-18 | End: 2025-05-18

## 2025-05-18 RX ORDER — OXYCODONE HYDROCHLORIDE 5 MG/1
5 TABLET ORAL
Refills: 0 | Status: DISCONTINUED | OUTPATIENT
Start: 2025-05-18 | End: 2025-05-20 | Stop reason: HOSPADM

## 2025-05-18 RX ORDER — ONDANSETRON 4 MG/1
4 TABLET, ORALLY DISINTEGRATING ORAL EVERY 4 HOURS PRN
Status: DISCONTINUED | OUTPATIENT
Start: 2025-05-18 | End: 2025-05-20 | Stop reason: HOSPADM

## 2025-05-18 RX ORDER — PROMETHAZINE HYDROCHLORIDE 25 MG/1
12.5-25 TABLET ORAL EVERY 4 HOURS PRN
Status: DISCONTINUED | OUTPATIENT
Start: 2025-05-18 | End: 2025-05-20 | Stop reason: HOSPADM

## 2025-05-18 RX ORDER — AMMONIUM LACTATE 12 G/100G
LOTION TOPICAL 2 TIMES DAILY
Status: DISCONTINUED | OUTPATIENT
Start: 2025-05-18 | End: 2025-05-20 | Stop reason: HOSPADM

## 2025-05-18 RX ORDER — SODIUM CHLORIDE 9 MG/ML
500 INJECTION, SOLUTION INTRAVENOUS ONCE
Status: COMPLETED | OUTPATIENT
Start: 2025-05-18 | End: 2025-05-18

## 2025-05-18 RX ORDER — PROMETHAZINE HYDROCHLORIDE 25 MG/1
12.5-25 SUPPOSITORY RECTAL EVERY 4 HOURS PRN
Status: DISCONTINUED | OUTPATIENT
Start: 2025-05-18 | End: 2025-05-20 | Stop reason: HOSPADM

## 2025-05-18 RX ORDER — LINEZOLID 600 MG/1
600 TABLET, FILM COATED ORAL EVERY 12 HOURS
Status: DISCONTINUED | OUTPATIENT
Start: 2025-05-18 | End: 2025-05-19

## 2025-05-18 RX ORDER — QUETIAPINE FUMARATE 25 MG/1
50 TABLET, FILM COATED ORAL NIGHTLY
Status: DISCONTINUED | OUTPATIENT
Start: 2025-05-18 | End: 2025-05-20 | Stop reason: HOSPADM

## 2025-05-18 RX ADMIN — ENOXAPARIN SODIUM 40 MG: 100 INJECTION SUBCUTANEOUS at 05:20

## 2025-05-18 RX ADMIN — QUETIAPINE FUMARATE 50 MG: 25 TABLET ORAL at 20:50

## 2025-05-18 RX ADMIN — Medication: at 09:04

## 2025-05-18 RX ADMIN — SODIUM CHLORIDE: 9 INJECTION, SOLUTION INTRAVENOUS at 16:28

## 2025-05-18 RX ADMIN — CEFAZOLIN 2 G: 2 INJECTION, POWDER, FOR SOLUTION INTRAMUSCULAR; INTRAVENOUS at 22:46

## 2025-05-18 RX ADMIN — IOHEXOL 65 ML: 350 INJECTION, SOLUTION INTRAVENOUS at 20:45

## 2025-05-18 RX ADMIN — OXYCODONE 5 MG: 5 TABLET ORAL at 05:22

## 2025-05-18 RX ADMIN — SODIUM CHLORIDE 1000 ML: 9 INJECTION, SOLUTION INTRAVENOUS at 01:46

## 2025-05-18 RX ADMIN — CEFAZOLIN 2 G: 2 INJECTION, POWDER, LYOPHILIZED, FOR SOLUTION INTRAVENOUS at 03:34

## 2025-05-18 RX ADMIN — Medication: at 16:29

## 2025-05-18 RX ADMIN — OXYCODONE 5 MG: 5 TABLET ORAL at 13:36

## 2025-05-18 RX ADMIN — CEFAZOLIN 2 G: 2 INJECTION, POWDER, FOR SOLUTION INTRAMUSCULAR; INTRAVENOUS at 13:38

## 2025-05-18 RX ADMIN — CEFAZOLIN 2 G: 2 INJECTION, POWDER, FOR SOLUTION INTRAMUSCULAR; INTRAVENOUS at 05:29

## 2025-05-18 RX ADMIN — OXYCODONE 5 MG: 5 TABLET ORAL at 20:26

## 2025-05-18 RX ADMIN — SODIUM CHLORIDE: 9 INJECTION, SOLUTION INTRAVENOUS at 04:17

## 2025-05-18 RX ADMIN — LINEZOLID 600 MG: 600 TABLET, FILM COATED ORAL at 16:26

## 2025-05-18 RX ADMIN — SODIUM CHLORIDE 500 ML: 9 INJECTION, SOLUTION INTRAVENOUS at 03:33

## 2025-05-18 RX ADMIN — LINEZOLID 600 MG: 600 TABLET, FILM COATED ORAL at 10:41

## 2025-05-18 ASSESSMENT — COGNITIVE AND FUNCTIONAL STATUS - GENERAL
DAILY ACTIVITIY SCORE: 21
TOILETING: A LITTLE
HELP NEEDED FOR BATHING: A LITTLE
CLIMB 3 TO 5 STEPS WITH RAILING: A LOT
DRESSING REGULAR LOWER BODY CLOTHING: A LITTLE
WALKING IN HOSPITAL ROOM: A LOT
MOVING FROM LYING ON BACK TO SITTING ON SIDE OF FLAT BED: A LITTLE
MOBILITY SCORE: 16
MOVING TO AND FROM BED TO CHAIR: A LITTLE
SUGGESTED CMS G CODE MODIFIER DAILY ACTIVITY: CJ
STANDING UP FROM CHAIR USING ARMS: A LITTLE
TURNING FROM BACK TO SIDE WHILE IN FLAT BAD: A LITTLE
SUGGESTED CMS G CODE MODIFIER MOBILITY: CK

## 2025-05-18 ASSESSMENT — PAIN DESCRIPTION - PAIN TYPE
TYPE: ACUTE PAIN

## 2025-05-18 ASSESSMENT — ENCOUNTER SYMPTOMS
NAUSEA: 0
HEADACHES: 0
NERVOUS/ANXIOUS: 1
DIZZINESS: 0
MYALGIAS: 1
HALLUCINATIONS: 0
INSOMNIA: 1
DEPRESSION: 1

## 2025-05-18 ASSESSMENT — LIFESTYLE VARIABLES: SUBSTANCE_ABUSE: 1

## 2025-05-18 NOTE — DISCHARGE SUMMARY
"  ED Observation Discharge Summary    Patient:Fareed Muhammad  Patient : 1995  Patient MRN: [id number]  Patient PCP: JOANA Judge    Admit Date: 2025  Discharge Date and Time: 25 12:11 AM  Discharge Diagnosis:   1. Suicidal ideation        2. Leukocytosis, unspecified type        3. Cellulitis, abdominal wall            Discharge Attending: Martha Melo M.D.  Discharge Service: ED Observation    ED Course  Fareed is a 29 y.o. male who was evaluated at [facility] on a legal hold for suicidal ideation.  I was called by the nursing staff to come to the bed to do a full skin exam before he goes over to [facility].        12:22 AM  Full skin exam performed the patient is morbidly obese.  Unfortunately the whole lower part of his abdomen over his pannus and into his fold is erythematous and warm and indurated consistent with a cellulitis.  He has had an elevated white blood cell count the last 48 hours and this is likely the source.  The patient's medical clearance will be removed and I do not feel comfortable with him going to just an outpatient psych facility without treatment for cellulitis.    1:33 AM  Antibiotics were ordered for the patient as well as IV fluids and lactic acid I spoke with Dr. Lucas with the medicine service and he has accepted the patient for hospitalization the setting of suicidal ideality and cellulitis of the abdomen    Discharge Exam:  /56   Pulse 95   Temp 36.8 °C (98.2 °F) (Temporal)   Resp 18   Ht 1.778 m (5' 10\")   Wt (!) 163 kg (360 lb)   SpO2 93%   BMI 51.65 kg/m² .    Constitutional: Awake and alert. Nontoxic  HENT:  Grossly normal  Eyes: Grossly normal  Neck: Normal range of motion  Cardiovascular: Normal heart rate   Thorax & Lungs: No respiratory distress  Abdomen: Nontender  Skin: Lower abdominal wall in the pannus is erythematous and hot to the touch in the pannus fold there appears to be some inflammation " consistent with some folliculitis patient was hot to touch  Extremities: Well perfused  Psychiatric: Affect normal    Labs  Labs Reviewed   CBC WITH DIFFERENTIAL - Abnormal; Notable for the following components:       Result Value    WBC 17.8 (*)     Hemoglobin 13.6 (*)     MCHC 31.3 (*)     Platelet Count 509 (*)     Neutrophils-Polys 73.00 (*)     Lymphocytes 16.90 (*)     Neutrophils (Absolute) 13.03 (*)     Monos (Absolute) 1.49 (*)     Immature Granulocytes (abs) 0.15 (*)     All other components within normal limits   COMP METABOLIC PANEL - Abnormal; Notable for the following components:    Glucose 104 (*)     Alkaline Phosphatase 126 (*)     Globulin 4.0 (*)     All other components within normal limits   LIPASE   DIAGNOSTIC ALCOHOL   ESTIMATED GFR   URINE DRUG SCREEN   URINALYSIS,CULTURE IF INDICATED         Radiology  No orders to display       Medications:   New Prescriptions    No medications on file       My final assessment includes full body exam   Upon Reevaluation, the patient's condition has: not improved; and will be escalated to hospitalization.    Patient discharged from ED Observation status at 1:33 AM (Time) 05/18/25  (Date).     Total time spent on this ED Observation discharge encounter is > 30 Minutes    Electronically signed by: Martha Melo M.D., 5/18/2025 12:11 AM

## 2025-05-18 NOTE — ED NOTES
Bedside report received from off going RN/tech: Amanda MACDONALD, assumed care of patient.  POC discussed with patient. Call light within reach, all needs addressed at this time.       Fall risk interventions in place: Move the patient closer to the nurse's station, Patient's personal possessions are with in their safe reach, Place socks on patient, Place fall risk sign on patient's door, Give patient urinal if applicable, and Keep floor surfaces clean and dry (all applicable per [facility] Fall risk assessment)   Continuous monitoring: Pulse Ox or Blood Pressure  IVF/IV medications: Not Applicable   Oxygen: Room Air  Bedside sitter: Pt on L2k SI with 1:1 sitter   (name)  Isolation: Not Applicable

## 2025-05-18 NOTE — RESPIRATORY CARE
Called to room to assess need for noc CPAP. Patient found sleeping with nasal cannula on his forehead. I discussed the use of a CPAP and patient has declined and accepted the nasal cannula. Currently, he is resting while receiving 2.5L. RN aware.
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
## 2025-05-18 NOTE — ASSESSMENT & PLAN NOTE
Likely undiagnosed asthma, wheezing on exam  CTA chest negative for PE, procalcitonin negative  ABG with normal pH, normal PCO2 level  Not in acute distress  Continue to  monitor oxygen saturation closely  Incentive spirometry  Out of bed to chair  Continue on prednisone and DuoNeb  Ambulatory O2 evaluation in a.m.

## 2025-05-18 NOTE — ED NOTES
Johana from [facility] called with acceptance.   Johana reporting that she needs full body exam note. ERP notified  [phone]

## 2025-05-18 NOTE — PROGRESS NOTES
Patient is on legal hold, placed in ED escalated to charge paperwork not on unit currently, charge is attempting to obtain.  Ordered XL paper scrubs, and switched to flat sheet.  Room safety checklist in place and bedside report with patient safety sitter 1:1 for SI legal hold with Cali as well as review of checklist.  Patient currently does not have any privileges per the orders was seen by behavioral health in ER.  Switched to safety tray and notified kitchen.

## 2025-05-18 NOTE — WOUND TEAM
"[facility]  Inpatient Services  Wound and Skin Care Brief Evaluation    Admission Date: 5/17/2025     Last order of IP CONSULT TO WOUND CARE was found on 5/18/2025 from Hospital Encounter on 5/17/2025     HPI, PMH, SH: Reviewed    Chief Complaint   Patient presents with    Depression     Pt states he \"has a lot going on in life right now\". Feelings of hopelessness.     Psych Eval     Requesting to speak to someone for help.      Diagnosis: Cellulitis [L03.90]    Unit where seen by Wound Team: T305/01     Wound consult placed regarding sacrum, bilateral heels, bilateral groin and scrotum. Chart and images reviewed. This clinician in to assess patient. Patient pleasant and agreeable. Patient was turned over to his right side and easily turns to his back without assistance. Non-selectively debrided with Moist warm washcloth.     No pressure injuries or advanced wound care needs identified. Sacrum and bilateral posterior heels are normal coloration for skin tone, intact and blanching.  Bilateral groins are red with moisture, interdry ordered.  Scrotum has a skin tear that is not draining, left JEROME. Plantar foot fissures, amlactin ordered to help soften the callus, no open area between fissures. Wound consult completed. No further follow up unless indicated and consulted.                    PREVENTATIVE INTERVENTIONS:    Q shift Jay - performed per nursing policy  Q shift pressure point assessments - performed per nursing policy    Surface/Positioning  Standard/trauma mattress - Currently in Place    Offloading/Redistribution  N/A      Respiratory  N/A    Containment/Moisture Prevention    N/A    Mobilization      Up to chair, Ambulate , and Ambulating at Baseline   "

## 2025-05-18 NOTE — ED NOTES
Transport @ bedside to take pt to floor. NAD. VSS. Respirations equal and unlabored. All belongings with patient. 1:1 sitter in line of sight.

## 2025-05-18 NOTE — ED NOTES
Pt ambulatory to BR to change into paper scrubs. Assessed by ERP - pt expressing SI with a plan to ERP.

## 2025-05-18 NOTE — ED NOTES
Pt requesting to be sent to [facility]. Pt complaining of wait times. Pt sticking their finger down their throat to induce vomiting.

## 2025-05-18 NOTE — PROGRESS NOTES
Pt. Was transported to the floor via  gurney. Pt. Was A&Ox4, on 2L oxygen via NC. No signs and symptoms of distress.  Pt. Was transferred to bed safely, belongings and call light within reach.  With 1:1 sitter at bedside.

## 2025-05-18 NOTE — ED NOTES
Pt in hospital clothing. In direct view of 1:1 sitter. Not yet on a Legal Hold - belongings outside of room. IV started & labs sent.

## 2025-05-18 NOTE — CARE PLAN
The patient is Watcher - Medium risk of patient condition declining or worsening    Shift Goals  Clinical Goals: safety, legal hold, 1:1 sitter,  Patient Goals: Rest, comfort  Family Goals: N/A    Progress made toward(s) clinical / shift goals:  Patient safety sitter at all times, frame alarm for safety, remains on legal hold, d/w psych will attempt to see today consultations are already in.     Patient is not progressing towards the following goals:      Problem: Skin Integrity  Goal: Skin integrity is maintained or improved  Description: Target End Date:  Prior to discharge or change in level of careDocument interventions on Skin Risk/Jay flowsheet groups and corresponding LDA1.  Assess and monitor skin integrity, appearance and/or temperature2.  Assess risk factors for impaired skin integrity and/or pressures ulcers3.  Implement precautions to protect skin integrity in collaboration with interdisciplinary team4.  Implement pressure ulcer prevention protocol if at risk for skin breakdown5.  Confirm wound care consult if at risk for skin breakdown6.  Ensure patient use of pressure relieving devices  (Low air loss bed, waffle overlay, heel protectors, ROHO cushion, etc)  Outcome: Not Progressing   Wound care started await consultation all measures are already in place.

## 2025-05-18 NOTE — ED NOTES
Moraima from  [facility] called and reported that she is seeking bonnie from their physician and will call back with update.

## 2025-05-18 NOTE — H&P
"Hospital Medicine History & Physical Note    Date of Service  5/18/2025    Primary Care Physician  YOBANY Judge.    Consultants  None    Code Status  Full Code    Chief Complaint  Chief Complaint   Patient presents with    Depression     Pt states he \"has a lot going on in life right now\". Feelings of hopelessness.     Psych Eval     Requesting to speak to someone for help.        History of Presenting Illness  Fareed Muhammad is a 29 y.o. male who presented 5/17/2025 with suicidal ideations.  Patient presents for suicidal ideations and has been feeling hopeless. Throughout ER course, patient has been tachycardic.  Has a neutrophilic leukocytosis of 17,000.  He was initially in ED observation and was placed on a legal hold.  However, he was noted to have abdominal cellulitis, and thus medicine was requested to admit patient.    I discussed the plan of care with patient.    Review of Systems  ROS    Past Medical History   has a past medical history of Acute alcohol intoxication (HCC) (08/21/2023), ASTHMA, Bipolar disorder (HCC), Closed fracture of multiple ribs of left side (05/31/2023), Discharge planning issues (06/22/2023), Drug abuse in remission ([facility]), Liver laceration, initial encounter (05/31/2023), No contraindication to deep vein thrombosis (DVT) prophylaxis (05/31/2023), Spleen laceration, initial encounter (05/31/2023), Trauma (05/31/2023), and Trauma (08/21/2023).    Surgical History   has a past surgical history that includes splenectomy (N/A, 5/31/2023); orif, elbow (Left, 6/1/2023); and hardware removal ortho (Left, 7/13/2023).     Family History  family history includes Cancer in his maternal grandmother and paternal aunt; Diabetes in his maternal grandfather and maternal grandmother; Hypertension in his maternal grandfather and maternal grandmother.   Family history reviewed with patient. There is no family history that is pertinent to the chief complaint.     Social History   reports " that he has been smoking cigarettes. He started smoking about 7 years ago. He has a 3.7 pack-year smoking history. He has never used smokeless tobacco. He reports current alcohol use. He reports current drug use. Drug: Marijuana.    Allergies  Allergies[1]    Medications  Prior to Admission Medications   Prescriptions Last Dose Informant Patient Reported? Taking?   INVEGA SUSTENNA 234 MG/1.5ML Suspension Prefilled Syringe   Yes No   Sig: INJECT 234 MG INTRAMUSCULARLY EVERY 4 WEEKS   traZODone (DESYREL) 50 MG Tab   No No   Sig: Take 1 Tablet by mouth every evening.   Patient not taking: Reported on 4/2/2025      Facility-Administered Medications: None       Physical Exam  Temp:  [36.8 °C (98.2 °F)-37.7 °C (99.8 °F)] 37.7 °C (99.8 °F)  Pulse:  [] 95  Resp:  [18] 18  BP: (123-140)/(56-93) 123/69  SpO2:  [93 %-95 %] 93 %  Blood Pressure: 123/69   Temperature: 37.7 °C (99.8 °F)   Pulse: 95   Respiration: 18   Pulse Oximetry: 93 %       Physical Exam  Constitutional:       Appearance: Normal appearance. He is obese.      Comments: Drowsy, has falling asleep multiple times throughout my interview with him   HENT:      Head: Normocephalic.      Nose: Nose normal.      Mouth/Throat:      Mouth: Mucous membranes are moist.   Eyes:      Pupils: Pupils are equal, round, and reactive to light.   Cardiovascular:      Rate and Rhythm: Regular rhythm. Tachycardia present.   Pulmonary:      Effort: Pulmonary effort is normal.      Breath sounds: Normal breath sounds.   Abdominal:      General: Abdomen is flat. Bowel sounds are normal.      Palpations: Abdomen is soft.      Comments: Area of erythema noted in umbilical area   Musculoskeletal:         General: Normal range of motion.      Cervical back: Neck supple.   Skin:     General: Skin is warm.   Psychiatric:         Mood and Affect: Mood normal.         Behavior: Behavior normal.         Thought Content: Thought content normal.         Judgment: Judgment normal.  "        Laboratory:  Recent Labs     05/16/25  1103 05/17/25  1831   WBC 18.4* 17.8*   RBC 4.58* 4.85   HEMOGLOBIN 13.0* 13.6*   HEMATOCRIT 40.5* 43.4   MCV 88.4 89.5   MCH 28.4 28.0   MCHC 32.1* 31.3*   RDW 49.1 48.4   PLATELETCT 518* 509*   MPV 9.2 9.1     Recent Labs     05/16/25  1103 05/17/25  1831   SODIUM 134* 135   POTASSIUM 4.1 4.0   CHLORIDE 101 99   CO2 21 22   GLUCOSE 98 104*   BUN 10 10   CREATININE 0.94 0.83   CALCIUM 8.9 8.8     Recent Labs     05/16/25  1103 05/17/25  1831   ALTSGPT 31 32   ASTSGOT 31 39   ALKPHOSPHAT 128* 126*   TBILIRUBIN 0.4 <0.2   LIPASE 14 16   GLUCOSE 98 104*         No results for input(s): \"NTPROBNP\" in the last 72 hours.      No results for input(s): \"TROPONINT\" in the last 72 hours.    Imaging:  No orders to display       no X-Ray or EKG requiring interpretation    Assessment/Plan:  Justification for Admission Status  I anticipate this patient will require at least two midnights for appropriate medical management, necessitating inpatient admission because pt has cellulitis    Patient will need a Med/Surg bed on EMERGENCY service .  The need is secondary to cellulitis.    * Cellulitis- (present on admission)  Assessment & Plan  Noted to have area of erythema at his umbilical area, meets criteria for SIRS with his leukocytosis and tachycardia  1 L bolus fluids was administered in ER, he continues to be tachycardic, I will give an additional 500 mL bolus, followed by maintenance fluids  Ancef initiated, I will continue Ancef    Suicidal ideations  Assessment & Plan  Currently endorses suicidal ideations and feeling hopelessness  Psychiatry consult in the morning  Constant observation  Legal hold initiated in ER    Morbid obesity (HCC)  Assessment & Plan  BMI 51  Noted to be hypoxic while sleeping  Patient is responsive to sternal rub, and shortly after he would fall back asleep upon my assessment with him and had to have been woken up several times.  Likely has undiagnosed " sleep apnea  CPAP at night ordered  ABG ordered        VTE prophylaxis: enoxaparin ppx       [1] No Known Allergies

## 2025-05-18 NOTE — ASSESSMENT & PLAN NOTE
Currently endorses suicidal ideations and feeling hopelessness.  On Seroquel and trazodone  Continue on legal hold.  Need inpatient psych placement.  Case discussed with psychiatrist Dr. Griffin

## 2025-05-18 NOTE — CARE PLAN
Problem: Skin Integrity  Goal: Skin integrity is maintained or improved  Description: Target End Date:  Prior to discharge or change in level of careDocument interventions on Skin Risk/Jay flowsheet groups and corresponding LDA1.  Assess and monitor skin integrity, appearance and/or temperature2.  Assess risk factors for impaired skin integrity and/or pressures ulcers3.  Implement precautions to protect skin integrity in collaboration with interdisciplinary team4.  Implement pressure ulcer prevention protocol if at risk for skin breakdown5.  Confirm wound care consult if at risk for skin breakdown6.  Ensure patient use of pressure relieving devices  (Low air loss bed, waffle overlay, heel protectors, ROHO cushion, etc)  Outcome: Progressing     Problem: Skin Integrity  Goal: Skin integrity is maintained or improved  Description: Target End Date:  Prior to discharge or change in level of careDocument interventions on Skin Risk/Jay flowsheet groups and corresponding LDA1.  Assess and monitor skin integrity, appearance and/or temperature2.  Assess risk factors for impaired skin integrity and/or pressures ulcers3.  Implement precautions to protect skin integrity in collaboration with interdisciplinary team4.  Implement pressure ulcer prevention protocol if at risk for skin breakdown5.  Confirm wound care consult if at risk for skin breakdown6.  Ensure patient use of pressure relieving devices  (Low air loss bed, waffle overlay, heel protectors, ROHO cushion, etc)  Outcome: Progressing     Problem: Knowledge Deficit - Standard  Goal: Patient and family/care givers will demonstrate understanding of plan of care, disease process/condition, diagnostic tests and medications  Description: Target End Date:  1-3 days or as soon as patient condition allowsDocument in Patient Education1.  Patient and family/caregiver oriented to unit, equipment, visitation policy and means for communicating concern2.  Complete/review  Learning Assessment3.  Assess knowledge level of disease process/condition, treatment plan, diagnostic tests and medications4.  Explain disease process/condition, treatment plan, diagnostic tests and medications  Outcome: Met     Problem: Hemodynamics  Goal: Patient's hemodynamics, fluid balance and neurologic status will be stable or improve  Description: Target End Date:  Prior to discharge or change in level of careDocument on Assessment and I/O flowsheet templates1.  Monitor vital signs, pulse oximetry and cardiac monitor per provider order and/or policy2.  Maintain blood pressure per provider order3.  Hemodynamic monitoring per provider order4.  Manage IV fluids and IV infusions5.  Monitor intake and output6.  Daily weights per unit policy or provider order7.  Assess peripheral pulses and capillary refill8.  Assess color and body temperature9.  Position patient for maximum circulation/cardiac [id number]. Monitor for signs/symptoms of excessive [id number]. Assess mental status, restlessness and changes in level of [id number]. Monitor temperature and report fever or hypothermia to provider immediately. Consideration of targeted temperature management.  Outcome: Met     Problem: Knowledge Deficit - Standard  Goal: Patient and family/care givers will demonstrate understanding of plan of care, disease process/condition, diagnostic tests and medications  Description: Target End Date:  1-3 days or as soon as patient condition allowsDocument in Patient Education1.  Patient and family/caregiver oriented to unit, equipment, visitation policy and means for communicating concern2.  Complete/review Learning Assessment3.  Assess knowledge level of disease process/condition, treatment plan, diagnostic tests and medications4.  Explain disease process/condition, treatment plan, diagnostic tests and medications  Outcome: Met     Problem: Hemodynamics  Goal: Patient's hemodynamics, fluid balance and neurologic status will be  stable or improve  Description: Target End Date:  Prior to discharge or change in level of careDocument on Assessment and I/O flowsheet templates1.  Monitor vital signs, pulse oximetry and cardiac monitor per provider order and/or policy2.  Maintain blood pressure per provider order3.  Hemodynamic monitoring per provider order4.  Manage IV fluids and IV infusions5.  Monitor intake and output6.  Daily weights per unit policy or provider order7.  Assess peripheral pulses and capillary refill8.  Assess color and body temperature9.  Position patient for maximum circulation/cardiac irbeut63. Monitor for signs/symptoms of excessive dikvkfdm22. Assess mental status, restlessness and changes in level of foodbvvmiwgol65. Monitor temperature and report fever or hypothermia to provider immediately. Consideration of targeted temperature management.  Outcome: Met   The patient is Stable - Low risk of patient condition declining or worsening    Shift Goals  Clinical Goals: L2K, safety, skin integrity  Patient Goals: Rest, comfort  Family Goals: N/A    Progress made toward(s) clinical / shift goals:      Pt. Vital signs WDL.   Pt. Has n skin integrity issue/ impairment. Wound consult was ordered.   Pt. Verbalized understanding on the care provided but needs reinforcement.

## 2025-05-18 NOTE — ED NOTES
Pt resting in gurney, RR even and unlabored. Pt updated on POC and has no additional requests at this time. 1:1 sitter remains @ bedside

## 2025-05-18 NOTE — ED PROVIDER NOTES
"ED Provider Note    CHIEF COMPLAINT  Chief Complaint   Patient presents with    Depression     Pt states he \"has a lot going on in life right now\". Feelings of hopelessness.     Psych Eval     Requesting to speak to someone for help.        EXTERNAL RECORDS REVIEWED  Inpatient Notes ED note from yesterday when he was evaluated for alcohol intoxication.    HPI/ROS  LIMITATION TO HISTORY   Select: : None  OUTSIDE HISTORIAN(S):  None    Fareed Muhammad is a 29 y.o. male who presents to the emergency department for evaluation of depression.  The patient states that he is currently feeling suicidal.  He states that he wants to break his neck.  He states that he has attempted to break his neck in the past to kill himself.  He states that he is recently homeless prompting the suicidal ideations.  He denies any homicidal ideations.  He denies drug or alcohol use but was seen here yesterday for acute alcohol intoxication.  He states that he does take antidepressants but has not been taking his medications.  He denies fevers, vomiting, or diarrhea.  He has not had any chest pain or shortness of breath.    PAST MEDICAL HISTORY   has a past medical history of Acute alcohol intoxication (HCC) (08/21/2023), ASTHMA, Bipolar disorder (HCC), Closed fracture of multiple ribs of left side (05/31/2023), Discharge planning issues (06/22/2023), Drug abuse in remission (HCC), Liver laceration, initial encounter (05/31/2023), No contraindication to deep vein thrombosis (DVT) prophylaxis (05/31/2023), Spleen laceration, initial encounter (05/31/2023), Trauma (05/31/2023), and Trauma (08/21/2023).    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    FAMILY HISTORY  Family History   Problem Relation Age of Onset    Cancer Paternal Aunt         breasr canre    Hypertension Maternal Grandmother     Diabetes Maternal " "Grandmother     Cancer Maternal Grandmother     Hypertension Maternal Grandfather     Diabetes Maternal Grandfather        SOCIAL HISTORY  Social History     Tobacco Use    Smoking status: Every Day     Current packs/day: 0.50     Average packs/day: 0.5 packs/day for 7.4 years (3.7 ttl pk-yrs)     Types: Cigarettes     Start date: 2018    Smokeless tobacco: Never    Tobacco comments:     currently using e-cigarettes   Vaping Use    Vaping status: Never Used   Substance and Sexual Activity    Alcohol use: Yes    Drug use: Yes     Types: Marijuana     Comment: meth, marijuana    Sexual activity: Not Currently     Partners: Female     Birth control/protection: Condom       CURRENT MEDICATIONS  Home Medications    **Home medications have not yet been reviewed for this encounter**         ALLERGIES  Allergies[1]    PHYSICAL EXAM  VITAL SIGNS: /73   Pulse 95   Temp 36.8 °C (98.2 °F) (Temporal)   Resp 18   Ht 1.778 m (5' 10\")   Wt (!) 163 kg (360 lb)   SpO2 93%   BMI 51.65 kg/m²   Constitutional: Alert and in no apparent distress.  Unkempt and with poor hygiene.  HENT: Normocephalic atraumatic. Bilateral external ears normal. Nose normal. Mucous membranes are moist.  Eyes: Pupils are equal and reactive. Conjunctiva normal. Non-icteric sclera.   Neck: Normal range of motion without tenderness. Supple.   Cardiovascular: Regular rate and rhythm. No murmurs, gallops or rubs.  Thorax & Lungs: No increased work of breathing. Breath sounds are clear to auscultation bilaterally. No wheezing, rhonchi or rales.  Abdomen: Soft, nontender and nondistended.  A large incisional hernia that is reducible is noted.  Skin: Warm and dry. No rashes are noted.  Back: No bony tenderness, No CVA tenderness.   Musculoskeletal: Good range of motion in all major joints. No tenderness to palpation or major deformities noted.   Neurologic: Alert and appropriate for age. The patient moves all 4 extremities without obvious " deficits.  Psych: Depressed affect.  The patient is actively suicidal with a plan to break his neck.  No homicidal ideations.  He does not appear to be reacting to internal stimuli.  No flight of ideas or pressured speech.    LABS  Results for orders placed or performed during the hospital encounter of 05/17/25   CBC WITH DIFFERENTIAL    Collection Time: 05/17/25  6:31 PM   Result Value Ref Range    WBC 17.8 (H) 4.8 - 10.8 K/uL    RBC 4.85 4.70 - 6.10 M/uL    Hemoglobin 13.6 (L) 14.0 - 18.0 g/dL    Hematocrit 43.4 42.0 - 52.0 %    MCV 89.5 81.4 - 97.8 fL    MCH 28.0 27.0 - 33.0 pg    MCHC 31.3 (L) 32.3 - 36.5 g/dL    RDW 48.4 35.9 - 50.0 fL    Platelet Count 509 (H) 164 - 446 K/uL    MPV 9.1 9.0 - 12.9 fL    Neutrophils-Polys 73.00 (H) 44.00 - 72.00 %    Lymphocytes 16.90 (L) 22.00 - 41.00 %    Monocytes 8.40 0.00 - 13.40 %    Eosinophils 0.30 0.00 - 6.90 %    Basophils 0.60 0.00 - 1.80 %    Immature Granulocytes 0.80 0.00 - 0.90 %    Nucleated RBC 0.00 0.00 - 0.20 /100 WBC    Neutrophils (Absolute) 13.03 (H) 1.82 - 7.42 K/uL    Lymphs (Absolute) 3.02 1.00 - 4.80 K/uL    Monos (Absolute) 1.49 (H) 0.00 - 0.85 K/uL    Eos (Absolute) 0.05 0.00 - 0.51 K/uL    Baso (Absolute) 0.10 0.00 - 0.12 K/uL    Immature Granulocytes (abs) 0.15 (H) 0.00 - 0.11 K/uL    NRBC (Absolute) 0.00 K/uL   COMP METABOLIC PANEL    Collection Time: 05/17/25  6:31 PM   Result Value Ref Range    Sodium 135 135 - 145 mmol/L    Potassium 4.0 3.6 - 5.5 mmol/L    Chloride 99 96 - 112 mmol/L    Co2 22 20 - 33 mmol/L    Anion Gap 14.0 7.0 - 16.0    Glucose 104 (H) 65 - 99 mg/dL    Bun 10 8 - 22 mg/dL    Creatinine 0.83 0.50 - 1.40 mg/dL    Calcium 8.8 8.5 - 10.5 mg/dL    Correct Calcium 9.0 8.5 - 10.5 mg/dL    AST(SGOT) 39 12 - 45 U/L    ALT(SGPT) 32 2 - 50 U/L    Alkaline Phosphatase 126 (H) 30 - 99 U/L    Total Bilirubin <0.2 0.1 - 1.5 mg/dL    Albumin 3.8 3.2 - 4.9 g/dL    Total Protein 7.8 6.0 - 8.2 g/dL    Globulin 4.0 (H) 1.9 - 3.5 g/dL    A-G  Ratio 1.0 g/dL   LIPASE    Collection Time: 05/17/25  6:31 PM   Result Value Ref Range    Lipase 16 11 - 82 U/L   DIAGNOSTIC ALCOHOL    Collection Time: 05/17/25  6:31 PM   Result Value Ref Range    Diagnostic Alcohol <10.1 <10.1 mg/dL   ESTIMATED GFR    Collection Time: 05/17/25  6:31 PM   Result Value Ref Range    GFR (CKD-EPI) 122 >60 mL/min/1.73 m 2     COURSE & MEDICAL DECISION MAKING    ASSESSMENT, COURSE AND PLAN  Care Narrative: This is a 29-year-old male presenting to the emergency department for evaluation of depression.  On initial evaluation, the patient was resting comfortably and in no acute distress.  Vital signs were reassuring.  Physical exam was notable for the patient being unkempt and with poor hygiene.  He did admit to active suicidal ideations with a plan to break his neck.  He does have risk factors including substance abuse and homelessness.  I did review the patient's labs from yesterday and noted that he had a leukocytosis of 18.  Labs will be repeated today.    Patient's leukocytosis was improved today at 17.8.  He had no history of fevers or obvious infectious symptoms.  It is improving and I am less concerned for serious bacterial illness at this point.    Electrolytes were without derangement.    The patient was cleared medically to be evaluated by the alert team.  The patient was placed on a legal hold as I do think he is suicidal with many risk factors including a history of substance abuse, homelessness, and a history of schizophrenia.  He was signed out to [facility]INIT[facility] Cantor, pending transfer.     ED OBS: Yes; I am placing the patient in to an observation status due to a diagnostic uncertainty as well as therapeutic intensity. Patient placed in observation status at 6:23 PM, 5/17/2025.     Observation plan is as follows: Evaluation by the alert team and final disposition.    ADDITIONAL PROBLEMS MANAGED  Leukocytosis    DISPOSITION AND DISCUSSIONS  I have discussed management of the  patient with the following physicians and ADAM's:  Dr Melo, ERP    FINAL DIAGNOSIS  1. Suicidal ideation    2. Leukocytosis, unspecified type      Electronically signed by: Marli Mcmillan D.O., 5/17/2025 5:59 PM           [1] No Known Allergies

## 2025-05-18 NOTE — PROGRESS NOTES
29 male with past medical history of seizure affective disorder presented with suicidal ideation.  He was started on IV antibiotic for abdominal cellulitis.  Labs with leukocytosis, renal function stable, U-Tox positive amphetamine.  ABGs normal pH pCO2 44.3 hypoxia.  Patient seen and examined at bedside.  Following all command  appropriately.  Not in acute distress      Plan  Get chest x-ray for ongoing hypoxia  Check D-dimer if elevated will get CT angio chest for further evaluation of acute PE/other underlying respiratory history  Continue on cefazolin, added Zyvox  Check MRSA PCR  Psych to evaluate further recommendation

## 2025-05-18 NOTE — PROGRESS NOTES
4 Eyes Skin Assessment Completed by Katia RN and Florence RN.    Head WDL  Ears WDL  Nose WDL  Mouth WDL  Neck WDL  Breast/Chest WDL  Shoulder Blades WDL  Spine WDL  (R) Arm/Elbow/Hand WDL  (L) Arm/Elbow/Hand WDL. PIV G20 on AC  Abdomen Scar (healed) from previous surgery.  Pannus is red and excoriated.   Groin Redness  with open wound on right scortum. Right groin is red and dry  Scrotum/Coccyx/Buttocks Redness and Blanching  (R) Leg WDL  (L) Leg WDL  (R) Heel/Foot/Toe Redness, calloused on heel  (L) Heel/Foot/Toe Calloused and cracked on heel        Devices In Places Pulse Ox and Nasal Cannula      Interventions In Place Gray Ear Foams, InterDry, Heel Mepilex, Pillows, Barrier Cream, Heels Loaded W/Pillows, and Pressure Redistribution Mattress    Possible Skin Injury Yes    Pictures Uploaded Into Epic Yes  Wound Consult Placed Yes  RN Wound Prevention Protocol Ordered Yes

## 2025-05-18 NOTE — ASSESSMENT & PLAN NOTE
BMI 51  Noted to be hypoxic while sleeping  Patient is responsive to sternal rub, and shortly after he would fall back asleep upon my assessment with him and had to have been woken up several times.  Likely has undiagnosed sleep apnea  CPAP at night ordered  ABG ordered

## 2025-05-18 NOTE — CONSULTS
"PSYCHIATRIC INTAKE EVALUATION: (new)  Reason for Admission: Cellulitis [L03.90]  Reason for Consult: Legal Hold Evaluation   Requesting Provider: Abe David M.D.   Legal Hold Status: on legal hold  Chart reviewed.         CC:  Chief Complaint   Patient presents with    Depression     Pt states he \"has a lot going on in life right now\". Feelings of hopelessness.     Psych Eval     Requesting to speak to someone for help.         HPI:       Patient is a 29 year old male with documented history of bipolar, schizophrenia, eating disorder, methamphetamine and alcohol use with PMH of HTN, TBI, and neurocognitive disorder presenting on legal hold following suicidal statements. Patient seen, chart reviewed, assessment completed.     Patient presents as guarded, minimally invested in symptoms assessment, vocalizing focus on obtaining resources for identified mental health condition. Reporting difficulty maintaining housing and employment. Admits not taking medications for unknown duration without known reported trigger. Patient admits having SI with plan to jump from high place to harm self. Admits having medications conditions, denies having asthma or HIV despite documentation. Patient admits struggling with depressed mood, sleeping only 4 hour interrupted when without alcohol. Denies having hobbies or opportunities to stay busy during daytime. Guarded with providing details about an average day, vocalizing hopelessness. Admits poor concentration when asked to perform tasks. Denies changes to appetite or energy. Patient unwilling to disclose additional details stating \"I'd rather not say.\"    Admits drinking alcohol in binge pattern, admits drinking to excess causing blackouts, denies known history of seizure disorder or delirium tremens. Remains unable to quantify amount use, guarded with last use. Vocalizing a concern for ability to obtain and maintain sobriety without supportive services. Denies history of rehab. " "Admits smoking over 1 pack of cigarettes daily. Admits frequent smoking cannabis purchased from dispensaries, guarded with timing.     Patient unable to confirm verify information obtained from chart review, appears to recall limited information about mental health condition, medication, and treatment despite documented history.     Patient denies thoughts of self-harm, denies current HI, A/VH. Patient denies symptoms indicative of psychosis, deepali/hypomania, PTSD, OCD, or ADHD.     Psychiatric ROS:  Depression:   Depressed mood, insomnia (4H QHS), decreased interests and concentration. Admits suicidal thinking with planning.     Deepali: Denies    Anxiety:   Restlessness, poor concentration, irritability, and sleep disruption.     Psychosis: Denies    PTSD: Denies      Medical ROS (as pertinent):     Review of Systems   Cardiovascular:  Negative for chest pain.   Gastrointestinal:  Negative for nausea.   Musculoskeletal:  Positive for myalgias.   Neurological:  Negative for dizziness and headaches.   Psychiatric/Behavioral:  Positive for depression, substance abuse and suicidal ideas. Negative for hallucinations. The patient is nervous/anxious and has insomnia.          Psychiatric Examination:  Vitals: /75   Pulse 92   Temp 36.8 °C (98.2 °F) (Temporal)   Resp 20   Ht 1.778 m (5' 10\")   Wt (!) 163 kg (360 lb)   SpO2 91%     General Appearance: observed in paper scrubs, disheveled and unkempt, maintains poor eye contact with indifferent behavior  Abnormal Movements: none, no PMA/PMR or tremor observed.  Gait/Posture: not observed  Speech: normal rate, normal rhythm, normal tone, normal volume, and normal fluency  Thought Process: Perseverative  Associations: None  Abnormal/Psychotic Thoughts: Denies A/VH. No e/o delusions, paranoia, or internal preoccupation  Judgement/Insight: limited/limited  Orientation: alert, oriented to person, place and time  Recent/Remote Memory: Poor memory for chronology of " "events  Attention/Concentration: short attention span  Language: spontaneous, comprehends spoken commands, and fluent   Fund of Knowledge: Below average based on content discussed during encounter.  Mood and Affect: \"suicidal\" Blunt  SI/HI: Admits SI. Denies HI    Past Medical History:   Past Medical History[1]    Past Psychiatric History:  Previous Dx: schizophrenia, bipolar, depression  Current medications: Trazodone  Previous medication trials: Invega Sustenna, Tegretol, Seroquel, Sertraline, Risperdal, Depakote  IP Hospitalizations: [facility], [facility], [facility], [facility]  Suicide attempts/SH Bx: Yes   OP: [facility]  Access to guns: denies access  Abuse/Trauma Hx: Yes  Legal Hx: no current probation/parole    Family Psych Hx:   Denies known family history of mental health condition or COLE.    Social Hx:   Born in [place]  Education: highest grade 10  Support: none  Has siblings   Never , no children  Housing: none  Financial: unemployed     Substance: UDS positive for methamphetamine, cannabis  Alcohol: Yes. See HPI. Denies Hx of SZ, DT  Nicotine: Yes 1ppd smoking cigarettes  Cannabis: Yes, admits occasionally smoking, purchased from dispensaries  Drugs: Denies  Substance Tx: Denies     Allergies:  Patient has no known allergies.     Labs: Reviewed  Recent Results (from the past 72 hours)   CBC WITH DIFFERENTIAL    Collection Time: 05/16/25 11:03 AM   Result Value Ref Range    WBC 18.4 (H) 4.8 - 10.8 K/uL    RBC 4.58 (L) 4.70 - 6.10 M/uL    Hemoglobin 13.0 (L) 14.0 - 18.0 g/dL    Hematocrit 40.5 (L) 42.0 - 52.0 %    MCV 88.4 81.4 - 97.8 fL    MCH 28.4 27.0 - 33.0 pg    MCHC 32.1 (L) 32.3 - 36.5 g/dL    RDW 49.1 35.9 - 50.0 fL    Platelet Count 518 (H) 164 - 446 K/uL    MPV 9.2 9.0 - 12.9 fL    Neutrophils-Polys 74.60 (H) 44.00 - 72.00 %    Lymphocytes 14.20 (L) 22.00 - 41.00 %    Monocytes 9.00 0.00 - 13.40 %    Eosinophils 0.40 0.00 - 6.90 %    Basophils 0.70 0.00 - 1.80 %    Immature Granulocytes 1.10 (H) 0.00 - " 0.90 %    Nucleated RBC 0.00 0.00 - 0.20 /100 WBC    Neutrophils (Absolute) 13.69 (H) 1.82 - 7.42 K/uL    Lymphs (Absolute) 2.61 1.00 - 4.80 K/uL    Monos (Absolute) 1.65 (H) 0.00 - 0.85 K/uL    Eos (Absolute) 0.07 0.00 - 0.51 K/uL    Baso (Absolute) 0.12 0.00 - 0.12 K/uL    Immature Granulocytes (abs) 0.21 (H) 0.00 - 0.11 K/uL    NRBC (Absolute) 0.00 K/uL   COMP METABOLIC PANEL    Collection Time: 05/16/25 11:03 AM   Result Value Ref Range    Sodium 134 (L) 135 - 145 mmol/L    Potassium 4.1 3.6 - 5.5 mmol/L    Chloride 101 96 - 112 mmol/L    Co2 21 20 - 33 mmol/L    Anion Gap 12.0 7.0 - 16.0    Glucose 98 65 - 99 mg/dL    Bun 10 8 - 22 mg/dL    Creatinine 0.94 0.50 - 1.40 mg/dL    Calcium 8.9 8.5 - 10.5 mg/dL    Correct Calcium 9.1 8.5 - 10.5 mg/dL    AST(SGOT) 31 12 - 45 U/L    ALT(SGPT) 31 2 - 50 U/L    Alkaline Phosphatase 128 (H) 30 - 99 U/L    Total Bilirubin 0.4 0.1 - 1.5 mg/dL    Albumin 3.8 3.2 - 4.9 g/dL    Total Protein 7.7 6.0 - 8.2 g/dL    Globulin 3.9 (H) 1.9 - 3.5 g/dL    A-G Ratio 1.0 g/dL   LIPASE    Collection Time: 05/16/25 11:03 AM   Result Value Ref Range    Lipase 14 11 - 82 U/L   DIAGNOSTIC ALCOHOL    Collection Time: 05/16/25 11:03 AM   Result Value Ref Range    Diagnostic Alcohol <10.1 <10.1 mg/dL   ESTIMATED GFR    Collection Time: 05/16/25 11:03 AM   Result Value Ref Range    GFR (CKD-EPI) 113 >60 mL/min/1.73 m 2   CBC WITH DIFFERENTIAL    Collection Time: 05/17/25  6:31 PM   Result Value Ref Range    WBC 17.8 (H) 4.8 - 10.8 K/uL    RBC 4.85 4.70 - 6.10 M/uL    Hemoglobin 13.6 (L) 14.0 - 18.0 g/dL    Hematocrit 43.4 42.0 - 52.0 %    MCV 89.5 81.4 - 97.8 fL    MCH 28.0 27.0 - 33.0 pg    MCHC 31.3 (L) 32.3 - 36.5 g/dL    RDW 48.4 35.9 - 50.0 fL    Platelet Count 509 (H) 164 - 446 K/uL    MPV 9.1 9.0 - 12.9 fL    Neutrophils-Polys 73.00 (H) 44.00 - 72.00 %    Lymphocytes 16.90 (L) 22.00 - 41.00 %    Monocytes 8.40 0.00 - 13.40 %    Eosinophils 0.30 0.00 - 6.90 %    Basophils 0.60 0.00 - 1.80  %    Immature Granulocytes 0.80 0.00 - 0.90 %    Nucleated RBC 0.00 0.00 - 0.20 /100 WBC    Neutrophils (Absolute) 13.03 (H) 1.82 - 7.42 K/uL    Lymphs (Absolute) 3.02 1.00 - 4.80 K/uL    Monos (Absolute) 1.49 (H) 0.00 - 0.85 K/uL    Eos (Absolute) 0.05 0.00 - 0.51 K/uL    Baso (Absolute) 0.10 0.00 - 0.12 K/uL    Immature Granulocytes (abs) 0.15 (H) 0.00 - 0.11 K/uL    NRBC (Absolute) 0.00 K/uL   COMP METABOLIC PANEL    Collection Time: 05/17/25  6:31 PM   Result Value Ref Range    Sodium 135 135 - 145 mmol/L    Potassium 4.0 3.6 - 5.5 mmol/L    Chloride 99 96 - 112 mmol/L    Co2 22 20 - 33 mmol/L    Anion Gap 14.0 7.0 - 16.0    Glucose 104 (H) 65 - 99 mg/dL    Bun 10 8 - 22 mg/dL    Creatinine 0.83 0.50 - 1.40 mg/dL    Calcium 8.8 8.5 - 10.5 mg/dL    Correct Calcium 9.0 8.5 - 10.5 mg/dL    AST(SGOT) 39 12 - 45 U/L    ALT(SGPT) 32 2 - 50 U/L    Alkaline Phosphatase 126 (H) 30 - 99 U/L    Total Bilirubin <0.2 0.1 - 1.5 mg/dL    Albumin 3.8 3.2 - 4.9 g/dL    Total Protein 7.8 6.0 - 8.2 g/dL    Globulin 4.0 (H) 1.9 - 3.5 g/dL    A-G Ratio 1.0 g/dL   LIPASE    Collection Time: 05/17/25  6:31 PM   Result Value Ref Range    Lipase 16 11 - 82 U/L   DIAGNOSTIC ALCOHOL    Collection Time: 05/17/25  6:31 PM   Result Value Ref Range    Diagnostic Alcohol <10.1 <10.1 mg/dL   ESTIMATED GFR    Collection Time: 05/17/25  6:31 PM   Result Value Ref Range    GFR (CKD-EPI) 122 >60 mL/min/1.73 m 2   LACTIC ACID    Collection Time: 05/18/25  1:49 AM   Result Value Ref Range    Lactic Acid 0.8 0.5 - 2.0 mmol/L   URINE DRUG SCREEN    Collection Time: 05/18/25  4:30 AM   Result Value Ref Range    Amphetamines Urine Positive (A) Negative    Barbiturates Negative Negative    Benzodiazepines Negative Negative    Cocaine Metabolite Negative Negative    Fentanyl, Urine Negative Negative    Methadone Negative Negative    Opiates Negative Negative    Oxycodone Negative Negative    Phencyclidine -Pcp Negative Negative    Propoxyphene Negative  Negative    Cannabinoid Metab Positive (A) Negative   URINALYSIS CULTURE, IF INDICATED    Collection Time: 05/18/25  4:30 AM    Specimen: Urine, Clean Catch   Result Value Ref Range    Color Yellow     Character Clear     Specific Gravity 1.024 <1.035    Ph 5.0 5.0 - 8.0    Glucose Negative Negative mg/dL    Ketones Trace (A) Negative mg/dL    Protein Negative Negative mg/dL    Bilirubin Negative Negative    Urobilinogen, Urine 1.0 <=1.0 EU/dL    Nitrite Negative Negative    Leukocyte Esterase Negative Negative    Occult Blood Negative Negative    Micro Urine Req see below    ABG - LAB    Collection Time: 05/18/25  8:04 AM   Result Value Ref Range    Ph 7.35 7.35 - 7.45    Pco2 44.3 32.0 - 48.0 mmHg    Po2 79.1 (L) 83.0 - 108.0 mmHg    O2 Saturation 93.7 93.0 - 99.0 %    Hco3 24 21 - 28 mmol/L    Base Excess -2 -4 - 3 mmol/L    Body Temp 36.6 Centigrade    Ph -TC 7.35 7.35 - 7.45    Pco2 -TC 43.5 32.0 - 48.0 mmHg    Po2 -TC 77.0 (L) 83.0 - 108.0 mmHg       Brain Imaging: Reviewed   No orders to display       Current Medications:  Scheduled Medications[2]    Assessment:   Patient is a 29 y.o. male currently on legal hold due to SI with plan. Patient admits mental health condition and medication nonadherence for unknown duration. Currently vocalizing hopelessness and requesting supportive services. Poor historian, unable to recall timing on previous long acting injectable. Minimally invested in symptoms assessment, remains focused on services and aftercare planning, somewhat future focused with selective topics. Plan to obtain collateral information from OP provider about medication regimen and timing. Restart Seroquel at previous dose for reported symptoms of mood dysregulation. Patient presents with symptoms of depression, vocalizing concern for worsening substance use disorder. Consider EKG due to history of prolonged QTC > 400. Will order psychotherapy for tomorrow to attempt to engage in harm reduction or  cessation programming information. Patient appears to have limited knowledge about naltrexone, antabuse, documentation supports history of taking suboxone.     Patient admits SI without planning while hospitalized. Denies HI, denies hallucinations. Patient able to identify having outpatient psychiatric services as a protective factor. Notable risk factors include: Hx of nonadherence, reported COLE, prior SA, limited support, and limited coping skills. Patient continues to present an acute danger to self due to SI with planning. Maintain legal hold and seek IP placement once medically cleared/appropriate.     Dx:  Schizophrenia   Alcohol use disorder   Stimulant use - methamphetamine use     Medical: as noted by the medical treatment team.   Principal Problem:    Cellulitis (POA: Yes)  Active Problems:    Morbid obesity (HCC) (POA: Unknown)    Suicidal ideations (POA: Unknown)  Resolved Problems:    * No resolved hospital problems. *      Plan:  Legal hold: Yes - continue 72H hold  Order psychotherapy tomorrow 5/19 if available   Psychotropic medications:   Start Seroquel 50mg PO QHS for mood  Start Trazodone 50mg PO QHS PRN for insomnia  Please transfer to inpatient psychiatric hospital when medically cleared and bed is available  Labs reviewed   Order EKG   Old records ordered/reviewed/summarized  Counseled on tobacco cessation   Discussed the case with: TAMIA David MD; JAYE Nielson MD  Psychiatry will follow up.     Thank you for the consult.     Sitter: Yes 1:1 Sitter  Phone: No  Visitors: No  Personal belongings: No    This note was created using voice recognition software (Dragon). The accuracy of the dictation is limited by the abilities of the software. I have reviewed the note prior to signing. However, error related to voice recognition software and /or scribes may still exist and should be interpreted within the appropriate context.           [1]   Past Medical History:  Diagnosis Date    Acute alcohol intoxication  (HCC) 08/21/2023    ASTHMA     Dx at age 5, does not use and inhaler, and has never had an attack    Bipolar disorder (HCC)     Closed fracture of multiple ribs of left side 05/31/2023    Discharge planning issues 06/22/2023    Drug abuse in remission (HCC)     meth, alcohol and tobacco since 14    Liver laceration, initial encounter 05/31/2023    No contraindication to deep vein thrombosis (DVT) prophylaxis 05/31/2023    Spleen laceration, initial encounter 05/31/2023    Trauma 05/31/2023    Trauma 08/21/2023   [2]   Scheduled Medications   Medication Dose Frequency    ceFAZolin  2 g Q8HRS    ammonium lactate   BID    linezolid  600 mg Q12HRS    [START ON 5/19/2025] rivaroxaban  10 mg DAILY AT 1800

## 2025-05-18 NOTE — ASSESSMENT & PLAN NOTE
Superficial abdominal cellulitis  Continue on IV cefazolin  MRSA PCR negative, discontinue Zyvox  Repeat BMP in a.m. to monitor electrolytes ,renal function and toxicity while on IV antibiotics  Repeat CBC in a.m. to monitor white count and hemoglobin  Requiring close monitoring for toxicity while on IV controlled substance  Monitor for sign of sepsis

## 2025-05-18 NOTE — ED NOTES
Report from Conor MACDONALD. Pt appears to be sleeping comfortably in gurney, RR even and unlabored, NAD. Pt has no additional requests at this time. 1:1 sitter remains @ bedside

## 2025-05-19 ENCOUNTER — APPOINTMENT (OUTPATIENT)
Dept: RADIOLOGY | Facility: MEDICAL CENTER | Age: 30
End: 2025-05-19
Attending: STUDENT IN AN ORGANIZED HEALTH CARE EDUCATION/TRAINING PROGRAM
Payer: MEDICARE

## 2025-05-19 PROBLEM — R06.2 WHEEZING: Status: ACTIVE | Noted: 2025-05-19

## 2025-05-19 LAB
ALBUMIN SERPL BCP-MCNC: 3.3 G/DL (ref 3.2–4.9)
ALBUMIN/GLOB SERPL: 1 G/DL
ALP SERPL-CCNC: 105 U/L (ref 30–99)
ALT SERPL-CCNC: 22 U/L (ref 2–50)
ANION GAP SERPL CALC-SCNC: 7 MMOL/L (ref 7–16)
AST SERPL-CCNC: 19 U/L (ref 12–45)
BILIRUB SERPL-MCNC: 0.2 MG/DL (ref 0.1–1.5)
BUN SERPL-MCNC: 7 MG/DL (ref 8–22)
CALCIUM ALBUM COR SERPL-MCNC: 9.2 MG/DL (ref 8.5–10.5)
CALCIUM SERPL-MCNC: 8.6 MG/DL (ref 8.5–10.5)
CHLORIDE SERPL-SCNC: 103 MMOL/L (ref 96–112)
CO2 SERPL-SCNC: 29 MMOL/L (ref 20–33)
CREAT SERPL-MCNC: 0.9 MG/DL (ref 0.5–1.4)
ERYTHROCYTE [DISTWIDTH] IN BLOOD BY AUTOMATED COUNT: 50.9 FL (ref 35.9–50)
FLUAV RNA SPEC QL NAA+PROBE: NEGATIVE
FLUBV RNA SPEC QL NAA+PROBE: NEGATIVE
GFR SERPLBLD CREATININE-BSD FMLA CKD-EPI: 119 ML/MIN/1.73 M 2
GLOBULIN SER CALC-MCNC: 3.3 G/DL (ref 1.9–3.5)
GLUCOSE SERPL-MCNC: 97 MG/DL (ref 65–99)
HCT VFR BLD AUTO: 41.7 % (ref 42–52)
HGB BLD-MCNC: 12.7 G/DL (ref 14–18)
MAGNESIUM SERPL-MCNC: 2.2 MG/DL (ref 1.5–2.5)
MCH RBC QN AUTO: 28.3 PG (ref 27–33)
MCHC RBC AUTO-ENTMCNC: 30.5 G/DL (ref 32.3–36.5)
MCV RBC AUTO: 92.9 FL (ref 81.4–97.8)
PHOSPHATE SERPL-MCNC: 3.2 MG/DL (ref 2.5–4.5)
PLATELET # BLD AUTO: 522 K/UL (ref 164–446)
PMV BLD AUTO: 9.4 FL (ref 9–12.9)
POTASSIUM SERPL-SCNC: 4 MMOL/L (ref 3.6–5.5)
PROT SERPL-MCNC: 6.6 G/DL (ref 6–8.2)
RBC # BLD AUTO: 4.49 M/UL (ref 4.7–6.1)
RSV RNA SPEC QL NAA+PROBE: NEGATIVE
SARS-COV-2 RNA RESP QL NAA+PROBE: NOTDETECTED
SODIUM SERPL-SCNC: 139 MMOL/L (ref 135–145)
SPECIMEN SOURCE: NORMAL
WBC # BLD AUTO: 12.1 K/UL (ref 4.8–10.8)

## 2025-05-19 PROCEDURE — 770001 HCHG ROOM/CARE - MED/SURG/GYN PRIV*

## 2025-05-19 PROCEDURE — 84100 ASSAY OF PHOSPHORUS: CPT

## 2025-05-19 PROCEDURE — A9270 NON-COVERED ITEM OR SERVICE: HCPCS | Performed by: STUDENT IN AN ORGANIZED HEALTH CARE EDUCATION/TRAINING PROGRAM

## 2025-05-19 PROCEDURE — 700102 HCHG RX REV CODE 250 W/ 637 OVERRIDE(OP): Performed by: STUDENT IN AN ORGANIZED HEALTH CARE EDUCATION/TRAINING PROGRAM

## 2025-05-19 PROCEDURE — 85027 COMPLETE CBC AUTOMATED: CPT

## 2025-05-19 PROCEDURE — 71045 X-RAY EXAM CHEST 1 VIEW: CPT

## 2025-05-19 PROCEDURE — 36415 COLL VENOUS BLD VENIPUNCTURE: CPT

## 2025-05-19 PROCEDURE — 0241U HCHG SARS-COV-2 COVID-19 NFCT DS RESP RNA 4 TRGT MIC: CPT

## 2025-05-19 PROCEDURE — 700111 HCHG RX REV CODE 636 W/ 250 OVERRIDE (IP): Mod: JZ | Performed by: STUDENT IN AN ORGANIZED HEALTH CARE EDUCATION/TRAINING PROGRAM

## 2025-05-19 PROCEDURE — 99233 SBSQ HOSP IP/OBS HIGH 50: CPT | Performed by: STUDENT IN AN ORGANIZED HEALTH CARE EDUCATION/TRAINING PROGRAM

## 2025-05-19 PROCEDURE — 80053 COMPREHEN METABOLIC PANEL: CPT

## 2025-05-19 PROCEDURE — 83735 ASSAY OF MAGNESIUM: CPT

## 2025-05-19 PROCEDURE — 700101 HCHG RX REV CODE 250: Performed by: STUDENT IN AN ORGANIZED HEALTH CARE EDUCATION/TRAINING PROGRAM

## 2025-05-19 PROCEDURE — 700102 HCHG RX REV CODE 250 W/ 637 OVERRIDE(OP)

## 2025-05-19 PROCEDURE — 700105 HCHG RX REV CODE 258: Performed by: STUDENT IN AN ORGANIZED HEALTH CARE EDUCATION/TRAINING PROGRAM

## 2025-05-19 PROCEDURE — 700111 HCHG RX REV CODE 636 W/ 250 OVERRIDE (IP): Performed by: STUDENT IN AN ORGANIZED HEALTH CARE EDUCATION/TRAINING PROGRAM

## 2025-05-19 PROCEDURE — A9270 NON-COVERED ITEM OR SERVICE: HCPCS

## 2025-05-19 PROCEDURE — 94640 AIRWAY INHALATION TREATMENT: CPT

## 2025-05-19 RX ORDER — IPRATROPIUM BROMIDE AND ALBUTEROL SULFATE 2.5; .5 MG/3ML; MG/3ML
3 SOLUTION RESPIRATORY (INHALATION)
Status: DISCONTINUED | OUTPATIENT
Start: 2025-05-19 | End: 2025-05-20

## 2025-05-19 RX ORDER — PREDNISONE 20 MG/1
40 TABLET ORAL DAILY
Status: DISCONTINUED | OUTPATIENT
Start: 2025-05-19 | End: 2025-05-20 | Stop reason: HOSPADM

## 2025-05-19 RX ADMIN — OXYCODONE 5 MG: 5 TABLET ORAL at 20:39

## 2025-05-19 RX ADMIN — ONDANSETRON 4 MG: 2 INJECTION INTRAMUSCULAR; INTRAVENOUS at 19:32

## 2025-05-19 RX ADMIN — LINEZOLID 600 MG: 600 TABLET, FILM COATED ORAL at 04:56

## 2025-05-19 RX ADMIN — Medication: at 18:16

## 2025-05-19 RX ADMIN — Medication: at 04:56

## 2025-05-19 RX ADMIN — CEFAZOLIN 2 G: 2 INJECTION, POWDER, FOR SOLUTION INTRAMUSCULAR; INTRAVENOUS at 15:05

## 2025-05-19 RX ADMIN — RIVAROXABAN 10 MG: 10 TABLET, FILM COATED ORAL at 18:16

## 2025-05-19 RX ADMIN — IPRATROPIUM BROMIDE AND ALBUTEROL SULFATE 3 ML: .5; 2.5 SOLUTION RESPIRATORY (INHALATION) at 13:57

## 2025-05-19 RX ADMIN — OXYCODONE 5 MG: 5 TABLET ORAL at 13:19

## 2025-05-19 RX ADMIN — SODIUM CHLORIDE: 9 INJECTION, SOLUTION INTRAVENOUS at 01:03

## 2025-05-19 RX ADMIN — PREDNISONE 40 MG: 20 TABLET ORAL at 10:25

## 2025-05-19 RX ADMIN — QUETIAPINE FUMARATE 50 MG: 25 TABLET ORAL at 20:38

## 2025-05-19 RX ADMIN — OXYCODONE 5 MG: 5 TABLET ORAL at 08:18

## 2025-05-19 RX ADMIN — CEFAZOLIN 2 G: 2 INJECTION, POWDER, FOR SOLUTION INTRAMUSCULAR; INTRAVENOUS at 05:01

## 2025-05-19 ASSESSMENT — ENCOUNTER SYMPTOMS
ABDOMINAL PAIN: 1
NERVOUS/ANXIOUS: 1
INSOMNIA: 0
SHORTNESS OF BREATH: 0
HEADACHES: 0
HALLUCINATIONS: 0
MEMORY LOSS: 0
NAUSEA: 0
DEPRESSION: 1
NAUSEA: 1
PALPITATIONS: 0
DIZZINESS: 0
VOMITING: 0

## 2025-05-19 ASSESSMENT — PATIENT HEALTH QUESTIONNAIRE - PHQ9
1. LITTLE INTEREST OR PLEASURE IN DOING THINGS: SEVERAL DAYS
7. TROUBLE CONCENTRATING ON THINGS, SUCH AS READING THE NEWSPAPER OR WATCHING TELEVISION: SEVERAL DAYS
2. FEELING DOWN, DEPRESSED, IRRITABLE, OR HOPELESS: SEVERAL DAYS
9. THOUGHTS THAT YOU WOULD BE BETTER OFF DEAD, OR OF HURTING YOURSELF: SEVERAL DAYS
SUM OF ALL RESPONSES TO PHQ9 QUESTIONS 1 AND 2: 2
8. MOVING OR SPEAKING SO SLOWLY THAT OTHER PEOPLE COULD HAVE NOTICED. OR THE OPPOSITE, BEING SO FIGETY OR RESTLESS THAT YOU HAVE BEEN MOVING AROUND A LOT MORE THAN USUAL: SEVERAL DAYS
4. FEELING TIRED OR HAVING LITTLE ENERGY: SEVERAL DAYS
6. FEELING BAD ABOUT YOURSELF - OR THAT YOU ARE A FAILURE OR HAVE LET YOURSELF OR YOUR FAMILY DOWN: SEVERAL DAYS
SUM OF ALL RESPONSES TO PHQ QUESTIONS 1-9: 9
3. TROUBLE FALLING OR STAYING ASLEEP OR SLEEPING TOO MUCH: SEVERAL DAYS
5. POOR APPETITE OR OVEREATING: SEVERAL DAYS

## 2025-05-19 ASSESSMENT — PAIN DESCRIPTION - PAIN TYPE
TYPE: ACUTE PAIN

## 2025-05-19 ASSESSMENT — LIFESTYLE VARIABLES: SUBSTANCE_ABUSE: 0

## 2025-05-19 NOTE — HOSPITAL COURSE
This is a 29 male with past medical history of seizure affective disorder presented with suicidal ideation.  He was started on IV antibiotic for abdominal cellulitis.  Labs with leukocytosis, renal function stable, U-Tox positive amphetamine.  ABGs normal pH pCO2 44.3 hypoxia.  Started on DuoNeb and steroid for suspected asthma/COPD.  Need to follow-up outpatient with pulmonology on discharge.  Psych evaluated and recommended inpatient psych placement, currently on legal hold.    Also he was receiving cefazolin during the stay in the hospital  for  his cellulitis and will be discharging him on keflex.

## 2025-05-19 NOTE — PROGRESS NOTES
Pt. Is taken via hospital bed  by transport for CT scan of the chest. Patient was A&Ox4, on 2L of oxygen via NC, no signs and symptoms of distress.     2013 5/18/25: Pt. Is back to his room, A&Ox4, on 2L of oxygen via NC, no signs and symptoms of distress.     2055 5/18/25: Pt. Verbalized three times to the sitter that he wanted to kill himself. Charge RN was made aware.

## 2025-05-19 NOTE — CARE PLAN
Problem: Pain - Standard  Goal: Alleviation of pain or a reduction in pain to the patient’s comfort goal  Outcome: Progressing     Problem: Provide Safe Environment  Goal: Suicide environmental safety, protocols, policies, and practices will be implemented  Outcome: Progressing       The patient is Stable - Low risk of patient condition declining or worsening    Shift Goals  Clinical Goals: Pain control, safety  Patient Goals: Pain control  Family Goals: N/A    Progress made toward(s) clinical / shift goals:  Taught pt 0-10 pain scale and non-pharmacological method of pain management, encouraged to verbalize when in pain. Administered PRN pain meds as needed. Bed locked and in lowest position. Bed alarm on. 1:1 sitter, safety and fall precautions in place. Hourly rounding. Call light and belongings within reach.     Patient is not progressing towards the following goals:

## 2025-05-19 NOTE — DIETARY
NUTRITION SERVICES: BMI - Pt with BMI >40 (=Body mass index is 51.65 kg/m².), class III obesity. Weight loss counseling not appropriate in acute care setting.     RECOMMEND - If appropriate at DC please refer to outpatient nutrition services for weight management.       RD to rescreen weekly per department policy

## 2025-05-19 NOTE — CARE PLAN
Problem: Skin Integrity  Goal: Skin integrity is maintained or improved  Description: Target End Date:  Prior to discharge or change in level of careDocument interventions on Skin Risk/Jay flowsheet groups and corresponding LDA1.  Assess and monitor skin integrity, appearance and/or temperature2.  Assess risk factors for impaired skin integrity and/or pressures ulcers3.  Implement precautions to protect skin integrity in collaboration with interdisciplinary team4.  Implement pressure ulcer prevention protocol if at risk for skin breakdown5.  Confirm wound care consult if at risk for skin breakdown6.  Ensure patient use of pressure relieving devices  (Low air loss bed, waffle overlay, heel protectors, ROHO cushion, etc)  Outcome: Progressing     Problem: Pain - Standard  Goal: Alleviation of pain or a reduction in pain to the patient’s comfort goal  Description: Target End Date:  Prior to discharge or change in level of careDocument on Vitals flowsheet1.  Document pain using the appropriate pain scale per order or unit policy2.  Educate and implement non-pharmacologic comfort measures (i.e. relaxation, distraction, massage, cold/heat therapy, etc.)3.  Pain management medications as ordered4.  Reassess pain after pain med administration per policy5.  If opiods administered assess patient's response to pain medication is appropriate per POSS sedation scale6.  Follow pain management plan developed in collaboration with patient and interdisciplinary team (including palliative care or pain specialists if applicable)  Outcome: Progressing     Problem: Skin Integrity  Goal: Skin integrity is maintained or improved  Description: Target End Date:  Prior to discharge or change in level of careDocument interventions on Skin Risk/Jay flowsheet groups and corresponding LDA1.  Assess and monitor skin integrity, appearance and/or temperature2.  Assess risk factors for impaired skin integrity and/or pressures ulcers3.   Implement precautions to protect skin integrity in collaboration with interdisciplinary team4.  Implement pressure ulcer prevention protocol if at risk for skin breakdown5.  Confirm wound care consult if at risk for skin breakdown6.  Ensure patient use of pressure relieving devices  (Low air loss bed, waffle overlay, heel protectors, ROHO cushion, etc)  Outcome: Progressing     Problem: Pain - Standard  Goal: Alleviation of pain or a reduction in pain to the patient’s comfort goal  Description: Target End Date:  Prior to discharge or change in level of careDocument on Vitals flowsheet1.  Document pain using the appropriate pain scale per order or unit policy2.  Educate and implement non-pharmacologic comfort measures (i.e. relaxation, distraction, massage, cold/heat therapy, etc.)3.  Pain management medications as ordered4.  Reassess pain after pain med administration per policy5.  If opiods administered assess patient's response to pain medication is appropriate per POSS sedation scale6.  Follow pain management plan developed in collaboration with patient and interdisciplinary team (including palliative care or pain specialists if applicable)  Outcome: Progressing   The patient is Stable - Low risk of patient condition declining or worsening    Shift Goals  Clinical Goals: L2K, skin integrity, safety, 1:1 sitter, Pain control  Patient Goals: Rest, pain control, comfort  Family Goals: N/A    Progress made toward(s) clinical / shift goals:      Pt. Vital signs WDL.   Pt. Has no new skin integrity issue/ impairment.   Pt. Rated pain between 0 and 7 from 1-10, 10 being the most painful. Pain medications given as ordered.   Pt. Didn't fall under RN care. Fall precautions in place.

## 2025-05-19 NOTE — ASSESSMENT & PLAN NOTE
Does have history of smoking  Likely of underlying asthma/COPD  Continue DuoNeb, prednisone  Need albuterol inhaler on discharge  I will add Trelegy  Pulmonology evaluation as outpatient for formal diagnosis of COPD/asthma

## 2025-05-19 NOTE — DISCHARGE PLANNING
Case Management Discharge Planning    Admission Date: 5/17/2025  GMLOS: 2.8  ALOS: 1    Anticipated Discharge Dispo: Discharge Disposition: D/T to psych hosp or distinct part unit (65)    DME Needed: No    Action(s) Taken: Patient was discussed in IDT rounds.  Per MD, continue treatment for cellulitis.  Anticipating medical clearance likely tomorrow for inpatient psych placement.  RNCM to continue to follow for medical clearance and coordinate with legal hold alert team.      Escalations Completed: None    Medically Clear: No    Next Steps: RN CM to continue to follow for DC planning      Barriers to Discharge: Medical clearance

## 2025-05-19 NOTE — PROGRESS NOTES
Hospital Medicine Daily Progress Note    Date of Service  5/19/2025    Chief Complaint  Fareed Muhammad is a 29 y.o. male admitted 5/17/2025 with suicidal ideation    Hospital Course  29 male with past medical history of seizure affective disorder presented with suicidal ideation.  He was started on IV antibiotic for abdominal cellulitis.  Labs with leukocytosis, renal function stable, U-Tox positive amphetamine.  ABGs normal pH pCO2 44.3 hypoxia.  Started on DuoNeb and steroid for suspected asthma/COPD.  Need to follow-up outpatient with pulmonology on discharge.  Psych evaluated and recommended inpatient psych placement       Interval Problem Update    5/19/2025  Seen and examined at bedside  Vitals remained stable  On 1 L oxygen  Diffuse wheezing on exam  Labs reviewed, leukocytosis improving white count 12.1, chemistry sodium 100 and potassium 4, renal function stable  CTA chest reviewed no pulmonary embolism appreciated  Chart reviewed, meds reviewed  Plan  Continue IV cefazolin  Started on DuoNeb and prednisone for ongoing wheezing, suspected underlying asthma.  Need to follow-up with outpatient pulmonology on discharge  Repeat BMP in a.m. to monitor electrolytes and and renal function  Repeat CBC in a.m. to monitor white count and hemoglobin  Requiring close monitoring for toxicity while on IV controlled substance  Requiring IV antibiotics, need close monitoring for toxicity  Continue on legal hold.  Need inpatient psych placement.  Case discussed with psychiatrist Dr. Griffin  High risk of deterioration from ongoing respiratory failure and suicidal ideation.  Need close monitoring          I have discussed this patient's plan of care and discharge plan at IDT rounds today with Case Management, Nursing, Nursing leadership, and other members of the IDT team.    Consultants/Specialty  psychiatry    Code Status  Full Code    Disposition  The patient is not medically cleared for discharge to home or a post-acute  facility.  Anticipate discharge to: a psychiatric hospital    I have placed the appropriate orders for post-discharge needs.    Review of Systems  Review of Systems   Respiratory:  Negative for shortness of breath.    Cardiovascular:  Negative for chest pain.   Gastrointestinal:  Negative for nausea and vomiting.        Physical Exam  Temp:  [36.7 °C (98.1 °F)-37 °C (98.6 °F)] 36.7 °C (98.1 °F)  Pulse:  [] 95  Resp:  [16-24] 18  BP: (103-146)/(71-86) 140/86  SpO2:  [88 %-96 %] 88 %    Physical Exam  Constitutional:       Appearance: He is obese. He is ill-appearing.   Cardiovascular:      Rate and Rhythm: Normal rate.      Pulses: Normal pulses.   Pulmonary:      Effort: Pulmonary effort is normal. No respiratory distress.      Breath sounds: Wheezing present.   Musculoskeletal:      Comments: Lower abdominal area noted with diffuse erythema, tender to touch   Neurological:      General: No focal deficit present.      Mental Status: He is alert and oriented to person, place, and time.   Psychiatric:         Mood and Affect: Mood normal.         Fluids  No intake or output data in the 24 hours ending 05/19/25 1414     Laboratory  Recent Labs     05/17/25  1831 05/19/25  0801   WBC 17.8* 12.1*   RBC 4.85 4.49*   HEMOGLOBIN 13.6* 12.7*   HEMATOCRIT 43.4 41.7*   MCV 89.5 92.9   MCH 28.0 28.3   MCHC 31.3* 30.5*   RDW 48.4 50.9*   PLATELETCT 509* 522*   MPV 9.1 9.4     Recent Labs     05/17/25  1831 05/19/25  0801   SODIUM 135 139   POTASSIUM 4.0 4.0   CHLORIDE 99 103   CO2 22 29   GLUCOSE 104* 97   BUN 10 7*   CREATININE 0.83 0.90   CALCIUM 8.8 8.6                   Imaging  DX-CHEST-LIMITED (1 VIEW)   Final Result      No acute cardiopulmonary disease.      CT-CTA CHEST PULMONARY ARTERY W/ RECONS   Final Result         1.  No pulmonary embolus appreciated.           Assessment/Plan  * Cellulitis- (present on admission)  Assessment & Plan  Superficial abdominal cellulitis  Continue on IV cefazolin  MRSA PCR  negative, discontinue Zyvox  Repeat BMP in a.m. to monitor electrolytes ,renal function and toxicity while on IV antibiotics  Repeat CBC in a.m. to monitor white count and hemoglobin  Requiring close monitoring for toxicity while on IV controlled substance  Monitor for sign of sepsis      Wheezing  Assessment & Plan  Does have history of smoking  Likely of underlying asthma/COPD  Continue DuoNeb, prednisone  Need albuterol inhaler on discharge  I will add [facility]  Pulmonology evaluation as outpatient for formal diagnosis of COPD/asthma    Acute respiratory failure with hypoxia (HCC)  Assessment & Plan  Likely undiagnosed asthma, wheezing on exam  CTA chest negative for PE, procalcitonin negative  ABG with normal pH, normal PCO2 level  Not in acute distress  Continue to  monitor oxygen saturation closely  Incentive spirometry  Out of bed to chair  Continue on prednisone and DuoNeb  Ambulatory O2 evaluation in a.m.      Suicidal ideations  Assessment & Plan  Currently endorses suicidal ideations and feeling hopelessness.  On Seroquel and trazodone  Continue on legal hold.  Need inpatient psych placement.  Case discussed with psychiatrist Dr. Griffin        Morbid obesity (HCC)  Assessment & Plan  BMI 51  Noted to be hypoxic while sleeping  Patient is responsive to sternal rub, and shortly after he would fall back asleep upon my assessment with him and had to have been woken up several times.  Likely has undiagnosed sleep apnea  CPAP at night ordered  ABG ordered    Schizoaffective disorder (HCC)- (present on admission)  Assessment & Plan  Psychiatrist following         VTE prophylaxis: xarelto    I have performed a physical exam and reviewed and updated ROS and Plan today (5/19/2025). In review of yesterday's note (5/18/2025), there are no changes except as documented above.

## 2025-05-20 ENCOUNTER — APPOINTMENT (OUTPATIENT)
Dept: RADIOLOGY | Facility: MEDICAL CENTER | Age: 30
End: 2025-05-20
Attending: HOSPITALIST
Payer: MEDICARE

## 2025-05-20 VITALS
TEMPERATURE: 98.4 F | RESPIRATION RATE: 20 BRPM | HEIGHT: 70 IN | BODY MASS INDEX: 45.1 KG/M2 | HEART RATE: 100 BPM | DIASTOLIC BLOOD PRESSURE: 75 MMHG | OXYGEN SATURATION: 88 % | WEIGHT: 315 LBS | SYSTOLIC BLOOD PRESSURE: 131 MMHG

## 2025-05-20 LAB
ANION GAP SERPL CALC-SCNC: 9 MMOL/L (ref 7–16)
BUN SERPL-MCNC: 9 MG/DL (ref 8–22)
CALCIUM SERPL-MCNC: 8.9 MG/DL (ref 8.5–10.5)
CHLORIDE SERPL-SCNC: 102 MMOL/L (ref 96–112)
CO2 SERPL-SCNC: 27 MMOL/L (ref 20–33)
CREAT SERPL-MCNC: 0.92 MG/DL (ref 0.5–1.4)
ERYTHROCYTE [DISTWIDTH] IN BLOOD BY AUTOMATED COUNT: 48.6 FL (ref 35.9–50)
GFR SERPLBLD CREATININE-BSD FMLA CKD-EPI: 116 ML/MIN/1.73 M 2
GLUCOSE SERPL-MCNC: 114 MG/DL (ref 65–99)
HCT VFR BLD AUTO: 42.3 % (ref 42–52)
HGB BLD-MCNC: 13.7 G/DL (ref 14–18)
MAGNESIUM SERPL-MCNC: 2.1 MG/DL (ref 1.5–2.5)
MCH RBC QN AUTO: 29.1 PG (ref 27–33)
MCHC RBC AUTO-ENTMCNC: 32.4 G/DL (ref 32.3–36.5)
MCV RBC AUTO: 90 FL (ref 81.4–97.8)
PHOSPHATE SERPL-MCNC: 2.4 MG/DL (ref 2.5–4.5)
PLATELET # BLD AUTO: 552 K/UL (ref 164–446)
PMV BLD AUTO: 9.1 FL (ref 9–12.9)
POTASSIUM SERPL-SCNC: 4.7 MMOL/L (ref 3.6–5.5)
RBC # BLD AUTO: 4.7 M/UL (ref 4.7–6.1)
SODIUM SERPL-SCNC: 138 MMOL/L (ref 135–145)
WBC # BLD AUTO: 14.3 K/UL (ref 4.8–10.8)

## 2025-05-20 PROCEDURE — 700111 HCHG RX REV CODE 636 W/ 250 OVERRIDE (IP): Performed by: STUDENT IN AN ORGANIZED HEALTH CARE EDUCATION/TRAINING PROGRAM

## 2025-05-20 PROCEDURE — A9270 NON-COVERED ITEM OR SERVICE: HCPCS | Performed by: STUDENT IN AN ORGANIZED HEALTH CARE EDUCATION/TRAINING PROGRAM

## 2025-05-20 PROCEDURE — 74018 RADEX ABDOMEN 1 VIEW: CPT

## 2025-05-20 PROCEDURE — 99239 HOSP IP/OBS DSCHRG MGMT >30: CPT | Performed by: HOSPITALIST

## 2025-05-20 PROCEDURE — 36415 COLL VENOUS BLD VENIPUNCTURE: CPT

## 2025-05-20 PROCEDURE — 700105 HCHG RX REV CODE 258: Performed by: STUDENT IN AN ORGANIZED HEALTH CARE EDUCATION/TRAINING PROGRAM

## 2025-05-20 PROCEDURE — 85027 COMPLETE CBC AUTOMATED: CPT

## 2025-05-20 PROCEDURE — 83735 ASSAY OF MAGNESIUM: CPT

## 2025-05-20 PROCEDURE — 700102 HCHG RX REV CODE 250 W/ 637 OVERRIDE(OP): Performed by: STUDENT IN AN ORGANIZED HEALTH CARE EDUCATION/TRAINING PROGRAM

## 2025-05-20 PROCEDURE — 84100 ASSAY OF PHOSPHORUS: CPT

## 2025-05-20 PROCEDURE — 80048 BASIC METABOLIC PNL TOTAL CA: CPT

## 2025-05-20 RX ORDER — QUETIAPINE FUMARATE 50 MG/1
50 TABLET, FILM COATED ORAL NIGHTLY
Status: SHIPPED
Start: 2025-05-20

## 2025-05-20 RX ORDER — CEPHALEXIN 500 MG/1
1000 CAPSULE ORAL 4 TIMES DAILY
Qty: 32 CAPSULE | Refills: 0 | Status: ACTIVE | DISCHARGE
Start: 2025-05-20 | End: 2025-05-24

## 2025-05-20 RX ORDER — ALBUTEROL SULFATE 90 UG/1
2 INHALANT RESPIRATORY (INHALATION) EVERY 4 HOURS PRN
Status: SHIPPED
Start: 2025-05-20

## 2025-05-20 RX ORDER — PREDNISONE 20 MG/1
40 TABLET ORAL DAILY
Status: SHIPPED
Start: 2025-05-20 | End: 2025-05-25

## 2025-05-20 RX ORDER — ALBUTEROL SULFATE 5 MG/ML
2.5 SOLUTION RESPIRATORY (INHALATION)
Status: DISCONTINUED | OUTPATIENT
Start: 2025-05-20 | End: 2025-05-20 | Stop reason: HOSPADM

## 2025-05-20 RX ADMIN — CEFAZOLIN 2 G: 2 INJECTION, POWDER, FOR SOLUTION INTRAMUSCULAR; INTRAVENOUS at 00:38

## 2025-05-20 RX ADMIN — PREDNISONE 40 MG: 20 TABLET ORAL at 05:36

## 2025-05-20 RX ADMIN — OXYCODONE 5 MG: 5 TABLET ORAL at 00:38

## 2025-05-20 RX ADMIN — PROMETHAZINE HYDROCHLORIDE 25 MG: 25 TABLET ORAL at 13:12

## 2025-05-20 RX ADMIN — ONDANSETRON 4 MG: 4 TABLET, ORALLY DISINTEGRATING ORAL at 10:45

## 2025-05-20 RX ADMIN — FLUTICASONE FUROATE, UMECLIDINIUM BROMIDE AND VILANTEROL TRIFENATATE 1 PUFF: 200; 62.5; 25 POWDER RESPIRATORY (INHALATION) at 09:57

## 2025-05-20 RX ADMIN — CEFAZOLIN 2 G: 2 INJECTION, POWDER, FOR SOLUTION INTRAMUSCULAR; INTRAVENOUS at 14:20

## 2025-05-20 RX ADMIN — CEFAZOLIN 2 G: 2 INJECTION, POWDER, FOR SOLUTION INTRAMUSCULAR; INTRAVENOUS at 05:40

## 2025-05-20 RX ADMIN — OXYCODONE 5 MG: 5 TABLET ORAL at 05:37

## 2025-05-20 ASSESSMENT — PAIN DESCRIPTION - PAIN TYPE
TYPE: ACUTE PAIN

## 2025-05-20 NOTE — DISCHARGE PLANNING
Case Management Discharge Planning    Admission Date: 5/17/2025  GMLOS: 2.8  ALOS: 2    Anticipated Discharge Dispo: Discharge Disposition: D/T to psych hosp or distinct part unit (65)    DME Needed: No    Action(s) Taken: Patient was discussed in morning rounds.  Per MD, patient is medically cleared for inpatient psych placement.  RNCM faxed medical clearance to alert team PAR and updated via volate on medical clearance.      Escalations Completed: None    Medically Clear: Yes    Next Steps: RN CM to continue to follow for DC planning      Barriers to Discharge: Pending Placement

## 2025-05-20 NOTE — DISCHARGE PLANNING
Legal Hold Transfer     Referral: Legal hold transfer to Mental Health Facility     Intervention: Notified by  [facility] that pt has been accepted to [facility].     Pt's accepting physcian is Dr. Pascal     Transport arranged through Sharon at [facility]     The pt will be picked up at 1500      Notified Bedside RN Corey, RENAE Calles, and Dr. Devlin of the departure time as well as accepting facility.      Transfer packet to be created with original legal hold and placed on chart.      Plan: Pt will be transferring to [facility] today at 1500 via [facility].

## 2025-05-20 NOTE — DISCHARGE PLANNING
"Alert Team Note     Spoke to Kajal at [facility] requesting additional information including if pt has staples or drains and if there is concern for MRSA. Notified bedside RENAE Nicole     @1250 Voalte message from Corey stating \"No staples or drains\" Per chart, negative MRSA test.    Notified Kajal at [facility]. [facility] accepted pt. Accepting is Dr. Pascal. Requesting transfer today at 1400. Phone number for nurse to nurse [phone]    Notified bedside RENAE Nicole, Dr. Montanez, RNCM Marli, Alert Team ELVER Rod, and Alert Team RENAE Bashir  "

## 2025-05-20 NOTE — DISCHARGE PLANNING
[facility] ALERT TEAM DISCHARGE PLANNING NOTE    Date:  05/20/2025  Patient Name:  Fareed Muhammad - 29 y.o. - Discharge Planning  MRN:  [id number]   YOB: 1995  ADMISSION DATE:  5/17/2025     Writer forwarded referral packet for inpatient psychiatric care to the following community providers:  [facility], [facility], [facility], and  Specialty (ADULT)     Items included in the referral packet:   ___x__Face Sheet   __x___Pages 1 and 2 of completed legal hold   ___x__Alert Team/Psych Assessment   ___x__H&P   __x___UDS   _____Blood Alcohol   ___x__Vital signs   _____Pregnancy Test (if applicable)   ___x__Medications List   ___x__Covid Screen

## 2025-05-20 NOTE — CARE PLAN
Problem: Pain - Standard  Goal: Alleviation of pain or a reduction in pain to the patient’s comfort goal  Outcome: Progressing     Problem: Provide Safe Environment  Goal: Suicide environmental safety, protocols, policies, and practices will be implemented  Outcome: Progressing       The patient is Watcher - Medium risk of patient condition declining or worsening    Shift Goals  Clinical Goals: Pain control, safety  Patient Goals: Pain control  Family Goals: N/A    Progress made toward(s) clinical / shift goals:   Taught pt 0-10 pain scale and non-pharmacological method of pain management, encouraged to verbalize when in pain. Administered PRN pain meds as needed. Bed locked and in lowest position. Bed alarm on, safety + security sitter, safety and fall precautions in place. Hourly rounding. Call light and belongings within reach.      Patient is not progressing towards the following goals:

## 2025-05-20 NOTE — PROGRESS NOTES
Report given to nurse Stephanie. Discharge AVS signed by 2 RNs. Pt picked up by [facility] and transferred to [facility]. All belongings and COBRA packet with legal hold paperwork given to EMTs..

## 2025-05-20 NOTE — CARE PLAN
The patient is Stable - Low risk of patient condition declining or worsening    Shift Goals  Clinical Goals: Legal Hold, Pain control,  Patient Goals: POC, pain control  Family Goals: N/A    Progress made toward(s) clinical / shift goals:        Problem: Urinary - Renal Perfusion  Goal: Ability to achieve and maintain adequate renal perfusion and functioning will improve  Outcome: Progressing     Problem: Respiratory  Goal: Patient will achieve/maintain optimum respiratory ventilation and gas exchange  Outcome: Progressing     Problem: Physical Regulation  Goal: Diagnostic test results will improve  Outcome: Progressing  Goal: Signs and symptoms of infection will decrease  Outcome: Progressing     Problem: Skin Integrity  Goal: Skin integrity is maintained or improved  Outcome: Progressing     Problem: Pain - Standard  Goal: Alleviation of pain or a reduction in pain to the patient’s comfort goal  Outcome: Progressing     Problem: Provide Safe Environment  Goal: Suicide environmental safety, protocols, policies, and practices will be implemented  Outcome: Progressing     Problem: Fall Risk  Goal: Patient will remain free from falls  Outcome: Progressing       Patient is not progressing towards the following goals:      Problem: Psychosocial  Goal: Patient's ability to identify and develop effective coping behaviors will improve  Outcome: Not Progressing  Goal: Patient's ability to identify and utilize available support systems will improve  Outcome: Not Progressing

## 2025-05-20 NOTE — CONSULTS
"Patient was referred to behavioral health for psychotherapy. Writer entered room, patient was in bed and the lights were off. Security was present with writer. Patient immediately began yelling at writer, saying \"I don't have to tell you anything!\" Writer was made aware earlier in the day patient had been making SI/HI statements. Patient with 1:1 sitter, security, no phone, no belongings, no visitors. Consult will be closed out at this time as patient refused services, and does not seem he would benefit from psychotherapy at this time.     Patient is on a legal hold.     Signing Off    Elsi Stringer MSW LCSW  "

## 2025-05-20 NOTE — DISCHARGE PLANNING
Legal Hold     Referral: Legal Hold Court     Intervention: Pt presented for legal hold meeting with  via video conferencing to discuss legal options of contesting hold and meeting with the court doctors tomorrow afternoon, or not contesting legal hold and continuing the hold for one week.  advised that pt's legal hold will be be continued for one week (5/29).       Plan: Pt's legal hold has been continued for one week. Pt will be transferring to [facility] this afternoon at 1500.

## 2025-05-20 NOTE — DISCHARGE SUMMARY
"Discharge Summary    CHIEF COMPLAINT ON ADMISSION  Chief Complaint   Patient presents with    Depression     Pt states he \"has a lot going on in life right now\". Feelings of hopelessness.     Psych Eval     Requesting to speak to someone for help.        Reason for Admission  SI     Admission Date  5/17/2025    CODE STATUS  Full Code    HPI & HOSPITAL COURSE  This is a 29 male with past medical history of seizure affective disorder presented with suicidal ideation.  He was started on IV antibiotic for abdominal cellulitis.  Labs with leukocytosis, renal function stable, U-Tox positive amphetamine.  ABGs normal pH pCO2 44.3 hypoxia.  Started on DuoNeb and steroid for suspected asthma/COPD, will  discharge him on trelegy and albuterol prn  Need to follow-up outpatient with pulmonology on discharge.  Psych evaluated and recommended inpatient psych placement, currently on legal hold.    Also he was receiving cefazolin during the stay in the hospital  for  his cellulitis and will be discharging him on keflex.     Therefore, he is discharged in good and stable condition to a psychiatric hospital.        Discharge Date  5/20/2025      FOLLOW UP ITEMS POST DISCHARGE  Ileana Goins, A.P.R.N.      DISCHARGE DIAGNOSES  Principal Problem:    Cellulitis (POA: Yes)  Active Problems:    Schizoaffective disorder (HCC) (POA: Yes)    Morbid obesity (HCC) (POA: Unknown)    Suicidal ideations (POA: Unknown)    Acute respiratory failure with hypoxia (HCC) (POA: Unknown)    Wheezing (POA: Unknown)  Resolved Problems:    * No resolved hospital problems. *      FOLLOW UP  No future appointments.  Ileana Goins, A.P.R.N.  [address] [facility] [id number]  [phone]    Schedule an appointment as soon as possible for a visit         MEDICATIONS ON DISCHARGE     Medication List        START taking these medications        Instructions   albuterol 108 (90 Base) MCG/ACT Aers inhalation aerosol   Inhale 2 Puffs every four hours as needed " for Shortness of Breath.  Dose: 2 Puff     cephALEXin 500 MG Caps  Commonly known as: Keflex   Take 2 Capsules by mouth 3 times a day for 4 days.  Dose: 1,000 mg     fluticasone-umeclidinium-vilanterol 200-62.5-25 mcg/PUFF inhaler  Start taking on: May 21, 2025  Commonly known as: Trelegy Ellipta   Inhale 1 Puff every day.  Dose: 1 Puff     predniSONE 20 MG Tabs  Commonly known as: Deltasone   Doctor's comments:    Take 2 Tablets by mouth every day for 5 days.  Dose: 40 mg     QUEtiapine 50 MG tablet  Commonly known as: SEROquel   Take 1 Tablet by mouth every evening.  Dose: 50 mg            CONTINUE taking these medications        Instructions   Invega Sustenna 234 MG/1.5ML [facility]  Generic drug: paliperidone palmitate ER   INJECT 234 MG INTRAMUSCULARLY EVERY 4 WEEKS            ASK your doctor about these medications        Instructions   traZODone 50 MG Tabs  Commonly known as: Desyrel   Take 1 Tablet by mouth every evening.  Dose: 50 mg              Allergies  Allergies[1]    DIET  Orders Placed This Encounter   Procedures    Diet Order Diet: Regular; Tray Modifications (optional): Safety Tray - Plastic dishware, utensils unwrapped (Suicide Watch)     Paperware only on meal tray.     Standing Status:   Standing     Number of Occurrences:   1     Diet::   Regular [1]     Tray Modifications (optional):   Safety Tray - Plastic dishware, utensils unwrapped (Suicide Watch)       ACTIVITY  As tolerated.  Weight bearing as tolerated    CONSULTATIONS  none    PROCEDURES  none    LABORATORY  Lab Results   Component Value Date    SODIUM 138 05/20/2025    POTASSIUM 4.7 05/20/2025    CHLORIDE 102 05/20/2025    CO2 27 05/20/2025    GLUCOSE 114 (H) 05/20/2025    BUN 9 05/20/2025    CREATININE 0.92 05/20/2025        Lab Results   Component Value Date    WBC 14.3 (H) 05/20/2025    HEMOGLOBIN 13.7 (L) 05/20/2025    HEMATOCRIT 42.3 05/20/2025    PLATELETCT 552 (H) 05/20/2025        Total time of the discharge process exceeds 39  minutes.       [1] No Known Allergies

## 2025-06-14 ENCOUNTER — HOSPITAL ENCOUNTER (EMERGENCY)
Facility: MEDICAL CENTER | Age: 30
End: 2025-06-15
Attending: EMERGENCY MEDICINE
Payer: MEDICARE

## 2025-06-14 DIAGNOSIS — F31.9 BIPOLAR AFFECTIVE DISORDER, REMISSION STATUS UNSPECIFIED (HCC): ICD-10-CM

## 2025-06-14 DIAGNOSIS — R45.851 SUICIDAL IDEATION: Primary | ICD-10-CM

## 2025-06-14 DIAGNOSIS — R73.9 HYPERGLYCEMIA: ICD-10-CM

## 2025-06-14 DIAGNOSIS — I10 HYPERTENSION, UNSPECIFIED TYPE: ICD-10-CM

## 2025-06-14 DIAGNOSIS — R11.2 NAUSEA AND VOMITING, UNSPECIFIED VOMITING TYPE: ICD-10-CM

## 2025-06-14 LAB
ALBUMIN SERPL BCP-MCNC: 3.8 G/DL (ref 3.2–4.9)
ALBUMIN/GLOB SERPL: 1 G/DL
ALP SERPL-CCNC: 121 U/L (ref 30–99)
ALT SERPL-CCNC: 38 U/L (ref 2–50)
AMPHET UR QL SCN: NEGATIVE
ANION GAP SERPL CALC-SCNC: 12 MMOL/L (ref 7–16)
APAP SERPL-MCNC: <5 UG/ML (ref 10–30)
AST SERPL-CCNC: 26 U/L (ref 12–45)
BARBITURATES UR QL SCN: NEGATIVE
BASOPHILS # BLD AUTO: 0.7 % (ref 0–1.8)
BASOPHILS # BLD: 0.1 K/UL (ref 0–0.12)
BENZODIAZ UR QL SCN: NEGATIVE
BILIRUB SERPL-MCNC: <0.2 MG/DL (ref 0.1–1.5)
BUN SERPL-MCNC: 9 MG/DL (ref 8–22)
BZE UR QL SCN: NEGATIVE
CALCIUM ALBUM COR SERPL-MCNC: 9.7 MG/DL (ref 8.5–10.5)
CALCIUM SERPL-MCNC: 9.5 MG/DL (ref 8.5–10.5)
CANNABINOIDS UR QL SCN: NEGATIVE
CHLORIDE SERPL-SCNC: 100 MMOL/L (ref 96–112)
CO2 SERPL-SCNC: 26 MMOL/L (ref 20–33)
CREAT SERPL-MCNC: 1.04 MG/DL (ref 0.5–1.4)
EOSINOPHIL # BLD AUTO: 0.11 K/UL (ref 0–0.51)
EOSINOPHIL NFR BLD: 0.8 % (ref 0–6.9)
ERYTHROCYTE [DISTWIDTH] IN BLOOD BY AUTOMATED COUNT: 47.9 FL (ref 35.9–50)
FENTANYL UR QL: NEGATIVE
GFR SERPLBLD CREATININE-BSD FMLA CKD-EPI: 100 ML/MIN/1.73 M 2
GLOBULIN SER CALC-MCNC: 3.7 G/DL (ref 1.9–3.5)
GLUCOSE SERPL-MCNC: 111 MG/DL (ref 65–99)
HCT VFR BLD AUTO: 45.4 % (ref 42–52)
HGB BLD-MCNC: 14.7 G/DL (ref 14–18)
IMM GRANULOCYTES # BLD AUTO: 0.15 K/UL (ref 0–0.11)
IMM GRANULOCYTES NFR BLD AUTO: 1.1 % (ref 0–0.9)
LIPASE SERPL-CCNC: 11 U/L (ref 11–82)
LYMPHOCYTES # BLD AUTO: 3.1 K/UL (ref 1–4.8)
LYMPHOCYTES NFR BLD: 22.4 % (ref 22–41)
MCH RBC QN AUTO: 28.7 PG (ref 27–33)
MCHC RBC AUTO-ENTMCNC: 32.4 G/DL (ref 32.3–36.5)
MCV RBC AUTO: 88.5 FL (ref 81.4–97.8)
METHADONE UR QL SCN: NEGATIVE
MONOCYTES # BLD AUTO: 1.23 K/UL (ref 0–0.85)
MONOCYTES NFR BLD AUTO: 8.9 % (ref 0–13.4)
NEUTROPHILS # BLD AUTO: 9.18 K/UL (ref 1.82–7.42)
NEUTROPHILS NFR BLD: 66.1 % (ref 44–72)
NRBC # BLD AUTO: 0 K/UL
NRBC BLD-RTO: 0 /100 WBC (ref 0–0.2)
OPIATES UR QL SCN: NEGATIVE
OXYCODONE UR QL SCN: NEGATIVE
PCP UR QL SCN: NEGATIVE
PLATELET # BLD AUTO: 583 K/UL (ref 164–446)
PMV BLD AUTO: 9.5 FL (ref 9–12.9)
POC BREATHALIZER: 0 PERCENT (ref 0–0.01)
POTASSIUM SERPL-SCNC: 4 MMOL/L (ref 3.6–5.5)
PROPOXYPH UR QL SCN: NEGATIVE
PROT SERPL-MCNC: 7.5 G/DL (ref 6–8.2)
RBC # BLD AUTO: 5.13 M/UL (ref 4.7–6.1)
SALICYLATES SERPL-MCNC: <1 MG/DL (ref 15–25)
SODIUM SERPL-SCNC: 138 MMOL/L (ref 135–145)
WBC # BLD AUTO: 13.9 K/UL (ref 4.8–10.8)

## 2025-06-14 PROCEDURE — 36415 COLL VENOUS BLD VENIPUNCTURE: CPT

## 2025-06-14 PROCEDURE — 80179 DRUG ASSAY SALICYLATE: CPT

## 2025-06-14 PROCEDURE — 302970 POC BREATHALIZER: Performed by: EMERGENCY MEDICINE

## 2025-06-14 PROCEDURE — 83690 ASSAY OF LIPASE: CPT

## 2025-06-14 PROCEDURE — 80053 COMPREHEN METABOLIC PANEL: CPT

## 2025-06-14 PROCEDURE — 700105 HCHG RX REV CODE 258: Mod: UD | Performed by: EMERGENCY MEDICINE

## 2025-06-14 PROCEDURE — 80307 DRUG TEST PRSMV CHEM ANLYZR: CPT

## 2025-06-14 PROCEDURE — 96374 THER/PROPH/DIAG INJ IV PUSH: CPT

## 2025-06-14 PROCEDURE — 85025 COMPLETE CBC W/AUTO DIFF WBC: CPT

## 2025-06-14 PROCEDURE — 99285 EMERGENCY DEPT VISIT HI MDM: CPT

## 2025-06-14 PROCEDURE — 700111 HCHG RX REV CODE 636 W/ 250 OVERRIDE (IP): Mod: JZ,UD | Performed by: EMERGENCY MEDICINE

## 2025-06-14 PROCEDURE — 90791 PSYCH DIAGNOSTIC EVALUATION: CPT

## 2025-06-14 PROCEDURE — 80143 DRUG ASSAY ACETAMINOPHEN: CPT

## 2025-06-14 PROCEDURE — 302970 POC BREATHALIZER

## 2025-06-14 RX ORDER — SODIUM CHLORIDE 9 MG/ML
1000 INJECTION, SOLUTION INTRAVENOUS ONCE
Status: COMPLETED | OUTPATIENT
Start: 2025-06-14 | End: 2025-06-15

## 2025-06-14 RX ORDER — ONDANSETRON 2 MG/ML
4 INJECTION INTRAMUSCULAR; INTRAVENOUS ONCE
Status: COMPLETED | OUTPATIENT
Start: 2025-06-14 | End: 2025-06-14

## 2025-06-14 RX ADMIN — SODIUM CHLORIDE 1000 ML: 9 INJECTION, SOLUTION INTRAVENOUS at 21:42

## 2025-06-14 RX ADMIN — ONDANSETRON 4 MG: 2 INJECTION INTRAMUSCULAR; INTRAVENOUS at 21:42

## 2025-06-14 ASSESSMENT — FIBROSIS 4 INDEX: FIB4 SCORE: 0.24

## 2025-06-15 VITALS
RESPIRATION RATE: 16 BRPM | HEART RATE: 94 BPM | SYSTOLIC BLOOD PRESSURE: 117 MMHG | OXYGEN SATURATION: 93 % | HEIGHT: 71 IN | BODY MASS INDEX: 44.1 KG/M2 | DIASTOLIC BLOOD PRESSURE: 58 MMHG | TEMPERATURE: 98.1 F | WEIGHT: 315 LBS

## 2025-06-15 NOTE — ED TRIAGE NOTES
"Chief Complaint   Patient presents with    Suicidal Ideation     Patient BIBA by EMS for SI with a plan to OD on his seroquel and gabapentin.         Patient to green 30 by EMS for above complaint. Patient states\" I don't trust myself to go home and not overdose.\" Patient denies HI. Endorses 3 beers today. AAxO 4, VSS. High risk SI protocol placed.   "

## 2025-06-15 NOTE — ED NOTES
Verified that Legal Hold is appropriate and signed in patient's chart.   All items removed from room for safety and to minimize risk of suicide.   Confirmed patient's personal items removed from room, and if applicable labeled/ placed in secure area  Stop sign filled up and placed in front of pt's room    1:1 Observation. Continuous visual monitoring by Trained Personnel. Discussion with sitter about patient care, safety and support. Sitter has unobstructed view of patient at all times.

## 2025-06-15 NOTE — ED NOTES
Pt is alert and coherent, [facility] arrived and to bring pt to [facility], pt personal belongings, medication and packet hane over to EMS>    Pt left ER awake and ambulatory, not in distress.

## 2025-06-15 NOTE — ED NOTES
Patient is on bed, asleep, Respirations even and unlabored. Bed in Low position. sitter at bedside in direct view of the patient, safety needs met.

## 2025-06-15 NOTE — ED PROVIDER NOTES
ER Provider Note    Scribed for  Bryan Welsh D.O. by Ellie Saul. 6/14/2025   8:10 PM    Primary Care Provider: JOANA Judge    CHIEF COMPLAINT  Chief Complaint   Patient presents with    Suicidal Ideation     Patient BIBA by EMS for SI with a plan to OD on his seroquel and gabapentin.      EXTERNAL RECORDS REVIEWED  Inpatient Notes Patient was recently admitted to an inpatient psychiatric facility 5/20/2025 with history noted patient has history significant for schizophrenia, chronic, paranoid type he was acute exacerbation, major depressive disorder morbid obesity.     HPI/ROS  LIMITATION TO HISTORY   Select: : None  OUTSIDE HISTORIAN(S):  EMS reports most of the history of present illness as noted below.    Fareed Muhammad is a 29 y.o. male who presents to the ED via EMS with suicidal ideation. EMS picked the patient up from [facility] prior to arrival and reports the patient has been reporting suicidal ideation to them including a plan to end his life by overdosing on his Seroquel and Gabapentin medications. He reports feeling of not trusting himself to go home, be alone, and to not harm himself. He reports drinking today, three beers. He denies homicidal ideation. There are no known alleviating or exacerbating factors.     PAST MEDICAL HISTORY  Past Medical History[1]    SURGICAL HISTORY  Past Surgical History[2]    FAMILY HISTORY  Family History   Problem Relation Age of Onset    Cancer Paternal Aunt         breasr canre    Hypertension Maternal Grandmother     Diabetes Maternal Grandmother     Cancer Maternal Grandmother     Hypertension Maternal Grandfather     Diabetes Maternal Grandfather        SOCIAL HISTORY   reports that he has been smoking cigarettes. He started smoking about 7 years ago. He has a 8.9 pack-year smoking history. He has never used smokeless tobacco. He reports current alcohol use of about 1.8 oz of alcohol per week. He reports that he does not currently use drugs after  "having used the following drugs: Marijuana.    CURRENT MEDICATIONS  Previous Medications    ALBUTEROL 108 (90 BASE) MCG/ACT AERO SOLN INHALATION AEROSOL    Inhale 2 Puffs every four hours as needed for Shortness of Breath.    FLUTICASONE-UMECLIDINIUM-VILANTEROL (TRELEGY ELLIPTA) 200-62.5-25 MCG/PUFF INHALER    Inhale 1 Puff every day.    INVEGA SUSTENNA 234 MG/1.5ML SUSPENSION PREFILLED SYRINGE    INJECT 234 MG INTRAMUSCULARLY EVERY 4 WEEKS    QUETIAPINE (SEROQUEL) 50 MG TABLET    Take 1 Tablet by mouth every evening.    TRAZODONE (DESYREL) 50 MG TAB    Take 1 Tablet by mouth every evening.       ALLERGIES  Allergies[3]     PHYSICAL EXAM  BP (!) 157/92   Pulse (!) 112   Temp 36.7 °C (98.1 °F) (Temporal)   Resp 16   Ht 1.803 m (5' 11\")   Wt (!) 159 kg (350 lb)   SpO2 93%   BMI 48.82 kg/m²    General: No acute distress  Neuro: Awake alert and oriented, muscle strength sensation normal  Neck: Supple  Cardiac: Regular rate and rhythm  Pulmonary: Clear to auscultation bilaterally no distress  Abdomen: Obese, soft nontender nondistended  Back: Nontender  Psych: Endorses SI. Denies HI.  Skin: Pink warm dry  Extremities: Full range of motion, muscle strength sensation intact 2+ pulses    DIAGNOSTIC STUDIES/PROCEDURES  Labs:   Labs Reviewed   CBC WITH DIFFERENTIAL - Abnormal; Notable for the following components:       Result Value    WBC 13.9 (*)     Platelet Count 583 (*)     Immature Granulocytes 1.10 (*)     Neutrophils (Absolute) 9.18 (*)     Monos (Absolute) 1.23 (*)     Immature Granulocytes (abs) 0.15 (*)     All other components within normal limits   COMP METABOLIC PANEL - Abnormal; Notable for the following components:    Glucose 111 (*)     Alkaline Phosphatase 121 (*)     Globulin 3.7 (*)     All other components within normal limits   ACETAMINOPHEN - Abnormal; Notable for the following components:    Acetaminophen -Tylenol <5.0 (*)     All other components within normal limits   SALICYLATE - Abnormal; " Notable for the following components:    Salicylates, Quant. <1.0 (*)     All other components within normal limits   POC BREATHALIZER - Normal   URINE DRUG SCREEN   LIPASE   ESTIMATED GFR     I have independently interpreted the above labs    COURSE & MEDICAL DECISION MAKING     ED OBS: Yes; I am placing the patient in to an observation status due to a diagnostic uncertainty as well as therapeutic intensity. Patient placed in observation status at 8:15 PM, 6/14/2025.     Observation plan is as follows: Awaiting transfer to inpatient psychiatric facility and placed on legal hold.    Upon Reevaluation, the patient's condition has: not improved; and will be escalated to hospitalization.    INITIAL ASSESSMENT, COURSE AND PLAN  Differential diagnoses include but not limited to: Psychosis, Suicidal Ideation, Depression, electrolyte derangement    Care Narrative: 21-year-old here with suicidal ideation and nausea and vomiting.    8:10 PM - Patient was first seen and evaluated at bedside. Patient presents to the ED for suicidal ideation. Discussed plan for consult with behavioral health and transfer to inpatient psychiatric facility. Ordered for Urine Drug Screen and POC Breathalyzer to evaluate. Patient verbalizes understanding and support with my plan of care.     8:53 PM - Patient is currently vomiting. Patient once again reports to me thoughts of ending his life. Patient medicated with Zofran 4 mg at this time.    9:30 PM - [facility] agrees with plan to transfer the patient to inpatient psychiatric facility for further care. Patient agrees with this plan.    2 AM: Signed the patient to Dr. Melo pending reevaluation, and eventual disposition and/or transfer to psychiatric facility.     ED COURSE AND ADDITIONAL PROBLEMS  Suicidal ideation, Acute  (primary encounter diagnosis): Patient on L2 K/legal hold.  Medically stable for transfer to higher level of care  psychiatry.      Hypertension, unspecified type, Acute: Patient agrees discussed with primary care.  Neurologically normal here.      Bipolar affective disorder, remission status unspecified (HCC), Acute: Likely contributing to patient's psychiatric illness today.      Nausea and vomiting, unspecified vomiting type, Acute: Antinausea meds given.  Patient tolerating p.o.  Electrolytes reassuring and unremarkable.      Medications   ondansetron (Zofran) syringe/vial injection 4 mg (4 mg Intravenous Given 6/14/25 2142)   NS infusion 1,000 mL (1,000 mL Intravenous New Bag 6/14/25 2142)      DISPOSITION AND DISCUSSIONS    I have discussed management of the patient with the following physicians and ADAM's:  None    Discussion of management with other [facility] or appropriate source(s): Behavioral Health to consult with the patient.     Barriers to care at this time, including but not limited to: None.     Decision tools and prescription drugs considered including, but not limited to: None.    DISPOSITION:  Patient will be hospitalized in guarded condition.    FINAL DIAGNOSIS  1. Suicidal ideation Acute   2. Hypertension, unspecified type Acute   3. Bipolar affective disorder, remission status unspecified (HCC) Acute   4. Nausea and vomiting, unspecified vomiting type Acute      Ellie WELLER (Rosy), am scribing for, and in the presence of, Bryan Welsh D.O..    Electronically signed by: Ellie Saul (Rosy), 6/14/2025    Bryan WELLER D.O. personally performed the services described in this documentation, as scribed by Ellie Saul in my presence, and it is both accurate and complete.      The note accurately reflects work and decisions made by me.  Bryan Welsh D.O.  6/15/2025  1:50 AM         [1]   Past Medical History:  Diagnosis Date    Acute alcohol intoxication (HCC) 08/21/2023    ASTHMA     Dx at age 5, does not use and inhaler, and has never had an attack    Bipolar disorder (HCC)     Closed fracture of  multiple ribs of left side 05/31/2023    Discharge planning issues 06/22/2023    Drug abuse in remission (HCC)     meth, alcohol and tobacco since 14    Liver laceration, initial encounter 05/31/2023    No contraindication to deep vein thrombosis (DVT) prophylaxis 05/31/2023    Spleen laceration, initial encounter 05/31/2023    Trauma 05/31/2023    Trauma 08/21/2023   [2]   Past Surgical History:  Procedure Laterality Date    HARDWARE REMOVAL ORTHO Left 7/13/2023    Procedure: ELBOW PIN REMOVAL WITH CAST REMOVAL;  Surgeon: Josef Bills M.D.;  Location: SURGERY [facility];  Service: Orthopedics    ORIF, ELBOW Left 6/1/2023    Procedure: ORIF, ELBOW;  Surgeon: Josef Bills M.D.;  Location: SURGERY [facility];  Service: Orthopedics    SPLENECTOMY N/A 5/31/2023    Procedure: SPLENECTOMY;  Surgeon: Doris Bhakta M.D.;  Location: SURGERY [facility];  Service: General   [3] No Known Allergies

## 2025-06-15 NOTE — ED NOTES
Bedside report received from off going RN/tech: Franchesca MACDONALD, assumed care of patient.  POC discussed with patient. all needs addressed at this time.       Fall risk interventions in place: Place socks on patient, Place fall risk sign on patient's door, Give patient urinal if applicable, Keep floor surfaces clean and dry, and Accompanied to restroom (all applicable per [place] Fall risk assessment)   Continuous monitoring: Not Applicable   IVF/IV medications: Not Applicable   Oxygen: Room Air  Bedside sitter: Pt on L2k SI with 1:1 sitter Gabriella (name)  Isolation: Not Applicable

## 2025-06-15 NOTE — ED NOTES
PIV placed, labs collected and sent. Patient medicated per MAR. Resting comfortably with 1:1 sitter in place

## 2025-06-15 NOTE — CONSULTS
[facility]   TRIAGE ASSESSMENT    Name: Fareed Muhammad  MRN: [id number]  : 1995  Age: 29 y.o.  Date of assessment: 2025  PCP: JOANA Judge  Persons in attendance: Patient  Patient Location: [facility]    CHIEF COMPLAINT/PRESENTING ISSUE (as stated by Patient): Patient is a 29 year old male with history of schizophrenia.  He was brought in by [facility] for suicidal ideation with a plan to overdose on drugs.  Patient states that he is not on any scheduled/prescribed medications at this time.    He initially refused to talk to me and said that I was just sending him to a hospital.  Patient reports being homeless and said that his last hospitalization was about a month ago at [facility].    Patient is unable to contract for safety.  [facility] sent packet for referrals to be sent to appropriate facilities.           Chief Complaint   Patient presents with    Suicidal Ideation     Patient BIBA by EMS for SI with a plan to OD on his seroquel and gabapentin.         CURRENT LIVING SITUATION/SOCIAL SUPPORT/FINANCIAL RESOURCES: Patient is homeless    BEHAVIORAL HEALTH/SUBSTANCE USE TREATMENT HISTORY  Does patient/parent report a history of prior behavioral health/substance use treatment for patient?   Yes:    Dates Level of Care Facilty/Provider Diagnosis/Problem Medications   May 2025 Inpatient [facility] Suicidal ideation Unknown                                                                      SAFETY ASSESSMENT - SELF  Does patient acknowledge current or past symptoms of dangerousness to self or is previous history noted? yes  Does parent/significant other report patient has current or past symptoms of dangerousness to self? yes  Does presenting problem suggest symptoms of dangerousness to self? Yes:     Past Current    Suicidal Thoughts: [x]  [x]    Suicidal Plans: [x]  [x]    Suicidal Intent: []  []    Suicide Attempts: []  []    Self-Injury []  []      For any boxes  checked above, provide detail: pt has a history of suicidal thoughts but denies an attempt    History of suicide by family member: no  History of suicide by friend/significant other: no  Recent change in frequency/specificity/intensity of suicidal thoughts or self-harm behavior? yes   Current access to firearms, medications, or other identified means of suicide/self-harm? no  If yes, willing to restrict access to means of suicide/self-harm? N/A  Protective factors present:  Willing to address in treatment    SAFETY ASSESSMENT - OTHERS  Does patient acknowledge current or past symptoms of aggressive behavior or risk to others or is previous history noted? no  Does parent/significant other report patient has current or past symptoms of aggressive behavior or risk to others?  no  Does presenting problem suggest symptoms of dangerousness to others? No    LEGAL HISTORY  Does patient acknowledge history of arrest/USP/jail or is previous history noted? Yes, 5 years ago but said that he was not charged with anything.    Crisis Safety Plan completed and copy given to patient? N/A    ABUSE/NEGLECT SCREENING  Does patient report feeling “unsafe” in his/her home, or afraid of anyone?  no  Does patient report any history of physical, sexual, or emotional abuse?  no  Does parent or significant other report any of the above? N\A  Is there evidence of neglect by self?  yes  Is there evidence of neglect by a caregiver? N/A  Does the patient/parent report any history of CPS/APS/police involvement related to suspected abuse/neglect or domestic violence? no      SUBSTANCE USE SCREENING  Yes:  Qasim all substances used in the past 30 days:      Last Use Amount   []   Alcohol     []   Marijuana     []   Heroin     []   Prescription Opioids  (used without prescription, for    recreation, or in excess of prescribed amount)     []   Other Prescription  (used without prescription, for    recreation, or in excess of prescribed amount)    "  []   Cocaine      []   Methamphetamine     []   \"\" drugs (ectasy, MDMA)     []   Other substances        UDS results: pending  Breathalyzer results: 0.0    What consequences does the patient associate with any of the above substance use and or addictive behaviors? None    Risk factors for detox (check all that apply):  []  Seizures   []  Diaphoretic (sweating)   []  Tremors   []  Hallucinations   []  Increased blood pressure   []  Decreased blood pressure   []  Other   []  None      [] Patient education on risk factors for detoxification and instructed to return to ER as needed.      MENTAL STATUS   Participation: Active verbal participation  Grooming: Disheveled and unkempt  Orientation: Alert  Behavior: Agitated  Eye contact: Limited  Mood: Depressed and Angry  Affect: Flat  Thought process: Logical  Thought content: Within normal limits  Speech: Rate within normal limits  Perception: Within normal limits  Memory:  Recent:  Limited and Remote:  Limited  Insight: Poor  Judgment:  Poor  Other:    Collateral information:   Source:  [] Significant other present in person:   [] Significant other by telephone  [] [facility]   [x] [facility] Nursing Staff  [x] [facility]  [] Other:     [] Unable to complete full assessment due to:  [] Acute intoxication  [] Patient declined to participate/engage  [] Patient verbally unresponsive  [] Significant cognitive deficits  [] Significant perceptual distortions or behavioral disorganization  [] Other:      CLINICAL IMPRESSIONS:  Primary:  Suicidal Ideation  Secondary:  HPT, Bipolar, Nausea and vomiting       IDENTIFIED NEEDS/PLAN:  [Trigger DISPOSITION list for any items marked]    [x]  Imminent safety risk - self [] Imminent safety risk - others   []  Acute substance withdrawal []  Psychosis/Impaired reality testing   []  Mood/anxiety []  Substance use/Addictive behavior   [x]  Maladaptive behaviro []  Parent/child conflict   []  Family/Couples conflict " []  Biomedical   [x]  Housing []  Financial   []   Legal  Occupational/Educational   []  Domestic violence []  Other:     Recommended Plan of Care:  1:1 Observation, No phone,  No visitors, No personal belongings  *Telesitter may not be utilized for moderate or high risk patients    Has the Recommended Plan of Care/Level of Observation been reviewed with the patient's assigned nurse? yes    Does patient/parent or guardian express agreement with the above plan? yes      Referral appointment(s) scheduled? NA    Alert team only:   I have discussed findings and recommendations with Dr. Welsh who is in agreement with these recommendations.     Referral information sent to the following outpatient community providers :N/A    Referral information sent to the following inpatient community providers :N//A    If applicable : Referred  to  Alert Team for legal hold follow up at (time): Legal Hold initiated 6.14.25 @ 2015       Guera Serrano R.N.  6/14/2025

## 2025-06-15 NOTE — ED NOTES
This Rn found patient shoving his hand in his mouth in an attempt to vomit. Patient induced vomiting and was provided with education by this RN to avoid doing so. Patient verbalized understanding.

## 2025-06-15 NOTE — ED NOTES
Rounded on patient, resting with calm unlabored breathing. Cooperative with staff. In direct supervision of designee.    Otolaryngologist Procedure Text (A): After obtaining clear surgical margins the patient was sent to otolaryngology for surgical repair.  The patient understands they will receive post-surgical care and follow-up from the referring physician's office.

## 2025-06-15 NOTE — ED NOTES
Patient ambulated with steady gait to restroom, urine collected and sent. Back to bed in direct view of safety sitter.

## 2025-07-16 VITALS
BODY MASS INDEX: 44.1 KG/M2 | OXYGEN SATURATION: 93 % | HEART RATE: 103 BPM | DIASTOLIC BLOOD PRESSURE: 83 MMHG | TEMPERATURE: 98.3 F | WEIGHT: 315 LBS | SYSTOLIC BLOOD PRESSURE: 125 MMHG | HEIGHT: 71 IN | RESPIRATION RATE: 15 BRPM

## 2025-07-16 PROCEDURE — 302449 STATCHG TRIAGE ONLY (STATISTIC)

## 2025-07-16 ASSESSMENT — FIBROSIS 4 INDEX: FIB4 SCORE: 0.2

## 2025-07-17 ENCOUNTER — HOSPITAL ENCOUNTER (EMERGENCY)
Facility: MEDICAL CENTER | Age: 30
End: 2025-07-17
Payer: MEDICARE

## 2025-07-17 NOTE — ED NOTES
PT called in lobby with no response x1. Bathrooms, outside ED lobby, and other waiting areas checked. Unable to locate PT at this time. Will attempt to room PT again in 10 minutes.

## 2025-07-17 NOTE — ED TRIAGE NOTES
"Chief Complaint   Patient presents with    Foot Pain     \"My feet hurt I can't walk\"     Pt mumbling a lot in triage appears unkept able to move legs and ankles to obvious trauma or deformity     \" I am just uncomfortable\"  Hx aggression hx psych     Pt in wc in triage     /83   Pulse (!) 103   Temp 36.8 °C (98.3 °F) (Temporal)   Resp 15   Ht 1.803 m (5' 11\")   Wt (!) 160 kg (352 lb 11.8 oz)   SpO2 93%   BMI 49.20 kg/m²     "

## 2025-07-18 ENCOUNTER — HOSPITAL ENCOUNTER (EMERGENCY)
Facility: MEDICAL CENTER | Age: 30
End: 2025-07-18
Attending: EMERGENCY MEDICINE
Payer: MEDICARE

## 2025-07-18 ENCOUNTER — PHARMACY VISIT (OUTPATIENT)
Dept: PHARMACY | Facility: MEDICAL CENTER | Age: 30
End: 2025-07-18
Payer: COMMERCIAL

## 2025-07-18 ENCOUNTER — HOSPITAL ENCOUNTER (EMERGENCY)
Facility: MEDICAL CENTER | Age: 30
End: 2025-07-18
Attending: STUDENT IN AN ORGANIZED HEALTH CARE EDUCATION/TRAINING PROGRAM
Payer: MEDICARE

## 2025-07-18 VITALS
SYSTOLIC BLOOD PRESSURE: 157 MMHG | RESPIRATION RATE: 14 BRPM | WEIGHT: 315 LBS | BODY MASS INDEX: 44.1 KG/M2 | OXYGEN SATURATION: 96 % | TEMPERATURE: 96.8 F | DIASTOLIC BLOOD PRESSURE: 102 MMHG | HEIGHT: 71 IN | HEART RATE: 82 BPM

## 2025-07-18 VITALS
TEMPERATURE: 97.2 F | WEIGHT: 315 LBS | BODY MASS INDEX: 44.1 KG/M2 | HEART RATE: 108 BPM | SYSTOLIC BLOOD PRESSURE: 113 MMHG | DIASTOLIC BLOOD PRESSURE: 72 MMHG | HEIGHT: 71 IN | OXYGEN SATURATION: 92 % | RESPIRATION RATE: 14 BRPM

## 2025-07-18 DIAGNOSIS — R45.851 SUICIDAL IDEATION: Primary | ICD-10-CM

## 2025-07-18 DIAGNOSIS — F32.A DEPRESSION, UNSPECIFIED DEPRESSION TYPE: ICD-10-CM

## 2025-07-18 DIAGNOSIS — M79.672 PAIN IN BOTH FEET: ICD-10-CM

## 2025-07-18 DIAGNOSIS — M79.671 PAIN IN BOTH FEET: ICD-10-CM

## 2025-07-18 DIAGNOSIS — R45.851 SUICIDAL IDEATION: ICD-10-CM

## 2025-07-18 DIAGNOSIS — Z59.00 HOMELESSNESS: ICD-10-CM

## 2025-07-18 LAB
POC BREATHALIZER: 0 PERCENT (ref 0–0.01)
POC BREATHALIZER: 0 PERCENT (ref 0–0.01)

## 2025-07-18 PROCEDURE — 99285 EMERGENCY DEPT VISIT HI MDM: CPT | Mod: 27

## 2025-07-18 PROCEDURE — 302970 POC BREATHALIZER

## 2025-07-18 PROCEDURE — 90791 PSYCH DIAGNOSTIC EVALUATION: CPT

## 2025-07-18 PROCEDURE — 302970 POC BREATHALIZER: Performed by: EMERGENCY MEDICINE

## 2025-07-18 PROCEDURE — 99283 EMERGENCY DEPT VISIT LOW MDM: CPT

## 2025-07-18 PROCEDURE — RXMED WILLOW AMBULATORY MEDICATION CHARGE: Performed by: EMERGENCY MEDICINE

## 2025-07-18 PROCEDURE — 302970 POC BREATHALIZER: Performed by: STUDENT IN AN ORGANIZED HEALTH CARE EDUCATION/TRAINING PROGRAM

## 2025-07-18 RX ORDER — QUETIAPINE FUMARATE 50 MG/1
100 TABLET, FILM COATED ORAL NIGHTLY
Qty: 6 TABLET | Refills: 0 | Status: SHIPPED | OUTPATIENT
Start: 2025-07-18 | End: 2025-07-19

## 2025-07-18 SDOH — ECONOMIC STABILITY - HOUSING INSECURITY: HOMELESSNESS UNSPECIFIED: Z59.00

## 2025-07-18 ASSESSMENT — FIBROSIS 4 INDEX
FIB4 SCORE: 0.2
FIB4 SCORE: 0.2

## 2025-07-18 NOTE — CONSULTS
"[facility]   TRIAGE ASSESSMENT    Name: Fareed Muhammad  MRN: [id number]  : 1995  Age: 29 y.o.  Date of assessment: 2025  PCP: JOANA Judge  Persons in attendance: Patient  Patient Location: [facility]    CHIEF COMPLAINT/PRESENTING ISSUE (as stated by patient): Pt came in with complaints of suicidal ideation. Upon further evaluation it was noted that the pt has chronic SI with a vague plan that references breaking his neck. Pt reports being established with [facility] but is non-compliant with his medications. Pt states that triggers are homelessness. He states \"I sleep all the time because I'm depressed\". During the assessment, pt is agitated and intermittently refusing to engage in the conversation. He is hiding under his blanket and asking to go back to sleep. This RN attempted to discuss wrap around services at [facility] and pt was open to sharing his Case Workers name at [facility]. When asked what the pt would like to get out of his visit to the ER today he states \"I have no where to live and I can't walk.\" Pt was provided with additional resources including homelessness resources, adult psychiatry, [facility] and [facility] flyers. Pt will be given a dose of his home meds and given a 3 day supply in the mean time.      Chief Complaint   Patient presents with    Suicidal Ideation     Pt reports having SI by breaking his neck. Denies HI. Denies drinking alcohol or drugs.         CURRENT LIVING SITUATION/SOCIAL SUPPORT/FINANCIAL RESOURCES: homeless    BEHAVIORAL HEALTH/SUBSTANCE USE TREATMENT HISTORY  Does patient/parent report a history of prior behavioral health/substance use treatment for patient?   Yes:    Dates Level of Care Facilty/Provider Diagnosis/Problem Medications    inpt [facility]     3[facility]2025 [facility]     [facility]25 in[facility]                                                   SAFETY ASSESSMENT " "- SELF  Does patient acknowledge current or past symptoms of dangerousness to self or is previous history noted? Yes- chronic si  Does parent/significant other report patient has current or past symptoms of dangerousness to self? no  Does presenting problem suggest symptoms of dangerousness to self? No    SAFETY ASSESSMENT - OTHERS  Does patient acknowledge current or past symptoms of aggressive behavior or risk to others or is previous history noted? no  Does parent/significant other report patient has current or past symptoms of aggressive behavior or risk to others?  no  Does presenting problem suggest symptoms of dangerousness to others? No    LEGAL HISTORY  Does patient acknowledge history of arrest/halfway/skilled nursing or is previous history noted? no    Crisis Safety Plan completed and copy given to patient? N\A    ABUSE/NEGLECT SCREENING  Does patient report feeling “unsafe” in his/her home, or afraid of anyone?  no  Does patient report any history of physical, sexual, or emotional abuse?  no  Does parent or significant other report any of the above? no  Is there evidence of neglect by self?  yes  Is there evidence of neglect by a caregiver? no  Does the patient/parent report any history of CPS/APS/police involvement related to suspected abuse/neglect or domestic violence? no  Based on the information provided during the current assessment, is a mandated report of suspected abuse/neglect being made?  No    SUBSTANCE USE SCREENING  Yes:  Qasim all substances used in the past 30 days:      Last Use Amount   [x]   Alcohol     [x]   Marijuana     []   Heroin     []   Prescription Opioids  (used without prescription, for    recreation, or in excess of prescribed amount)     []   Other Prescription  (used without prescription, for    recreation, or in excess of prescribed amount)     []   Cocaine      [x]   Methamphetamine     []   \"\" drugs (ectasy, MDMA)     []   Other substances        UDS results: " N/A  Breathalyzer results: 0.00    What consequences does the patient associate with any of the above substance use and or addictive behaviors? None    Risk factors for detox (check all that apply):  []  Seizures   []  Diaphoretic (sweating)   []  Tremors   []  Hallucinations   []  Increased blood pressure   []  Decreased blood pressure   []  Other   []  None      [] Patient education on risk factors for detoxification and instructed to return to ER as needed.      MENTAL STATUS   Participation: Limited verbal participation and Defensive  Grooming: Disheveled  Orientation: Alert and Fully Oriented  Behavior: Agitated  Eye contact: Limited  Mood: Depressed and Irritable  Affect: Flexible  Thought process: Goal-directed  Thought content: Within normal limits  Speech: Loud  Perception: Within normal limits  Memory:  No gross evidence of memory deficits  Insight: Limited  Judgment:  Limited  Other:    Collateral information: Pt has had several ED visits for behavioral health concerns over the last 2 months. He has had treatment at [facility] and [facility] and continues to be non compliant with his medications.  Source:  [] Significant other present in person:   [] Significant other by telephone  [] [facility]   [] [facility] Nursing Staff  [] [facility]  [] Other:     [] Unable to complete full assessment due to:  [] Acute intoxication  [] Patient declined to participate/engage  [] Patient verbally unresponsive  [] Significant cognitive deficits  [] Significant perceptual distortions or behavioral disorganization  [] Other:      CLINICAL IMPRESSIONS:  Primary:  depression  Secondary:  homelessness       IDENTIFIED NEEDS/PLAN:  [Trigger DISPOSITION list for any items marked]    []  Imminent safety risk - self [] Imminent safety risk - others   []  Acute substance withdrawal []  Psychosis/Impaired reality testing   [x]  Mood/anxiety [x]  Substance use/Addictive behavior   [x]  Maladaptive behaviro []   Parent/child conflict   []  Family/Couples conflict []  Biomedical   [x]  Housing [x]  Financial   []   Legal  Occupational/Educational   []  Domestic violence []  Other:     Recommended Plan of Care:  Actively being addressed by [facility]  *[facility] may not be utilized for moderate or high risk patients    Pt provided with resources for wrap around services at [facility]. Discussed the plan to walk to [facility] for a walk in appt and then pt was provided with a bus pass in order to go to [facility]. He was educated about available resources that can assist with housing. Pt also provided with a dose of his home meds and with a d/c prescription to cover a few day until care is established.     Additional resources, Alert Team , [facility], [facility] flyer, [facility] flyer.    Has the Recommended Plan of Care/Level of Observation been reviewed with the patient's assigned nurse? yes    Does patient/parent or guardian express agreement with the above plan? yes      Referral appointment(s) scheduled? N\A    Alert team only:   I have discussed findings and recommendations with Dr. Mills who is in agreement with these recommendations. D/C L2k    Referral information sent to the following outpatient community providers :N/A    Referral information sent to the following inpatient community providers :N/A  Kimber Aly R.N.  7/18/2025

## 2025-07-18 NOTE — ED TRIAGE NOTES
"Chief Complaint   Patient presents with    Suicidal Ideation     Pt reports having SI by breaking his neck. Denies HI. Denies drinking alcohol or drugs.      Ambulatory:  Yes  Alert and Oriented: x 4  Oxygen Treatment: No    Pt came in to triage for the above complaints.   Pt is speaking in full sentences, follows commands and responds appropriately to questions.   Respirations are even and unlabored.    Notify Charge RN.     BP (!) 143/109   Pulse 90   Temp 36 °C (96.8 °F) (Temporal)   Resp 16   Ht 1.803 m (5' 11\")   Wt (!) 160 kg (352 lb 11.8 oz)   SpO2 94%   BMI 49.20 kg/m²       "

## 2025-07-18 NOTE — ED NOTES
Beside report from [facility] RN.  L2K in place, paper scrubs in use, all belongings removed, and stop sign displayed.  Pt report to 1:1 sitter at bedside. Safety checklist reviewed and signed.

## 2025-07-18 NOTE — ED NOTES
Legal hold discontinued by ERP. Pt's belongings returned. [facility] contacted to tube discharge medications.

## 2025-07-18 NOTE — ED NOTES
Pt becoming verbally aggressive while alert team trying to before eval and provide pt with resources. Security standing by at bedside.

## 2025-07-18 NOTE — ED NOTES
Pt changed back into personal clothing. Pt provided discharge instructions and follow-up information including mental health resources from alert team. Pt provided discharge prescription from [facility]. Pt verbalized understanding and all questions answered. Pt ambulated from ED with steady gait carrying all belongings. Pt provided bus pass for transport home.     Security standing by.

## 2025-07-18 NOTE — ED PROVIDER NOTES
ED Provider Note    CHIEF COMPLAINT  Chief Complaint   Patient presents with    Suicidal Ideation     Pt reports having SI by breaking his neck. Denies HI. Denies drinking alcohol or drugs.        EXTERNAL RECORDS REVIEWED  Inpatient Notes patient was admitted to inpatient psych May 2025 for recurrent MDD    HPI/ROS  LIMITATION TO HISTORY   Select: : None  OUTSIDE HISTORIAN(S):  None    Fareed Muhammad is a 29 y.o. male with history of chronic schizophrenia, major depressive disorder, morbid obesity  who presents for evaluation of suicidal ideation that started today.  His plan is to snap his own neck.  He has not been taking any of his psychiatric medications.  He also notes that he has been having some blisters on the bottom of his foot from walking so much and is having foot pain.  He denies any homicidal ideation, hallucinations.  He denies any drug or alcohol use. Patient currently homeless.    PAST MEDICAL HISTORY   has a past medical history of Acute alcohol intoxication (HCC) (08/21/2023), ASTHMA, Bipolar disorder (HCC), Closed fracture of multiple ribs of left side (05/31/2023), Discharge planning issues (06/22/2023), Drug abuse in remission ([facility]), Liver laceration, initial encounter (05/31/2023), No contraindication to deep vein thrombosis (DVT) prophylaxis (05/31/2023), Spleen laceration, initial encounter (05/31/2023), Trauma (05/31/2023), and Trauma (08/21/2023).    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    FAMILY HISTORY  Family History   Problem Relation Age of Onset    Cancer Paternal Aunt         breasr canre    Hypertension Maternal Grandmother     Diabetes Maternal Grandmother     Cancer Maternal Grandmother     Hypertension Maternal Grandfather     Diabetes Maternal Grandfather        SOCIAL HISTORY  Social History     Tobacco Use    Smoking status: Every Day      "Current packs/day: 2.00     Average packs/day: 1.2 packs/day for 7.5 years (9.1 ttl pk-yrs)     Types: Cigarettes     Start date: 2018    Smokeless tobacco: Never    Tobacco comments:     currently using e-cigarettes   Vaping Use    Vaping status: Never Used   Substance and Sexual Activity    Alcohol use: Yes     Alcohol/week: 1.8 oz     Types: 3 Cans of beer per week    Drug use: Not Currently     Types: Marijuana     Comment: meth, marijuana    Sexual activity: Not Currently     Partners: Female     Birth control/protection: Condom       CURRENT MEDICATIONS  Home Medications    **Home medications have not yet been reviewed for this encounter**         ALLERGIES  Allergies[1]    PHYSICAL EXAM  VITAL SIGNS: BP (!) 143/109   Pulse 90   Temp 36 °C (96.8 °F) (Temporal)   Resp 16   Ht 1.803 m (5' 11\")   Wt (!) 160 kg (352 lb 11.8 oz)   SpO2 94%   BMI 49.20 kg/m²      Constitutional: Well developed, Well nourished, No acute respiratory distress, Non-toxic appearance.  Lying on left side  HENT: Normocephalic, Atraumatic, Bilateral external ears normal, Oropharynx clear, mucous membranes are moist.  Eyes: Conjunctiva normal, No discharge. No icterus.  Neck: Normal range of motion. Supple.  Lymphatic: No cervical lymphadenopathy noted.   Cardiovascular: Normal heart rate, Normal rhythm, No murmurs, No rubs, No gallops.   Thorax & Lungs: Clear to auscultation bilaterally, No respiratory distress, No wheezing.  Abdomen: Soft nontender normal bowel sounds no rebound masses or peritoneal signs  Skin: Warm, Dry, No erythema, No rash.  Calluses noted to the balls of bilateral feet, no erythema or purulent drainage  Extremities: Intact distal pulses, No edema, No tenderness  Musculoskeletal: Good range of motion in all major joints. Normal gait.  Neurologic: Alert & oriented. No focal deficits noted.   Psych: Reports SI with plan to snap neck, denies HI, not responding to internal stimuli    EKG/LABS  Results for orders " placed or performed during the hospital encounter of 07/18/25   POC BREATHALIZER    Collection Time: 07/18/25  3:05 AM   Result Value Ref Range    POC Breathalizer 0.00 0.00 - 0.01 Percent       I have independently interpreted this EKG      COURSE & MEDICAL DECISION MAKING    ASSESSMENT, COURSE AND PLAN  Care Narrative:   This is a 29-year-old male with history of schizophrenia and MDD who is here for suicidal ideation with plan to stab his own neck.  He denies any drug or alcohol use.  He is post be taking psychiatric medications however patient denies taking any.  He also reports foot pain from calluses on the bottom of his feet from walking a lot.    Patient was evaluated, vital signs are stable.  He is resting comfortably.  There is no signs of infections to his feet.  No history of trauma therefore does not need x-rays.  His feet appear callus.    Patient is medically cleared for psychiatric evaluation.  Legal hold has been initiated.  Pending evaluation by alert team.  Patient will remain in ED observation status and his care will be signed out to my partner pending evaluation by alert team.    ED OBS: Yes; I am placing the patient in to an observation status due to a diagnostic uncertainty as well as therapeutic intensity. Patient placed in observation status at 3:26 AM, 7/18/2025.     Observation plan is as follows: alert team evaluation            ADDITIONAL PROBLEMS MANAGED  Foot pain - calluses noted, no signs of infection    DISPOSITION AND DISCUSSIONS  I have discussed management of the patient with the following physicians and ADAM's:    [facility] TALAMANTES - Dr. Mills    Discussion of management with other [facility] or appropriate source(s): None     Escalation of care considered, and ultimately not performed:Laboratory analysis    Barriers to care at this time, including but not limited to: Patient is homeless.     Decision tools and prescription drugs considered including, but not limited to: None.    FINAL  DIAGNOSIS  1. Suicidal ideation Acute   2. Pain in both feet Acute   3. Homelessness Acute        Electronically signed by: Wendy Quintanilla M.D., 7/18/2025 3:26 AM           [1] No Known Allergies

## 2025-07-19 ENCOUNTER — PHARMACY VISIT (OUTPATIENT)
Dept: PHARMACY | Facility: MEDICAL CENTER | Age: 30
End: 2025-07-19
Payer: COMMERCIAL

## 2025-07-19 ENCOUNTER — HOSPITAL ENCOUNTER (EMERGENCY)
Facility: MEDICAL CENTER | Age: 30
End: 2025-07-20
Attending: EMERGENCY MEDICINE
Payer: MEDICARE

## 2025-07-19 VITALS
RESPIRATION RATE: 17 BRPM | DIASTOLIC BLOOD PRESSURE: 92 MMHG | TEMPERATURE: 97.2 F | OXYGEN SATURATION: 96 % | HEART RATE: 100 BPM | WEIGHT: 315 LBS | SYSTOLIC BLOOD PRESSURE: 138 MMHG | BODY MASS INDEX: 44.1 KG/M2 | HEIGHT: 71 IN

## 2025-07-19 DIAGNOSIS — Z76.0 MEDICATION REFILL: ICD-10-CM

## 2025-07-19 DIAGNOSIS — K52.9 CHRONIC DIARRHEA: ICD-10-CM

## 2025-07-19 DIAGNOSIS — R21 PERIANAL RASH: ICD-10-CM

## 2025-07-19 DIAGNOSIS — Z59.00 HOMELESS: Primary | ICD-10-CM

## 2025-07-19 PROCEDURE — RXMED WILLOW AMBULATORY MEDICATION CHARGE: Performed by: EMERGENCY MEDICINE

## 2025-07-19 PROCEDURE — A9270 NON-COVERED ITEM OR SERVICE: HCPCS | Mod: UD | Performed by: EMERGENCY MEDICINE

## 2025-07-19 PROCEDURE — 700102 HCHG RX REV CODE 250 W/ 637 OVERRIDE(OP): Mod: UD | Performed by: EMERGENCY MEDICINE

## 2025-07-19 PROCEDURE — 99283 EMERGENCY DEPT VISIT LOW MDM: CPT

## 2025-07-19 RX ORDER — QUETIAPINE FUMARATE 100 MG/1
100 TABLET, FILM COATED ORAL 2 TIMES DAILY
Qty: 60 TABLET | Refills: 3 | Status: SHIPPED | OUTPATIENT
Start: 2025-07-19

## 2025-07-19 RX ORDER — QUETIAPINE FUMARATE 100 MG/1
100 TABLET, FILM COATED ORAL ONCE
Status: COMPLETED | OUTPATIENT
Start: 2025-07-19 | End: 2025-07-19

## 2025-07-19 RX ORDER — ACETAMINOPHEN 500 MG
1000 TABLET ORAL ONCE
Status: COMPLETED | OUTPATIENT
Start: 2025-07-19 | End: 2025-07-19

## 2025-07-19 RX ORDER — IBUPROFEN 800 MG/1
800 TABLET, FILM COATED ORAL ONCE
Status: COMPLETED | OUTPATIENT
Start: 2025-07-19 | End: 2025-07-19

## 2025-07-19 RX ADMIN — IBUPROFEN 800 MG: 800 TABLET, FILM COATED ORAL at 20:48

## 2025-07-19 RX ADMIN — ACETAMINOPHEN 1000 MG: 500 TABLET ORAL at 20:48

## 2025-07-19 RX ADMIN — QUETIAPINE FUMARATE 100 MG: 100 TABLET ORAL at 20:48

## 2025-07-19 SDOH — ECONOMIC STABILITY - HOUSING INSECURITY: HOMELESSNESS UNSPECIFIED: Z59.00

## 2025-07-19 ASSESSMENT — FIBROSIS 4 INDEX: FIB4 SCORE: 0.2

## 2025-07-19 ASSESSMENT — PAIN DESCRIPTION - PAIN TYPE: TYPE: ACUTE PAIN

## 2025-07-19 NOTE — ED NOTES
Pt discharged to home. Discharge paperwork provided. Education provided by ERP. Reinforced discharge instructions.  Pt was given follow up instructions and prescriptions.  Pt verbalized understanding of all instructions for discharge.   Patient went out of the ER ambulatory with steady gait., alert and oriented x 4, with all belongings.     Security standby

## 2025-07-19 NOTE — ED TRIAGE NOTES
"Chief Complaint   Patient presents with    Suicidal Ideation     Pt endorses feeling suicidal, states he tried to break his neck by twisting his head with his hands.  Pt keeps stating, \"I am homeless and starving to death.\"     Pt was seen this morning for same complaints and discharged.  Pt continuing to state that he cannot walk and requesting WC but pt able to walk from lobby with triage room without difficulty.      Pt is alert and oriented, speaking in full sentences, follows commands and responds appropriately to questions. Resp are even and unlabored.      Pt placed in lobby. Once placed back in [employer], pt noted to walk around in [employer] without assistance.  Pt educated on triage process. Pt encouraged to alert staff for any changes.     Patient and staff wearing appropriate PPE.    /72   Pulse (!) 108   Temp 36.2 °C (97.2 °F)   Resp 14   Ht 1.803 m (5' 11\")   Wt (!) 160 kg (352 lb)   SpO2 92%      "

## 2025-07-19 NOTE — ED NOTES
MTM ride; 10-15 mins ETA; security informed    Patient going to [facility]; arranged by alert team

## 2025-07-19 NOTE — ED PROVIDER NOTES
"ER Provider Note    Scribed for Concha Borjas M.d. by Carissa Guaman. 7/18/2025  7:23 PM    Primary Care Provider: JOANA Judge    CHIEF COMPLAINT   Chief Complaint   Patient presents with    Suicidal Ideation     Pt endorses feeling suicidal, states he tried to break his neck by twisting his head with his hands.  Pt keeps stating, \"I am homeless and starving to death.\"     EXTERNAL RECORDS REVIEWED  External ED Note The patient was at [facility] June 5th of this year for SI and ETOH. He has history of bipolar, HIV and asthma. He endorsed passive suicidality without a plan.    HPI/ROS  LIMITATION TO HISTORY   Select: : None  OUTSIDE HISTORIAN(S):  None.    Fareed Muhammad is a 29 y.o. male who presents to the ED for evaluation of suicidal ideation. Per report, the patient was seen this morning for the same complaint and was evaluated by behavioral health, was found to have chronic suicidality, contracted for safety, and was discharged. He describes that he is depressed and is having suicidal thoughts. He reports that he tried to crack his neck with his fists earlier today in hopes of breaking his neck.  This was the same plan that patient described earlier this morning when he was here.  The patient notes that he has done this in the past, with his last time being a couple weeks ago. He denies overdosing on any pills today. He denies the use of drugs or alcohol. The patient is currently homeless. He is requesting food and something to drink at this time. Per nursing note, the patient kept stating \"I am homeless and starving to death.\" He reports that he takes Seroquel and gabapentin.  Patient denies attempting to harm himself in any other way.  Did not overdose on any medications.  No homicidal ideation.    PAST MEDICAL HISTORY  Past Medical History[1]    SURGICAL HISTORY  Past Surgical History[2]    FAMILY HISTORY  Family History   Problem Relation Age of Onset    Cancer Paternal Aunt  " "       pradip canre    Hypertension Maternal Grandmother     Diabetes Maternal Grandmother     Cancer Maternal Grandmother     Hypertension Maternal Grandfather     Diabetes Maternal Grandfather      SOCIAL HISTORY   reports that he has been smoking cigarettes. He started smoking about 7 years ago. He has a 9.1 pack-year smoking history. He has never used smokeless tobacco. He reports current alcohol use of about 1.8 oz of alcohol per week. He reports that he does not currently use drugs after having used the following drugs: Marijuana.    CURRENT MEDICATIONS  Discharge Medication List as of 7/18/2025  8:54 PM        CONTINUE these medications which have NOT CHANGED    Details   QUEtiapine (SEROQUEL) 50 MG tablet Take 2 Tablets by mouth every evening for 3 days., Disp-6 Tablet, R-0, Normal      fluticasone-umeclidinium-vilanterol (TRELEGY ELLIPTA) 200-62.5-25 mcg/PUFF inhaler Inhale 1 Puff every day., No Print      albuterol 108 (90 Base) MCG/ACT Aero Soln inhalation aerosol Inhale 2 Puffs every four hours as needed for Shortness of Breath., No Print      INVEGA SUSTENNA 234 MG/1.5ML Suspension Prefilled Syringe INJECT 234 MG INTRAMUSCULARLY EVERY 4 WEEKS, KARIN, Historical Med      traZODone (DESYREL) 50 MG Tab Take 1 Tablet by mouth every evening., Disp-30 Tablet, R-0, Normal           ALLERGIES  Patient has no known allergies.      PHYSICAL EXAM  /72   Pulse (!) 108   Temp 36.2 °C (97.2 °F)   Resp 14   Ht 1.803 m (5' 11\")   Wt (!) 160 kg (352 lb)   SpO2 92%   BMI 49.09 kg/m²     Constitutional: Disheveled and unkempt; No acute distress; Non-toxic appearance.  Morbidly obese  HENT: Normocephalic, atraumatic; Bilateral external ears normal;   Eyes: PERRL, EOMI, Conjunctiva normal. No discharge.   Neck:  Supple, nontender midline; No stridor; No nuchal rigidity.   Lymphatic: No cervical lymphadenopathy noted.   Cardiovascular: Regular rate and rhythm without murmurs, rubs, or gallop.   Thorax & Lungs: No " "respiratory distress, Decreased breath sounds without wheezing, rales or rhonchi. Nontender chest wall. No crepitus or subcutaneous air  Abdomen: Soft, nontender, bowel sounds normal. No obvious masses; No pulsatile masses; no rebound, guarding, or peritoneal signs.   Skin: Good color; warm and dry without rash or petechia.  Back: Nontender, No CVA tenderness.    Extremities: Distal radial, dorsalis pedis, posterior tibial pulses are equal bilaterally; No edema; Nontender calves or saphenous, No cyanosis, No clubbing.   Musculoskeletal: Good range of motion in all major joints. No tenderness to palpation or major deformities noted.   Neurologic: Alert & oriented x 4, clear speech      DIAGNOSTIC STUDIES    LABS  Results for orders placed or performed during the hospital encounter of 07/18/25   POC BREATHALIZER    Collection Time: 07/18/25  6:45 PM   Result Value Ref Range    POC Breathalizer 0.000 0.00 - 0.01 Percent         COURSE & MEDICAL DECISION MAKING     ASSESSMENT, COURSE AND PLAN  Care Narrative: Patient presents to the ER for the second time in the last 24 hours with suicidal ideation.  He says he plans to use his hands to crack his own neck.  This was patient's plan when he was seen here earlier today.  He was evaluated earlier today by behavioral health and when asked what the patient would like to get out of his ER visit today he said \"I have nowhere to live and I cannot walk.\"  Patient was provided with resources including homeless resources, adult psychiatry, [facility] in [place] behavioral flares.  He was given a dose of his home meds and given a 3-day supply in the meantime.   Legal 2000 was discontinued and patient was discharged.     Here in the ER the patient is sleeping for most of his ED stay.  He moves all extremities with full range of motion.  He is able to walk.  When asked how we can help him today he requests food.  He seen by Katrina, with the alert team.  She knows the patient well.  Patient " "has good insurance.  She is encouraged the patient to use his insurance and follow-up with the resources that were provided to him today.  The patient has chronic suicidality.  Alert team does not feel that there is any acute issue at this time and patient does not meet criteria for legal 2000.  At this time patient will be discharged.    7:23 PM - Patient seen and examined at bedside. This is a 29 year old man who presents to the emergency department for evaluation of suicidal ideation. Per report, the patient was seen this morning for the same complaint and was discharged. He describes that he is depressed and is having suicidal thoughts. He reports that he tried to crack his neck with his fists earlier today in hopes of breaking his neck. The patient notes that he has done this in the past, with his last time being a couple weeks ago. He denies overdosing on any pills today. He denies the use of drugs or alcohol. The patient is currently homeless. He is requesting food and something to drink at this time. Per nursing note, the patient kept stating \"I am homeless and starving to death.\" Discussed plan of care, including performing lab work. Patient agrees to the plan of care. Ordered for POC Breathalizer and Urine Drug Screen to evaluate his symptoms.      7:45 PM - Spoke with Katrina from alert team. She informed me that Fareed has great insurance. He has MTM, [facility] The patient was here today. He is only wanting a place to sleep and food. He does not need to be on a hold.     7:58 PM - The patient started screaming at staff members     8:15 PM - The patient was reevaluated at bedside. Discussed discharge instructions and return precautions with the patient and they were cleared for discharge. Patient was given the opportunity to ask any further questions. He is comfortable with discharge at this time.         DISPOSITION AND DISCUSSIONS  I have discussed management of the patient with the " following physicians and ADAM's:  None.    Discussion of management with other QHP or appropriate source(s): Behavioral Health Katrina with Alert Team     Escalation of care considered, and ultimately not performed:      Barriers to care at this time, including but not limited to: Patient is homeless.     Decision tools and prescription drugs considered including, but not limited to: Patient has no complaints of pain.  No need for pain medicine..    The patient will return for new or worsening symptoms and is stable at the time of discharge.    DISPOSITION:  Patient will be discharged home in stable condition.    FINAL DIAGNOSIS  1. Suicidal ideation Chronic   2. Depression, unspecified depression type       I, Carissa Guaman (Scribe), am scribing for, and in the presence of, Concha Borjas M.D..    Electronically signed by: Carissa Guaman (Scribe), 7/18/2025    IConcha M.D. personally performed the services described in this documentation, as scribed by Carissa Guaman in my presence, and it is both accurate and complete.     This dictation has been created using voice recognition software. The accuracy of the dictation is limited by the abilities of the software. I expect there may be some errors of grammar and possibly content. I made every attempt to manually correct the errors within my dictation. However, errors related to voice recognition software may still exist and should be interpreted within the appropriate context.      The note accurately reflects work and decisions made by me.  Concha Borjas M.D.  7/18/2025  10:50 PM         [1]   Past Medical History:  Diagnosis Date    Acute alcohol intoxication (HCC) 08/21/2023    ASTHMA     Dx at age 5, does not use and inhaler, and has never had an attack    Bipolar disorder (HCC)     Closed fracture of multiple ribs of left side 05/31/2023    Discharge planning issues 06/22/2023    Drug abuse in remission (HCC)      meth, alcohol and tobacco since 14    Liver laceration, initial encounter 05/31/2023    No contraindication to deep vein thrombosis (DVT) prophylaxis 05/31/2023    Spleen laceration, initial encounter 05/31/2023    Trauma 05/31/2023    Trauma 08/21/2023   [2]   Past Surgical History:  Procedure Laterality Date    HARDWARE REMOVAL ORTHO Left 7/13/2023    Procedure: ELBOW PIN REMOVAL WITH CAST REMOVAL;  Surgeon: Josef Bills M.D.;  Location: SURGERY [facility];  Service: Orthopedics    ORIF, ELBOW Left 6/1/2023    Procedure: ORIF, ELBOW;  Surgeon: Josef Bills M.D.;  Location: SURGERY [facility];  Service: Orthopedics    SPLENECTOMY N/A 5/31/2023    Procedure: SPLENECTOMY;  Surgeon: Doris Bhakta M.D.;  Location: [facility];  Service: General

## 2025-07-19 NOTE — DISCHARGE INSTRUCTIONS
Follow-up at [facility] as recommended by the alert team/behavioral health nurses.    Return to the ER for any problems or concerns.

## 2025-07-20 NOTE — ED NOTES
"Pt given barrier cream to apply to his buttock and groin. Pt states he is unable to apply cream himself, requesting that we apply cream to his \"nut sack.\" Pt able to apply cream himself after verbal instruction and encouragement   "

## 2025-07-20 NOTE — ED NOTES
Received report from RENAE Gastelum. Pt has been discharged; all paperwork and Rx given to pt by RENAE Gastelum. Pt continues to sleep on gurney. Will ask security for help getting pt out.

## 2025-07-20 NOTE — ED PROVIDER NOTES
ED Provider Note    CHIEF COMPLAINT  Chief Complaint   Patient presents with    Other     Pt reports today started to have pain on his buttocks and that he has a rash on his buttocks area. Pt asking for pain medication to relive the pain.        EXTERNAL RECORDS REVIEWED  External ED Note has been seen at [facility] recently for SI and EtOH has a history of bipolar disorder HIV and asthma.  Was seen here yesterday for evaluation of SI and being homeless he was sent to [facility] via [facility]    HPI/ROS  LIMITATION TO HISTORY   Select: : None  OUTSIDE HISTORIAN(S):  feliz Muhammad is a 29 y.o. male who presents to the emergency department chief complaint of diarrhea and pain around his rectum.  He states he is got a rash and it really hurts and he needs to be taking care of.  He denies nausea vomiting fevers or chills.  Denies any bloody stools or bloody emesis    PAST MEDICAL HISTORY   has a past medical history of Acute alcohol intoxication (HCC) (08/21/2023), ASTHMA, Bipolar disorder (HCC), Closed fracture of multiple ribs of left side (05/31/2023), Discharge planning issues (06/22/2023), Drug abuse in remission ([facility]), Liver laceration, initial encounter (05/31/2023), No contraindication to deep vein thrombosis (DVT) prophylaxis (05/31/2023), Spleen laceration, initial encounter (05/31/2023), Trauma (05/31/2023), and Trauma (08/21/2023).    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    FAMILY HISTORY  Family History   Problem Relation Age of Onset    Cancer Paternal Aunt         breasr canre    Hypertension Maternal Grandmother     Diabetes Maternal Grandmother     Cancer Maternal Grandmother     Hypertension Maternal Grandfather     Diabetes Maternal Grandfather        SOCIAL HISTORY  Social History     Tobacco Use    Smoking status: Every Day     Current packs/day: 2.00     Average  "packs/day: 1.2 packs/day for 7.5 years (9.1 ttl pk-yrs)     Types: Cigarettes     Start date: 2018    Smokeless tobacco: Never    Tobacco comments:     currently using e-cigarettes   Vaping Use    Vaping status: Never Used   Substance and Sexual Activity    Alcohol use: Yes     Alcohol/week: 1.8 oz     Types: 3 Cans of beer per week    Drug use: Not Currently     Types: Marijuana     Comment: meth, marijuana    Sexual activity: Not Currently     Partners: Female     Birth control/protection: Condom       CURRENT MEDICATIONS  Home Medications       Reviewed by Sandra Dillon R.N. (Registered Nurse) on 07/19/25 at 2001  Med List Status: Not Addressed     Medication Last Dose Status   albuterol 108 (90 Base) MCG/ACT Aero Soln inhalation aerosol  Active   fluticasone-umeclidinium-vilanterol (TRELEGY ELLIPTA) 200-62.5-25 mcg/PUFF inhaler  Active   INVEGA SUSTENNA 234 MG/1.5ML Suspension Prefilled Syringe  Active   QUEtiapine (SEROQUEL) 50 MG tablet  Active   traZODone (DESYREL) 50 MG Tab  Active                    ALLERGIES  Allergies[1]    PHYSICAL EXAM  VITAL SIGNS: BP (!) 141/95   Pulse (!) 122   Temp 36.2 °C (97.2 °F) (Tympanic)   Resp 18   Ht 1.803 m (5' 11\")   Wt (!) 160 kg (352 lb 11.8 oz)   SpO2 94%   BMI 49.20 kg/m²    Pulse OX: Pulse Oxygen level is within normal limits on room air  Constitutional: Alert in no apparent distress.  HENT: Normocephalic, Atraumatic, MMM  Eyes: PERound. Conjunctiva normal, non-icteric.   Heart: Regular rate and rhythm, intact distal pulses   Lungs: Symmetrical movement, no resp distress   Abdomen/: Non-tender, non-distended, normal bowel sounds between the butt cheeks near the rectum there is just some mild skin breakdown and erythema consistent with discomfort secondary diarrhea  Skin: Warm, Dry, No erythema, No rash.   Neurologic: Alert and oriented, Grossly non-focal.         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  When I asked him what happened he states he did not have a bed for him and he is homeless and then asked if he could be hospitalized until the rash around his rectum is cleared.  It is not infected there is no bacterial infection it is also skin breakdown secondary to diarrhea.  He will be provided with a barrier cream and when I came back to the room then said he needed to be hospitalized because he is suicidal.  When I pressed him about it he states that he just wants a place to sleep for the night and he does not feel comfortable being on the street.  He is not suicidal he is not homicidal he has no plans to hurt himself or others.  He was given a barrier cream oral ibuprofen and Tylenol and they will be discharged with outpatient resources      I have discussed management of the patient with the following physicians and ADAM's:  none    Discussion of management with other Q or appropriate source(s): None     Escalation of care considered, and ultimately not performed:Laboratory analysis    Barriers to care at this time, including but not limited to: Patient is homeless.     Decision tools and prescription drugs considered including, but not limited to: barrier cream.    The patient will return for new or worsening symptoms and is stable at the time of discharge.    The patient is referred to a primary physician for blood pressure management, diabetic screening, and for all other preventative health concerns.    DISPOSITION:  Patient will be discharged home in stable condition.    FOLLOW UP:  Ileana Goins A.P.R.N.  [address] [facility] [address]  [phone]    Schedule an appointment as soon as possible for a visit         OUTPATIENT MEDICATIONS:  New Prescriptions    QUETIAPINE (SEROQUEL) 100 MG TAB    Take 1 Tablet by mouth 2 times a day.         FINAL DIAGNOSIS  1.  Homeless    2. Perianal rash    3. Chronic diarrhea    4. Medication refill         Electronically signed by: Martha Melo M.D., 7/19/2025 8:28 PM           [1] No Known Allergies

## 2025-07-20 NOTE — ED NOTES
Prescription medicine were picked up for the patient as he stated he was not able to walk up there himself, and to expedite the process of making sure his medications were received.  Prescription medications were delivered to the patient in the bag given by the pharmacist, closed and stapled.  At time of receipt, pharmacist asked this tech to sign for the medication.  Signature stated this was for medication for the patient, and employee ID was input as well.

## 2025-07-20 NOTE — ED NOTES
"Pt yelling \"find someone who knows how to argue you stupid bitch.\" Pt then provided with food and stated \"thank you\"   "

## 2025-07-20 NOTE — DISCHARGE INSTRUCTIONS
Use the barrier cream after every stool use a very large amount it will help keep your skin clean while it heals over the next week or so.

## 2025-07-30 ENCOUNTER — HOSPITAL ENCOUNTER (EMERGENCY)
Facility: MEDICAL CENTER | Age: 30
End: 2025-07-30
Payer: MEDICARE

## 2025-07-30 ASSESSMENT — FIBROSIS 4 INDEX: FIB4 SCORE: 0.2

## 2025-07-30 ASSESSMENT — PAIN DESCRIPTION - PAIN TYPE: TYPE: ACUTE PAIN

## 2025-07-30 NOTE — ED TRIAGE NOTES
"Chief Complaint   Patient presents with    Rash     Began x2 days ago to testicles, redness, pain 10/10        Ambulated to triage for above complaint. Intake ETOH prior to coming to ER.     Past Medical History[1]    Pt educated of triage process and possible wait times. Pt informed to contact staff if status changes or with any developing concerns, pt returned to lobby.     /77   Pulse (!) 109   Temp 36.6 °C (97.9 °F) (Temporal)   Resp 16   Ht 1.803 m (5' 11\")   Wt (!) 143 kg (315 lb 4.1 oz)   SpO2 94%   BMI 43.97 kg/m²         [1]   Past Medical History:  Diagnosis Date    Acute alcohol intoxication (HCC) 08/21/2023    ASTHMA     Dx at age 5, does not use and inhaler, and has never had an attack    Bipolar disorder (HCC)     Closed fracture of multiple ribs of left side 05/31/2023    Discharge planning issues 06/22/2023    Drug abuse in remission (HCC)     meth, alcohol and tobacco since 14    Liver laceration, initial encounter 05/31/2023    No contraindication to deep vein thrombosis (DVT) prophylaxis 05/31/2023    Spleen laceration, initial encounter 05/31/2023    Trauma 05/31/2023    Trauma 08/21/2023     "

## 2025-07-30 NOTE — ED TRIAGE NOTES
Chief Complaint   Patient presents with    Scrotal Pain     X 2 days.       Pt states rash to scrotum.     BP (!) 143/96   Pulse (!) 106   Temp 36.1 °C (97 °F) (Temporal)   Resp 14   SpO2 96%

## 2025-07-31 NOTE — ED NOTES
Fareed Muhammad paged 3 times to triage, no answer. Patient not in lobby, bathroom nor outside.  and left after triage. RN paged patient to take them back to room, and no answer.

## (undated) DEVICE — PADDING CAST 4 IN STERILE - 4 X 4 YDS (24/CA)

## (undated) DEVICE — BAG SPONGE COUNT 10.25 X 32 - BLUE (250/CA)

## (undated) DEVICE — Device

## (undated) DEVICE — POUCH FLUID COLLECTION INVISISHIELD - (10/BX)

## (undated) DEVICE — TUBING CLEARLINK DUO-VENT - C-FLO (48EA/CA)

## (undated) DEVICE — STAPLER SKIN DISP - (6/BX 10BX/CA) VISISTAT

## (undated) DEVICE — SET EXTENSION WITH 2 PORTS (48EA/CA) ***PART #2C8610 IS A SUBSTITUTE*****

## (undated) DEVICE — DRAPE 36X28IN RAD CARM BND BG - (25/CA) O

## (undated) DEVICE — BANDAGE ELASTIC STERILE VELCRO 6 X 5 YDS (25EA/CA)

## (undated) DEVICE — GLOVE BIOGEL INDICATOR SZ 7.5 SURGICAL PF LTX - (50PR/BX 4BX/CA)

## (undated) DEVICE — SUTURE 3-0 ETHILON FS-1 - (36/BX) 30 INCH

## (undated) DEVICE — GLOVE BIOGEL INDICATOR SZ 7SURGICAL PF LTX - (50/BX 4BX/CA)

## (undated) DEVICE — GLOVE BIOGEL SZ 7.5 SURGICAL PF LTX - (50PR/BX 4BX/CA)

## (undated) DEVICE — SUCTION INSTRUMENT YANKAUER BULBOUS TIP W/O VENT (50EA/CA)

## (undated) DEVICE — GOWN WARMING STANDARD FLEX - (30/CA)

## (undated) DEVICE — LACTATED RINGERS INJ 1000 ML - (14EA/CA 60CA/PF)

## (undated) DEVICE — COVER CAMERA LENS LIGHT HANDLE STUBBY DISPOSABLE (20EA/BX)

## (undated) DEVICE — SUTURE 0 VICRYL PLUS CT-1 - 8 X 18 INCH (12/BX)

## (undated) DEVICE — CLIP LG INTNL HRZN TI ESCP LGT - (20/BX)

## (undated) DEVICE — PADDING CAST 6 IN STERILE - 6 X 4 YDS (24/CA)

## (undated) DEVICE — SLEEVE VASO CALF MED - (10PR/CA)

## (undated) DEVICE — SUTURE 2-0 VICRYL PLUS CT-1 - 8 X 18 INCH(12/BX)

## (undated) DEVICE — COVER LIGHT HANDLE ALC PLUS DISP (18EA/BX)

## (undated) DEVICE — CLIP MED LG INTNL HRZN TI ESCP - (20/BX)

## (undated) DEVICE — CANISTER SUCTION 3000ML MECHANICAL FILTER AUTO SHUTOFF MEDI-VAC NONSTERILE LF DISP  (40EA/CA)

## (undated) DEVICE — BOVIE  BLADE 6 EXTENDED - (50/PK)

## (undated) DEVICE — DRAPE LARGE 3 QUARTER - (20/CA)

## (undated) DEVICE — SUTURE NONABSORBABLE XBRAID S #2 22MM(12EA/BX)

## (undated) DEVICE — GLOVE BIOGEL SZ 6.5 SURGICAL PF LTX (50PR/BX 4BX/CA)

## (undated) DEVICE — CHLORAPREP 26 ML APPLICATOR - ORANGE TINT(25/CA)

## (undated) DEVICE — SENSOR OXIMETER ADULT SPO2 RD SET (20EA/BX)

## (undated) DEVICE — BLADE SURGICAL #10 - (50/BX)

## (undated) DEVICE — PAD LAP STERILE 18 X 18 - (5/PK 40PK/CA)

## (undated) DEVICE — CELLSAVER PACK

## (undated) DEVICE — CELLSAVER STAT

## (undated) DEVICE — CLIP MED INTNL HRZN TI ESCP - (25/BX)

## (undated) DEVICE — PENCIL ELECTSURG 10FT BTN SWH - (50/CA)

## (undated) DEVICE — SUTURE 0 SILK CT-1 (36PK/BX)

## (undated) DEVICE — PACK MAJOR BASIN - (2EA/CA)

## (undated) DEVICE — GLOVE BIOGEL INDICATOR SZ 8 SURGICAL PF LTX - (50/BX 4BX/CA)

## (undated) DEVICE — PACK UPPER EXTREMITY (2EA/CA)

## (undated) DEVICE — GLOVE BIOGEL SZ 7 SURGICAL PF LTX - (50PR/BX 4BX/CA)

## (undated) DEVICE — DRAPE STRLE REG TOWEL 18X24 - (10/BX 4BX/CA)"

## (undated) DEVICE — SUTURE GENERAL

## (undated) DEVICE — SET LEADWIRE 5 LEAD BEDSIDE DISPOSABLE ECG (1SET OF 5/EA)

## (undated) DEVICE — ELECTRODE DUAL RETURN W/ CORD - (50/PK)

## (undated) DEVICE — STAPLER 60MM ARTICULATING (3EA/BX)

## (undated) DEVICE — SUTURE 1 VICRYL PLUS CTX - 36 INCH (36/BX)

## (undated) DEVICE — STAPLE 60MM WHTE 2.6MM - (12/BX) WAS PART #ECR60W

## (undated) DEVICE — SODIUM CHL IRRIGATION 0.9% 1000ML (12EA/CA)

## (undated) DEVICE — DECANTER FLD BLS - (50/CA)

## (undated) DEVICE — SLEEVE, VASO, THIGH, MED

## (undated) DEVICE — SUTURE 3-0 VICRYL PLUS SH - 8X 18 INCH (12/BX)

## (undated) DEVICE — TAPE CASTING 4 IN X 4 YDS - TUF-STUFF (10/BX)

## (undated) DEVICE — GOWN SURGEONS LARGE - (32/CA)

## (undated) DEVICE — DRAPE SURG STERI-DRAPE 7X11OD - (40EA/CA)

## (undated) DEVICE — SPONGE GAUZESTER 4 X 4 4PLY - (128PK/CA)

## (undated) DEVICE — HEMOSTAT ABSORBABLE POWDER SURGICEL 3G (5EA/BX)

## (undated) DEVICE — TRAY SURESTEP FOLEY TEMP SENSING 16FR (10EA/CA) ORDER  #18764 FOR TEMP FOLEY ONLY